# Patient Record
Sex: FEMALE | Race: WHITE
[De-identification: names, ages, dates, MRNs, and addresses within clinical notes are randomized per-mention and may not be internally consistent; named-entity substitution may affect disease eponyms.]

---

## 2018-04-27 ENCOUNTER — HOSPITAL ENCOUNTER (INPATIENT)
Dept: HOSPITAL 77 - KA.MS | Age: 83
LOS: 4 days | Discharge: NURSING, REHAB OR LONG-TERM CARE | DRG: 300 | End: 2018-05-01
Attending: FAMILY MEDICINE | Admitting: NURSE PRACTITIONER
Payer: MEDICARE

## 2018-04-27 DIAGNOSIS — N18.3: ICD-10-CM

## 2018-04-27 DIAGNOSIS — G25.81: ICD-10-CM

## 2018-04-27 DIAGNOSIS — Z88.8: ICD-10-CM

## 2018-04-27 DIAGNOSIS — K59.00: ICD-10-CM

## 2018-04-27 DIAGNOSIS — Z85.828: ICD-10-CM

## 2018-04-27 DIAGNOSIS — I50.32: ICD-10-CM

## 2018-04-27 DIAGNOSIS — K21.9: ICD-10-CM

## 2018-04-27 DIAGNOSIS — F32.9: ICD-10-CM

## 2018-04-27 DIAGNOSIS — R41.3: ICD-10-CM

## 2018-04-27 DIAGNOSIS — F41.8: ICD-10-CM

## 2018-04-27 DIAGNOSIS — D50.9: ICD-10-CM

## 2018-04-27 DIAGNOSIS — M54.5: ICD-10-CM

## 2018-04-27 DIAGNOSIS — I82.412: Primary | ICD-10-CM

## 2018-04-27 DIAGNOSIS — I11.0: ICD-10-CM

## 2018-04-27 DIAGNOSIS — Z79.899: ICD-10-CM

## 2018-04-27 DIAGNOSIS — Z87.891: ICD-10-CM

## 2018-04-27 DIAGNOSIS — I13.0: ICD-10-CM

## 2018-04-27 DIAGNOSIS — L03.116: ICD-10-CM

## 2018-04-28 LAB
CHLORIDE SERPL-SCNC: 109 MMOL/L (ref 98–115)
SODIUM SERPL-SCNC: 148 MMOL/L (ref 136–145)

## 2018-04-29 RX ADMIN — NYSTATIN SCH APPLIC: 100000 POWDER TOPICAL at 20:15

## 2018-04-29 NOTE — PN
04/29/2018 PATIENT NAME:  GRETCHEN MIR

 

CHIEF COMPLAINT:  Feels much better, did have significant bowel movement this

morning.  She was significantly constipated, less leg pain, less leg edema.

Vital signs stable.  She now has a therapeutic INR.

 

HISTORY:  84-year-old female was initially seen by Carmela Denise, Nurse

practitioner at [facility].  Her son had brought her in for

evaluation. She is evaluated by home health nurse.  Home health nurse had made a

vulnerable adult report. The patient was recently removed from long-term care;

however, she was admitted due to significant DVTs of her common femoral vein and

left superficial femoral vein as well as DVT of her proximal femoral and her

left greater saphenous vein, all incompletely compressible.  Also, she has a

nonocclusive thrombus in her right common femoral vein.  She was admitted for

anticoagulation and bridging therapy since she will be placed on a chronic

anticoagulation of Coumadin.

 

PHYSICAL EXAMINATION:  VITAL SIGNS:  The vital signs have been stable.  Blood

pressure 152/74, heart rate 68, temperature 98.8, and O2 sats 97% on 1 L oxygen.

LUNGS:  Her lungs are clear to auscultation. GENERAL:  The patient is alert and

oriented.

CV:  Regular rate and rhythm.  No murmur.  S1, S2 normal.  No rub.

GI:  No longer distended.  Increase in bowel tones with resolution of

constipation.

EXTREMITIES:  Lower extremities still edematous, however, some wrinkling and

less erythema, nonpitting 2+ to her left, now down to 2+ in her right.

Bilateral anterior shin shiny, however, some wrinkling.  Pedal pulses present

bilaterally dorsalis pedis.

 

LABORATORY DATA:  White count 5.8, INR now 2.0, PT 19.5, total protein 5.2,

albumin 2.37, BUN 20, and creatinine 1.26.

 

IMPRESSION AND PLAN:

1. Deep vein thrombosis, left common femoral vein, proximal femoral vein, and

    left greater saphenous vein.  Discontinue Lovenox. Continue with Coumadin 5

    mg p.o. daily.  Monitor INR, now therapeutic.  Attempts for KAREN hose

    stockings since she has less edema today; if not, continue with spiral Ace

    wraps.  Ankle pumps practice with the patient.  Monitor INR carefully.

2. Constipation significant on admission.  She was placed on Movantik due to

    chronic opioid therapy.  Docusate sodium along with prunes.  She had

    significant multiple bowel movements this morning with less GI distention.

    Continue Movantik one day.  Discontinue scheduled tramadol.  Reduce

    docusate sodium to one tablet.  Continue with stool softener and hold

    prunes.

3. Cellulitis, left lower extremity.  There is some mild improvement.  We will

    continue with Keflex orally.

4. Congestive heart failure, diastolic, chronic.  This is stable.  Continue

    with beta blocker 20 mg Lasix daily.  We will monitor for signs of

    dehydration.  Weight is stable.

5. Chronic musculoskeletal lumbar back pain; however, this is very manageable.

    The patient was on scheduled tramadol.  We will change to p.r.n.

6. Depression, stable on Zoloft.

7. Restless legs syndrome.  Continue with Requip.

8. Vulnerable adult.  Social service consult placed.  PT consult placed.

 

OVERALL PLAN:  Discontinue Lovenox.  Continue with Coumadin. Education today

regarding dietary modifications with Coumadin.  Pharmacy consultation will be

placed today.  Monitor INR.  Continue Ace wrap.  The patient will be determined

regarding placement in the morning.  She is medically ready to be discharged,

however.

 

DD:  04/29/2018 11:06:11

DT:  04/29/2018 12:54:43

Job #:  [id number]

## 2018-04-30 RX ADMIN — NYSTATIN SCH APPLIC: 100000 POWDER TOPICAL at 10:25

## 2018-04-30 RX ADMIN — NYSTATIN SCH APPLIC: 100000 POWDER TOPICAL at 20:55

## 2018-04-30 NOTE — PCM.PN
- General Info


Date of Service: 04/30/18


Functional Status: Reports: Pain Controlled, Tolerating Diet, Other (No longer 

constipation).  Denies: Ambulating, New Symptoms





- Review of Systems


General: Reports: Weakness


HEENT: Reports: No Symptoms


Pulmonary: Reports: No Symptoms


Cardiovascular: Reports: Edema


Gastrointestinal: Denies: Constipation, Diarrhea, Nausea, Vomiting


Musculoskeletal: Reports: Joint Swelling.  Denies: Back Pain


Skin: Reports: Rash.  Denies: Bruising, Pruritis


Neurological: Reports: Pre-Existing Deficit, Difficulty Walking, Weakness, Gait 

Disturbance


Psychiatric: Reports: No Symptoms





- Patient Data


Vitals - Most Recent: 


 Last Vital Signs











Temp  97.2 F   04/30/18 05:14


 


Pulse  68   04/30/18 05:14


 


Resp  22 H  04/30/18 05:14


 


BP  141/66 H  04/30/18 05:14


 


Pulse Ox  98   04/30/18 09:00











Weight - Most Recent: 176 lb 4.8 oz


I&O - Last 24 Hours: 


 Intake & Output











 04/29/18 04/30/18 04/30/18





 22:59 06:59 14:59


 


Intake Total 630 20 


 


Balance 630 20 











Med Orders - Current: 


 Current Medications





Acetaminophen (Tylenol)  650 mg PO BEDTIME [facility]


   Last Admin: 04/29/18 20:14 Dose:  650 mg


Acetaminophen (Tylenol)  650 mg PO Q6H PRN


   PRN Reason: Pain


Aspirin (Halfprin)  81 mg PO DAILY [facility]


   Last Admin: 04/29/18 08:03 Dose:  81 mg


Calcium Carbonate/Glycine (Tums)  750 mg PO TID PRN


   PRN Reason: DYSPEPSIA


Cephalexin (Keflex)  250 mg PO TID [facility]


   Stop: 05/04/18 14:01


   Last Admin: 04/29/18 20:14 Dose:  250 mg


Docusate Sodium (Colace)  100 mg PO DAILY [facility]


Furosemide (Lasix)  20 mg PO DAILY [facility]


   Last Admin: 04/29/18 08:04 Dose:  20 mg


Gabapentin (Neurontin)  300 mg PO BEDTIME [facility]


   Last Admin: 04/29/18 20:14 Dose:  300 mg


Gabapentin (Neurontin)  100 mg PO DAILY@0800,1200 [facility]


   Last Admin: 04/29/18 12:08 Dose:  100 mg


Metoprolol Succinate (Toprol Xl)  25 mg PO DAILY [facility]


   Last Admin: 04/29/18 08:04 Dose:  25 mg


Nitroglycerin (Nitrostat)  0.4 mg SL ASDIRECTED PRN


   PRN Reason: Chest Pain


Nystatin (Nystop)  0 gm TOP BID [facility]


   Last Admin: 04/29/18 20:15 Dose:  1 applic


Polyethylene Glycol (Miralax)  17 gm PO DAILY [facility]


   Last Admin: 04/29/18 08:00 Dose:  17 gm


Ropinirole HCl (Requip)  0.5 mg PO BEDTIME [facility]


   Last Admin: 04/29/18 20:14 Dose:  0.5 mg


Senna/Docusate Sodium (Senna Plus)  1 tab PO BID [facility]


   Last Admin: 04/29/18 20:16 Dose:  Not Given


Sertraline HCl (Zoloft)  75 mg PO DAILY [facility]


   Last Admin: 04/29/18 08:03 Dose:  75 mg


Tramadol HCl (Ultram)  50 mg PO Q8H PRN


   PRN Reason: Pain


Warfarin Sodium (Coumadin)  5 mg PO DAILY@1800 [facility]


   Last Admin: 04/29/18 18:02 Dose:  5 mg





Discontinued Medications





Docusate Sodium (Colace)  100 mg PO DAILY [facility]


   Last Admin: 04/28/18 08:21 Dose:  100 mg


Docusate Sodium (Colace)  100 mg PO BID [facility]


   Last Admin: 04/29/18 08:01 Dose:  100 mg


Enoxaparin Sodium (Lovenox)  80 mg SUBCUT BID [facility]


   Last Admin: 04/29/18 08:00 Dose:  80 mg


Tramadol HCl (Ultram)  100 mg PO BID [facility]


   Last Admin: 04/29/18 08:02 Dose:  100 mg


Warfarin Sodium (Coumadin)  10 mg PO ONETIME ONE


   Stop: 04/27/18 18:01


   Last Admin: 04/27/18 18:21 Dose:  10 mg











- Exam


Quality Assessment: Supplemental Oxygen


General: Alert, Oriented


Neck: Supple


Lungs: Clear to Auscultation, Normal Respiratory Effort


Cardiovascular: Regular Rate, Regular Rhythm


GI/Abdominal Exam: No Distention


Back Exam: No: CVA Tenderness (L), CVA Tenderness (R)


Extremities: Increased Warmth, Redness.  No: Anita's Sign


Skin: Warm, Rash (Anterior shins bilaterally)


Neurological: Normal Speech, Normal Tone


Psy/Mental Status: Alert, Normal Affect, Normal Mood





- Problem List Review


Problem List Initiated/Reviewed/Updated: Yes





- My Orders


Last 24 Hours: 


My Active Orders





04/29/18 13:18


Consult to  [CONS] Routine 





04/29/18 13:19


traMADol [Ultram]   50 mg PO Q8H PRN 





04/29/18 13:20


Consult to Physical Therapy [PT Evaluation and Treatment] [CONS] Routine 





04/29/18 21:00


Nystatin [Nystop]   0 gm TOP BID 





04/30/18 09:00


Docusate Sodium [Colace]   100 mg PO DAILY 














- Plan


Plan:: 


HISTORY OF PRESENT ILLNESS


84-year-old very pleasant elderly female was initially in admitted by Carmela mota in [place] due to DVT. Recently been removed from long-term care and had been 

evaluated by home health nurse in which a vulnerable report is generated by 

her. She was admitted due to significant DVTs of her common femoral vein, left 

superficial femoral vein, proximal femoral left greater saphenous vein--all 

incompletely compressible. She also had nonocclusive thrombus right common 

femoral vein. She was initially admitted for bridging therapy along with 

anticoagulation efforts and to monitor for any hemodynamic instability.


_________________________________________________





Primary problem


DVT, left, femoral vein, proximal femoral vein, left greater saphenous vein, 

now on Coumadin, LMWH discontinued. Therapeutic INR, KAREN hose stockings.





Constipation, COT contributory, significant on admission requiring digital 

disimpaction, soapsuds enema, Senna-S, Movantik, now resolved, tramadol changed 

to when necessary only





Cellulitis, BLE, improving with Keflex,





CHF, chronic, diastolic, beta blocker, Lasix





Musculoskeletal lumbar back pain, chronic, appears to be manageable without 

chronic opioids





Depression, stable on Zoloft restless leg syndrome, stable with Requip





Vulnerable adult; PT, social service today, 





Overall plan, patient medically stable, /physical therapy 

consult to Coumadin clinic, today likely needs to be placed back in long-term 

care

## 2018-05-01 VITALS — SYSTOLIC BLOOD PRESSURE: 123 MMHG | DIASTOLIC BLOOD PRESSURE: 70 MMHG

## 2018-05-01 RX ADMIN — NYSTATIN SCH APPLIC: 100000 POWDER TOPICAL at 10:00

## 2018-05-01 NOTE — DISCH
FINAL DIAGNOSES:

1. Deep vein thrombosis, femoral vein, proximal femoral vein and left greater

    saphenous vein.

2. Constipation, chronic opioid therapy induced.

3. Cellulitis, improving.

4. Chronic problems include diastolic congestive heart failure;

    musculoskeletal lumbar back pain, much improved; depression, stable on

    Zoloft, normal adult.  The patient will be placed back to long-term care.

 

HISTORY:  This 84-year-old, very pleasant, elderly female was initially admitted

by Carmela Denise, Nurse practitioner due to significant DVTs, recently had been

removed from long-term care.  Had been evaluated by home health nurse, which a

vulnerable report was generated.  She was admitted due to significant DVTs of

her common femoral vein, left superficial femoral vein, proximal femoral, left

greater saphenous vein all incompletely compressible.  She also had a

nonocclusive thrombus of her right common femoral vein.  She was admitted for

bridging therapy and anticoagulation and to monitor for any hemodynamic

instability.

 

HOSPITAL COURSE:  Hospital course was uneventful.  I had originally placed her

on Coumadin 10 mg p.o. daily along with Lovenox.  Her INR quickly became

therapeutic.  Once therapeutic at 2.0, low molecular weight heparin was

discontinued and we continued on Coumadin 5 mg each day.  She never had any

hemodynamic instability.  She did have monitoring of her left lower extremities

on her left.  She did have some cellulitis.  She came in on Keflex.  We

continued with that.  She had some improvement.  Her labs were monitored.  White

count 5.8, hemoglobin 10.6, and hematocrit 32.4.  INR on discharge 2.0.  Sodium

148, potassium 3.9, BUN 20, and creatinine 1.26.  AST and ALT were normal.

Albumin was 2.37.  The patient did have significant constipation which required

digital disimpaction with soapsuds enema and aggressive osmotic and stool

softeners which she had significant bowel movements.  We changed her diet to

increase with 3 whole prunes a day.  Her tramadol was discontinued.  She had no

back pain at all while in the hospital.  She will be discharged with scheduled

Tylenol and Senna-S.

 

MEDICATION CHANGES AND ADJUSTMENTS:  Tramadol, discontinued.  Coumadin 5 mg p.o.

daily, Senna-S one tablet p.o. daily.  Tylenol was changed from as needed to

scheduled.  She can continue on all other home medications.

 

DISPOSITION:  The patient will be placed back in long-term care.  Social

services consult has been placed.  She will be managed by the [facility]R Clinic,

consultation has been placed in for her.  They are to monitor for any increase

in redness or swelling of her lower extremities, any chest pains or shortness of

breath.  Her INR will be monitored.  She can follow up on rounds at next

scheduled appointment at the nursing home.

 

MEDICAL DECISION MAKING:  Greater than 45 minutes was spent on this discharge

planning, process, care, coordination, pharmacy consultation, and PT

consultation.

 

DD:  05/01/2018 10:16:39

DT:  05/01/2018 10:51:02

Job #:  [id number]

## 2019-01-31 ENCOUNTER — HOSPITAL ENCOUNTER (INPATIENT)
Dept: HOSPITAL 50 - VM.MS | Age: 84
LOS: 152 days | Discharge: HOME | DRG: 641 | End: 2019-07-02
Attending: FAMILY MEDICINE | Admitting: FAMILY MEDICINE
Payer: MEDICAID

## 2019-01-31 DIAGNOSIS — D64.9: ICD-10-CM

## 2019-01-31 DIAGNOSIS — E66.9: ICD-10-CM

## 2019-01-31 DIAGNOSIS — Z79.01: ICD-10-CM

## 2019-01-31 DIAGNOSIS — M81.0: ICD-10-CM

## 2019-01-31 DIAGNOSIS — Z90.49: ICD-10-CM

## 2019-01-31 DIAGNOSIS — M10.9: ICD-10-CM

## 2019-01-31 DIAGNOSIS — I13.0: ICD-10-CM

## 2019-01-31 DIAGNOSIS — Z85.828: ICD-10-CM

## 2019-01-31 DIAGNOSIS — M54.9: ICD-10-CM

## 2019-01-31 DIAGNOSIS — Z86.718: ICD-10-CM

## 2019-01-31 DIAGNOSIS — G25.81: ICD-10-CM

## 2019-01-31 DIAGNOSIS — J44.9: ICD-10-CM

## 2019-01-31 DIAGNOSIS — G89.29: ICD-10-CM

## 2019-01-31 DIAGNOSIS — F03.90: ICD-10-CM

## 2019-01-31 DIAGNOSIS — K21.9: ICD-10-CM

## 2019-01-31 DIAGNOSIS — F09: ICD-10-CM

## 2019-01-31 DIAGNOSIS — Z88.8: ICD-10-CM

## 2019-01-31 DIAGNOSIS — R62.7: Primary | ICD-10-CM

## 2019-01-31 DIAGNOSIS — N18.3: ICD-10-CM

## 2019-01-31 DIAGNOSIS — N39.0: ICD-10-CM

## 2019-01-31 DIAGNOSIS — Z79.899: ICD-10-CM

## 2019-01-31 DIAGNOSIS — I48.91: ICD-10-CM

## 2019-01-31 DIAGNOSIS — I50.32: ICD-10-CM

## 2019-01-31 DIAGNOSIS — F41.9: ICD-10-CM

## 2019-01-31 DIAGNOSIS — F32.9: ICD-10-CM

## 2019-01-31 RX ADMIN — ROPINIROLE SCH MG: 0.5 TABLET ORAL at 20:48

## 2019-01-31 RX ADMIN — MICONAZOLE NITRATE SCH APPLIC: 2 POWDER TOPICAL at 20:48

## 2019-01-31 NOTE — PCM.HP
H&P History of Present Illness





- General


Date of Service: 01/31/19


Source of Information: Patient, Other





- History of Present Illness


Initial Comments - Free Text/Narative: 








Patient presented by [facility] to ER for failure to thrive.   had 

been doing a vulnerable adult case with her.  She been living with her 

grandchildren who are unable to take care of her or transport.  She is 

basically sitting on the couch all day.  Numerous scratches to her arms for a 

puppy that been playing with her.  Doesn't sound like they're getting her up to 

toilet very much.  She is getting maybe one meal a day with a meals to [employer] 

type program.  She doesn't really have much for two complaints currently.  She 

doesn't have any pain anywhere.  She is on chronic Coumadin for history of DVT 

she has not had a recent INR the past couple months.  She do basic chemistry in 

the ER which was normal.





Past medical history skin cancer, shoulder pain, restless legs, vitreous 

detachment, peripheral neuropathy, obesity, osteoporosis, goiter, melanoma, 

ovarian cancer, impaired fasting glucose, DVT, gout, GERD, hypertension, 

peripheral edema, mild cognitive impairment, mild chronic kidney disease, 

diastolic heart failure, anxiety and depression, chronic anemia.





Medications: Coumadin, nystatin powder, Zoloft, Requip, when necessary 

nitroglycerin, Toprol, Neurontin, Lasix, Pepcid, Tylenol, Randi lax, nocturnal 

oxygen.





Allergies: She is intolerant to baclofen





social history:she does not drink alcohol, remote smoking history, she'd 

boyfriend at [facility] then he passed away -because of this she is 

not want return to nursing home there, she is a daughter-in-law and grandchild 

locally here that she has some contact with.  There is a son[place] she doesn't 

have much contact with.





Family history: Noncontributory





Review of systems: Denies any headache or neck pain.  Did have a recent fall 

but denies hitting her head or neck.  Any significant trauma.  Denies vision or 

hearing change that's new.  Denies any new memory or mood changes, mild chronic 

depression.  Denies chest pain or dyspnea.  Denies abdominal pain.  States 

urine and stools are normal.  Endorses mild stable chronic peripheral edema.  

Chronic scratches to arms per HPI.





Blood pressure 150/74, temperature 37, pulse 82, oxygen saturation 97% on room 

air.  Alert and oriented female, pleasant and cooperative in no acute distress.

  Head is eight rheumatic or mucosa pink and moist.  Heart and lungs clear to 

auscultation abdomen soft nontender.  No gross pressure ulcers to back or 

buttock.  1+ pitting edema bilaterally.





#1.  Failure to thrive and vulnerable adult.  She needs to be removed from this 

unsafe home environment.  County and  been contacted.  They're 

working on placement.  Given limited mobility this would be nursing home most 

likely.  We'll consult physical therapy regarding gait and ADLs.  She otherwise 

appears largely cognitively intact.





#2  She has a mildly super therapeutic INR which has not been monitored 

regularly secondary to above.  We will hold at least next couple doses and 

monitor this.





#3 Multiple scratches appear clean, wound cares per RN; no sign of pressure 

ulcer currently.








  ** Knees


Pain Score (Numeric/FACES): 4





- Related Data


Allergies/Adverse Reactions: 


 Allergies











Allergy/AdvReac Type Severity Reaction Status Date / Time


 


baclofen AdvReac Intermediate Confusion Verified 01/31/19 15:06











Home Medications: 


 Home Meds





Gabapentin 300 mg PO BEDTIME 05/19/15 [History]


Nitroglycerin [Nitrostat] 0.4 mg SL ASDIRECTED PRN 05/19/15 [History]


Sertraline [Zoloft] 100 mg PO DAILY 05/19/15 [History]


rOPINIRole HCl [Requip] 0.5 mg PO BEDTIME 05/20/15 [History]


Calcium Carbonate [Tums Extra Strength] 750 mg PO TID PRN 10/20/16 [History]


Gabapentin [Neurontin] 100 mg PO 08,12 10/20/16 [History]


Docusate Sodium [Colace] 100 mg PO DAILY 04/27/18 [History]


Furosemide 20 mg PO DAILY 04/27/18 [History]


Metoprolol Succinate 25 mg PO DAILY 04/27/18 [History]


Polyethylene Glycol 3350 [MiraLAX] 17 gm PO DAILY PRN 04/27/18 [History]


Acetaminophen [Tylenol] 650 mg PO Q6H #0 05/01/18 [Rx]


Sennosides/Docusate Sodium [Senna S Tablet] 1 each PO DAILY #30 tablet 05/01/18 

[Rx]


Famotidine 20 mg PO DAILY 01/31/19 [History]


Nystatin [Nyamyc] 1 gm TOP BID 01/31/19 [History]


Nystatin [Nystop] 1 applic TP BID PRN 01/31/19 [History]


Warfarin Sodium [Jantoven] 5 mg PO ASDIRECTED 01/31/19 [History]











Past Medical History


HEENT History: Reports: Cataract, Impaired Vision


Other HEENT History: bilat posterior vitreous detachment


Cardiovascular History: Reports: Blood Clots/VTE/DVT, Heart Failure, 

Hypertension


Other Cardiovascular History: 3+ pitting edema to lower legs and feet


Respiratory History: Reports: COPD, Other (See Below)


Other Respiratory History: Home O2 wears 1L/nc during the day and 3L/nc at night


Gastrointestinal History: Reports: GERD


Genitourinary History: Reports: Other (See Below)


Other Genitourinary History: CKD STAGE III


OB/GYN History: Reports: Pregnancy


Musculoskeletal History: Reports: Arthritis, Back Pain, Chronic, Gout, 

Osteoporosis


Neurological History: Reports: Neuropathy, Peripheral, Other (See Below)


Other Neuro History: Memory loss, cognitive defecit


Psychiatric History: Reports: Anxiety, Depression


Endocrine/Metabolic History: Reports: Obesity/BMI 30+, Other (See Below)


Other Endocrine/Metabolic History: Non toxic multinodular goiter


Hematologic History: Reports: Anemia


Other Hematologic History: DVT


Oncologic (Cancer) History: Reports: Malignant Melanoma, Ovarian, Squamous Cell 

Carcinoma


Dermatologic History: Reports: Cellulitis, Other (See Below)


Other Dermatologic History: Dermatochalasia, melanoma, squamous cell skin ca





- Infectious Disease History


Infectious Disease History: Reports: Chicken Pox, Measles, Mumps


Other Infectious Disease History: E coli in 2012





- Past Surgical History


HEENT Surgical History: Reports: Cataract Surgery


GI Surgical History: Reports: Appendectomy, Cholecystectomy


Musculoskeletal Surgical History: Reports: None


Dermatological Surgical History: Reports: Skin Biopsy





Social & Family History





- Family History


Family Medical History: Noncontributory


Oncologic: Reports: Lung





- Caffeine Use


Caffeine Use: Reports: None





H&P Review of Systems





- Review of Systems:


Review Of Systems: See Below





Exam





- Exam


Exam: See Below





- Vital Signs


Vital Signs: 


 Last Vital Signs











Temp  36.9 C   01/31/19 16:49


 


Pulse  82   01/31/19 16:49


 


Resp  20   01/31/19 16:49


 


BP  150/74 H  01/31/19 16:49


 


Pulse Ox  97   01/31/19 16:49











Weight: 71.668 kg





- Patient Data


Result Diagrams: 


 01/31/19 14:48





Problem List Initiated/Reviewed/Updated: Yes


Orders Last 24hrs: 


 Active Orders 24 hr











 Category Date Time Status


 


 ALBUMIN [CHEM] Routine Lab  01/31/19 16:56 Ordered


 


 CBC WITH AUTO DIFF [HEME] Routine Lab  01/31/19 16:56 Ordered


 


 INR,PT,PROTHROMBIN TIME [COAG] Routine Lab  01/31/19 16:56 Ordered


 


 SEDIMENTATION RATE AUTO [HEME] Routine Lab  01/31/19 16:56 Ordered


 


 TSH ULTRASENSITIVE [CHEM] Routine Lab  01/31/19 16:56 Ordered


 


 VITAMIN B12 [REF] Routine Lab  01/31/19 16:56 Ordered

## 2019-02-01 RX ADMIN — MICONAZOLE NITRATE SCH APPLIC: 2 POWDER TOPICAL at 21:37

## 2019-02-01 RX ADMIN — GABAPENTIN SCH MG: 100 CAPSULE ORAL at 21:36

## 2019-02-01 RX ADMIN — GABAPENTIN SCH MG: 100 CAPSULE ORAL at 12:04

## 2019-02-01 RX ADMIN — FUROSEMIDE SCH MG: 20 TABLET ORAL at 08:16

## 2019-02-01 RX ADMIN — ROPINIROLE SCH MG: 0.5 TABLET ORAL at 21:35

## 2019-02-01 RX ADMIN — MICONAZOLE NITRATE SCH APPLIC: 2 POWDER TOPICAL at 08:18

## 2019-02-01 RX ADMIN — SERTRALINE HYDROCHLORIDE SCH MG: 100 TABLET ORAL at 08:17

## 2019-02-02 RX ADMIN — MICONAZOLE NITRATE SCH APPLIC: 2 POWDER TOPICAL at 19:27

## 2019-02-02 RX ADMIN — SERTRALINE HYDROCHLORIDE SCH MG: 100 TABLET ORAL at 07:43

## 2019-02-02 RX ADMIN — ROPINIROLE SCH MG: 0.5 TABLET ORAL at 19:28

## 2019-02-02 RX ADMIN — GABAPENTIN SCH MG: 100 CAPSULE ORAL at 19:28

## 2019-02-02 RX ADMIN — MICONAZOLE NITRATE SCH APPLIC: 2 POWDER TOPICAL at 07:44

## 2019-02-02 RX ADMIN — FUROSEMIDE SCH MG: 20 TABLET ORAL at 07:42

## 2019-02-02 RX ADMIN — GABAPENTIN SCH MG: 100 CAPSULE ORAL at 07:43

## 2019-02-02 RX ADMIN — GABAPENTIN SCH MG: 100 CAPSULE ORAL at 12:22

## 2019-02-03 RX ADMIN — FUROSEMIDE SCH MG: 20 TABLET ORAL at 07:34

## 2019-02-03 RX ADMIN — GABAPENTIN SCH MG: 100 CAPSULE ORAL at 20:45

## 2019-02-03 RX ADMIN — MICONAZOLE NITRATE SCH APPLIC: 2 POWDER TOPICAL at 07:35

## 2019-02-03 RX ADMIN — GABAPENTIN SCH MG: 100 CAPSULE ORAL at 12:20

## 2019-02-03 RX ADMIN — GABAPENTIN SCH MG: 100 CAPSULE ORAL at 07:35

## 2019-02-03 RX ADMIN — SERTRALINE HYDROCHLORIDE SCH MG: 100 TABLET ORAL at 07:34

## 2019-02-03 RX ADMIN — MICONAZOLE NITRATE SCH APPLIC: 2 POWDER TOPICAL at 20:46

## 2019-02-03 RX ADMIN — ROPINIROLE SCH MG: 0.5 TABLET ORAL at 20:45

## 2019-02-04 RX ADMIN — MICONAZOLE NITRATE SCH APPLIC: 2 POWDER TOPICAL at 08:32

## 2019-02-04 RX ADMIN — GABAPENTIN SCH MG: 100 CAPSULE ORAL at 08:32

## 2019-02-04 RX ADMIN — SERTRALINE HYDROCHLORIDE SCH MG: 100 TABLET ORAL at 08:31

## 2019-02-04 RX ADMIN — GABAPENTIN SCH MG: 100 CAPSULE ORAL at 13:08

## 2019-02-04 RX ADMIN — FUROSEMIDE SCH MG: 20 TABLET ORAL at 08:32

## 2019-02-05 RX ADMIN — MICONAZOLE NITRATE SCH APPLIC: 2 POWDER TOPICAL at 20:38

## 2019-02-05 RX ADMIN — FUROSEMIDE SCH MG: 20 TABLET ORAL at 07:34

## 2019-02-05 RX ADMIN — ROPINIROLE SCH MG: 0.5 TABLET ORAL at 20:38

## 2019-02-05 RX ADMIN — Medication SCH MG: at 20:40

## 2019-02-05 RX ADMIN — SERTRALINE HYDROCHLORIDE SCH MG: 100 TABLET ORAL at 07:34

## 2019-02-05 RX ADMIN — GABAPENTIN SCH MG: 100 CAPSULE ORAL at 20:43

## 2019-02-05 RX ADMIN — GABAPENTIN SCH MG: 100 CAPSULE ORAL at 20:38

## 2019-02-05 RX ADMIN — GABAPENTIN SCH MG: 100 CAPSULE ORAL at 07:33

## 2019-02-05 RX ADMIN — ROPINIROLE SCH MG: 0.5 TABLET ORAL at 20:42

## 2019-02-05 RX ADMIN — Medication SCH MG: at 01:19

## 2019-02-05 RX ADMIN — GABAPENTIN SCH MG: 100 CAPSULE ORAL at 12:38

## 2019-02-05 RX ADMIN — Medication SCH MG: at 20:44

## 2019-02-05 RX ADMIN — MICONAZOLE NITRATE SCH APPLIC: 2 POWDER TOPICAL at 01:20

## 2019-02-05 RX ADMIN — ROPINIROLE SCH MG: 0.5 TABLET ORAL at 01:19

## 2019-02-05 RX ADMIN — GABAPENTIN SCH MG: 100 CAPSULE ORAL at 01:19

## 2019-02-05 RX ADMIN — MICONAZOLE NITRATE SCH APPLIC: 2 POWDER TOPICAL at 07:34

## 2019-02-06 RX ADMIN — MICONAZOLE NITRATE SCH APPLIC: 2 POWDER TOPICAL at 20:04

## 2019-02-06 RX ADMIN — Medication SCH MG: at 20:06

## 2019-02-06 RX ADMIN — MICONAZOLE NITRATE SCH APPLIC: 2 POWDER TOPICAL at 08:46

## 2019-02-06 RX ADMIN — GABAPENTIN SCH MG: 100 CAPSULE ORAL at 11:49

## 2019-02-06 RX ADMIN — ROPINIROLE SCH MG: 0.5 TABLET ORAL at 20:05

## 2019-02-06 RX ADMIN — GABAPENTIN SCH MG: 100 CAPSULE ORAL at 08:46

## 2019-02-06 RX ADMIN — GABAPENTIN SCH MG: 100 CAPSULE ORAL at 20:05

## 2019-02-06 RX ADMIN — FUROSEMIDE SCH MG: 20 TABLET ORAL at 08:46

## 2019-02-06 RX ADMIN — SERTRALINE HYDROCHLORIDE SCH MG: 100 TABLET ORAL at 08:45

## 2019-02-07 RX ADMIN — SERTRALINE HYDROCHLORIDE SCH MG: 100 TABLET ORAL at 09:01

## 2019-02-07 RX ADMIN — ROPINIROLE SCH MG: 0.5 TABLET ORAL at 19:54

## 2019-02-07 RX ADMIN — MICONAZOLE NITRATE SCH APPLIC: 2 POWDER TOPICAL at 09:03

## 2019-02-07 RX ADMIN — MICONAZOLE NITRATE SCH APPLIC: 2 POWDER TOPICAL at 19:53

## 2019-02-07 RX ADMIN — GABAPENTIN SCH MG: 100 CAPSULE ORAL at 11:41

## 2019-02-07 RX ADMIN — GABAPENTIN SCH MG: 100 CAPSULE ORAL at 19:54

## 2019-02-07 RX ADMIN — GABAPENTIN SCH MG: 100 CAPSULE ORAL at 07:47

## 2019-02-07 RX ADMIN — FUROSEMIDE SCH MG: 20 TABLET ORAL at 09:01

## 2019-02-07 RX ADMIN — Medication SCH MG: at 19:55

## 2019-02-08 RX ADMIN — GABAPENTIN SCH MG: 100 CAPSULE ORAL at 12:27

## 2019-02-08 RX ADMIN — ROPINIROLE SCH MG: 0.5 TABLET ORAL at 21:00

## 2019-02-08 RX ADMIN — GABAPENTIN SCH MG: 100 CAPSULE ORAL at 21:00

## 2019-02-08 RX ADMIN — Medication SCH MG: at 21:00

## 2019-02-08 RX ADMIN — SERTRALINE HYDROCHLORIDE SCH MG: 100 TABLET ORAL at 08:10

## 2019-02-08 RX ADMIN — MICONAZOLE NITRATE SCH APPLIC: 2 POWDER TOPICAL at 08:12

## 2019-02-08 RX ADMIN — GABAPENTIN SCH MG: 100 CAPSULE ORAL at 08:10

## 2019-02-08 RX ADMIN — MICONAZOLE NITRATE SCH APPLIC: 2 POWDER TOPICAL at 21:00

## 2019-02-08 RX ADMIN — FUROSEMIDE SCH MG: 20 TABLET ORAL at 08:10

## 2019-02-09 RX ADMIN — Medication SCH MG: at 20:17

## 2019-02-09 RX ADMIN — SERTRALINE HYDROCHLORIDE SCH MG: 100 TABLET ORAL at 08:07

## 2019-02-09 RX ADMIN — ROPINIROLE SCH MG: 0.5 TABLET ORAL at 20:15

## 2019-02-09 RX ADMIN — MICONAZOLE NITRATE SCH APPLIC: 2 POWDER TOPICAL at 08:08

## 2019-02-09 RX ADMIN — GABAPENTIN SCH MG: 100 CAPSULE ORAL at 12:32

## 2019-02-09 RX ADMIN — GABAPENTIN SCH MG: 100 CAPSULE ORAL at 08:07

## 2019-02-09 RX ADMIN — GABAPENTIN SCH MG: 100 CAPSULE ORAL at 20:14

## 2019-02-09 RX ADMIN — FUROSEMIDE SCH MG: 20 TABLET ORAL at 08:07

## 2019-02-09 RX ADMIN — MICONAZOLE NITRATE SCH APPLIC: 2 POWDER TOPICAL at 20:13

## 2019-02-10 RX ADMIN — GABAPENTIN SCH MG: 100 CAPSULE ORAL at 12:48

## 2019-02-10 RX ADMIN — Medication SCH MG: at 20:09

## 2019-02-10 RX ADMIN — GABAPENTIN SCH MG: 100 CAPSULE ORAL at 08:06

## 2019-02-10 RX ADMIN — MICONAZOLE NITRATE SCH APPLIC: 2 POWDER TOPICAL at 08:08

## 2019-02-10 RX ADMIN — ROPINIROLE SCH MG: 0.5 TABLET ORAL at 20:07

## 2019-02-10 RX ADMIN — SERTRALINE HYDROCHLORIDE SCH MG: 100 TABLET ORAL at 08:05

## 2019-02-10 RX ADMIN — GABAPENTIN SCH MG: 100 CAPSULE ORAL at 20:07

## 2019-02-10 RX ADMIN — MICONAZOLE NITRATE SCH APPLIC: 2 POWDER TOPICAL at 20:08

## 2019-02-10 RX ADMIN — FUROSEMIDE SCH MG: 20 TABLET ORAL at 08:06

## 2019-02-11 RX ADMIN — SERTRALINE HYDROCHLORIDE SCH MG: 100 TABLET ORAL at 08:00

## 2019-02-11 RX ADMIN — FUROSEMIDE SCH MG: 20 TABLET ORAL at 07:59

## 2019-02-11 RX ADMIN — Medication SCH MG: at 20:21

## 2019-02-11 RX ADMIN — GABAPENTIN SCH MG: 100 CAPSULE ORAL at 20:22

## 2019-02-11 RX ADMIN — ROPINIROLE SCH MG: 0.5 TABLET ORAL at 20:20

## 2019-02-11 RX ADMIN — MICONAZOLE NITRATE SCH APPLIC: 2 POWDER TOPICAL at 20:20

## 2019-02-11 RX ADMIN — GABAPENTIN SCH MG: 100 CAPSULE ORAL at 12:12

## 2019-02-11 RX ADMIN — MICONAZOLE NITRATE SCH APPLIC: 2 POWDER TOPICAL at 08:01

## 2019-02-11 RX ADMIN — GABAPENTIN SCH MG: 100 CAPSULE ORAL at 08:00

## 2019-02-12 RX ADMIN — GABAPENTIN SCH MG: 100 CAPSULE ORAL at 08:14

## 2019-02-12 RX ADMIN — SERTRALINE HYDROCHLORIDE SCH MG: 100 TABLET ORAL at 08:14

## 2019-02-12 RX ADMIN — ROPINIROLE SCH MG: 0.5 TABLET ORAL at 19:47

## 2019-02-12 RX ADMIN — FUROSEMIDE SCH MG: 20 TABLET ORAL at 08:15

## 2019-02-12 RX ADMIN — GABAPENTIN SCH MG: 100 CAPSULE ORAL at 13:03

## 2019-02-12 RX ADMIN — MICONAZOLE NITRATE SCH APPLIC: 2 POWDER TOPICAL at 19:46

## 2019-02-12 RX ADMIN — GABAPENTIN SCH MG: 100 CAPSULE ORAL at 19:46

## 2019-02-12 RX ADMIN — MICONAZOLE NITRATE SCH APPLIC: 2 POWDER TOPICAL at 08:16

## 2019-02-13 RX ADMIN — SERTRALINE HYDROCHLORIDE SCH MG: 100 TABLET ORAL at 07:59

## 2019-02-13 RX ADMIN — GABAPENTIN SCH MG: 100 CAPSULE ORAL at 07:58

## 2019-02-13 RX ADMIN — GABAPENTIN SCH MG: 100 CAPSULE ORAL at 20:01

## 2019-02-13 RX ADMIN — MICONAZOLE NITRATE SCH: 2 POWDER TOPICAL at 20:15

## 2019-02-13 RX ADMIN — FUROSEMIDE SCH MG: 20 TABLET ORAL at 08:00

## 2019-02-13 RX ADMIN — MICONAZOLE NITRATE SCH APPLIC: 2 POWDER TOPICAL at 07:58

## 2019-02-13 RX ADMIN — GABAPENTIN SCH MG: 100 CAPSULE ORAL at 12:33

## 2019-02-13 RX ADMIN — ROPINIROLE SCH MG: 0.5 TABLET ORAL at 19:59

## 2019-02-14 RX ADMIN — SERTRALINE HYDROCHLORIDE SCH MG: 100 TABLET ORAL at 07:50

## 2019-02-14 RX ADMIN — GABAPENTIN SCH MG: 100 CAPSULE ORAL at 07:52

## 2019-02-14 RX ADMIN — MICONAZOLE NITRATE SCH APPLIC: 2 POWDER TOPICAL at 07:52

## 2019-02-14 RX ADMIN — Medication SCH: at 19:04

## 2019-02-14 RX ADMIN — ROPINIROLE SCH MG: 0.5 TABLET ORAL at 19:02

## 2019-02-14 RX ADMIN — FUROSEMIDE SCH MG: 20 TABLET ORAL at 07:52

## 2019-02-14 RX ADMIN — MICONAZOLE NITRATE SCH APPLIC: 2 POWDER TOPICAL at 19:03

## 2019-02-14 RX ADMIN — GABAPENTIN SCH MG: 100 CAPSULE ORAL at 19:03

## 2019-02-14 RX ADMIN — GABAPENTIN SCH MG: 100 CAPSULE ORAL at 12:28

## 2019-02-15 RX ADMIN — MICONAZOLE NITRATE SCH APPLIC: 2 POWDER TOPICAL at 19:48

## 2019-02-15 RX ADMIN — GABAPENTIN SCH MG: 100 CAPSULE ORAL at 08:01

## 2019-02-15 RX ADMIN — ROPINIROLE SCH MG: 0.5 TABLET ORAL at 19:48

## 2019-02-15 RX ADMIN — GABAPENTIN SCH MG: 100 CAPSULE ORAL at 13:02

## 2019-02-15 RX ADMIN — FUROSEMIDE SCH MG: 20 TABLET ORAL at 08:01

## 2019-02-15 RX ADMIN — MICONAZOLE NITRATE SCH APPLIC: 2 POWDER TOPICAL at 08:02

## 2019-02-15 RX ADMIN — GABAPENTIN SCH MG: 100 CAPSULE ORAL at 19:48

## 2019-02-15 RX ADMIN — SERTRALINE HYDROCHLORIDE SCH MG: 100 TABLET ORAL at 08:01

## 2019-02-16 RX ADMIN — MICONAZOLE NITRATE SCH APPLIC: 2 POWDER TOPICAL at 19:36

## 2019-02-16 RX ADMIN — GABAPENTIN SCH MG: 100 CAPSULE ORAL at 19:32

## 2019-02-16 RX ADMIN — MICONAZOLE NITRATE SCH APPLIC: 2 POWDER TOPICAL at 07:56

## 2019-02-16 RX ADMIN — GABAPENTIN SCH MG: 100 CAPSULE ORAL at 12:38

## 2019-02-16 RX ADMIN — SERTRALINE HYDROCHLORIDE SCH MG: 100 TABLET ORAL at 07:54

## 2019-02-16 RX ADMIN — FUROSEMIDE SCH MG: 20 TABLET ORAL at 07:50

## 2019-02-16 RX ADMIN — Medication SCH MG: at 19:33

## 2019-02-16 RX ADMIN — GABAPENTIN SCH MG: 100 CAPSULE ORAL at 07:50

## 2019-02-16 RX ADMIN — ROPINIROLE SCH MG: 0.5 TABLET ORAL at 19:33

## 2019-02-17 RX ADMIN — ROPINIROLE SCH MG: 0.5 TABLET ORAL at 20:22

## 2019-02-17 RX ADMIN — SERTRALINE HYDROCHLORIDE SCH MG: 100 TABLET ORAL at 07:49

## 2019-02-17 RX ADMIN — GABAPENTIN SCH MG: 100 CAPSULE ORAL at 07:48

## 2019-02-17 RX ADMIN — MICONAZOLE NITRATE SCH APPLIC: 2 POWDER TOPICAL at 20:23

## 2019-02-17 RX ADMIN — FUROSEMIDE SCH MG: 20 TABLET ORAL at 07:48

## 2019-02-17 RX ADMIN — Medication SCH MG: at 20:22

## 2019-02-17 RX ADMIN — GABAPENTIN SCH MG: 100 CAPSULE ORAL at 12:29

## 2019-02-17 RX ADMIN — GABAPENTIN SCH MG: 100 CAPSULE ORAL at 20:21

## 2019-02-17 RX ADMIN — MICONAZOLE NITRATE SCH: 2 POWDER TOPICAL at 07:47

## 2019-02-18 RX ADMIN — GABAPENTIN SCH MG: 100 CAPSULE ORAL at 09:00

## 2019-02-18 RX ADMIN — FUROSEMIDE SCH MG: 20 TABLET ORAL at 08:59

## 2019-02-18 RX ADMIN — ROPINIROLE SCH MG: 0.5 TABLET ORAL at 20:29

## 2019-02-18 RX ADMIN — MICONAZOLE NITRATE SCH: 2 POWDER TOPICAL at 09:00

## 2019-02-18 RX ADMIN — SERTRALINE HYDROCHLORIDE SCH MG: 100 TABLET ORAL at 09:00

## 2019-02-18 RX ADMIN — GABAPENTIN SCH MG: 100 CAPSULE ORAL at 20:29

## 2019-02-18 RX ADMIN — GABAPENTIN SCH MG: 100 CAPSULE ORAL at 14:46

## 2019-02-19 RX ADMIN — GABAPENTIN SCH MG: 100 CAPSULE ORAL at 09:32

## 2019-02-19 RX ADMIN — Medication SCH MG: at 19:41

## 2019-02-19 RX ADMIN — SERTRALINE HYDROCHLORIDE SCH MG: 100 TABLET ORAL at 09:32

## 2019-02-19 RX ADMIN — GABAPENTIN SCH MG: 100 CAPSULE ORAL at 13:17

## 2019-02-19 RX ADMIN — FUROSEMIDE SCH MG: 20 TABLET ORAL at 09:31

## 2019-02-19 RX ADMIN — ROPINIROLE SCH MG: 0.5 TABLET ORAL at 19:33

## 2019-02-19 RX ADMIN — GABAPENTIN SCH MG: 100 CAPSULE ORAL at 19:34

## 2019-02-20 RX ADMIN — SERTRALINE HYDROCHLORIDE SCH MG: 100 TABLET ORAL at 07:32

## 2019-02-20 RX ADMIN — GABAPENTIN SCH MG: 100 CAPSULE ORAL at 07:33

## 2019-02-20 RX ADMIN — FUROSEMIDE SCH MG: 20 TABLET ORAL at 07:32

## 2019-02-20 RX ADMIN — GABAPENTIN SCH MG: 100 CAPSULE ORAL at 13:25

## 2019-02-20 RX ADMIN — GABAPENTIN SCH MG: 100 CAPSULE ORAL at 20:03

## 2019-02-20 RX ADMIN — ROPINIROLE SCH MG: 0.5 TABLET ORAL at 20:03

## 2019-02-21 RX ADMIN — GABAPENTIN SCH MG: 100 CAPSULE ORAL at 08:17

## 2019-02-21 RX ADMIN — GABAPENTIN SCH MG: 100 CAPSULE ORAL at 19:16

## 2019-02-21 RX ADMIN — GABAPENTIN SCH MG: 100 CAPSULE ORAL at 13:27

## 2019-02-21 RX ADMIN — FUROSEMIDE SCH MG: 20 TABLET ORAL at 08:18

## 2019-02-21 RX ADMIN — ROPINIROLE SCH MG: 0.5 TABLET ORAL at 19:16

## 2019-02-21 RX ADMIN — SERTRALINE HYDROCHLORIDE SCH MG: 100 TABLET ORAL at 08:17

## 2019-02-22 RX ADMIN — ROPINIROLE SCH MG: 0.5 TABLET ORAL at 19:54

## 2019-02-22 RX ADMIN — GABAPENTIN SCH MG: 100 CAPSULE ORAL at 07:27

## 2019-02-22 RX ADMIN — GABAPENTIN SCH MG: 100 CAPSULE ORAL at 12:29

## 2019-02-22 RX ADMIN — FUROSEMIDE SCH MG: 20 TABLET ORAL at 07:26

## 2019-02-22 RX ADMIN — SERTRALINE HYDROCHLORIDE SCH MG: 100 TABLET ORAL at 07:28

## 2019-02-22 RX ADMIN — GABAPENTIN SCH MG: 100 CAPSULE ORAL at 19:54

## 2019-02-23 RX ADMIN — GABAPENTIN SCH MG: 100 CAPSULE ORAL at 10:59

## 2019-02-23 RX ADMIN — GABAPENTIN SCH MG: 100 CAPSULE ORAL at 19:49

## 2019-02-23 RX ADMIN — GABAPENTIN SCH MG: 100 CAPSULE ORAL at 07:13

## 2019-02-23 RX ADMIN — ROPINIROLE SCH MG: 0.5 TABLET ORAL at 19:49

## 2019-02-23 RX ADMIN — FUROSEMIDE SCH MG: 20 TABLET ORAL at 07:13

## 2019-02-23 RX ADMIN — SERTRALINE HYDROCHLORIDE SCH MG: 100 TABLET ORAL at 07:13

## 2019-02-24 RX ADMIN — ROPINIROLE SCH MG: 0.5 TABLET ORAL at 19:21

## 2019-02-24 RX ADMIN — SERTRALINE HYDROCHLORIDE SCH MG: 100 TABLET ORAL at 07:52

## 2019-02-24 RX ADMIN — GABAPENTIN SCH MG: 100 CAPSULE ORAL at 11:27

## 2019-02-24 RX ADMIN — FUROSEMIDE SCH MG: 20 TABLET ORAL at 07:52

## 2019-02-24 RX ADMIN — GABAPENTIN SCH MG: 100 CAPSULE ORAL at 19:21

## 2019-02-24 RX ADMIN — GABAPENTIN SCH MG: 100 CAPSULE ORAL at 07:51

## 2019-02-25 RX ADMIN — SERTRALINE HYDROCHLORIDE SCH MG: 100 TABLET ORAL at 08:00

## 2019-02-25 RX ADMIN — ROPINIROLE SCH MG: 0.5 TABLET ORAL at 20:03

## 2019-02-25 RX ADMIN — GABAPENTIN SCH MG: 100 CAPSULE ORAL at 20:02

## 2019-02-25 RX ADMIN — FUROSEMIDE SCH MG: 20 TABLET ORAL at 08:00

## 2019-02-25 RX ADMIN — GABAPENTIN SCH MG: 100 CAPSULE ORAL at 08:52

## 2019-02-25 RX ADMIN — GABAPENTIN SCH MG: 100 CAPSULE ORAL at 13:05

## 2019-02-26 RX ADMIN — Medication SCH MG: at 20:05

## 2019-02-26 RX ADMIN — GABAPENTIN SCH MG: 100 CAPSULE ORAL at 13:02

## 2019-02-26 RX ADMIN — FUROSEMIDE SCH MG: 20 TABLET ORAL at 07:33

## 2019-02-26 RX ADMIN — GABAPENTIN SCH MG: 100 CAPSULE ORAL at 07:34

## 2019-02-26 RX ADMIN — ROPINIROLE SCH MG: 0.5 TABLET ORAL at 20:04

## 2019-02-26 RX ADMIN — SERTRALINE HYDROCHLORIDE SCH MG: 100 TABLET ORAL at 07:33

## 2019-02-26 RX ADMIN — GABAPENTIN SCH MG: 100 CAPSULE ORAL at 20:05

## 2019-02-27 RX ADMIN — Medication SCH MG: at 19:48

## 2019-02-27 RX ADMIN — GABAPENTIN SCH MG: 100 CAPSULE ORAL at 19:50

## 2019-02-27 RX ADMIN — SERTRALINE HYDROCHLORIDE SCH MG: 100 TABLET ORAL at 07:12

## 2019-02-27 RX ADMIN — GABAPENTIN SCH: 100 CAPSULE ORAL at 08:09

## 2019-02-27 RX ADMIN — FUROSEMIDE SCH MG: 20 TABLET ORAL at 07:12

## 2019-02-27 RX ADMIN — GABAPENTIN SCH MG: 100 CAPSULE ORAL at 12:01

## 2019-02-27 RX ADMIN — GABAPENTIN SCH MG: 100 CAPSULE ORAL at 07:12

## 2019-02-27 RX ADMIN — ROPINIROLE SCH MG: 0.5 TABLET ORAL at 19:50

## 2019-02-28 RX ADMIN — GABAPENTIN SCH MG: 100 CAPSULE ORAL at 20:27

## 2019-02-28 RX ADMIN — SERTRALINE HYDROCHLORIDE SCH MG: 100 TABLET ORAL at 09:30

## 2019-02-28 RX ADMIN — ROPINIROLE SCH MG: 0.5 TABLET ORAL at 19:15

## 2019-02-28 RX ADMIN — GABAPENTIN SCH MG: 100 CAPSULE ORAL at 13:03

## 2019-02-28 RX ADMIN — FUROSEMIDE SCH MG: 20 TABLET ORAL at 09:30

## 2019-02-28 RX ADMIN — Medication SCH MG: at 20:32

## 2019-02-28 RX ADMIN — GABAPENTIN SCH MG: 100 CAPSULE ORAL at 09:30

## 2019-03-01 RX ADMIN — GABAPENTIN SCH MG: 100 CAPSULE ORAL at 20:04

## 2019-03-01 RX ADMIN — GABAPENTIN SCH MG: 100 CAPSULE ORAL at 09:08

## 2019-03-01 RX ADMIN — ROPINIROLE SCH MG: 0.5 TABLET ORAL at 20:04

## 2019-03-01 RX ADMIN — GABAPENTIN SCH MG: 100 CAPSULE ORAL at 12:30

## 2019-03-01 RX ADMIN — SERTRALINE HYDROCHLORIDE SCH MG: 100 TABLET ORAL at 09:08

## 2019-03-01 RX ADMIN — FUROSEMIDE SCH MG: 20 TABLET ORAL at 09:08

## 2019-03-01 RX ADMIN — Medication SCH MG: at 20:03

## 2019-03-02 RX ADMIN — GABAPENTIN SCH MG: 100 CAPSULE ORAL at 13:16

## 2019-03-02 RX ADMIN — GABAPENTIN SCH MG: 100 CAPSULE ORAL at 20:21

## 2019-03-02 RX ADMIN — Medication SCH MG: at 20:20

## 2019-03-02 RX ADMIN — GABAPENTIN SCH MG: 100 CAPSULE ORAL at 08:28

## 2019-03-02 RX ADMIN — SERTRALINE HYDROCHLORIDE SCH MG: 100 TABLET ORAL at 08:28

## 2019-03-02 RX ADMIN — FUROSEMIDE SCH MG: 20 TABLET ORAL at 08:28

## 2019-03-02 RX ADMIN — ROPINIROLE SCH MG: 0.5 TABLET ORAL at 20:21

## 2019-03-03 RX ADMIN — ROPINIROLE SCH MG: 0.5 TABLET ORAL at 19:44

## 2019-03-03 RX ADMIN — GABAPENTIN SCH MG: 100 CAPSULE ORAL at 19:43

## 2019-03-03 RX ADMIN — GABAPENTIN SCH MG: 100 CAPSULE ORAL at 14:51

## 2019-03-03 RX ADMIN — GABAPENTIN SCH: 100 CAPSULE ORAL at 10:45

## 2019-03-03 RX ADMIN — FUROSEMIDE SCH: 20 TABLET ORAL at 10:44

## 2019-03-03 RX ADMIN — SERTRALINE HYDROCHLORIDE SCH: 100 TABLET ORAL at 10:45

## 2019-03-04 RX ADMIN — SERTRALINE HYDROCHLORIDE SCH MG: 100 TABLET ORAL at 08:12

## 2019-03-04 RX ADMIN — GABAPENTIN SCH MG: 100 CAPSULE ORAL at 08:12

## 2019-03-04 RX ADMIN — FUROSEMIDE SCH MG: 20 TABLET ORAL at 08:12

## 2019-03-04 RX ADMIN — GABAPENTIN SCH MG: 100 CAPSULE ORAL at 12:00

## 2019-03-04 RX ADMIN — ROPINIROLE SCH MG: 0.5 TABLET ORAL at 20:15

## 2019-03-04 RX ADMIN — GABAPENTIN SCH MG: 100 CAPSULE ORAL at 20:14

## 2019-03-04 NOTE — CR
______________________________________________________________________________   

  

[id number] RAD/RAD Video Swallow Study  

Exam:   

   

 RAD Video Swallow Study  

   

 Clinical Data:   

   

 DIFFICULTY SWALLOWING  

   

 COMPARISON:   

   

 NO PREVIOUS SIMILAR EXAM IS AVAILABLE  

   

 FINDINGS:   

   

 The exam was performed by a certified technologist and speech   

   

 pathologist in the patient's hometown.  

   

 Penetration was seen in the piriform sinuses.  

   

 Residuals were present.  

   

 No aspiration was seen.   

   

 IMPRESSION:  

   

 PATIENT CONSIDERED AT MODERATE RISK FOR ASPIRATION PNEUMONIA.  

   

 Electronically signed by Kendell Mckeon MD on 3/4/2019 2:02 PM  

   

  

Kendell Mckeon MD                 

 03/04/19 4210    

  

Thank you for allowing us to participate in the care of your patient.

## 2019-03-05 RX ADMIN — GABAPENTIN SCH MG: 100 CAPSULE ORAL at 09:19

## 2019-03-05 RX ADMIN — GABAPENTIN SCH MG: 100 CAPSULE ORAL at 19:46

## 2019-03-05 RX ADMIN — FUROSEMIDE SCH MG: 20 TABLET ORAL at 09:17

## 2019-03-05 RX ADMIN — ROPINIROLE SCH MG: 0.5 TABLET ORAL at 19:46

## 2019-03-05 RX ADMIN — SERTRALINE HYDROCHLORIDE SCH MG: 100 TABLET ORAL at 09:19

## 2019-03-05 RX ADMIN — GABAPENTIN SCH MG: 100 CAPSULE ORAL at 15:17

## 2019-03-06 RX ADMIN — GABAPENTIN SCH MG: 100 CAPSULE ORAL at 13:57

## 2019-03-06 RX ADMIN — SERTRALINE HYDROCHLORIDE SCH MG: 100 TABLET ORAL at 07:39

## 2019-03-06 RX ADMIN — GABAPENTIN SCH MG: 100 CAPSULE ORAL at 19:37

## 2019-03-06 RX ADMIN — GABAPENTIN SCH MG: 100 CAPSULE ORAL at 07:39

## 2019-03-06 RX ADMIN — FUROSEMIDE SCH MG: 20 TABLET ORAL at 07:39

## 2019-03-06 RX ADMIN — ROPINIROLE SCH MG: 0.5 TABLET ORAL at 19:37

## 2019-03-07 RX ADMIN — GABAPENTIN SCH MG: 100 CAPSULE ORAL at 12:59

## 2019-03-07 RX ADMIN — ROPINIROLE SCH MG: 0.5 TABLET ORAL at 20:39

## 2019-03-07 RX ADMIN — FUROSEMIDE SCH MG: 20 TABLET ORAL at 08:14

## 2019-03-07 RX ADMIN — SERTRALINE HYDROCHLORIDE SCH MG: 100 TABLET ORAL at 08:14

## 2019-03-07 RX ADMIN — GABAPENTIN SCH MG: 100 CAPSULE ORAL at 20:39

## 2019-03-07 RX ADMIN — GABAPENTIN SCH MG: 100 CAPSULE ORAL at 08:14

## 2019-03-08 RX ADMIN — SERTRALINE HYDROCHLORIDE SCH MG: 100 TABLET ORAL at 07:48

## 2019-03-08 RX ADMIN — ROPINIROLE SCH MG: 0.5 TABLET ORAL at 19:58

## 2019-03-08 RX ADMIN — GABAPENTIN SCH MG: 100 CAPSULE ORAL at 07:48

## 2019-03-08 RX ADMIN — FUROSEMIDE SCH MG: 20 TABLET ORAL at 07:48

## 2019-03-08 RX ADMIN — GABAPENTIN SCH MG: 100 CAPSULE ORAL at 14:28

## 2019-03-08 RX ADMIN — GABAPENTIN SCH MG: 100 CAPSULE ORAL at 20:02

## 2019-03-09 RX ADMIN — GABAPENTIN SCH MG: 100 CAPSULE ORAL at 20:59

## 2019-03-09 RX ADMIN — FUROSEMIDE SCH MG: 20 TABLET ORAL at 08:45

## 2019-03-09 RX ADMIN — ROPINIROLE SCH MG: 0.5 TABLET ORAL at 21:00

## 2019-03-09 RX ADMIN — SERTRALINE HYDROCHLORIDE SCH MG: 100 TABLET ORAL at 08:44

## 2019-03-09 RX ADMIN — GABAPENTIN SCH MG: 100 CAPSULE ORAL at 12:55

## 2019-03-09 RX ADMIN — GABAPENTIN SCH MG: 100 CAPSULE ORAL at 08:44

## 2019-03-10 RX ADMIN — FUROSEMIDE SCH MG: 20 TABLET ORAL at 09:57

## 2019-03-10 RX ADMIN — SERTRALINE HYDROCHLORIDE SCH MG: 100 TABLET ORAL at 09:57

## 2019-03-10 RX ADMIN — GABAPENTIN SCH MG: 100 CAPSULE ORAL at 20:22

## 2019-03-10 RX ADMIN — GABAPENTIN SCH MG: 100 CAPSULE ORAL at 09:57

## 2019-03-10 RX ADMIN — ROPINIROLE SCH MG: 0.5 TABLET ORAL at 20:22

## 2019-03-10 RX ADMIN — GABAPENTIN SCH MG: 100 CAPSULE ORAL at 13:12

## 2019-03-11 RX ADMIN — GABAPENTIN SCH MG: 100 CAPSULE ORAL at 19:51

## 2019-03-11 RX ADMIN — SERTRALINE HYDROCHLORIDE SCH MG: 100 TABLET ORAL at 07:33

## 2019-03-11 RX ADMIN — ROPINIROLE SCH MG: 0.5 TABLET ORAL at 19:46

## 2019-03-11 RX ADMIN — Medication SCH MG: at 19:52

## 2019-03-11 RX ADMIN — GABAPENTIN SCH MG: 100 CAPSULE ORAL at 13:59

## 2019-03-11 RX ADMIN — FUROSEMIDE SCH MG: 20 TABLET ORAL at 07:37

## 2019-03-11 RX ADMIN — GABAPENTIN SCH MG: 100 CAPSULE ORAL at 07:33

## 2019-03-12 RX ADMIN — FUROSEMIDE SCH MG: 20 TABLET ORAL at 08:02

## 2019-03-12 RX ADMIN — GABAPENTIN SCH MG: 100 CAPSULE ORAL at 08:02

## 2019-03-12 RX ADMIN — GABAPENTIN SCH MG: 100 CAPSULE ORAL at 19:58

## 2019-03-12 RX ADMIN — SERTRALINE HYDROCHLORIDE SCH MG: 100 TABLET ORAL at 08:04

## 2019-03-12 RX ADMIN — GABAPENTIN SCH MG: 100 CAPSULE ORAL at 13:10

## 2019-03-12 RX ADMIN — ROPINIROLE SCH MG: 0.5 TABLET ORAL at 19:58

## 2019-03-12 RX ADMIN — Medication SCH MG: at 19:58

## 2019-03-13 RX ADMIN — ROPINIROLE SCH MG: 0.5 TABLET ORAL at 19:57

## 2019-03-13 RX ADMIN — GABAPENTIN SCH MG: 100 CAPSULE ORAL at 19:57

## 2019-03-13 RX ADMIN — FUROSEMIDE SCH MG: 20 TABLET ORAL at 09:09

## 2019-03-13 RX ADMIN — SERTRALINE HYDROCHLORIDE SCH MG: 100 TABLET ORAL at 09:09

## 2019-03-13 RX ADMIN — Medication SCH MG: at 19:58

## 2019-03-13 RX ADMIN — GABAPENTIN SCH MG: 100 CAPSULE ORAL at 09:09

## 2019-03-13 RX ADMIN — GABAPENTIN SCH MG: 100 CAPSULE ORAL at 12:31

## 2019-03-14 RX ADMIN — FUROSEMIDE SCH MG: 20 TABLET ORAL at 07:57

## 2019-03-14 RX ADMIN — GABAPENTIN SCH MG: 100 CAPSULE ORAL at 13:12

## 2019-03-14 RX ADMIN — Medication SCH MG: at 20:02

## 2019-03-14 RX ADMIN — ROPINIROLE SCH MG: 0.5 TABLET ORAL at 20:02

## 2019-03-14 RX ADMIN — GABAPENTIN SCH MG: 100 CAPSULE ORAL at 07:56

## 2019-03-14 RX ADMIN — GABAPENTIN SCH MG: 100 CAPSULE ORAL at 20:02

## 2019-03-14 RX ADMIN — SERTRALINE HYDROCHLORIDE SCH MG: 100 TABLET ORAL at 07:56

## 2019-03-15 RX ADMIN — GABAPENTIN SCH MG: 100 CAPSULE ORAL at 20:42

## 2019-03-15 RX ADMIN — Medication SCH MG: at 20:43

## 2019-03-15 RX ADMIN — SERTRALINE HYDROCHLORIDE SCH MG: 100 TABLET ORAL at 07:09

## 2019-03-15 RX ADMIN — FUROSEMIDE SCH MG: 20 TABLET ORAL at 07:06

## 2019-03-15 RX ADMIN — GABAPENTIN SCH MG: 100 CAPSULE ORAL at 07:09

## 2019-03-15 RX ADMIN — GABAPENTIN SCH MG: 100 CAPSULE ORAL at 13:22

## 2019-03-15 RX ADMIN — ROPINIROLE SCH MG: 0.5 TABLET ORAL at 20:42

## 2019-03-16 RX ADMIN — GABAPENTIN SCH MG: 100 CAPSULE ORAL at 19:27

## 2019-03-16 RX ADMIN — Medication SCH MG: at 19:28

## 2019-03-16 RX ADMIN — GABAPENTIN SCH MG: 100 CAPSULE ORAL at 12:40

## 2019-03-16 RX ADMIN — FUROSEMIDE SCH MG: 20 TABLET ORAL at 08:10

## 2019-03-16 RX ADMIN — ROPINIROLE SCH MG: 0.5 TABLET ORAL at 19:27

## 2019-03-16 RX ADMIN — GABAPENTIN SCH MG: 100 CAPSULE ORAL at 08:09

## 2019-03-16 RX ADMIN — SERTRALINE HYDROCHLORIDE SCH MG: 100 TABLET ORAL at 08:10

## 2019-03-17 RX ADMIN — GABAPENTIN SCH MG: 100 CAPSULE ORAL at 19:59

## 2019-03-17 RX ADMIN — Medication SCH MG: at 19:58

## 2019-03-17 RX ADMIN — SERTRALINE HYDROCHLORIDE SCH MG: 100 TABLET ORAL at 07:55

## 2019-03-17 RX ADMIN — FUROSEMIDE SCH MG: 20 TABLET ORAL at 07:55

## 2019-03-17 RX ADMIN — GABAPENTIN SCH MG: 100 CAPSULE ORAL at 15:07

## 2019-03-17 RX ADMIN — GABAPENTIN SCH MG: 100 CAPSULE ORAL at 07:55

## 2019-03-17 RX ADMIN — ROPINIROLE SCH MG: 0.5 TABLET ORAL at 20:00

## 2019-03-18 RX ADMIN — ROPINIROLE SCH MG: 0.5 TABLET ORAL at 19:49

## 2019-03-18 RX ADMIN — GABAPENTIN SCH MG: 100 CAPSULE ORAL at 13:07

## 2019-03-18 RX ADMIN — GABAPENTIN SCH MG: 100 CAPSULE ORAL at 19:49

## 2019-03-18 RX ADMIN — Medication SCH MG: at 19:48

## 2019-03-18 RX ADMIN — SERTRALINE HYDROCHLORIDE SCH MG: 100 TABLET ORAL at 07:52

## 2019-03-18 RX ADMIN — FUROSEMIDE SCH MG: 20 TABLET ORAL at 07:52

## 2019-03-18 RX ADMIN — GABAPENTIN SCH MG: 100 CAPSULE ORAL at 07:53

## 2019-03-19 RX ADMIN — FUROSEMIDE SCH MG: 20 TABLET ORAL at 08:39

## 2019-03-19 RX ADMIN — Medication SCH MG: at 20:11

## 2019-03-19 RX ADMIN — GABAPENTIN SCH MG: 100 CAPSULE ORAL at 20:11

## 2019-03-19 RX ADMIN — GABAPENTIN SCH MG: 100 CAPSULE ORAL at 08:37

## 2019-03-19 RX ADMIN — SERTRALINE HYDROCHLORIDE SCH MG: 100 TABLET ORAL at 08:39

## 2019-03-19 RX ADMIN — GABAPENTIN SCH MG: 100 CAPSULE ORAL at 13:31

## 2019-03-19 RX ADMIN — ROPINIROLE SCH MG: 0.5 TABLET ORAL at 20:11

## 2019-03-20 RX ADMIN — Medication SCH MG: at 19:24

## 2019-03-20 RX ADMIN — FUROSEMIDE SCH MG: 20 TABLET ORAL at 08:08

## 2019-03-20 RX ADMIN — GABAPENTIN SCH MG: 100 CAPSULE ORAL at 08:07

## 2019-03-20 RX ADMIN — GABAPENTIN SCH MG: 100 CAPSULE ORAL at 13:07

## 2019-03-20 RX ADMIN — SERTRALINE HYDROCHLORIDE SCH MG: 100 TABLET ORAL at 08:07

## 2019-03-20 RX ADMIN — GABAPENTIN SCH MG: 100 CAPSULE ORAL at 19:23

## 2019-03-20 RX ADMIN — ROPINIROLE SCH MG: 0.5 TABLET ORAL at 19:23

## 2019-03-21 RX ADMIN — FUROSEMIDE SCH MG: 20 TABLET ORAL at 07:33

## 2019-03-21 RX ADMIN — SERTRALINE HYDROCHLORIDE SCH MG: 100 TABLET ORAL at 07:33

## 2019-03-21 RX ADMIN — GABAPENTIN SCH MG: 100 CAPSULE ORAL at 07:32

## 2019-03-21 RX ADMIN — ROPINIROLE SCH MG: 0.5 TABLET ORAL at 19:58

## 2019-03-21 RX ADMIN — Medication SCH MG: at 19:59

## 2019-03-21 RX ADMIN — GABAPENTIN SCH MG: 100 CAPSULE ORAL at 13:15

## 2019-03-21 RX ADMIN — GABAPENTIN SCH MG: 100 CAPSULE ORAL at 19:59

## 2019-03-22 RX ADMIN — Medication SCH MG: at 20:31

## 2019-03-22 RX ADMIN — GABAPENTIN SCH MG: 100 CAPSULE ORAL at 08:06

## 2019-03-22 RX ADMIN — GABAPENTIN SCH MG: 100 CAPSULE ORAL at 20:31

## 2019-03-22 RX ADMIN — SERTRALINE HYDROCHLORIDE SCH MG: 100 TABLET ORAL at 08:05

## 2019-03-22 RX ADMIN — GABAPENTIN SCH MG: 100 CAPSULE ORAL at 13:05

## 2019-03-22 RX ADMIN — ROPINIROLE SCH MG: 0.5 TABLET ORAL at 20:31

## 2019-03-22 RX ADMIN — FUROSEMIDE SCH MG: 20 TABLET ORAL at 08:05

## 2019-03-23 RX ADMIN — SERTRALINE HYDROCHLORIDE SCH MG: 100 TABLET ORAL at 08:26

## 2019-03-23 RX ADMIN — GABAPENTIN SCH MG: 100 CAPSULE ORAL at 20:02

## 2019-03-23 RX ADMIN — Medication SCH MG: at 20:02

## 2019-03-23 RX ADMIN — GABAPENTIN SCH MG: 100 CAPSULE ORAL at 14:17

## 2019-03-23 RX ADMIN — ROPINIROLE SCH MG: 0.5 TABLET ORAL at 20:02

## 2019-03-23 RX ADMIN — FUROSEMIDE SCH MG: 20 TABLET ORAL at 08:27

## 2019-03-23 RX ADMIN — GABAPENTIN SCH MG: 100 CAPSULE ORAL at 08:27

## 2019-03-24 RX ADMIN — ROPINIROLE SCH MG: 0.5 TABLET ORAL at 19:45

## 2019-03-24 RX ADMIN — GABAPENTIN SCH MG: 100 CAPSULE ORAL at 19:45

## 2019-03-24 RX ADMIN — SERTRALINE HYDROCHLORIDE SCH MG: 100 TABLET ORAL at 08:25

## 2019-03-24 RX ADMIN — GABAPENTIN SCH MG: 100 CAPSULE ORAL at 08:25

## 2019-03-24 RX ADMIN — Medication SCH MG: at 19:43

## 2019-03-24 RX ADMIN — FUROSEMIDE SCH MG: 20 TABLET ORAL at 08:25

## 2019-03-24 RX ADMIN — GABAPENTIN SCH MG: 100 CAPSULE ORAL at 15:14

## 2019-03-25 RX ADMIN — ROPINIROLE SCH MG: 0.5 TABLET ORAL at 20:21

## 2019-03-25 RX ADMIN — GABAPENTIN SCH MG: 100 CAPSULE ORAL at 14:12

## 2019-03-25 RX ADMIN — Medication SCH MG: at 20:21

## 2019-03-25 RX ADMIN — GABAPENTIN SCH MG: 100 CAPSULE ORAL at 08:35

## 2019-03-25 RX ADMIN — FUROSEMIDE SCH MG: 20 TABLET ORAL at 08:36

## 2019-03-25 RX ADMIN — GABAPENTIN SCH MG: 100 CAPSULE ORAL at 20:20

## 2019-03-25 RX ADMIN — SERTRALINE HYDROCHLORIDE SCH MG: 100 TABLET ORAL at 08:36

## 2019-03-26 RX ADMIN — Medication SCH MG: at 20:37

## 2019-03-26 RX ADMIN — ROPINIROLE SCH MG: 0.5 TABLET ORAL at 20:36

## 2019-03-26 RX ADMIN — FUROSEMIDE SCH MG: 20 TABLET ORAL at 07:46

## 2019-03-26 RX ADMIN — GABAPENTIN SCH MG: 100 CAPSULE ORAL at 07:46

## 2019-03-26 RX ADMIN — GABAPENTIN SCH MG: 100 CAPSULE ORAL at 12:38

## 2019-03-26 RX ADMIN — SERTRALINE HYDROCHLORIDE SCH MG: 100 TABLET ORAL at 07:47

## 2019-03-26 RX ADMIN — GABAPENTIN SCH MG: 100 CAPSULE ORAL at 20:36

## 2019-03-27 RX ADMIN — FUROSEMIDE SCH MG: 20 TABLET ORAL at 07:44

## 2019-03-27 RX ADMIN — SERTRALINE HYDROCHLORIDE SCH MG: 100 TABLET ORAL at 07:43

## 2019-03-27 RX ADMIN — ROPINIROLE SCH MG: 0.5 TABLET ORAL at 20:29

## 2019-03-27 RX ADMIN — Medication SCH MG: at 20:30

## 2019-03-27 RX ADMIN — GABAPENTIN SCH MG: 100 CAPSULE ORAL at 13:55

## 2019-03-27 RX ADMIN — GABAPENTIN SCH MG: 100 CAPSULE ORAL at 07:43

## 2019-03-27 RX ADMIN — GABAPENTIN SCH MG: 100 CAPSULE ORAL at 20:30

## 2019-03-28 RX ADMIN — SERTRALINE HYDROCHLORIDE SCH MG: 100 TABLET ORAL at 08:03

## 2019-03-28 RX ADMIN — FUROSEMIDE SCH MG: 20 TABLET ORAL at 08:04

## 2019-03-28 RX ADMIN — GABAPENTIN SCH MG: 100 CAPSULE ORAL at 12:49

## 2019-03-28 RX ADMIN — GABAPENTIN SCH MG: 100 CAPSULE ORAL at 08:04

## 2019-03-28 RX ADMIN — Medication SCH MG: at 20:33

## 2019-03-28 RX ADMIN — GABAPENTIN SCH MG: 100 CAPSULE ORAL at 20:34

## 2019-03-28 RX ADMIN — ROPINIROLE SCH MG: 0.5 TABLET ORAL at 20:33

## 2019-03-29 RX ADMIN — GABAPENTIN SCH MG: 100 CAPSULE ORAL at 08:55

## 2019-03-29 RX ADMIN — Medication SCH MG: at 19:42

## 2019-03-29 RX ADMIN — ROPINIROLE SCH MG: 0.5 TABLET ORAL at 19:43

## 2019-03-29 RX ADMIN — GABAPENTIN SCH MG: 100 CAPSULE ORAL at 14:20

## 2019-03-29 RX ADMIN — FUROSEMIDE SCH MG: 20 TABLET ORAL at 08:55

## 2019-03-29 RX ADMIN — SERTRALINE HYDROCHLORIDE SCH MG: 100 TABLET ORAL at 08:55

## 2019-03-30 RX ADMIN — SERTRALINE HYDROCHLORIDE SCH MG: 100 TABLET ORAL at 07:49

## 2019-03-30 RX ADMIN — ROPINIROLE SCH MG: 0.5 TABLET ORAL at 19:55

## 2019-03-30 RX ADMIN — GABAPENTIN SCH MG: 100 CAPSULE ORAL at 07:48

## 2019-03-30 RX ADMIN — GABAPENTIN SCH MG: 100 CAPSULE ORAL at 14:16

## 2019-03-30 RX ADMIN — Medication SCH MG: at 07:47

## 2019-03-30 RX ADMIN — Medication SCH MG: at 19:54

## 2019-03-30 RX ADMIN — FUROSEMIDE SCH MG: 20 TABLET ORAL at 07:49

## 2019-03-31 RX ADMIN — GABAPENTIN SCH MG: 100 CAPSULE ORAL at 08:22

## 2019-03-31 RX ADMIN — SERTRALINE HYDROCHLORIDE SCH MG: 100 TABLET ORAL at 08:22

## 2019-03-31 RX ADMIN — Medication SCH MG: at 19:47

## 2019-03-31 RX ADMIN — ROPINIROLE SCH MG: 0.5 TABLET ORAL at 19:46

## 2019-03-31 RX ADMIN — FUROSEMIDE SCH MG: 20 TABLET ORAL at 08:22

## 2019-03-31 RX ADMIN — Medication SCH MG: at 08:21

## 2019-03-31 RX ADMIN — GABAPENTIN SCH MG: 100 CAPSULE ORAL at 12:51

## 2019-04-01 RX ADMIN — Medication SCH MG: at 08:04

## 2019-04-01 RX ADMIN — SERTRALINE HYDROCHLORIDE SCH MG: 100 TABLET ORAL at 08:05

## 2019-04-01 RX ADMIN — FUROSEMIDE SCH MG: 20 TABLET ORAL at 08:04

## 2019-04-01 RX ADMIN — ROPINIROLE SCH MG: 0.5 TABLET ORAL at 19:45

## 2019-04-01 RX ADMIN — GABAPENTIN SCH MG: 100 CAPSULE ORAL at 08:04

## 2019-04-01 RX ADMIN — Medication SCH MG: at 19:45

## 2019-04-01 RX ADMIN — GABAPENTIN SCH MG: 100 CAPSULE ORAL at 13:09

## 2019-04-02 RX ADMIN — FUROSEMIDE SCH MG: 20 TABLET ORAL at 08:11

## 2019-04-02 RX ADMIN — Medication SCH MG: at 19:51

## 2019-04-02 RX ADMIN — SERTRALINE HYDROCHLORIDE SCH MG: 100 TABLET ORAL at 08:12

## 2019-04-02 RX ADMIN — GABAPENTIN SCH MG: 100 CAPSULE ORAL at 08:12

## 2019-04-02 RX ADMIN — ROPINIROLE SCH MG: 0.5 TABLET ORAL at 19:51

## 2019-04-02 RX ADMIN — Medication SCH MG: at 08:12

## 2019-04-02 RX ADMIN — GABAPENTIN SCH MG: 100 CAPSULE ORAL at 15:10

## 2019-04-03 RX ADMIN — GABAPENTIN SCH MG: 100 CAPSULE ORAL at 12:47

## 2019-04-03 RX ADMIN — SERTRALINE HYDROCHLORIDE SCH MG: 100 TABLET ORAL at 08:09

## 2019-04-03 RX ADMIN — GABAPENTIN SCH MG: 100 CAPSULE ORAL at 08:09

## 2019-04-03 RX ADMIN — Medication SCH MG: at 20:19

## 2019-04-03 RX ADMIN — FUROSEMIDE SCH MG: 20 TABLET ORAL at 08:10

## 2019-04-03 RX ADMIN — ROPINIROLE SCH MG: 0.5 TABLET ORAL at 20:19

## 2019-04-03 RX ADMIN — Medication SCH MG: at 08:09

## 2019-04-04 RX ADMIN — Medication SCH MG: at 07:50

## 2019-04-04 RX ADMIN — ROPINIROLE SCH MG: 0.5 TABLET ORAL at 20:31

## 2019-04-04 RX ADMIN — GABAPENTIN SCH MG: 100 CAPSULE ORAL at 15:31

## 2019-04-04 RX ADMIN — Medication SCH MG: at 20:30

## 2019-04-04 RX ADMIN — SERTRALINE HYDROCHLORIDE SCH MG: 100 TABLET ORAL at 07:50

## 2019-04-04 RX ADMIN — FUROSEMIDE SCH MG: 20 TABLET ORAL at 07:51

## 2019-04-04 RX ADMIN — GABAPENTIN SCH MG: 100 CAPSULE ORAL at 07:50

## 2019-04-05 RX ADMIN — Medication SCH MG: at 08:01

## 2019-04-05 RX ADMIN — GABAPENTIN SCH MG: 100 CAPSULE ORAL at 08:02

## 2019-04-05 RX ADMIN — Medication SCH MG: at 20:29

## 2019-04-05 RX ADMIN — GABAPENTIN SCH MG: 100 CAPSULE ORAL at 12:54

## 2019-04-05 RX ADMIN — SERTRALINE HYDROCHLORIDE SCH MG: 100 TABLET ORAL at 08:02

## 2019-04-05 RX ADMIN — ROPINIROLE SCH MG: 0.5 TABLET ORAL at 20:29

## 2019-04-05 RX ADMIN — FUROSEMIDE SCH MG: 20 TABLET ORAL at 08:02

## 2019-04-06 RX ADMIN — Medication SCH MG: at 21:06

## 2019-04-06 RX ADMIN — GABAPENTIN SCH MG: 100 CAPSULE ORAL at 13:21

## 2019-04-06 RX ADMIN — SERTRALINE HYDROCHLORIDE SCH MG: 100 TABLET ORAL at 09:15

## 2019-04-06 RX ADMIN — Medication SCH MG: at 09:14

## 2019-04-06 RX ADMIN — FUROSEMIDE SCH MG: 20 TABLET ORAL at 09:15

## 2019-04-06 RX ADMIN — GABAPENTIN SCH MG: 100 CAPSULE ORAL at 09:15

## 2019-04-06 RX ADMIN — ROPINIROLE SCH MG: 0.5 TABLET ORAL at 21:06

## 2019-04-07 RX ADMIN — GABAPENTIN SCH MG: 100 CAPSULE ORAL at 12:06

## 2019-04-07 RX ADMIN — Medication SCH MG: at 07:46

## 2019-04-07 RX ADMIN — SERTRALINE HYDROCHLORIDE SCH MG: 100 TABLET ORAL at 07:46

## 2019-04-07 RX ADMIN — GABAPENTIN SCH MG: 100 CAPSULE ORAL at 07:46

## 2019-04-07 RX ADMIN — FUROSEMIDE SCH MG: 20 TABLET ORAL at 07:46

## 2019-04-07 RX ADMIN — ROPINIROLE SCH MG: 0.5 TABLET ORAL at 20:22

## 2019-04-07 RX ADMIN — Medication SCH MG: at 20:22

## 2019-04-08 RX ADMIN — GABAPENTIN SCH MG: 100 CAPSULE ORAL at 12:43

## 2019-04-08 RX ADMIN — GABAPENTIN SCH MG: 100 CAPSULE ORAL at 08:33

## 2019-04-08 RX ADMIN — ROPINIROLE SCH MG: 0.5 TABLET ORAL at 20:30

## 2019-04-08 RX ADMIN — Medication SCH MG: at 20:31

## 2019-04-08 RX ADMIN — Medication SCH MG: at 08:33

## 2019-04-08 RX ADMIN — SERTRALINE HYDROCHLORIDE SCH MG: 100 TABLET ORAL at 08:34

## 2019-04-08 RX ADMIN — FUROSEMIDE SCH MG: 20 TABLET ORAL at 08:34

## 2019-04-09 RX ADMIN — GABAPENTIN SCH MG: 100 CAPSULE ORAL at 12:00

## 2019-04-09 RX ADMIN — GABAPENTIN SCH MG: 100 CAPSULE ORAL at 08:06

## 2019-04-09 RX ADMIN — SERTRALINE HYDROCHLORIDE SCH MG: 100 TABLET ORAL at 08:06

## 2019-04-09 RX ADMIN — Medication SCH MG: at 19:27

## 2019-04-09 RX ADMIN — FUROSEMIDE SCH MG: 20 TABLET ORAL at 08:06

## 2019-04-09 RX ADMIN — Medication SCH MG: at 08:06

## 2019-04-09 RX ADMIN — ROPINIROLE SCH MG: 0.5 TABLET ORAL at 19:27

## 2019-04-10 RX ADMIN — SERTRALINE HYDROCHLORIDE SCH MG: 100 TABLET ORAL at 08:30

## 2019-04-10 RX ADMIN — ROPINIROLE SCH MG: 0.5 TABLET ORAL at 20:29

## 2019-04-10 RX ADMIN — GABAPENTIN SCH MG: 100 CAPSULE ORAL at 08:31

## 2019-04-10 RX ADMIN — GABAPENTIN SCH MG: 100 CAPSULE ORAL at 14:06

## 2019-04-10 RX ADMIN — Medication SCH MG: at 20:28

## 2019-04-10 RX ADMIN — Medication SCH MG: at 08:31

## 2019-04-10 RX ADMIN — FUROSEMIDE SCH MG: 20 TABLET ORAL at 08:30

## 2019-04-11 RX ADMIN — Medication SCH MG: at 08:37

## 2019-04-11 RX ADMIN — Medication SCH MG: at 19:44

## 2019-04-11 RX ADMIN — SERTRALINE HYDROCHLORIDE SCH MG: 100 TABLET ORAL at 08:37

## 2019-04-11 RX ADMIN — GABAPENTIN SCH MG: 100 CAPSULE ORAL at 08:37

## 2019-04-11 RX ADMIN — GABAPENTIN SCH MG: 100 CAPSULE ORAL at 14:38

## 2019-04-11 RX ADMIN — ROPINIROLE SCH MG: 0.5 TABLET ORAL at 19:44

## 2019-04-11 RX ADMIN — FUROSEMIDE SCH MG: 20 TABLET ORAL at 08:37

## 2019-04-12 RX ADMIN — GABAPENTIN SCH MG: 100 CAPSULE ORAL at 12:48

## 2019-04-12 RX ADMIN — Medication SCH MG: at 20:57

## 2019-04-12 RX ADMIN — ROPINIROLE SCH MG: 0.5 TABLET ORAL at 20:57

## 2019-04-12 RX ADMIN — GABAPENTIN SCH MG: 100 CAPSULE ORAL at 07:49

## 2019-04-12 RX ADMIN — SERTRALINE HYDROCHLORIDE SCH MG: 100 TABLET ORAL at 07:48

## 2019-04-12 RX ADMIN — Medication SCH MG: at 07:48

## 2019-04-12 RX ADMIN — FUROSEMIDE SCH MG: 20 TABLET ORAL at 07:48

## 2019-04-13 RX ADMIN — SERTRALINE HYDROCHLORIDE SCH MG: 100 TABLET ORAL at 08:58

## 2019-04-13 RX ADMIN — NITROGLYCERIN PRN MG: 0.4 TABLET SUBLINGUAL at 18:00

## 2019-04-13 RX ADMIN — Medication SCH MG: at 08:58

## 2019-04-13 RX ADMIN — GABAPENTIN SCH MG: 100 CAPSULE ORAL at 08:58

## 2019-04-13 RX ADMIN — GABAPENTIN SCH MG: 100 CAPSULE ORAL at 13:50

## 2019-04-13 RX ADMIN — ROPINIROLE SCH MG: 0.5 TABLET ORAL at 20:57

## 2019-04-13 RX ADMIN — Medication SCH MG: at 20:57

## 2019-04-13 RX ADMIN — FUROSEMIDE SCH MG: 20 TABLET ORAL at 08:58

## 2019-04-13 RX ADMIN — NITROGLYCERIN PRN MG: 0.4 TABLET SUBLINGUAL at 17:55

## 2019-04-14 RX ADMIN — GABAPENTIN SCH MG: 100 CAPSULE ORAL at 09:39

## 2019-04-14 RX ADMIN — FUROSEMIDE SCH MG: 20 TABLET ORAL at 09:39

## 2019-04-14 RX ADMIN — GABAPENTIN SCH MG: 100 CAPSULE ORAL at 12:08

## 2019-04-14 RX ADMIN — ROPINIROLE SCH MG: 0.5 TABLET ORAL at 21:31

## 2019-04-14 RX ADMIN — Medication SCH MG: at 21:32

## 2019-04-14 RX ADMIN — SERTRALINE HYDROCHLORIDE SCH MG: 100 TABLET ORAL at 09:40

## 2019-04-14 RX ADMIN — Medication SCH MG: at 09:39

## 2019-04-14 NOTE — PN
Progress Note for BRENDA MIRAMONTES  Date:  04/14/2019  Room #:  VM.219

 

SUBJECTIVE:  An 85-year-old seen today for swing bed rounds because of an

episode of chest pain she had yesterday late afternoon.  EKGs were done.  One of

them did show sinus rhythm, one of them showed atrial fibrillation which she has

a known history of and is on Coumadin.  She stated the chest pain lasted a

couple hours.  It was relieved by some nitroglycerin and Tums.  It really scared

her.  Did have a troponin drawn 3 hours after the chest pain started, which is

negative.  The patient then has had no further events.  She told the nurse she

had a couple of heart attacks in the past, but when I reviewed her history and I

had been her previous physician, she had only had heart failure.  Otherwise, she

is breathing fine today.  She is a long-term swing bed patient.

 

PHYSICAL EXAMINATION:

Vital Signs:  Objectively, her temperature, she has been afebrile, pulse 79,

blood pressure 112/56, respiratory rate 16, O2 of 96% on 2 L.  She is

chronically on oxygen.

General:  She is in no acute distress.

Heart:  Regular rate and rhythm, S1, S2 without murmur appreciated.

Lungs: Sounds clear to auscultation bilaterally without crackles or wheezes.

Extremities:  Warm and dry, no edema.

Mental Status:  She is alert, she is aware.  She is in the hospital.  She tells

me she is going to stay here long-term.

 

ASSESSMENT:

1. Episode of chest pain, partially evaluated with EKG and troponin.  Did

    offer her repeat troponin now this morning, but given that her symptoms

    have resolved, she is comfortable with no further workup.

2. Failure to thrive.  She was a vulnerable adult in an unsafe home

    environment, now she is in swing bed getting assistance with ADLs.

3. Atrial fibrillation and history of recurrent deep venous thrombosis.  Her

    INR was therapeutic on the 12th.  It will be repeated in 2 weeks.

4. Cognitive impairment.  MoCA was 19 three years ago.  She most likely has at

    least a moderate dementia.

 

PLAN:  At this point, patient will continue on swing bed cares.  We will further

evaluate if needed for any episodes of chest pain, but now we will continue with

her current medical management.  She has no documented coronary artery disease

but does have nitroglycerin available if needed.  I would not add any extra

medications at this point, she is already on a beta-blocker.

 

 

MKA:  04/14/2019 10:53:16  MODL:  04/14/2019 11:18:39

Job #:  [id number]

## 2019-04-15 RX ADMIN — SERTRALINE HYDROCHLORIDE SCH MG: 100 TABLET ORAL at 07:43

## 2019-04-15 RX ADMIN — GABAPENTIN SCH MG: 100 CAPSULE ORAL at 14:24

## 2019-04-15 RX ADMIN — Medication SCH MG: at 19:40

## 2019-04-15 RX ADMIN — ROPINIROLE SCH MG: 0.5 TABLET ORAL at 19:41

## 2019-04-15 RX ADMIN — Medication SCH MG: at 07:43

## 2019-04-15 RX ADMIN — FUROSEMIDE SCH MG: 20 TABLET ORAL at 07:43

## 2019-04-15 RX ADMIN — GABAPENTIN SCH MG: 100 CAPSULE ORAL at 07:42

## 2019-04-16 RX ADMIN — ROPINIROLE SCH MG: 0.5 TABLET ORAL at 19:17

## 2019-04-16 RX ADMIN — FUROSEMIDE SCH MG: 20 TABLET ORAL at 07:55

## 2019-04-16 RX ADMIN — GABAPENTIN SCH MG: 100 CAPSULE ORAL at 07:54

## 2019-04-16 RX ADMIN — Medication SCH MG: at 19:17

## 2019-04-16 RX ADMIN — Medication SCH MG: at 07:54

## 2019-04-16 RX ADMIN — SERTRALINE HYDROCHLORIDE SCH MG: 100 TABLET ORAL at 07:54

## 2019-04-16 RX ADMIN — GABAPENTIN SCH MG: 100 CAPSULE ORAL at 13:04

## 2019-04-17 RX ADMIN — GABAPENTIN SCH MG: 100 CAPSULE ORAL at 07:31

## 2019-04-17 RX ADMIN — ROPINIROLE SCH MG: 0.5 TABLET ORAL at 19:23

## 2019-04-17 RX ADMIN — Medication SCH MG: at 07:31

## 2019-04-17 RX ADMIN — FUROSEMIDE SCH MG: 20 TABLET ORAL at 07:31

## 2019-04-17 RX ADMIN — Medication SCH MG: at 19:23

## 2019-04-17 RX ADMIN — GABAPENTIN SCH MG: 100 CAPSULE ORAL at 13:35

## 2019-04-17 RX ADMIN — SERTRALINE HYDROCHLORIDE SCH MG: 100 TABLET ORAL at 07:31

## 2019-04-18 RX ADMIN — SERTRALINE HYDROCHLORIDE SCH MG: 100 TABLET ORAL at 07:50

## 2019-04-18 RX ADMIN — GABAPENTIN SCH MG: 100 CAPSULE ORAL at 07:49

## 2019-04-18 RX ADMIN — GABAPENTIN SCH: 100 CAPSULE ORAL at 15:02

## 2019-04-18 RX ADMIN — ROPINIROLE SCH MG: 0.5 TABLET ORAL at 19:30

## 2019-04-18 RX ADMIN — Medication SCH MG: at 19:31

## 2019-04-18 RX ADMIN — FUROSEMIDE SCH MG: 20 TABLET ORAL at 07:50

## 2019-04-18 RX ADMIN — Medication SCH MG: at 09:15

## 2019-04-18 RX ADMIN — CEPHALEXIN SCH MG: 250 CAPSULE ORAL at 19:34

## 2019-04-19 RX ADMIN — ROPINIROLE SCH MG: 0.5 TABLET ORAL at 22:38

## 2019-04-19 RX ADMIN — FUROSEMIDE SCH MG: 20 TABLET ORAL at 09:33

## 2019-04-19 RX ADMIN — CEPHALEXIN SCH MG: 250 CAPSULE ORAL at 09:36

## 2019-04-19 RX ADMIN — SERTRALINE HYDROCHLORIDE SCH MG: 100 TABLET ORAL at 09:34

## 2019-04-19 RX ADMIN — CEPHALEXIN SCH MG: 250 CAPSULE ORAL at 22:39

## 2019-04-19 RX ADMIN — Medication SCH MG: at 09:34

## 2019-04-19 RX ADMIN — GABAPENTIN SCH MG: 100 CAPSULE ORAL at 12:43

## 2019-04-19 RX ADMIN — GABAPENTIN SCH MG: 100 CAPSULE ORAL at 09:35

## 2019-04-19 RX ADMIN — Medication SCH MG: at 22:37

## 2019-04-20 RX ADMIN — Medication SCH MG: at 08:54

## 2019-04-20 RX ADMIN — CEPHALEXIN SCH MG: 250 CAPSULE ORAL at 08:50

## 2019-04-20 RX ADMIN — GABAPENTIN SCH MG: 100 CAPSULE ORAL at 14:04

## 2019-04-20 RX ADMIN — CEPHALEXIN SCH MG: 250 CAPSULE ORAL at 19:55

## 2019-04-20 RX ADMIN — FUROSEMIDE SCH MG: 20 TABLET ORAL at 08:52

## 2019-04-20 RX ADMIN — ROPINIROLE SCH MG: 0.5 TABLET ORAL at 19:56

## 2019-04-20 RX ADMIN — SERTRALINE HYDROCHLORIDE SCH MG: 100 TABLET ORAL at 08:52

## 2019-04-20 RX ADMIN — Medication SCH MG: at 19:56

## 2019-04-20 RX ADMIN — GABAPENTIN SCH MG: 100 CAPSULE ORAL at 08:55

## 2019-04-21 RX ADMIN — Medication SCH MG: at 09:02

## 2019-04-21 RX ADMIN — CEPHALEXIN SCH MG: 250 CAPSULE ORAL at 09:03

## 2019-04-21 RX ADMIN — ROPINIROLE SCH MG: 0.5 TABLET ORAL at 20:20

## 2019-04-21 RX ADMIN — GABAPENTIN SCH MG: 100 CAPSULE ORAL at 12:35

## 2019-04-21 RX ADMIN — GABAPENTIN SCH MG: 100 CAPSULE ORAL at 09:05

## 2019-04-21 RX ADMIN — Medication SCH MG: at 20:20

## 2019-04-21 RX ADMIN — FUROSEMIDE SCH MG: 20 TABLET ORAL at 09:02

## 2019-04-21 RX ADMIN — CEPHALEXIN SCH MG: 250 CAPSULE ORAL at 20:23

## 2019-04-21 RX ADMIN — SERTRALINE HYDROCHLORIDE SCH MG: 100 TABLET ORAL at 09:03

## 2019-04-22 RX ADMIN — GABAPENTIN SCH MG: 100 CAPSULE ORAL at 13:56

## 2019-04-22 RX ADMIN — Medication SCH MG: at 09:19

## 2019-04-22 RX ADMIN — SERTRALINE HYDROCHLORIDE SCH MG: 100 TABLET ORAL at 09:22

## 2019-04-22 RX ADMIN — FUROSEMIDE SCH MG: 20 TABLET ORAL at 09:18

## 2019-04-22 RX ADMIN — CEPHALEXIN SCH MG: 250 CAPSULE ORAL at 09:32

## 2019-04-22 RX ADMIN — GABAPENTIN SCH MG: 100 CAPSULE ORAL at 09:19

## 2019-04-22 RX ADMIN — CEPHALEXIN SCH MG: 250 CAPSULE ORAL at 19:59

## 2019-04-22 RX ADMIN — Medication SCH MG: at 19:59

## 2019-04-22 RX ADMIN — ROPINIROLE SCH MG: 0.5 TABLET ORAL at 19:59

## 2019-04-23 RX ADMIN — Medication SCH MG: at 19:34

## 2019-04-23 RX ADMIN — Medication SCH MG: at 08:19

## 2019-04-23 RX ADMIN — FUROSEMIDE SCH MG: 20 TABLET ORAL at 08:19

## 2019-04-23 RX ADMIN — ROPINIROLE SCH MG: 0.5 TABLET ORAL at 19:34

## 2019-04-23 RX ADMIN — GABAPENTIN SCH MG: 100 CAPSULE ORAL at 08:18

## 2019-04-23 RX ADMIN — CEPHALEXIN SCH MG: 250 CAPSULE ORAL at 08:20

## 2019-04-23 RX ADMIN — SERTRALINE HYDROCHLORIDE SCH MG: 100 TABLET ORAL at 08:20

## 2019-04-23 RX ADMIN — GABAPENTIN SCH MG: 100 CAPSULE ORAL at 13:27

## 2019-04-24 RX ADMIN — Medication SCH MG: at 19:20

## 2019-04-24 RX ADMIN — FUROSEMIDE SCH MG: 20 TABLET ORAL at 08:17

## 2019-04-24 RX ADMIN — Medication SCH MG: at 08:17

## 2019-04-24 RX ADMIN — GABAPENTIN SCH MG: 100 CAPSULE ORAL at 08:17

## 2019-04-24 RX ADMIN — SERTRALINE HYDROCHLORIDE SCH MG: 100 TABLET ORAL at 08:17

## 2019-04-24 RX ADMIN — ROPINIROLE SCH MG: 0.5 TABLET ORAL at 19:20

## 2019-04-24 RX ADMIN — GABAPENTIN SCH MG: 100 CAPSULE ORAL at 12:19

## 2019-04-25 RX ADMIN — GABAPENTIN SCH MG: 100 CAPSULE ORAL at 13:35

## 2019-04-25 RX ADMIN — GABAPENTIN SCH MG: 100 CAPSULE ORAL at 08:16

## 2019-04-25 RX ADMIN — Medication SCH MG: at 08:17

## 2019-04-25 RX ADMIN — FUROSEMIDE SCH MG: 20 TABLET ORAL at 08:17

## 2019-04-25 RX ADMIN — ROPINIROLE SCH MG: 0.5 TABLET ORAL at 20:57

## 2019-04-25 RX ADMIN — SERTRALINE HYDROCHLORIDE SCH MG: 100 TABLET ORAL at 08:17

## 2019-04-25 RX ADMIN — Medication SCH MG: at 20:57

## 2019-04-26 RX ADMIN — ROPINIROLE SCH MG: 0.5 TABLET ORAL at 20:21

## 2019-04-26 RX ADMIN — Medication SCH MG: at 20:22

## 2019-04-26 RX ADMIN — SERTRALINE HYDROCHLORIDE SCH MG: 100 TABLET ORAL at 08:39

## 2019-04-26 RX ADMIN — Medication SCH MG: at 08:39

## 2019-04-26 RX ADMIN — GABAPENTIN SCH MG: 100 CAPSULE ORAL at 08:38

## 2019-04-26 RX ADMIN — GABAPENTIN SCH MG: 100 CAPSULE ORAL at 12:46

## 2019-04-26 RX ADMIN — FUROSEMIDE SCH MG: 20 TABLET ORAL at 08:39

## 2019-04-27 RX ADMIN — FUROSEMIDE SCH MG: 20 TABLET ORAL at 07:33

## 2019-04-27 RX ADMIN — Medication SCH MG: at 20:02

## 2019-04-27 RX ADMIN — GABAPENTIN SCH MG: 100 CAPSULE ORAL at 07:34

## 2019-04-27 RX ADMIN — Medication SCH MG: at 07:33

## 2019-04-27 RX ADMIN — SERTRALINE HYDROCHLORIDE SCH MG: 100 TABLET ORAL at 07:33

## 2019-04-27 RX ADMIN — GABAPENTIN SCH MG: 100 CAPSULE ORAL at 13:47

## 2019-04-27 RX ADMIN — ROPINIROLE SCH MG: 0.5 TABLET ORAL at 20:02

## 2019-04-28 RX ADMIN — FUROSEMIDE SCH MG: 20 TABLET ORAL at 07:40

## 2019-04-28 RX ADMIN — GABAPENTIN SCH MG: 100 CAPSULE ORAL at 07:40

## 2019-04-28 RX ADMIN — SERTRALINE HYDROCHLORIDE SCH MG: 100 TABLET ORAL at 07:40

## 2019-04-28 RX ADMIN — Medication SCH MG: at 20:05

## 2019-04-28 RX ADMIN — Medication SCH MG: at 07:40

## 2019-04-28 RX ADMIN — GABAPENTIN SCH MG: 100 CAPSULE ORAL at 14:15

## 2019-04-28 RX ADMIN — ROPINIROLE SCH MG: 0.5 TABLET ORAL at 20:06

## 2019-04-29 RX ADMIN — Medication SCH MG: at 07:57

## 2019-04-29 RX ADMIN — Medication SCH MG: at 20:27

## 2019-04-29 RX ADMIN — FUROSEMIDE SCH MG: 20 TABLET ORAL at 07:57

## 2019-04-29 RX ADMIN — GABAPENTIN SCH MG: 100 CAPSULE ORAL at 13:52

## 2019-04-29 RX ADMIN — GABAPENTIN SCH MG: 100 CAPSULE ORAL at 07:57

## 2019-04-29 RX ADMIN — SERTRALINE HYDROCHLORIDE SCH MG: 100 TABLET ORAL at 07:57

## 2019-04-29 RX ADMIN — ROPINIROLE SCH MG: 0.5 TABLET ORAL at 20:26

## 2019-04-30 RX ADMIN — Medication SCH MG: at 07:30

## 2019-04-30 RX ADMIN — ROPINIROLE SCH MG: 0.5 TABLET ORAL at 20:16

## 2019-04-30 RX ADMIN — GABAPENTIN SCH MG: 100 CAPSULE ORAL at 07:30

## 2019-04-30 RX ADMIN — SERTRALINE HYDROCHLORIDE SCH MG: 100 TABLET ORAL at 07:30

## 2019-04-30 RX ADMIN — Medication SCH MG: at 20:19

## 2019-04-30 RX ADMIN — GABAPENTIN SCH MG: 100 CAPSULE ORAL at 12:29

## 2019-04-30 RX ADMIN — FUROSEMIDE SCH MG: 20 TABLET ORAL at 07:30

## 2019-05-01 RX ADMIN — ROPINIROLE SCH MG: 0.5 TABLET ORAL at 20:30

## 2019-05-01 RX ADMIN — GABAPENTIN SCH MG: 100 CAPSULE ORAL at 12:24

## 2019-05-01 RX ADMIN — GABAPENTIN SCH MG: 100 CAPSULE ORAL at 08:25

## 2019-05-01 RX ADMIN — Medication SCH MG: at 08:25

## 2019-05-01 RX ADMIN — FUROSEMIDE SCH MG: 20 TABLET ORAL at 08:25

## 2019-05-01 RX ADMIN — SERTRALINE HYDROCHLORIDE SCH MG: 100 TABLET ORAL at 08:26

## 2019-05-01 RX ADMIN — Medication SCH MG: at 20:30

## 2019-05-02 RX ADMIN — Medication SCH MG: at 09:13

## 2019-05-02 RX ADMIN — FUROSEMIDE SCH MG: 20 TABLET ORAL at 09:14

## 2019-05-02 RX ADMIN — SERTRALINE HYDROCHLORIDE SCH MG: 100 TABLET ORAL at 09:14

## 2019-05-02 RX ADMIN — ROPINIROLE SCH MG: 0.5 TABLET ORAL at 20:22

## 2019-05-02 RX ADMIN — GABAPENTIN SCH MG: 100 CAPSULE ORAL at 13:12

## 2019-05-02 RX ADMIN — GABAPENTIN SCH MG: 100 CAPSULE ORAL at 09:13

## 2019-05-02 RX ADMIN — Medication SCH MG: at 20:23

## 2019-05-03 RX ADMIN — Medication SCH MG: at 19:57

## 2019-05-03 RX ADMIN — Medication SCH MG: at 07:54

## 2019-05-03 RX ADMIN — GABAPENTIN SCH MG: 100 CAPSULE ORAL at 07:55

## 2019-05-03 RX ADMIN — FUROSEMIDE SCH MG: 20 TABLET ORAL at 07:55

## 2019-05-03 RX ADMIN — SERTRALINE HYDROCHLORIDE SCH MG: 100 TABLET ORAL at 07:55

## 2019-05-03 RX ADMIN — ROPINIROLE SCH MG: 0.5 TABLET ORAL at 19:58

## 2019-05-03 RX ADMIN — GABAPENTIN SCH MG: 100 CAPSULE ORAL at 12:17

## 2019-05-04 RX ADMIN — GABAPENTIN SCH MG: 100 CAPSULE ORAL at 08:34

## 2019-05-04 RX ADMIN — GABAPENTIN SCH MG: 100 CAPSULE ORAL at 12:22

## 2019-05-04 RX ADMIN — Medication SCH MG: at 08:33

## 2019-05-04 RX ADMIN — Medication SCH MG: at 19:52

## 2019-05-04 RX ADMIN — SERTRALINE HYDROCHLORIDE SCH MG: 100 TABLET ORAL at 08:34

## 2019-05-04 RX ADMIN — ROPINIROLE SCH MG: 0.5 TABLET ORAL at 19:52

## 2019-05-04 RX ADMIN — FUROSEMIDE SCH MG: 20 TABLET ORAL at 08:33

## 2019-05-05 RX ADMIN — Medication SCH MG: at 08:43

## 2019-05-05 RX ADMIN — Medication SCH MG: at 21:07

## 2019-05-05 RX ADMIN — ROPINIROLE SCH MG: 0.5 TABLET ORAL at 21:05

## 2019-05-05 RX ADMIN — SERTRALINE HYDROCHLORIDE SCH MG: 100 TABLET ORAL at 08:44

## 2019-05-05 RX ADMIN — FUROSEMIDE SCH MG: 20 TABLET ORAL at 08:43

## 2019-05-05 RX ADMIN — GABAPENTIN SCH MG: 100 CAPSULE ORAL at 12:42

## 2019-05-05 RX ADMIN — GABAPENTIN SCH MG: 100 CAPSULE ORAL at 08:43

## 2019-05-06 RX ADMIN — Medication SCH MG: at 09:02

## 2019-05-06 RX ADMIN — ROPINIROLE SCH MG: 0.5 TABLET ORAL at 20:38

## 2019-05-06 RX ADMIN — GABAPENTIN SCH MG: 100 CAPSULE ORAL at 14:41

## 2019-05-06 RX ADMIN — SERTRALINE HYDROCHLORIDE SCH MG: 100 TABLET ORAL at 09:02

## 2019-05-06 RX ADMIN — FUROSEMIDE SCH MG: 20 TABLET ORAL at 09:02

## 2019-05-06 RX ADMIN — Medication SCH MG: at 20:40

## 2019-05-06 RX ADMIN — GABAPENTIN SCH MG: 100 CAPSULE ORAL at 09:02

## 2019-05-07 RX ADMIN — GABAPENTIN SCH MG: 100 CAPSULE ORAL at 08:16

## 2019-05-07 RX ADMIN — Medication SCH MG: at 19:41

## 2019-05-07 RX ADMIN — GABAPENTIN SCH MG: 100 CAPSULE ORAL at 12:48

## 2019-05-07 RX ADMIN — SERTRALINE HYDROCHLORIDE SCH MG: 100 TABLET ORAL at 08:15

## 2019-05-07 RX ADMIN — ROPINIROLE SCH MG: 0.5 TABLET ORAL at 19:39

## 2019-05-07 RX ADMIN — Medication SCH MG: at 08:15

## 2019-05-07 RX ADMIN — FUROSEMIDE SCH MG: 20 TABLET ORAL at 08:15

## 2019-05-08 RX ADMIN — FUROSEMIDE SCH MG: 20 TABLET ORAL at 07:43

## 2019-05-08 RX ADMIN — Medication SCH MG: at 07:44

## 2019-05-08 RX ADMIN — GABAPENTIN SCH MG: 100 CAPSULE ORAL at 07:43

## 2019-05-08 RX ADMIN — SERTRALINE HYDROCHLORIDE SCH MG: 100 TABLET ORAL at 07:43

## 2019-05-08 RX ADMIN — Medication SCH MG: at 19:51

## 2019-05-08 RX ADMIN — ROPINIROLE SCH MG: 0.5 TABLET ORAL at 19:50

## 2019-05-08 RX ADMIN — GABAPENTIN SCH MG: 100 CAPSULE ORAL at 12:57

## 2019-05-09 RX ADMIN — GABAPENTIN SCH MG: 100 CAPSULE ORAL at 07:29

## 2019-05-09 RX ADMIN — GABAPENTIN SCH MG: 100 CAPSULE ORAL at 12:46

## 2019-05-09 RX ADMIN — FUROSEMIDE SCH MG: 20 TABLET ORAL at 07:28

## 2019-05-09 RX ADMIN — Medication SCH MG: at 07:28

## 2019-05-09 RX ADMIN — SERTRALINE HYDROCHLORIDE SCH MG: 100 TABLET ORAL at 07:28

## 2019-05-09 RX ADMIN — Medication SCH MG: at 20:32

## 2019-05-09 RX ADMIN — ROPINIROLE SCH MG: 0.5 TABLET ORAL at 20:31

## 2019-05-10 RX ADMIN — FUROSEMIDE SCH MG: 20 TABLET ORAL at 08:08

## 2019-05-10 RX ADMIN — GABAPENTIN SCH MG: 100 CAPSULE ORAL at 12:41

## 2019-05-10 RX ADMIN — GABAPENTIN SCH MG: 100 CAPSULE ORAL at 08:08

## 2019-05-10 RX ADMIN — SERTRALINE HYDROCHLORIDE SCH MG: 100 TABLET ORAL at 08:08

## 2019-05-10 RX ADMIN — Medication SCH MG: at 08:08

## 2019-05-10 RX ADMIN — Medication SCH MG: at 19:49

## 2019-05-10 RX ADMIN — ROPINIROLE SCH MG: 0.5 TABLET ORAL at 19:49

## 2019-05-11 RX ADMIN — GABAPENTIN SCH MG: 100 CAPSULE ORAL at 13:42

## 2019-05-11 RX ADMIN — ROPINIROLE SCH MG: 0.5 TABLET ORAL at 19:50

## 2019-05-11 RX ADMIN — SERTRALINE HYDROCHLORIDE SCH MG: 100 TABLET ORAL at 09:35

## 2019-05-11 RX ADMIN — FUROSEMIDE SCH MG: 20 TABLET ORAL at 09:35

## 2019-05-11 RX ADMIN — Medication SCH MG: at 19:49

## 2019-05-11 RX ADMIN — Medication SCH MG: at 09:35

## 2019-05-11 RX ADMIN — GABAPENTIN SCH MG: 100 CAPSULE ORAL at 09:32

## 2019-05-12 RX ADMIN — SERTRALINE HYDROCHLORIDE SCH MG: 100 TABLET ORAL at 08:06

## 2019-05-12 RX ADMIN — FUROSEMIDE SCH MG: 20 TABLET ORAL at 08:05

## 2019-05-12 RX ADMIN — GABAPENTIN SCH MG: 100 CAPSULE ORAL at 13:09

## 2019-05-12 RX ADMIN — Medication SCH MG: at 19:35

## 2019-05-12 RX ADMIN — ROPINIROLE SCH MG: 0.5 TABLET ORAL at 19:36

## 2019-05-12 RX ADMIN — GABAPENTIN SCH MG: 100 CAPSULE ORAL at 08:05

## 2019-05-12 RX ADMIN — Medication SCH MG: at 08:06

## 2019-05-13 RX ADMIN — Medication SCH MG: at 07:48

## 2019-05-13 RX ADMIN — ROPINIROLE SCH MG: 0.5 TABLET ORAL at 19:52

## 2019-05-13 RX ADMIN — FUROSEMIDE SCH MG: 20 TABLET ORAL at 07:48

## 2019-05-13 RX ADMIN — SERTRALINE HYDROCHLORIDE SCH MG: 100 TABLET ORAL at 07:48

## 2019-05-13 RX ADMIN — Medication SCH MG: at 19:52

## 2019-05-13 RX ADMIN — GABAPENTIN SCH MG: 100 CAPSULE ORAL at 07:47

## 2019-05-13 RX ADMIN — GABAPENTIN SCH MG: 100 CAPSULE ORAL at 12:42

## 2019-05-14 RX ADMIN — GABAPENTIN SCH MG: 100 CAPSULE ORAL at 07:28

## 2019-05-14 RX ADMIN — FUROSEMIDE SCH MG: 20 TABLET ORAL at 07:29

## 2019-05-14 RX ADMIN — Medication SCH MG: at 07:28

## 2019-05-14 RX ADMIN — ROPINIROLE SCH MG: 0.5 TABLET ORAL at 19:59

## 2019-05-14 RX ADMIN — Medication SCH MG: at 19:59

## 2019-05-14 RX ADMIN — SERTRALINE HYDROCHLORIDE SCH MG: 100 TABLET ORAL at 07:28

## 2019-05-14 RX ADMIN — GABAPENTIN SCH MG: 100 CAPSULE ORAL at 13:49

## 2019-05-15 RX ADMIN — GABAPENTIN SCH MG: 100 CAPSULE ORAL at 08:01

## 2019-05-15 RX ADMIN — FUROSEMIDE SCH MG: 20 TABLET ORAL at 08:01

## 2019-05-15 RX ADMIN — GABAPENTIN SCH MG: 100 CAPSULE ORAL at 14:06

## 2019-05-15 RX ADMIN — SERTRALINE HYDROCHLORIDE SCH MG: 100 TABLET ORAL at 08:01

## 2019-05-15 RX ADMIN — Medication SCH MG: at 08:01

## 2019-05-15 RX ADMIN — Medication SCH MG: at 20:13

## 2019-05-15 RX ADMIN — ROPINIROLE SCH MG: 0.5 TABLET ORAL at 20:13

## 2019-05-16 RX ADMIN — ROPINIROLE SCH MG: 0.5 TABLET ORAL at 20:42

## 2019-05-16 RX ADMIN — Medication SCH MG: at 20:43

## 2019-05-16 RX ADMIN — SERTRALINE HYDROCHLORIDE SCH MG: 100 TABLET ORAL at 08:58

## 2019-05-16 RX ADMIN — Medication SCH MG: at 08:54

## 2019-05-16 RX ADMIN — GABAPENTIN SCH MG: 100 CAPSULE ORAL at 13:26

## 2019-05-16 RX ADMIN — FUROSEMIDE SCH MG: 20 TABLET ORAL at 08:56

## 2019-05-16 RX ADMIN — GABAPENTIN SCH MG: 100 CAPSULE ORAL at 08:56

## 2019-05-17 RX ADMIN — Medication SCH MG: at 07:34

## 2019-05-17 RX ADMIN — GABAPENTIN SCH MG: 100 CAPSULE ORAL at 07:35

## 2019-05-17 RX ADMIN — Medication SCH MG: at 21:00

## 2019-05-17 RX ADMIN — FUROSEMIDE SCH MG: 20 TABLET ORAL at 07:34

## 2019-05-17 RX ADMIN — SERTRALINE HYDROCHLORIDE SCH MG: 100 TABLET ORAL at 07:35

## 2019-05-17 RX ADMIN — GABAPENTIN SCH MG: 100 CAPSULE ORAL at 12:34

## 2019-05-17 RX ADMIN — ROPINIROLE SCH MG: 0.5 TABLET ORAL at 21:00

## 2019-05-18 RX ADMIN — SERTRALINE HYDROCHLORIDE SCH MG: 100 TABLET ORAL at 07:18

## 2019-05-18 RX ADMIN — Medication SCH MG: at 07:18

## 2019-05-18 RX ADMIN — Medication SCH MG: at 19:24

## 2019-05-18 RX ADMIN — GABAPENTIN SCH MG: 100 CAPSULE ORAL at 07:18

## 2019-05-18 RX ADMIN — ROPINIROLE SCH MG: 0.5 TABLET ORAL at 19:21

## 2019-05-18 RX ADMIN — FUROSEMIDE SCH MG: 20 TABLET ORAL at 07:18

## 2019-05-18 RX ADMIN — GABAPENTIN SCH MG: 100 CAPSULE ORAL at 12:06

## 2019-05-19 RX ADMIN — ROPINIROLE SCH MG: 0.5 TABLET ORAL at 19:51

## 2019-05-19 RX ADMIN — Medication SCH MG: at 19:50

## 2019-05-19 RX ADMIN — GABAPENTIN SCH MG: 100 CAPSULE ORAL at 12:31

## 2019-05-19 RX ADMIN — GABAPENTIN SCH MG: 100 CAPSULE ORAL at 07:26

## 2019-05-19 RX ADMIN — FUROSEMIDE SCH MG: 20 TABLET ORAL at 07:26

## 2019-05-19 RX ADMIN — Medication SCH MG: at 07:26

## 2019-05-19 RX ADMIN — SERTRALINE HYDROCHLORIDE SCH MG: 100 TABLET ORAL at 07:26

## 2019-05-20 RX ADMIN — ROPINIROLE SCH MG: 0.5 TABLET ORAL at 19:47

## 2019-05-20 RX ADMIN — SERTRALINE HYDROCHLORIDE SCH MG: 100 TABLET ORAL at 07:54

## 2019-05-20 RX ADMIN — GABAPENTIN SCH MG: 100 CAPSULE ORAL at 07:53

## 2019-05-20 RX ADMIN — Medication SCH MG: at 19:48

## 2019-05-20 RX ADMIN — Medication SCH MG: at 07:53

## 2019-05-20 RX ADMIN — FUROSEMIDE SCH MG: 20 TABLET ORAL at 07:53

## 2019-05-20 RX ADMIN — GABAPENTIN SCH MG: 100 CAPSULE ORAL at 12:26

## 2019-05-21 RX ADMIN — GABAPENTIN SCH MG: 100 CAPSULE ORAL at 07:48

## 2019-05-21 RX ADMIN — SERTRALINE HYDROCHLORIDE SCH MG: 100 TABLET ORAL at 07:50

## 2019-05-21 RX ADMIN — Medication SCH MG: at 07:50

## 2019-05-21 RX ADMIN — GABAPENTIN SCH MG: 100 CAPSULE ORAL at 12:54

## 2019-05-21 RX ADMIN — FUROSEMIDE SCH MG: 20 TABLET ORAL at 07:50

## 2019-05-21 RX ADMIN — Medication SCH MG: at 20:08

## 2019-05-21 RX ADMIN — ROPINIROLE SCH MG: 0.5 TABLET ORAL at 20:08

## 2019-05-22 RX ADMIN — SERTRALINE HYDROCHLORIDE SCH MG: 100 TABLET ORAL at 07:30

## 2019-05-22 RX ADMIN — ROPINIROLE SCH MG: 0.5 TABLET ORAL at 19:54

## 2019-05-22 RX ADMIN — FUROSEMIDE SCH MG: 20 TABLET ORAL at 07:30

## 2019-05-22 RX ADMIN — GABAPENTIN SCH MG: 100 CAPSULE ORAL at 07:31

## 2019-05-22 RX ADMIN — Medication SCH MG: at 07:30

## 2019-05-22 RX ADMIN — Medication SCH MG: at 19:56

## 2019-05-22 RX ADMIN — GABAPENTIN SCH MG: 100 CAPSULE ORAL at 13:24

## 2019-05-23 RX ADMIN — GABAPENTIN SCH MG: 100 CAPSULE ORAL at 13:04

## 2019-05-23 RX ADMIN — Medication SCH MG: at 07:39

## 2019-05-23 RX ADMIN — GABAPENTIN SCH MG: 100 CAPSULE ORAL at 07:39

## 2019-05-23 RX ADMIN — SERTRALINE HYDROCHLORIDE SCH MG: 100 TABLET ORAL at 07:39

## 2019-05-23 RX ADMIN — FUROSEMIDE SCH MG: 20 TABLET ORAL at 07:40

## 2019-05-23 RX ADMIN — ROPINIROLE SCH MG: 0.5 TABLET ORAL at 19:53

## 2019-05-23 RX ADMIN — Medication SCH MG: at 19:52

## 2019-05-24 RX ADMIN — Medication SCH MG: at 19:24

## 2019-05-24 RX ADMIN — GABAPENTIN SCH MG: 100 CAPSULE ORAL at 12:57

## 2019-05-24 RX ADMIN — Medication SCH MG: at 07:47

## 2019-05-24 RX ADMIN — GABAPENTIN SCH MG: 100 CAPSULE ORAL at 07:48

## 2019-05-24 RX ADMIN — FUROSEMIDE SCH MG: 20 TABLET ORAL at 07:47

## 2019-05-24 RX ADMIN — SERTRALINE HYDROCHLORIDE SCH MG: 100 TABLET ORAL at 07:47

## 2019-05-24 RX ADMIN — ROPINIROLE SCH MG: 0.5 TABLET ORAL at 19:24

## 2019-05-25 RX ADMIN — SERTRALINE HYDROCHLORIDE SCH MG: 100 TABLET ORAL at 08:03

## 2019-05-25 RX ADMIN — GABAPENTIN SCH MG: 100 CAPSULE ORAL at 08:03

## 2019-05-25 RX ADMIN — Medication SCH MG: at 08:00

## 2019-05-25 RX ADMIN — FUROSEMIDE SCH MG: 20 TABLET ORAL at 08:00

## 2019-05-25 RX ADMIN — Medication SCH MG: at 19:53

## 2019-05-25 RX ADMIN — ROPINIROLE SCH MG: 0.5 TABLET ORAL at 19:53

## 2019-05-25 RX ADMIN — GABAPENTIN SCH MG: 100 CAPSULE ORAL at 12:53

## 2019-05-26 RX ADMIN — SERTRALINE HYDROCHLORIDE SCH MG: 100 TABLET ORAL at 07:45

## 2019-05-26 RX ADMIN — ROPINIROLE SCH MG: 0.5 TABLET ORAL at 19:23

## 2019-05-26 RX ADMIN — Medication SCH MG: at 19:23

## 2019-05-26 RX ADMIN — FUROSEMIDE SCH MG: 20 TABLET ORAL at 07:45

## 2019-05-26 RX ADMIN — GABAPENTIN SCH MG: 100 CAPSULE ORAL at 07:46

## 2019-05-26 RX ADMIN — Medication SCH MG: at 07:45

## 2019-05-26 RX ADMIN — GABAPENTIN SCH MG: 100 CAPSULE ORAL at 16:00

## 2019-05-27 RX ADMIN — SERTRALINE HYDROCHLORIDE SCH MG: 100 TABLET ORAL at 08:09

## 2019-05-27 RX ADMIN — Medication SCH MG: at 08:09

## 2019-05-27 RX ADMIN — GABAPENTIN SCH MG: 100 CAPSULE ORAL at 12:34

## 2019-05-27 RX ADMIN — ROPINIROLE SCH MG: 0.5 TABLET ORAL at 19:17

## 2019-05-27 RX ADMIN — GABAPENTIN SCH MG: 100 CAPSULE ORAL at 08:09

## 2019-05-27 RX ADMIN — FUROSEMIDE SCH MG: 20 TABLET ORAL at 08:09

## 2019-05-27 RX ADMIN — Medication SCH MG: at 19:17

## 2019-05-28 RX ADMIN — Medication SCH MG: at 08:08

## 2019-05-28 RX ADMIN — GABAPENTIN SCH MG: 100 CAPSULE ORAL at 08:09

## 2019-05-28 RX ADMIN — ROPINIROLE SCH MG: 0.5 TABLET ORAL at 19:38

## 2019-05-28 RX ADMIN — FUROSEMIDE SCH MG: 20 TABLET ORAL at 08:08

## 2019-05-28 RX ADMIN — GABAPENTIN SCH MG: 100 CAPSULE ORAL at 13:18

## 2019-05-28 RX ADMIN — SERTRALINE HYDROCHLORIDE SCH MG: 100 TABLET ORAL at 08:09

## 2019-05-28 RX ADMIN — Medication SCH MG: at 19:37

## 2019-05-29 RX ADMIN — GABAPENTIN SCH MG: 100 CAPSULE ORAL at 07:44

## 2019-05-29 RX ADMIN — FUROSEMIDE SCH MG: 20 TABLET ORAL at 07:44

## 2019-05-29 RX ADMIN — Medication SCH MG: at 19:39

## 2019-05-29 RX ADMIN — SERTRALINE HYDROCHLORIDE SCH MG: 100 TABLET ORAL at 07:45

## 2019-05-29 RX ADMIN — GABAPENTIN SCH MG: 100 CAPSULE ORAL at 13:14

## 2019-05-29 RX ADMIN — ROPINIROLE SCH MG: 0.5 TABLET ORAL at 19:38

## 2019-05-29 RX ADMIN — Medication SCH MG: at 07:44

## 2019-05-30 RX ADMIN — FUROSEMIDE SCH MG: 20 TABLET ORAL at 08:08

## 2019-05-30 RX ADMIN — Medication SCH MG: at 08:08

## 2019-05-30 RX ADMIN — GABAPENTIN SCH MG: 100 CAPSULE ORAL at 13:39

## 2019-05-30 RX ADMIN — SERTRALINE HYDROCHLORIDE SCH MG: 100 TABLET ORAL at 08:08

## 2019-05-30 RX ADMIN — ROPINIROLE SCH MG: 0.5 TABLET ORAL at 20:06

## 2019-05-30 RX ADMIN — GABAPENTIN SCH MG: 100 CAPSULE ORAL at 08:09

## 2019-05-30 RX ADMIN — Medication SCH MG: at 20:06

## 2019-05-31 RX ADMIN — FUROSEMIDE SCH MG: 20 TABLET ORAL at 08:18

## 2019-05-31 RX ADMIN — SERTRALINE HYDROCHLORIDE SCH MG: 100 TABLET ORAL at 08:18

## 2019-05-31 RX ADMIN — Medication SCH MG: at 08:18

## 2019-05-31 RX ADMIN — Medication SCH MG: at 20:30

## 2019-05-31 RX ADMIN — GABAPENTIN SCH MG: 100 CAPSULE ORAL at 12:19

## 2019-05-31 RX ADMIN — ROPINIROLE SCH MG: 0.5 TABLET ORAL at 20:31

## 2019-05-31 RX ADMIN — GABAPENTIN SCH MG: 100 CAPSULE ORAL at 08:18

## 2019-06-01 RX ADMIN — Medication SCH MG: at 09:05

## 2019-06-01 RX ADMIN — GABAPENTIN SCH MG: 100 CAPSULE ORAL at 09:07

## 2019-06-01 RX ADMIN — ROPINIROLE SCH MG: 0.5 TABLET ORAL at 19:26

## 2019-06-01 RX ADMIN — GABAPENTIN SCH MG: 100 CAPSULE ORAL at 12:24

## 2019-06-01 RX ADMIN — SERTRALINE HYDROCHLORIDE SCH MG: 100 TABLET ORAL at 09:05

## 2019-06-01 RX ADMIN — FUROSEMIDE SCH MG: 20 TABLET ORAL at 09:06

## 2019-06-01 RX ADMIN — Medication SCH MG: at 19:26

## 2019-06-02 RX ADMIN — ROPINIROLE SCH MG: 0.5 TABLET ORAL at 19:35

## 2019-06-02 RX ADMIN — FUROSEMIDE SCH MG: 20 TABLET ORAL at 07:33

## 2019-06-02 RX ADMIN — Medication SCH MG: at 19:35

## 2019-06-02 RX ADMIN — GABAPENTIN SCH MG: 100 CAPSULE ORAL at 07:34

## 2019-06-02 RX ADMIN — Medication SCH MG: at 07:33

## 2019-06-02 RX ADMIN — SERTRALINE HYDROCHLORIDE SCH MG: 100 TABLET ORAL at 07:33

## 2019-06-02 RX ADMIN — GABAPENTIN SCH MG: 100 CAPSULE ORAL at 12:41

## 2019-06-03 RX ADMIN — GABAPENTIN SCH MG: 100 CAPSULE ORAL at 12:16

## 2019-06-03 RX ADMIN — FUROSEMIDE SCH MG: 20 TABLET ORAL at 07:55

## 2019-06-03 RX ADMIN — Medication SCH MG: at 07:55

## 2019-06-03 RX ADMIN — ROPINIROLE SCH MG: 0.5 TABLET ORAL at 19:48

## 2019-06-03 RX ADMIN — Medication SCH MG: at 19:48

## 2019-06-03 RX ADMIN — GABAPENTIN SCH MG: 100 CAPSULE ORAL at 07:56

## 2019-06-03 RX ADMIN — SERTRALINE HYDROCHLORIDE SCH MG: 100 TABLET ORAL at 07:55

## 2019-06-04 RX ADMIN — FUROSEMIDE SCH MG: 20 TABLET ORAL at 08:26

## 2019-06-04 RX ADMIN — GABAPENTIN SCH MG: 100 CAPSULE ORAL at 13:14

## 2019-06-04 RX ADMIN — SERTRALINE HYDROCHLORIDE SCH MG: 100 TABLET ORAL at 08:26

## 2019-06-04 RX ADMIN — Medication SCH MG: at 08:25

## 2019-06-04 RX ADMIN — Medication SCH MG: at 20:04

## 2019-06-04 RX ADMIN — ROPINIROLE SCH MG: 0.5 TABLET ORAL at 20:04

## 2019-06-04 RX ADMIN — GABAPENTIN SCH MG: 100 CAPSULE ORAL at 08:25

## 2019-06-05 RX ADMIN — GABAPENTIN SCH MG: 100 CAPSULE ORAL at 08:34

## 2019-06-05 RX ADMIN — MICONAZOLE NITRATE SCH APPLIC: 2 POWDER TOPICAL at 19:56

## 2019-06-05 RX ADMIN — FUROSEMIDE SCH MG: 20 TABLET ORAL at 08:35

## 2019-06-05 RX ADMIN — ROPINIROLE SCH MG: 0.5 TABLET ORAL at 19:56

## 2019-06-05 RX ADMIN — Medication SCH MG: at 08:36

## 2019-06-05 RX ADMIN — GABAPENTIN SCH MG: 100 CAPSULE ORAL at 12:31

## 2019-06-05 RX ADMIN — SERTRALINE HYDROCHLORIDE SCH MG: 100 TABLET ORAL at 08:36

## 2019-06-05 RX ADMIN — Medication SCH MG: at 19:56

## 2019-06-06 RX ADMIN — SERTRALINE HYDROCHLORIDE SCH MG: 100 TABLET ORAL at 08:15

## 2019-06-06 RX ADMIN — GABAPENTIN SCH MG: 100 CAPSULE ORAL at 14:28

## 2019-06-06 RX ADMIN — MICONAZOLE NITRATE SCH APPLIC: 2 POWDER TOPICAL at 19:58

## 2019-06-06 RX ADMIN — MICONAZOLE NITRATE SCH APPLIC: 2 POWDER TOPICAL at 08:16

## 2019-06-06 RX ADMIN — MICONAZOLE NITRATE SCH APPLIC: 2 POWDER TOPICAL at 14:28

## 2019-06-06 RX ADMIN — Medication SCH MG: at 20:03

## 2019-06-06 RX ADMIN — GABAPENTIN SCH MG: 100 CAPSULE ORAL at 08:16

## 2019-06-06 RX ADMIN — FUROSEMIDE SCH MG: 20 TABLET ORAL at 08:16

## 2019-06-06 RX ADMIN — Medication SCH MG: at 08:15

## 2019-06-06 RX ADMIN — ROPINIROLE SCH MG: 0.5 TABLET ORAL at 20:01

## 2019-06-07 RX ADMIN — ROPINIROLE SCH MG: 0.5 TABLET ORAL at 19:23

## 2019-06-07 RX ADMIN — MICONAZOLE NITRATE SCH: 2 POWDER TOPICAL at 09:02

## 2019-06-07 RX ADMIN — GABAPENTIN SCH MG: 100 CAPSULE ORAL at 08:26

## 2019-06-07 RX ADMIN — GABAPENTIN SCH MG: 100 CAPSULE ORAL at 13:52

## 2019-06-07 RX ADMIN — FUROSEMIDE SCH MG: 20 TABLET ORAL at 08:27

## 2019-06-07 RX ADMIN — Medication SCH MG: at 19:23

## 2019-06-07 RX ADMIN — MICONAZOLE NITRATE SCH APPLIC: 2 POWDER TOPICAL at 19:19

## 2019-06-07 RX ADMIN — MICONAZOLE NITRATE SCH APPLIC: 2 POWDER TOPICAL at 13:55

## 2019-06-07 RX ADMIN — Medication SCH MG: at 08:27

## 2019-06-07 RX ADMIN — SERTRALINE HYDROCHLORIDE SCH MG: 100 TABLET ORAL at 08:27

## 2019-06-08 RX ADMIN — GABAPENTIN SCH MG: 100 CAPSULE ORAL at 13:04

## 2019-06-08 RX ADMIN — ROPINIROLE SCH MG: 0.5 TABLET ORAL at 19:11

## 2019-06-08 RX ADMIN — SERTRALINE HYDROCHLORIDE SCH MG: 100 TABLET ORAL at 07:18

## 2019-06-08 RX ADMIN — FUROSEMIDE SCH MG: 20 TABLET ORAL at 07:18

## 2019-06-08 RX ADMIN — Medication SCH MG: at 07:18

## 2019-06-08 RX ADMIN — MICONAZOLE NITRATE SCH: 2 POWDER TOPICAL at 11:24

## 2019-06-08 RX ADMIN — Medication SCH MG: at 19:12

## 2019-06-08 RX ADMIN — MICONAZOLE NITRATE SCH: 2 POWDER TOPICAL at 07:19

## 2019-06-08 RX ADMIN — GABAPENTIN SCH MG: 100 CAPSULE ORAL at 07:20

## 2019-06-09 RX ADMIN — Medication SCH MG: at 19:17

## 2019-06-09 RX ADMIN — FUROSEMIDE SCH MG: 20 TABLET ORAL at 07:28

## 2019-06-09 RX ADMIN — ROPINIROLE SCH MG: 0.5 TABLET ORAL at 19:17

## 2019-06-09 RX ADMIN — SERTRALINE HYDROCHLORIDE SCH MG: 100 TABLET ORAL at 07:28

## 2019-06-09 RX ADMIN — GABAPENTIN SCH MG: 100 CAPSULE ORAL at 12:17

## 2019-06-09 RX ADMIN — Medication SCH MG: at 07:28

## 2019-06-09 RX ADMIN — GABAPENTIN SCH MG: 100 CAPSULE ORAL at 07:28

## 2019-06-10 RX ADMIN — SERTRALINE HYDROCHLORIDE SCH MG: 100 TABLET ORAL at 08:00

## 2019-06-10 RX ADMIN — FUROSEMIDE SCH MG: 20 TABLET ORAL at 08:00

## 2019-06-10 RX ADMIN — GABAPENTIN SCH MG: 100 CAPSULE ORAL at 13:14

## 2019-06-10 RX ADMIN — Medication SCH MG: at 08:00

## 2019-06-10 RX ADMIN — Medication SCH MG: at 20:20

## 2019-06-10 RX ADMIN — ROPINIROLE SCH MG: 0.5 TABLET ORAL at 20:21

## 2019-06-10 RX ADMIN — GABAPENTIN SCH MG: 100 CAPSULE ORAL at 08:01

## 2019-06-11 RX ADMIN — ROPINIROLE SCH MG: 0.5 TABLET ORAL at 19:44

## 2019-06-11 RX ADMIN — SERTRALINE HYDROCHLORIDE SCH MG: 100 TABLET ORAL at 07:57

## 2019-06-11 RX ADMIN — FUROSEMIDE SCH MG: 20 TABLET ORAL at 07:57

## 2019-06-11 RX ADMIN — GABAPENTIN SCH MG: 100 CAPSULE ORAL at 07:58

## 2019-06-11 RX ADMIN — Medication SCH MG: at 07:56

## 2019-06-11 RX ADMIN — Medication SCH MG: at 19:44

## 2019-06-11 RX ADMIN — GABAPENTIN SCH MG: 100 CAPSULE ORAL at 14:04

## 2019-06-12 RX ADMIN — Medication SCH MG: at 08:06

## 2019-06-12 RX ADMIN — Medication SCH MG: at 19:10

## 2019-06-12 RX ADMIN — GABAPENTIN SCH MG: 100 CAPSULE ORAL at 08:07

## 2019-06-12 RX ADMIN — SERTRALINE HYDROCHLORIDE SCH MG: 100 TABLET ORAL at 08:06

## 2019-06-12 RX ADMIN — FUROSEMIDE SCH MG: 20 TABLET ORAL at 08:06

## 2019-06-12 RX ADMIN — ROPINIROLE SCH MG: 0.5 TABLET ORAL at 19:10

## 2019-06-12 RX ADMIN — GABAPENTIN SCH MG: 100 CAPSULE ORAL at 12:32

## 2019-06-13 RX ADMIN — GABAPENTIN SCH MG: 100 CAPSULE ORAL at 07:59

## 2019-06-13 RX ADMIN — FUROSEMIDE SCH MG: 20 TABLET ORAL at 07:58

## 2019-06-13 RX ADMIN — ROPINIROLE SCH MG: 0.5 TABLET ORAL at 19:15

## 2019-06-13 RX ADMIN — Medication SCH MG: at 19:15

## 2019-06-13 RX ADMIN — GABAPENTIN SCH MG: 100 CAPSULE ORAL at 13:32

## 2019-06-13 RX ADMIN — Medication SCH MG: at 07:58

## 2019-06-13 RX ADMIN — SERTRALINE HYDROCHLORIDE SCH MG: 100 TABLET ORAL at 07:58

## 2019-06-14 RX ADMIN — Medication SCH MG: at 08:34

## 2019-06-14 RX ADMIN — ROPINIROLE SCH MG: 0.5 TABLET ORAL at 19:21

## 2019-06-14 RX ADMIN — GABAPENTIN SCH MG: 100 CAPSULE ORAL at 12:47

## 2019-06-14 RX ADMIN — Medication SCH MG: at 19:22

## 2019-06-14 RX ADMIN — GABAPENTIN SCH MG: 100 CAPSULE ORAL at 08:41

## 2019-06-14 RX ADMIN — FUROSEMIDE SCH MG: 20 TABLET ORAL at 08:34

## 2019-06-14 RX ADMIN — SERTRALINE HYDROCHLORIDE SCH MG: 100 TABLET ORAL at 08:34

## 2019-06-15 RX ADMIN — FUROSEMIDE SCH MG: 20 TABLET ORAL at 08:38

## 2019-06-15 RX ADMIN — GABAPENTIN SCH MG: 100 CAPSULE ORAL at 12:59

## 2019-06-15 RX ADMIN — Medication SCH MG: at 19:20

## 2019-06-15 RX ADMIN — GABAPENTIN SCH MG: 100 CAPSULE ORAL at 08:38

## 2019-06-15 RX ADMIN — ROPINIROLE SCH MG: 0.5 TABLET ORAL at 19:19

## 2019-06-15 RX ADMIN — SERTRALINE HYDROCHLORIDE SCH MG: 100 TABLET ORAL at 08:37

## 2019-06-15 RX ADMIN — Medication SCH MG: at 08:41

## 2019-06-16 RX ADMIN — SERTRALINE HYDROCHLORIDE SCH MG: 100 TABLET ORAL at 08:20

## 2019-06-16 RX ADMIN — Medication SCH MG: at 19:11

## 2019-06-16 RX ADMIN — ROPINIROLE SCH MG: 0.5 TABLET ORAL at 19:11

## 2019-06-16 RX ADMIN — Medication SCH MG: at 08:20

## 2019-06-16 RX ADMIN — FUROSEMIDE SCH MG: 20 TABLET ORAL at 08:20

## 2019-06-16 RX ADMIN — GABAPENTIN SCH MG: 100 CAPSULE ORAL at 08:20

## 2019-06-16 RX ADMIN — GABAPENTIN SCH MG: 100 CAPSULE ORAL at 12:13

## 2019-06-17 RX ADMIN — ROPINIROLE SCH MG: 0.5 TABLET ORAL at 19:14

## 2019-06-17 RX ADMIN — FUROSEMIDE SCH MG: 20 TABLET ORAL at 09:02

## 2019-06-17 RX ADMIN — SERTRALINE HYDROCHLORIDE SCH MG: 100 TABLET ORAL at 09:01

## 2019-06-17 RX ADMIN — Medication SCH MG: at 19:14

## 2019-06-17 RX ADMIN — GABAPENTIN SCH MG: 100 CAPSULE ORAL at 12:35

## 2019-06-17 RX ADMIN — Medication SCH MG: at 09:01

## 2019-06-17 RX ADMIN — GABAPENTIN SCH MG: 100 CAPSULE ORAL at 09:02

## 2019-06-18 RX ADMIN — SERTRALINE HYDROCHLORIDE SCH MG: 100 TABLET ORAL at 08:13

## 2019-06-18 RX ADMIN — GABAPENTIN SCH MG: 100 CAPSULE ORAL at 08:14

## 2019-06-18 RX ADMIN — GABAPENTIN SCH MG: 100 CAPSULE ORAL at 13:06

## 2019-06-18 RX ADMIN — Medication SCH MG: at 19:13

## 2019-06-18 RX ADMIN — FUROSEMIDE SCH MG: 20 TABLET ORAL at 08:13

## 2019-06-18 RX ADMIN — ROPINIROLE SCH MG: 0.5 TABLET ORAL at 19:12

## 2019-06-18 RX ADMIN — Medication SCH MG: at 08:13

## 2019-06-19 RX ADMIN — Medication SCH MG: at 08:32

## 2019-06-19 RX ADMIN — ROPINIROLE SCH MG: 0.5 TABLET ORAL at 19:47

## 2019-06-19 RX ADMIN — GABAPENTIN SCH MG: 100 CAPSULE ORAL at 13:18

## 2019-06-19 RX ADMIN — FUROSEMIDE SCH MG: 20 TABLET ORAL at 08:32

## 2019-06-19 RX ADMIN — SERTRALINE HYDROCHLORIDE SCH MG: 100 TABLET ORAL at 08:32

## 2019-06-19 RX ADMIN — GABAPENTIN SCH MG: 100 CAPSULE ORAL at 08:32

## 2019-06-19 RX ADMIN — Medication SCH MG: at 19:47

## 2019-06-20 RX ADMIN — SERTRALINE HYDROCHLORIDE SCH MG: 100 TABLET ORAL at 08:06

## 2019-06-20 RX ADMIN — FUROSEMIDE SCH MG: 20 TABLET ORAL at 08:05

## 2019-06-20 RX ADMIN — GABAPENTIN SCH MG: 100 CAPSULE ORAL at 13:41

## 2019-06-20 RX ADMIN — Medication SCH MG: at 19:54

## 2019-06-20 RX ADMIN — Medication SCH MG: at 08:05

## 2019-06-20 RX ADMIN — ROPINIROLE SCH MG: 0.5 TABLET ORAL at 19:54

## 2019-06-20 RX ADMIN — GABAPENTIN SCH MG: 100 CAPSULE ORAL at 08:05

## 2019-06-21 RX ADMIN — FUROSEMIDE SCH MG: 20 TABLET ORAL at 08:52

## 2019-06-21 RX ADMIN — Medication SCH MG: at 19:47

## 2019-06-21 RX ADMIN — SERTRALINE HYDROCHLORIDE SCH MG: 100 TABLET ORAL at 08:52

## 2019-06-21 RX ADMIN — Medication SCH MG: at 08:51

## 2019-06-21 RX ADMIN — GABAPENTIN SCH MG: 100 CAPSULE ORAL at 08:53

## 2019-06-21 RX ADMIN — ROPINIROLE SCH MG: 0.5 TABLET ORAL at 19:47

## 2019-06-21 RX ADMIN — GABAPENTIN SCH MG: 100 CAPSULE ORAL at 13:50

## 2019-06-22 RX ADMIN — ROPINIROLE SCH MG: 0.5 TABLET ORAL at 20:09

## 2019-06-22 RX ADMIN — Medication SCH MG: at 20:09

## 2019-06-22 RX ADMIN — FUROSEMIDE SCH MG: 20 TABLET ORAL at 07:53

## 2019-06-22 RX ADMIN — GABAPENTIN SCH MG: 100 CAPSULE ORAL at 12:21

## 2019-06-22 RX ADMIN — SERTRALINE HYDROCHLORIDE SCH MG: 100 TABLET ORAL at 07:53

## 2019-06-22 RX ADMIN — GABAPENTIN SCH MG: 100 CAPSULE ORAL at 07:53

## 2019-06-22 RX ADMIN — Medication SCH MG: at 07:54

## 2019-06-23 RX ADMIN — Medication SCH MG: at 07:47

## 2019-06-23 RX ADMIN — FUROSEMIDE SCH MG: 20 TABLET ORAL at 07:47

## 2019-06-23 RX ADMIN — GABAPENTIN SCH MG: 100 CAPSULE ORAL at 14:12

## 2019-06-23 RX ADMIN — SERTRALINE HYDROCHLORIDE SCH MG: 100 TABLET ORAL at 07:48

## 2019-06-23 RX ADMIN — GABAPENTIN SCH MG: 100 CAPSULE ORAL at 07:48

## 2019-06-23 RX ADMIN — Medication SCH MG: at 19:27

## 2019-06-23 RX ADMIN — ROPINIROLE SCH MG: 0.5 TABLET ORAL at 19:28

## 2019-06-24 RX ADMIN — FUROSEMIDE SCH MG: 20 TABLET ORAL at 08:55

## 2019-06-24 RX ADMIN — SERTRALINE HYDROCHLORIDE SCH MG: 100 TABLET ORAL at 09:11

## 2019-06-24 RX ADMIN — Medication SCH MG: at 08:56

## 2019-06-24 RX ADMIN — GABAPENTIN SCH MG: 100 CAPSULE ORAL at 08:59

## 2019-06-24 RX ADMIN — ROPINIROLE SCH MG: 0.5 TABLET ORAL at 20:46

## 2019-06-24 RX ADMIN — GABAPENTIN SCH MG: 100 CAPSULE ORAL at 12:54

## 2019-06-24 RX ADMIN — Medication SCH MG: at 20:47

## 2019-06-25 RX ADMIN — Medication SCH MG: at 09:33

## 2019-06-25 RX ADMIN — GABAPENTIN SCH MG: 100 CAPSULE ORAL at 13:05

## 2019-06-25 RX ADMIN — Medication SCH MG: at 19:53

## 2019-06-25 RX ADMIN — FUROSEMIDE SCH MG: 20 TABLET ORAL at 09:32

## 2019-06-25 RX ADMIN — SERTRALINE HYDROCHLORIDE SCH MG: 100 TABLET ORAL at 09:33

## 2019-06-25 RX ADMIN — GABAPENTIN SCH MG: 100 CAPSULE ORAL at 09:33

## 2019-06-25 RX ADMIN — ROPINIROLE SCH MG: 0.5 TABLET ORAL at 19:54

## 2019-06-26 RX ADMIN — FUROSEMIDE SCH MG: 20 TABLET ORAL at 08:28

## 2019-06-26 RX ADMIN — SERTRALINE HYDROCHLORIDE SCH MG: 100 TABLET ORAL at 08:28

## 2019-06-26 RX ADMIN — GABAPENTIN SCH MG: 100 CAPSULE ORAL at 13:28

## 2019-06-26 RX ADMIN — Medication SCH MG: at 19:53

## 2019-06-26 RX ADMIN — Medication SCH MG: at 08:28

## 2019-06-26 RX ADMIN — ROPINIROLE SCH MG: 0.5 TABLET ORAL at 19:53

## 2019-06-26 RX ADMIN — GABAPENTIN SCH MG: 100 CAPSULE ORAL at 08:28

## 2019-06-27 RX ADMIN — GABAPENTIN SCH MG: 100 CAPSULE ORAL at 14:36

## 2019-06-27 RX ADMIN — ROPINIROLE SCH MG: 0.5 TABLET ORAL at 19:10

## 2019-06-27 RX ADMIN — FUROSEMIDE SCH MG: 20 TABLET ORAL at 07:48

## 2019-06-27 RX ADMIN — Medication SCH MG: at 19:11

## 2019-06-27 RX ADMIN — Medication SCH MG: at 07:48

## 2019-06-27 RX ADMIN — GABAPENTIN SCH MG: 100 CAPSULE ORAL at 07:47

## 2019-06-27 RX ADMIN — SERTRALINE HYDROCHLORIDE SCH MG: 100 TABLET ORAL at 07:48

## 2019-06-28 VITALS — DIASTOLIC BLOOD PRESSURE: 42 MMHG | SYSTOLIC BLOOD PRESSURE: 101 MMHG

## 2019-06-28 RX ADMIN — FUROSEMIDE SCH MG: 20 TABLET ORAL at 07:50

## 2019-06-28 RX ADMIN — SERTRALINE HYDROCHLORIDE SCH MG: 100 TABLET ORAL at 07:50

## 2019-06-28 RX ADMIN — GABAPENTIN SCH MG: 100 CAPSULE ORAL at 07:53

## 2019-06-28 RX ADMIN — GABAPENTIN SCH MG: 100 CAPSULE ORAL at 14:12

## 2019-06-28 RX ADMIN — ROPINIROLE SCH MG: 0.5 TABLET ORAL at 19:09

## 2019-06-28 RX ADMIN — Medication SCH MG: at 07:50

## 2019-06-28 RX ADMIN — Medication SCH MG: at 19:09

## 2019-06-29 RX ADMIN — ROPINIROLE SCH MG: 0.5 TABLET ORAL at 19:20

## 2019-06-29 RX ADMIN — Medication SCH MG: at 19:21

## 2019-06-29 RX ADMIN — SERTRALINE HYDROCHLORIDE SCH MG: 100 TABLET ORAL at 08:21

## 2019-06-29 RX ADMIN — GABAPENTIN SCH MG: 100 CAPSULE ORAL at 08:22

## 2019-06-29 RX ADMIN — GABAPENTIN SCH MG: 100 CAPSULE ORAL at 13:59

## 2019-06-29 RX ADMIN — Medication SCH MG: at 08:21

## 2019-06-29 RX ADMIN — FUROSEMIDE SCH MG: 20 TABLET ORAL at 08:21

## 2019-06-30 RX ADMIN — ROPINIROLE SCH MG: 0.5 TABLET ORAL at 19:15

## 2019-06-30 RX ADMIN — Medication SCH MG: at 19:16

## 2019-06-30 RX ADMIN — Medication SCH MG: at 07:27

## 2019-06-30 RX ADMIN — FUROSEMIDE SCH MG: 20 TABLET ORAL at 07:27

## 2019-06-30 RX ADMIN — GABAPENTIN SCH MG: 100 CAPSULE ORAL at 14:14

## 2019-06-30 RX ADMIN — SERTRALINE HYDROCHLORIDE SCH MG: 100 TABLET ORAL at 07:27

## 2019-06-30 RX ADMIN — GABAPENTIN SCH MG: 100 CAPSULE ORAL at 07:27

## 2019-07-01 RX ADMIN — GABAPENTIN SCH MG: 100 CAPSULE ORAL at 13:12

## 2019-07-01 RX ADMIN — Medication SCH MG: at 08:07

## 2019-07-01 RX ADMIN — FUROSEMIDE SCH MG: 20 TABLET ORAL at 08:08

## 2019-07-01 RX ADMIN — SERTRALINE HYDROCHLORIDE SCH MG: 100 TABLET ORAL at 08:07

## 2019-07-01 RX ADMIN — ROPINIROLE SCH MG: 0.5 TABLET ORAL at 19:12

## 2019-07-01 RX ADMIN — GABAPENTIN SCH MG: 100 CAPSULE ORAL at 08:08

## 2019-07-01 RX ADMIN — Medication SCH MG: at 19:13

## 2019-07-02 RX ADMIN — GABAPENTIN SCH MG: 100 CAPSULE ORAL at 13:44

## 2019-07-02 RX ADMIN — ROPINIROLE SCH MG: 0.5 TABLET ORAL at 20:34

## 2019-07-02 RX ADMIN — GABAPENTIN SCH MG: 100 CAPSULE ORAL at 08:04

## 2019-07-02 RX ADMIN — Medication SCH MG: at 20:33

## 2019-07-02 RX ADMIN — SERTRALINE HYDROCHLORIDE SCH MG: 100 TABLET ORAL at 08:04

## 2019-07-02 RX ADMIN — Medication SCH MG: at 20:34

## 2019-07-02 RX ADMIN — Medication SCH MG: at 08:04

## 2019-07-02 RX ADMIN — FUROSEMIDE SCH MG: 20 TABLET ORAL at 08:04

## 2019-07-02 NOTE — PCM.PN
- General Info


Date of Service: 05/14/19


Admission Dx/Problem (Free Text): 





Subjective: Patient is doing well.  No complaints currently.  Did have an 

episode of chest pain was seen by internal medicine last month.  EKG showed 

unchanged age for fibrillation, troponin was negative.  Patient's on chronic 

Coumadin therapy, INR therapeutic.  She was treated for UTI.





Objective: Vital signs remain normal.  Pulse remains mostly regular heart and 

lungs clear extremity is warm well perfused.





A/P:





Doing well in long-term SB.  Continue cares.





Rate stable as is INR














- Patient Data


Vitals - Most Recent: 


 Last Vital Signs











Temp  37.0 C   06/28/19 08:00


 


Pulse  88   06/28/19 08:00


 


Resp  20   06/14/19 08:00


 


BP  101/42 L  06/28/19 08:00


 


Pulse Ox  93 L  06/28/19 08:00











Weight - Most Recent: 81.873 kg


I&O - Last 24 Hours: 


 Intake & Output











 07/01/19 07/02/19 07/02/19





 22:59 06:59 14:59


 


Intake Total 240  


 


Balance 240  











Med Orders - Current: 


 Current Medications





Acetaminophen (Tylenol)  650 mg PO 0200,0800,1300,2000 [facility]


   Last Admin: 07/02/19 13:44 Dose:  650 mg


Calcium Carbonate/Glycine (Tums Extra Strength)  750 mg PO TID PRN


   PRN Reason: Dyspepsia


   Last Admin: 06/05/19 12:31 Dose:  750 mg


Docusate Sodium (Colace)  100 mg PO DAILY PRN


   PRN Reason: Constipation


Famotidine (Pepcid)  20 mg PO DAILY [facility]


   Last Admin: 07/02/19 08:05 Dose:  20 mg


Furosemide (Lasix)  20 mg PO DAILY [facility]


   Last Admin: 07/02/19 08:04 Dose:  20 mg


Gabapentin (Neurontin)  100 mg PO BID@0800,1300 [facility]


   Last Admin: 07/02/19 13:44 Dose:  100 mg


Melatonin (Melatonin)  6 mg PO BEDTIME [facility]


   Last Admin: 07/01/19 19:11 Dose:  6 mg


Nitroglycerin (Nitrostat)  0.4 mg SL Q5M PRN


   PRN Reason: Chest Pain


   Last Admin: 04/13/19 18:00 Dose:  0.4 mg


Warfarin. 2.5mg (1/2 Of 5mg Tab): Own Supply  0 mg PO MoTh@2000 [facility]


   Last Admin: 07/01/19 19:13 Dose:  2.5 mg


Warfarin. 5mg (Own (Supply))  0 mg PO SuTuWeFrSa@2000 [facility]


   Last Admin: 06/30/19 19:16 Dose:  5 mg


Gabapentin. 300mg ( (Own Supply))  0 mg PO BEDTIME [facility]


   Last Admin: 07/01/19 19:11 Dose:  300 mg


Metoprolol.Er 25mg ( (Own Supply))  0 mg PO DAILY [facility]


   Last Admin: 07/02/19 08:04 Dose:  25 mg


Pharmacy Consult (Consult To Pharmacy)  1 each .XX ASDIRECTED PRN


   PRN Reason: consult warfarin/INR


Polyethylene Glycol (Miralax)  17 gm PO DAILY [facility]


   Last Admin: 07/02/19 08:04 Dose:  Not Given


Ropinirole HCl (Requip)  0.5 mg PO BEDTIME [facility]


   Last Admin: 07/01/19 19:12 Dose:  0.5 mg


Senna/Docusate Sodium (Senna Plus)  1 tab PO DAILY PRN


   PRN Reason: Constipation


Sertraline HCl (Zoloft)  100 mg PO DAILY [facility]


   Last Admin: 07/02/19 08:04 Dose:  100 mg





Discontinued Medications





Acetaminophen (Tylenol)  650 mg PO Q6H [facility]


   Last Admin: 02/08/19 12:43 Dose:  Not Given


Acetaminophen (Tylenol)  650 mg PO 0200,0800,1400,2000 [facility]


   Last Admin: 02/27/19 07:12 Dose:  650 mg


Barium Sulfate (E-Z-Hd)  50.3999 gm .ROUTE .STK-MED ONE


   Stop: 03/04/19 10:01


Barium Sulfate (E-Z-Paste)  10.25567 gm PO .STK-MED ONE


   Stop: 03/04/19 10:01


Cephalexin (Keflex)  500 mg PO BID [facility]


   Stop: 03/12/19 20:01


   Last Admin: 03/12/19 19:57 Dose:  500 mg


Cephalexin (Keflex)  250 mg PO BID [facility]


   Stop: 04/23/19 08:01


   Last Admin: 04/23/19 08:20 Dose:  250 mg


Docusate Sodium (Colace)  100 mg PO DAILY [facility]


   Last Admin: 06/19/19 08:32 Dose:  100 mg


Gabapentin (Neurontin)  300 mg PO BEDTIME [facility]


   Last Admin: 01/31/19 20:48 Dose:  300 mg


Gabapentin (Neurontin)  100 mg PO 08,12 [facility]


   Last Admin: 02/01/19 08:17 Dose:  100 mg


Gabapentin (Neurontin)  100 mg PO BID@0800,1200 [facility]


   Last Admin: 02/27/19 07:12 Dose:  100 mg


Gabapentin (Neurontin)  300 mg PO BEDTIME [facility]


   Last Admin: 03/28/19 20:34 Dose:  300 mg


Metoprolol Succinate (Toprol Xl)  25 mg PO DAILY [facility]


   Last Admin: 03/29/19 08:58 Dose:  25 mg


Miconazole (Desenex 2%)  0 gm TOP BID [facility]


   Last Admin: 02/18/19 09:00 Dose:  Not Given


Miconazole (Desenex 2%)  0 gm TOP BID PRN


   PRN Reason: Itching


   Last Admin: 02/16/19 07:55 Dose:  1 dose


Miconazole (Desenex 2%)  0 gm TOP TID [facility]


   Last Admin: 06/05/19 15:40 Dose:  1 applic


Miconazole (Desenex 2%)  0 gm TOP TID [facility]


   Stop: 06/10/19 20:01


   Last Admin: 06/08/19 11:24 Dose:  Not Given


Miconazole (Desenex 2%)  0 gm TOP TID PRN


   PRN Reason: Rash


   Stop: 06/10/19 20:01


   Last Admin: 06/09/19 19:16 Dose:  1 applic


Nitrofurantoin Macrocrystals (Macrobid)  100 mg PO BID [facility]


   Stop: 04/23/19 17:01


   Last Admin: 04/18/19 18:16 Dose:  Not Given


Warfarin. 2.5mg (1/2 Of 5mg Tab): Own Supply  0 mg PO MOWEFR@2000 [facility]


   Last Admin: 02/11/19 20:21 Dose:  2.5 mg


Warfarin. 5mg (Own (Supply))  0 mg PO SUTUTHSA@2000 [facility]


   Last Admin: 02/19/19 19:41 Dose:  5 mg


Warfarin. 2.5mg (1/2 Of 5mg Tab): Own Supply  0 mg PO MOWEFR@2000 [facility]


   Last Admin: 02/15/19 20:20 Dose:  2.5 mg


Warfarin. 2.5mg (1/2 Of 5mg Tab): Own Supply  0 mg PO ONETIME ONE


   Stop: 02/16/19 20:01


   Last Admin: 02/16/19 19:34 Dose:  2.5 mg


Warfarin. 5mg (Own (Supply))  0 mg PO ONETIME ONE


   Stop: 02/17/19 20:01


   Last Admin: 02/17/19 20:23 Dose:  5 mg


Warfarin. 5mg (Own (Supply))  0 mg PO ONETIME ONE


   Stop: 02/18/19 20:01


   Last Admin: 02/18/19 20:30 Dose:  10 mg


Warfarin. 5mg (Own (Supply))  0 mg PO ONETIME ONE


   Stop: 02/19/19 20:01


   Last Admin: 02/19/19 20:30 Dose:  5 mg


Warfarin. 5mg (Own (Supply))  2.5 mg PO ONETIME ONE


   Stop: 02/22/19 20:01


   Last Admin: 02/22/19 19:55 Dose:  2.5 mg


Warfarin. 5mg (Own (Supply))  5 mg PO ONETIME ONE


   Stop: 02/23/19 20:01


   Last Admin: 02/23/19 19:49 Dose:  5 mg


Warfarin. 5mg (Own (Supply))  5 mg PO ONETIME ONE


   Stop: 02/24/19 20:01


   Last Admin: 02/24/19 19:21 Dose:  5 mg


Warfarin. 5mg (Own (Supply))  2.5 mg PO ONETIME ONE


   Stop: 02/25/19 20:01


   Last Admin: 02/25/19 20:04 Dose:  2.5 mg


Warfarin. 2.5mg (1/2 Of 5mg Tab): Own Supply  0 mg PO MOWEFR@2000 [facility]


   Last Admin: 03/01/19 20:03 Dose:  2.5 mg


Warfarin. 5mg (Own (Supply))  0 mg PO SUTUTHSA@2000 [facility]


   Last Admin: 03/02/19 20:20 Dose:  5 mg


Warfarin. 2.5mg (1/2 Of 5mg Tab): Own Supply  2.5 mg PO ONETIME ONE


   Stop: 03/02/19 20:01


   Last Admin: 03/02/19 20:20 Dose:  2.5 mg


Warfarin. 5mg (Own (Supply))  0 mg PO ONETIME ONE


   Stop: 03/03/19 20:01


   Last Admin: 03/03/19 19:43 Dose:  10 mg


Warfarin. 5mg (Own (Supply))  0 mg PO ONETIME ONE


   Stop: 03/04/19 20:01


   Last Admin: 03/04/19 20:16 Dose:  5 mg


Warfarin 5mg (Own (Supply))  0 mg PO ONETIME ONE


   Stop: 03/07/19 20:01


   Last Admin: 03/07/19 20:41 Dose:  5 mg


Warfarin 5mg (Own (Supply))  0 mg PO ONETIME ONE


   Stop: 03/08/19 20:01


   Last Admin: 03/08/19 20:02 Dose:  5 mg


Warfarin 5mg (Own (Supply))  0 mg PO ONETIME ONE


   Stop: 03/09/19 20:01


   Last Admin: 03/09/19 21:03 Dose:  5 mg


Warfarin 5mg (Own (Supply))  0 mg PO ONETIME ONE


   Stop: 03/10/19 20:01


   Last Admin: 03/10/19 20:23 Dose:  2.5 mg


Warfarin. 2.5mg (1/2 Of 5mg Tab): Own Supply  0 mg PO MOWEFR@2000 [facility]


   Last Admin: 03/20/19 19:24 Dose:  2.5 mg


Warfarin. 5mg (Own (Supply))  0 mg PO SUTUTHSA@2000 [facility]


   Last Admin: 03/21/19 19:59 Dose:  5 mg


Polyethylene Glycol (Miralax)  17 gm PO DAILY PRN


   PRN Reason: Constipation


Ropinirole HCl (Requip)  0.5 mg PO BEDTIME [facility]


   Last Admin: 01/31/19 20:36 Dose:  Not Given


Ropinirole HCl (Requip)  0.5 mg PO BEDTIME [facility]


   Last Admin: 01/31/19 20:49 Dose:  Not Given


Ropinirole HCl (Requip)  0.5 mg PO BEDTIME [facility]


Senna/Docusate Sodium (Senna Plus)  1 tab PO DAILY [facility]


   Last Admin: 06/19/19 08:32 Dose:  1 tab


Warfarin Sodium (Coumadin)  5 mg PO ASDIRECTED [facility]


Warfarin Sodium (Coumadin)  2.5 mg PO ONETIME ONE


   Stop: 02/03/19 20:01


   Last Admin: 02/03/19 20:45 Dose:  2.5 mg


Warfarin Sodium (Coumadin)  1 mg PO DAILY@2000 [facility]


   Stop: 03/05/19 20:01


   Last Admin: 03/05/19 19:47 Dose:  1 mg


Warfarin Sodium (Coumadin)  2.5 mg PO DAILY@2000 [facility]


   Stop: 03/06/19 20:01


   Last Admin: 03/06/19 19:39 Dose:  2.5 mg











- Problem List Review


Problem List Initiated/Reviewed/Updated: Yes





- My Orders


Last 24 Hours: 


My Active Orders





07/19/19 07:00


INR,PT,PROTHROMBIN TIME [COAG] Q28D 





08/16/19 07:00


INR,PT,PROTHROMBIN TIME [COAG] Q28D 





09/13/19 07:00


INR,PT,PROTHROMBIN TIME [COAG] Q28D 





10/11/19 07:00


INR,PT,PROTHROMBIN TIME [COAG] Q28D

## 2019-07-02 NOTE — PCM.PN
- General Info


Date of Service: 02/28/19


Subjective Update: 








Patient is a long-term admitted to swing bed.  Hasn't any self you economic 

support her family.  She opted for placement here set nursing home.  She has no 

focal complaints at this time.





Vital signs remained normal.  Heart and lungs clear to auscultation.  Abdomen 

soft nontender.  Extremities warm well perfused without edema.





Patient admitted to the long term nursing home stay.  She's doing relatively 

well.  INR supratherapeutic, pharmacy to follow.











- Patient Data


Vitals - Most Recent: 


 Last Vital Signs











Temp  37.0 C   06/28/19 08:00


 


Pulse  88   06/28/19 08:00


 


Resp  20   06/14/19 08:00


 


BP  101/42 L  06/28/19 08:00


 


Pulse Ox  93 L  06/28/19 08:00











Weight - Most Recent: 81.873 kg


I&O - Last 24 Hours: 


 Intake & Output











 07/01/19 07/02/19 07/02/19





 22:59 06:59 14:59


 


Intake Total 240  


 


Balance 240  











Med Orders - Current: 


 Current Medications





Acetaminophen (Tylenol)  650 mg PO 0200,0800,1300,2000 [facility]


   Last Admin: 07/02/19 08:05 Dose:  650 mg


Calcium Carbonate/Glycine (Tums Extra Strength)  750 mg PO TID PRN


   PRN Reason: Dyspepsia


   Last Admin: 06/05/19 12:31 Dose:  750 mg


Docusate Sodium (Colace)  100 mg PO DAILY PRN


   PRN Reason: Constipation


Famotidine (Pepcid)  20 mg PO DAILY [facility]


   Last Admin: 07/02/19 08:05 Dose:  20 mg


Furosemide (Lasix)  20 mg PO DAILY [facility]


   Last Admin: 07/02/19 08:04 Dose:  20 mg


Gabapentin (Neurontin)  100 mg PO BID@0800,1300 [facility]


   Last Admin: 07/02/19 08:04 Dose:  100 mg


Melatonin (Melatonin)  6 mg PO BEDTIME [facility]


   Last Admin: 07/01/19 19:11 Dose:  6 mg


Nitroglycerin (Nitrostat)  0.4 mg SL Q5M PRN


   PRN Reason: Chest Pain


   Last Admin: 04/13/19 18:00 Dose:  0.4 mg


Warfarin. 2.5mg (1/2 Of 5mg Tab): Own Supply  0 mg PO MoTh@2000 [facility]


   Last Admin: 07/01/19 19:13 Dose:  2.5 mg


Warfarin. 5mg (Own (Supply))  0 mg PO SuTuWElpidioa@2000 [facility]


   Last Admin: 06/30/19 19:16 Dose:  5 mg


Gabapentin. 300mg ( (Own Supply))  0 mg PO BEDTIME [facility]


   Last Admin: 07/01/19 19:11 Dose:  300 mg


Metoprolol.Er 25mg ( (Own Supply))  0 mg PO DAILY [facility]


   Last Admin: 07/02/19 08:04 Dose:  25 mg


Pharmacy Consult (Consult To Pharmacy)  1 each .XX ASDIRECTED PRN


   PRN Reason: consult warfarin/INR


Polyethylene Glycol (Miralax)  17 gm PO DAILY [facility]


   Last Admin: 07/02/19 08:04 Dose:  Not Given


Ropinirole HCl (Requip)  0.5 mg PO BEDTIME [facility]


   Last Admin: 07/01/19 19:12 Dose:  0.5 mg


Senna/Docusate Sodium (Senna Plus)  1 tab PO DAILY PRN


   PRN Reason: Constipation


Sertraline HCl (Zoloft)  100 mg PO DAILY [facility]


   Last Admin: 07/02/19 08:04 Dose:  100 mg





Discontinued Medications





Acetaminophen (Tylenol)  650 mg PO Q6H [facility]


   Last Admin: 02/08/19 12:43 Dose:  Not Given


Acetaminophen (Tylenol)  650 mg PO 0200,0800,1400,2000 [facility]


   Last Admin: 02/27/19 07:12 Dose:  650 mg


Barium Sulfate (E-Z-Hd)  50.3999 gm .ROUTE .STK-MED ONE


   Stop: 03/04/19 10:01


Barium Sulfate (E-Z-Paste)  10.52097 gm PO .STK-MED ONE


   Stop: 03/04/19 10:01


Cephalexin (Keflex)  500 mg PO BID [facility]


   Stop: 03/12/19 20:01


   Last Admin: 03/12/19 19:57 Dose:  500 mg


Cephalexin (Keflex)  250 mg PO BID [facility]


   Stop: 04/23/19 08:01


   Last Admin: 04/23/19 08:20 Dose:  250 mg


Docusate Sodium (Colace)  100 mg PO DAILY [facility]


   Last Admin: 06/19/19 08:32 Dose:  100 mg


Gabapentin (Neurontin)  300 mg PO BEDTIME [facility]


   Last Admin: 01/31/19 20:48 Dose:  300 mg


Gabapentin (Neurontin)  100 mg PO 08,12 [facility]


   Last Admin: 02/01/19 08:17 Dose:  100 mg


Gabapentin (Neurontin)  100 mg PO BID@0800,1200 [facility]


   Last Admin: 02/27/19 07:12 Dose:  100 mg


Gabapentin (Neurontin)  300 mg PO BEDTIME [facility]


   Last Admin: 03/28/19 20:34 Dose:  300 mg


Metoprolol Succinate (Toprol Xl)  25 mg PO DAILY [facility]


   Last Admin: 03/29/19 08:58 Dose:  25 mg


Miconazole (Desenex 2%)  0 gm TOP BID [facility]


   Last Admin: 02/18/19 09:00 Dose:  Not Given


Miconazole (Desenex 2%)  0 gm TOP BID PRN


   PRN Reason: Itching


   Last Admin: 02/16/19 07:55 Dose:  1 dose


Miconazole (Desenex 2%)  0 gm TOP TID [facility]


   Last Admin: 06/05/19 15:40 Dose:  1 applic


Miconazole (Desenex 2%)  0 gm TOP TID [facility]


   Stop: 06/10/19 20:01


   Last Admin: 06/08/19 11:24 Dose:  Not Given


Miconazole (Desenex 2%)  0 gm TOP TID PRN


   PRN Reason: Rash


   Stop: 06/10/19 20:01


   Last Admin: 06/09/19 19:16 Dose:  1 applic


Nitrofurantoin Macrocrystals (Macrobid)  100 mg PO BID [facility]


   Stop: 04/23/19 17:01


   Last Admin: 04/18/19 18:16 Dose:  Not Given


Warfarin. 2.5mg (1/2 Of 5mg Tab): Own Supply  0 mg PO MOWEFR@2000 [facility]


   Last Admin: 02/11/19 20:21 Dose:  2.5 mg


Warfarin. 5mg (Own (Supply))  0 mg PO SUTUTHSA@2000 [facility]


   Last Admin: 02/19/19 19:41 Dose:  5 mg


Warfarin. 2.5mg (1/2 Of 5mg Tab): Own Supply  0 mg PO MOWEFR@2000 [facility]


   Last Admin: 02/15/19 20:20 Dose:  2.5 mg


Warfarin. 2.5mg (1/2 Of 5mg Tab): Own Supply  0 mg PO ONETIME ONE


   Stop: 02/16/19 20:01


   Last Admin: 02/16/19 19:34 Dose:  2.5 mg


Warfarin. 5mg (Own (Supply))  0 mg PO ONETIME ONE


   Stop: 02/17/19 20:01


   Last Admin: 02/17/19 20:23 Dose:  5 mg


Warfarin. 5mg (Own (Supply))  0 mg PO ONETIME ONE


   Stop: 02/18/19 20:01


   Last Admin: 02/18/19 20:30 Dose:  10 mg


Warfarin. 5mg (Own (Supply))  0 mg PO ONETIME ONE


   Stop: 02/19/19 20:01


   Last Admin: 02/19/19 20:30 Dose:  5 mg


Warfarin. 5mg (Own (Supply))  2.5 mg PO ONETIME ONE


   Stop: 02/22/19 20:01


   Last Admin: 02/22/19 19:55 Dose:  2.5 mg


Warfarin. 5mg (Own (Supply))  5 mg PO ONETIME ONE


   Stop: 02/23/19 20:01


   Last Admin: 02/23/19 19:49 Dose:  5 mg


Warfarin. 5mg (Own (Supply))  5 mg PO ONETIME ONE


   Stop: 02/24/19 20:01


   Last Admin: 02/24/19 19:21 Dose:  5 mg


Warfarin. 5mg (Own (Supply))  2.5 mg PO ONETIME ONE


   Stop: 02/25/19 20:01


   Last Admin: 02/25/19 20:04 Dose:  2.5 mg


Warfarin. 2.5mg (1/2 Of 5mg Tab): Own Supply  0 mg PO MOWEFR@2000 [facility]


   Last Admin: 03/01/19 20:03 Dose:  2.5 mg


Warfarin. 5mg (Own (Supply))  0 mg PO SUTUTHSA@[address] [place] [address]


   Last Admin: 03/02/19 20:20 Dose:  5 mg


Warfarin. 2.5mg (1/2 Of 5mg Tab): Own Supply  2.5 mg PO ONETIME ONE


   Stop: 03/02/19 20:01


   Last Admin: 03/02/19 20:20 Dose:  2.5 mg


Warfarin. 5mg (Own (Supply))  0 mg PO ONETIME ONE


   Stop: 03/03/19 20:01


   Last Admin: 03/03/19 19:43 Dose:  10 mg


Warfarin. 5mg (Own (Supply))  0 mg PO ONETIME ONE


   Stop: 03/04/19 20:01


   Last Admin: 03/04/19 20:16 Dose:  5 mg


Warfarin 5mg (Own (Supply))  0 mg PO ONETIME ONE


   Stop: 03/07/19 20:01


   Last Admin: 03/07/19 20:41 Dose:  5 mg


Warfarin 5mg (Own (Supply))  0 mg PO ONETIME ONE


   Stop: 03/08/19 20:01


   Last Admin: 03/08/19 20:02 Dose:  5 mg


Warfarin 5mg (Own (Supply))  0 mg PO ONETIME ONE


   Stop: 03/09/19 20:01


   Last Admin: 03/09/19 21:03 Dose:  5 mg


Warfarin 5mg (Own (Supply))  0 mg PO ONETIME ONE


   Stop: 03/10/19 20:01


   Last Admin: 03/10/19 20:23 Dose:  2.5 mg


Warfarin. 2.5mg (1/2 Of 5mg Tab): Own Supply  0 mg PO MOWEFR@2000 [facility]


   Last Admin: 03/20/19 19:24 Dose:  2.5 mg


Warfarin. 5mg (Own (Supply))  0 mg PO SUTUTHSA@2000 [facility]


   Last Admin: 03/21/19 19:59 Dose:  5 mg


Polyethylene Glycol (Miralax)  17 gm PO DAILY PRN


   PRN Reason: Constipation


Ropinirole HCl (Requip)  0.5 mg PO BEDTIME [facility]


   Last Admin: 01/31/19 20:36 Dose:  Not Given


Ropinirole HCl (Requip)  0.5 mg PO BEDTIME [facility]


   Last Admin: 01/31/19 20:49 Dose:  Not Given


Ropinirole HCl (Requip)  0.5 mg PO BEDTIME [facility]


Senna/Docusate Sodium (Senna Plus)  1 tab PO DAILY [facility]


   Last Admin: 06/19/19 08:32 Dose:  1 tab


Warfarin Sodium (Coumadin)  5 mg PO ASDIRECTED [facility]


Warfarin Sodium (Coumadin)  2.5 mg PO ONETIME ONE


   Stop: 02/03/19 20:01


   Last Admin: 02/03/19 20:45 Dose:  2.5 mg


Warfarin Sodium (Coumadin)  1 mg PO DAILY@2000 [facility]


   Stop: 03/05/19 20:01


   Last Admin: 03/05/19 19:47 Dose:  1 mg


Warfarin Sodium (Coumadin)  2.5 mg PO DAILY@2000 [facility]


   Stop: 03/06/19 20:01


   Last Admin: 03/06/19 19:39 Dose:  2.5 mg











- Problem List Review


Problem List Initiated/Reviewed/Updated: Yes





- My Orders


Last 24 Hours: 


My Active Orders





07/19/19 07:00


INR,PT,PROTHROMBIN TIME [COAG] Q28D 





08/16/19 07:00


INR,PT,PROTHROMBIN TIME [COAG] Q28D 





09/13/19 07:00


INR,PT,PROTHROMBIN TIME [COAG] Q28D 





10/11/19 07:00


INR,PT,PROTHROMBIN TIME [COAG] Q28D

## 2019-07-02 NOTE — PCM.PN
- General Info


Date of Service: 03/28/19


Admission Dx/Problem (Free Text): 








Admitted to swing bed long-term.  Failure to thrive.  Little to no social 

economic support.  Nursing was concerned about swallowing.  She did a video eval

, which showed penetration without cristela aspiration.  They have recommended 

moderate dietary adjustments.





Vital signs remain stable.  Alert oriented no acute distress.  Abdomen soft 

nontender.





Assessment and plan:


Slight dietary modification, watch for any aspiration episodes.


Coumadin level currently therapeutic.


Otherwise seems adjusted to long-term living in swing bed environment.














- Patient Data


Vitals - Most Recent: 


 Last Vital Signs











Temp  37.0 C   06/28/19 08:00


 


Pulse  88   06/28/19 08:00


 


Resp  20   06/14/19 08:00


 


BP  101/42 L  06/28/19 08:00


 


Pulse Ox  93 L  06/28/19 08:00











Weight - Most Recent: 81.873 kg


I&O - Last 24 Hours: 


 Intake & Output











 07/01/19 07/02/19 07/02/19





 22:59 06:59 14:59


 


Intake Total 240  


 


Balance 240  











Med Orders - Current: 


 Current Medications





Acetaminophen (Tylenol)  650 mg PO 0200,0800,1300,2000 [facility]


   Last Admin: 07/02/19 08:05 Dose:  650 mg


Calcium Carbonate/Glycine (Tums Extra Strength)  750 mg PO TID PRN


   PRN Reason: Dyspepsia


   Last Admin: 06/05/19 12:31 Dose:  750 mg


Docusate Sodium (Colace)  100 mg PO DAILY PRN


   PRN Reason: Constipation


Famotidine (Pepcid)  20 mg PO DAILY [facility]


   Last Admin: 07/02/19 08:05 Dose:  20 mg


Furosemide (Lasix)  20 mg PO DAILY [facility]


   Last Admin: 07/02/19 08:04 Dose:  20 mg


Gabapentin (Neurontin)  100 mg PO BID@0800,1300 [facility]


   Last Admin: 07/02/19 08:04 Dose:  100 mg


Melatonin (Melatonin)  6 mg PO BEDTIME [facility]


   Last Admin: 07/01/19 19:11 Dose:  6 mg


Nitroglycerin (Nitrostat)  0.4 mg SL Q5M PRN


   PRN Reason: Chest Pain


   Last Admin: 04/13/19 18:00 Dose:  0.4 mg


Warfarin. 2.5mg (1/2 Of 5mg Tab): Own Supply  0 mg PO MoTh@2000 [facility]


   Last Admin: 07/01/19 19:13 Dose:  2.5 mg


Warfarin. 5mg (Own (Supply))  0 mg PO SuTuWeFrSa@2000 [facility]


   Last Admin: 06/30/19 19:16 Dose:  5 mg


Gabapentin. 300mg ( (Own Supply))  0 mg PO BEDTIME [facility]


   Last Admin: 07/01/19 19:11 Dose:  300 mg


Metoprolol.Er 25mg ( (Own Supply))  0 mg PO DAILY [facility]


   Last Admin: 07/02/19 08:04 Dose:  25 mg


Pharmacy Consult (Consult To Pharmacy)  1 each .XX ASDIRECTED PRN


   PRN Reason: consult warfarin/INR


Polyethylene Glycol (Miralax)  17 gm PO DAILY [facility]


   Last Admin: 07/02/19 08:04 Dose:  Not Given


Ropinirole HCl (Requip)  0.5 mg PO BEDTIME [facility]


   Last Admin: 07/01/19 19:12 Dose:  0.5 mg


Senna/Docusate Sodium (Senna Plus)  1 tab PO DAILY PRN


   PRN Reason: Constipation


Sertraline HCl (Zoloft)  100 mg PO DAILY [facility]


   Last Admin: 07/02/19 08:04 Dose:  100 mg





Discontinued Medications





Acetaminophen (Tylenol)  650 mg PO Q6H [facility]


   Last Admin: 02/08/19 12:43 Dose:  Not Given


Acetaminophen (Tylenol)  650 mg PO 0200,0800,1400,2000 [facility]


   Last Admin: 02/27/19 07:12 Dose:  650 mg


Barium Sulfate (E-Z-Hd)  50.3999 gm .ROUTE .STK-MED ONE


   Stop: 03/04/19 10:01


Barium Sulfate (E-Z-Paste)  10.15122 gm PO .STK-MED ONE


   Stop: 03/04/19 10:01


Cephalexin (Keflex)  500 mg PO BID [facility]


   Stop: 03/12/19 20:01


   Last Admin: 03/12/19 19:57 Dose:  500 mg


Cephalexin (Keflex)  250 mg PO BID [facility]


   Stop: 04/23/19 08:01


   Last Admin: 04/23/19 08:20 Dose:  250 mg


Docusate Sodium (Colace)  100 mg PO DAILY [facility]


   Last Admin: 06/19/19 08:32 Dose:  100 mg


Gabapentin (Neurontin)  300 mg PO BEDTIME [facility]


   Last Admin: 01/31/19 20:48 Dose:  300 mg


Gabapentin (Neurontin)  100 mg PO 08,12 [facility]


   Last Admin: 02/01/19 08:17 Dose:  100 mg


Gabapentin (Neurontin)  100 mg PO BID@0800,1200 [facility]


   Last Admin: 02/27/19 07:12 Dose:  100 mg


Gabapentin (Neurontin)  300 mg PO BEDTIME [facility]


   Last Admin: 03/28/19 20:34 Dose:  300 mg


Metoprolol Succinate (Toprol Xl)  25 mg PO DAILY [facility]


   Last Admin: 03/29/19 08:58 Dose:  25 mg


Miconazole (Desenex 2%)  0 gm TOP BID [facility]


   Last Admin: 02/18/19 09:00 Dose:  Not Given


Miconazole (Desenex 2%)  0 gm TOP BID PRN


   PRN Reason: Itching


   Last Admin: 02/16/19 07:55 Dose:  1 dose


Miconazole (Desenex 2%)  0 gm TOP TID [facility]


   Last Admin: 06/05/19 15:40 Dose:  1 applic


Miconazole (Desenex 2%)  0 gm TOP TID [facility]


   Stop: 06/10/19 20:01


   Last Admin: 06/08/19 11:24 Dose:  Not Given


Miconazole (Desenex 2%)  0 gm TOP TID PRN


   PRN Reason: Rash


   Stop: 06/10/19 20:01


   Last Admin: 06/09/19 19:16 Dose:  1 applic


Nitrofurantoin Macrocrystals (Macrobid)  100 mg PO BID [facility]


   Stop: 04/23/19 17:01


   Last Admin: 04/18/19 18:16 Dose:  Not Given


Warfarin. 2.5mg (1/2 Of 5mg Tab): Own Supply  0 mg PO MOWEFR@2000 [facility]


   Last Admin: 02/11/19 20:21 Dose:  2.5 mg


Warfarin. 5mg (Own (Supply))  0 mg PO SUTUTHSA@2000 [facility]


   Last Admin: 02/19/19 19:41 Dose:  5 mg


Warfarin. 2.5mg (1/2 Of 5mg Tab): Own Supply  0 mg PO MOWEFR@2000 [facility]


   Last Admin: 02/15/19 20:20 Dose:  2.5 mg


Warfarin. 2.5mg (1/2 Of 5mg Tab): Own Supply  0 mg PO ONETIME ONE


   Stop: 02/16/19 20:01


   Last Admin: 02/16/19 19:34 Dose:  2.5 mg


Warfarin. 5mg (Own (Supply))  0 mg PO ONETIME ONE


   Stop: 02/17/19 20:01


   Last Admin: 02/17/19 20:23 Dose:  5 mg


Warfarin. 5mg (Own (Supply))  0 mg PO ONETIME ONE


   Stop: 02/18/19 20:01


   Last Admin: 02/18/19 20:30 Dose:  10 mg


Warfarin. 5mg (Own (Supply))  0 mg PO ONETIME ONE


   Stop: 02/19/19 20:01


   Last Admin: 02/19/19 20:30 Dose:  5 mg


Warfarin. 5mg (Own (Supply))  2.5 mg PO ONETIME ONE


   Stop: 02/22/19 20:01


   Last Admin: 02/22/19 19:55 Dose:  2.5 mg


Warfarin. 5mg (Own (Supply))  5 mg PO ONETIME ONE


   Stop: 02/23/19 20:01


   Last Admin: 02/23/19 19:49 Dose:  5 mg


Warfarin. 5mg (Own (Supply))  5 mg PO ONETIME ONE


   Stop: 02/24/19 20:01


   Last Admin: 02/24/19 19:21 Dose:  5 mg


Warfarin. 5mg (Own (Supply))  2.5 mg PO ONETIME ONE


   Stop: 02/25/19 20:01


   Last Admin: 02/25/19 20:04 Dose:  2.5 mg


Warfarin. 2.5mg (1/2 Of 5mg Tab): Own Supply  0 mg PO MOWEFR@2000 [facility]


   Last Admin: 03/01/19 20:03 Dose:  2.5 mg


Warfarin. 5mg (Own (Supply))  0 mg PO SUTUTHSA@2000 [facility]


   Last Admin: 03/02/19 20:20 Dose:  5 mg


Warfarin. 2.5mg (1/2 Of 5mg Tab): Own Supply  2.5 mg PO ONETIME ONE


   Stop: 03/02/19 20:01


   Last Admin: 03/02/19 20:20 Dose:  2.5 mg


Warfarin. 5mg (Own (Supply))  0 mg PO ONETIME ONE


   Stop: 03/03/19 20:01


   Last Admin: 03/03/19 19:43 Dose:  10 mg


Warfarin. 5mg (Own (Supply))  0 mg PO ONETIME ONE


   Stop: 03/04/19 20:01


   Last Admin: 03/04/19 20:16 Dose:  5 mg


Warfarin 5mg (Own (Supply))  0 mg PO ONETIME ONE


   Stop: 03/07/19 20:01


   Last Admin: 03/07/19 20:41 Dose:  5 mg


Warfarin 5mg (Own (Supply))  0 mg PO ONETIME ONE


   Stop: 03/08/19 20:01


   Last Admin: 03/08/19 20:02 Dose:  5 mg


Warfarin 5mg (Own (Supply))  0 mg PO ONETIME ONE


   Stop: 03/09/19 20:01


   Last Admin: 03/09/19 21:03 Dose:  5 mg


Warfarin 5mg (Own (Supply))  0 mg PO ONETIME ONE


   Stop: 03/10/19 20:01


   Last Admin: 03/10/19 20:23 Dose:  2.5 mg


Warfarin. 2.5mg (1/2 Of 5mg Tab): Own Supply  0 mg PO MOWEFR@2000 [facility]


   Last Admin: 03/20/19 19:24 Dose:  2.5 mg


Warfarin. 5mg (Own (Supply))  0 mg PO SUTUTHSA@2000 [facility]


   Last Admin: 03/21/19 19:59 Dose:  5 mg


Polyethylene Glycol (Miralax)  17 gm PO DAILY PRN


   PRN Reason: Constipation


Ropinirole HCl (Requip)  0.5 mg PO BEDTIME [facility]


   Last Admin: 01/31/19 20:36 Dose:  Not Given


Ropinirole HCl (Requip)  0.5 mg PO BEDTIME [facility]


   Last Admin: 01/31/19 20:49 Dose:  Not Given


Ropinirole HCl (Requip)  0.5 mg PO BEDTIME [facility]


Senna/Docusate Sodium (Senna Plus)  1 tab PO DAILY [facility]


   Last Admin: 06/19/19 08:32 Dose:  1 tab


Warfarin Sodium (Coumadin)  5 mg PO ASDIRECTED [facility]


Warfarin Sodium (Coumadin)  2.5 mg PO ONETIME ONE


   Stop: 02/03/19 20:01


   Last Admin: 02/03/19 20:45 Dose:  2.5 mg


Warfarin Sodium (Coumadin)  1 mg PO DAILY@2000 [facility]


   Stop: 03/05/19 20:01


   Last Admin: 03/05/19 19:47 Dose:  1 mg


Warfarin Sodium (Coumadin)  2.5 mg PO DAILY@2000 [facility]


   Stop: 03/06/19 20:01


   Last Admin: 03/06/19 19:39 Dose:  2.5 mg











- Problem List Review


Problem List Initiated/Reviewed/Updated: Yes





- My Orders


Last 24 Hours: 


My Active Orders





07/19/19 07:00


INR,PT,PROTHROMBIN TIME [COAG] Q28D 





08/16/19 07:00


INR,PT,PROTHROMBIN TIME [COAG] Q28D 





09/13/19 07:00


INR,PT,PROTHROMBIN TIME [COAG] Q28D 





10/11/19 07:00


INR,PT,PROTHROMBIN TIME [COAG] Q28D

## 2019-07-03 RX ADMIN — Medication SCH MG: at 19:40

## 2019-07-03 RX ADMIN — Medication SCH MG: at 19:41

## 2019-07-03 RX ADMIN — SERTRALINE HYDROCHLORIDE SCH MG: 100 TABLET ORAL at 08:48

## 2019-07-03 RX ADMIN — GABAPENTIN SCH MG: 100 CAPSULE ORAL at 12:52

## 2019-07-03 RX ADMIN — FUROSEMIDE SCH MG: 20 TABLET ORAL at 08:48

## 2019-07-03 RX ADMIN — Medication SCH MG: at 08:48

## 2019-07-03 RX ADMIN — GABAPENTIN SCH MG: 100 CAPSULE ORAL at 08:48

## 2019-07-03 RX ADMIN — ROPINIROLE SCH MG: 0.5 TABLET ORAL at 19:41

## 2019-07-04 RX ADMIN — GABAPENTIN SCH MG: 100 CAPSULE ORAL at 13:10

## 2019-07-04 RX ADMIN — SERTRALINE HYDROCHLORIDE SCH MG: 100 TABLET ORAL at 07:50

## 2019-07-04 RX ADMIN — FUROSEMIDE SCH MG: 20 TABLET ORAL at 07:47

## 2019-07-04 RX ADMIN — Medication SCH MG: at 19:54

## 2019-07-04 RX ADMIN — GABAPENTIN SCH MG: 100 CAPSULE ORAL at 07:49

## 2019-07-04 RX ADMIN — Medication SCH MG: at 07:48

## 2019-07-04 RX ADMIN — ROPINIROLE SCH MG: 0.5 TABLET ORAL at 19:55

## 2019-07-05 RX ADMIN — GABAPENTIN SCH MG: 100 CAPSULE ORAL at 12:19

## 2019-07-05 RX ADMIN — Medication SCH MG: at 19:42

## 2019-07-05 RX ADMIN — Medication SCH MG: at 08:04

## 2019-07-05 RX ADMIN — ROPINIROLE SCH MG: 0.5 TABLET ORAL at 19:42

## 2019-07-05 RX ADMIN — GABAPENTIN SCH MG: 100 CAPSULE ORAL at 08:04

## 2019-07-05 RX ADMIN — SERTRALINE HYDROCHLORIDE SCH MG: 100 TABLET ORAL at 08:04

## 2019-07-05 RX ADMIN — FUROSEMIDE SCH MG: 20 TABLET ORAL at 08:04

## 2019-07-06 RX ADMIN — SERTRALINE HYDROCHLORIDE SCH MG: 100 TABLET ORAL at 07:30

## 2019-07-06 RX ADMIN — Medication SCH MG: at 07:30

## 2019-07-06 RX ADMIN — ROPINIROLE SCH MG: 0.5 TABLET ORAL at 20:20

## 2019-07-06 RX ADMIN — GABAPENTIN SCH MG: 100 CAPSULE ORAL at 07:30

## 2019-07-06 RX ADMIN — Medication SCH MG: at 20:20

## 2019-07-06 RX ADMIN — GABAPENTIN SCH MG: 100 CAPSULE ORAL at 13:12

## 2019-07-06 RX ADMIN — FUROSEMIDE SCH MG: 20 TABLET ORAL at 07:30

## 2019-07-07 RX ADMIN — GABAPENTIN SCH MG: 100 CAPSULE ORAL at 12:47

## 2019-07-07 RX ADMIN — GABAPENTIN SCH MG: 100 CAPSULE ORAL at 08:44

## 2019-07-07 RX ADMIN — SERTRALINE HYDROCHLORIDE SCH MG: 100 TABLET ORAL at 08:42

## 2019-07-07 RX ADMIN — FUROSEMIDE SCH MG: 20 TABLET ORAL at 08:43

## 2019-07-07 RX ADMIN — Medication SCH MG: at 08:43

## 2019-07-07 RX ADMIN — Medication SCH MG: at 20:17

## 2019-07-07 RX ADMIN — ROPINIROLE SCH MG: 0.5 TABLET ORAL at 20:18

## 2019-07-08 RX ADMIN — ROPINIROLE SCH MG: 0.5 TABLET ORAL at 19:58

## 2019-07-08 RX ADMIN — GABAPENTIN SCH MG: 100 CAPSULE ORAL at 08:23

## 2019-07-08 RX ADMIN — GABAPENTIN SCH MG: 100 CAPSULE ORAL at 12:40

## 2019-07-08 RX ADMIN — Medication SCH MG: at 08:23

## 2019-07-08 RX ADMIN — SERTRALINE HYDROCHLORIDE SCH MG: 100 TABLET ORAL at 08:24

## 2019-07-08 RX ADMIN — Medication SCH MG: at 19:57

## 2019-07-08 RX ADMIN — FUROSEMIDE SCH MG: 20 TABLET ORAL at 08:24

## 2019-07-08 RX ADMIN — Medication SCH MG: at 19:59

## 2019-07-09 RX ADMIN — FUROSEMIDE SCH MG: 20 TABLET ORAL at 08:18

## 2019-07-09 RX ADMIN — SERTRALINE HYDROCHLORIDE SCH MG: 100 TABLET ORAL at 08:17

## 2019-07-09 RX ADMIN — GABAPENTIN SCH MG: 100 CAPSULE ORAL at 13:07

## 2019-07-09 RX ADMIN — Medication SCH MG: at 20:48

## 2019-07-09 RX ADMIN — Medication SCH MG: at 20:49

## 2019-07-09 RX ADMIN — GABAPENTIN SCH MG: 100 CAPSULE ORAL at 08:17

## 2019-07-09 RX ADMIN — Medication SCH MG: at 08:17

## 2019-07-09 RX ADMIN — ROPINIROLE SCH MG: 0.5 TABLET ORAL at 20:47

## 2019-07-10 RX ADMIN — GABAPENTIN SCH MG: 100 CAPSULE ORAL at 08:28

## 2019-07-10 RX ADMIN — ROPINIROLE SCH MG: 0.5 TABLET ORAL at 19:55

## 2019-07-10 RX ADMIN — Medication SCH MG: at 19:56

## 2019-07-10 RX ADMIN — Medication SCH MG: at 08:27

## 2019-07-10 RX ADMIN — SERTRALINE HYDROCHLORIDE SCH MG: 100 TABLET ORAL at 08:27

## 2019-07-10 RX ADMIN — GABAPENTIN SCH MG: 100 CAPSULE ORAL at 12:09

## 2019-07-10 RX ADMIN — Medication SCH MG: at 19:51

## 2019-07-10 RX ADMIN — FUROSEMIDE SCH MG: 20 TABLET ORAL at 08:27

## 2019-07-11 RX ADMIN — FUROSEMIDE SCH MG: 20 TABLET ORAL at 07:43

## 2019-07-11 RX ADMIN — Medication SCH MG: at 19:47

## 2019-07-11 RX ADMIN — SERTRALINE HYDROCHLORIDE SCH MG: 100 TABLET ORAL at 07:43

## 2019-07-11 RX ADMIN — Medication SCH MG: at 07:42

## 2019-07-11 RX ADMIN — Medication SCH MG: at 19:50

## 2019-07-11 RX ADMIN — GABAPENTIN SCH MG: 100 CAPSULE ORAL at 13:55

## 2019-07-11 RX ADMIN — ROPINIROLE SCH MG: 0.5 TABLET ORAL at 19:49

## 2019-07-11 RX ADMIN — GABAPENTIN SCH MG: 100 CAPSULE ORAL at 07:42

## 2019-07-12 RX ADMIN — Medication SCH MG: at 07:40

## 2019-07-12 RX ADMIN — GABAPENTIN SCH MG: 100 CAPSULE ORAL at 07:41

## 2019-07-12 RX ADMIN — Medication SCH MG: at 19:27

## 2019-07-12 RX ADMIN — FUROSEMIDE SCH MG: 20 TABLET ORAL at 07:40

## 2019-07-12 RX ADMIN — SERTRALINE HYDROCHLORIDE SCH MG: 100 TABLET ORAL at 07:40

## 2019-07-12 RX ADMIN — ROPINIROLE SCH MG: 0.5 TABLET ORAL at 19:26

## 2019-07-12 RX ADMIN — GABAPENTIN SCH MG: 100 CAPSULE ORAL at 13:19

## 2019-07-12 RX ADMIN — Medication SCH MG: at 19:26

## 2019-07-13 RX ADMIN — Medication SCH MG: at 19:48

## 2019-07-13 RX ADMIN — FUROSEMIDE SCH MG: 20 TABLET ORAL at 07:39

## 2019-07-13 RX ADMIN — GABAPENTIN SCH MG: 100 CAPSULE ORAL at 15:45

## 2019-07-13 RX ADMIN — GABAPENTIN SCH MG: 100 CAPSULE ORAL at 07:44

## 2019-07-13 RX ADMIN — Medication SCH MG: at 19:44

## 2019-07-13 RX ADMIN — ROPINIROLE SCH MG: 0.5 TABLET ORAL at 19:47

## 2019-07-13 RX ADMIN — SERTRALINE HYDROCHLORIDE SCH MG: 100 TABLET ORAL at 07:39

## 2019-07-13 RX ADMIN — Medication SCH MG: at 07:39

## 2019-07-14 RX ADMIN — Medication SCH MG: at 07:37

## 2019-07-14 RX ADMIN — Medication SCH MG: at 19:08

## 2019-07-14 RX ADMIN — GABAPENTIN SCH MG: 100 CAPSULE ORAL at 13:38

## 2019-07-14 RX ADMIN — ROPINIROLE SCH MG: 0.5 TABLET ORAL at 19:07

## 2019-07-14 RX ADMIN — FUROSEMIDE SCH MG: 20 TABLET ORAL at 07:37

## 2019-07-14 RX ADMIN — Medication SCH MG: at 19:07

## 2019-07-14 RX ADMIN — SERTRALINE HYDROCHLORIDE SCH MG: 100 TABLET ORAL at 07:36

## 2019-07-14 RX ADMIN — GABAPENTIN SCH MG: 100 CAPSULE ORAL at 07:37

## 2019-07-15 RX ADMIN — FUROSEMIDE SCH MG: 20 TABLET ORAL at 07:34

## 2019-07-15 RX ADMIN — GABAPENTIN SCH MG: 100 CAPSULE ORAL at 13:31

## 2019-07-15 RX ADMIN — SERTRALINE HYDROCHLORIDE SCH MG: 100 TABLET ORAL at 07:34

## 2019-07-15 RX ADMIN — Medication SCH MG: at 07:34

## 2019-07-15 RX ADMIN — ROPINIROLE SCH MG: 0.5 TABLET ORAL at 19:16

## 2019-07-15 RX ADMIN — Medication SCH MG: at 19:19

## 2019-07-15 RX ADMIN — Medication SCH MG: at 19:17

## 2019-07-15 RX ADMIN — GABAPENTIN SCH MG: 100 CAPSULE ORAL at 07:34

## 2019-07-16 RX ADMIN — Medication SCH MG: at 19:10

## 2019-07-16 RX ADMIN — ROPINIROLE SCH MG: 0.5 TABLET ORAL at 19:09

## 2019-07-16 RX ADMIN — Medication SCH MG: at 19:07

## 2019-07-16 RX ADMIN — GABAPENTIN SCH MG: 100 CAPSULE ORAL at 12:38

## 2019-07-16 RX ADMIN — FUROSEMIDE SCH MG: 20 TABLET ORAL at 08:04

## 2019-07-16 RX ADMIN — SERTRALINE HYDROCHLORIDE SCH MG: 100 TABLET ORAL at 08:04

## 2019-07-16 RX ADMIN — Medication SCH MG: at 08:04

## 2019-07-16 RX ADMIN — GABAPENTIN SCH MG: 100 CAPSULE ORAL at 08:04

## 2019-07-17 RX ADMIN — Medication SCH MG: at 19:38

## 2019-07-17 RX ADMIN — FUROSEMIDE SCH MG: 20 TABLET ORAL at 07:55

## 2019-07-17 RX ADMIN — GABAPENTIN SCH MG: 100 CAPSULE ORAL at 13:15

## 2019-07-17 RX ADMIN — Medication SCH MG: at 19:37

## 2019-07-17 RX ADMIN — SERTRALINE HYDROCHLORIDE SCH MG: 100 TABLET ORAL at 07:55

## 2019-07-17 RX ADMIN — GABAPENTIN SCH MG: 100 CAPSULE ORAL at 07:55

## 2019-07-17 RX ADMIN — ROPINIROLE SCH MG: 0.5 TABLET ORAL at 19:37

## 2019-07-17 RX ADMIN — Medication SCH MG: at 07:55

## 2019-07-18 RX ADMIN — SERTRALINE HYDROCHLORIDE SCH MG: 100 TABLET ORAL at 07:32

## 2019-07-18 RX ADMIN — FUROSEMIDE SCH MG: 20 TABLET ORAL at 07:32

## 2019-07-18 RX ADMIN — ZINC SCH TAB: TAB ORAL at 12:41

## 2019-07-18 RX ADMIN — GABAPENTIN SCH MG: 100 CAPSULE ORAL at 07:33

## 2019-07-18 RX ADMIN — Medication SCH MG: at 19:24

## 2019-07-18 RX ADMIN — Medication SCH MG: at 19:25

## 2019-07-18 RX ADMIN — Medication SCH MG: at 07:32

## 2019-07-18 RX ADMIN — GABAPENTIN SCH MG: 100 CAPSULE ORAL at 12:42

## 2019-07-18 RX ADMIN — ROPINIROLE SCH MG: 0.5 TABLET ORAL at 19:26

## 2019-07-19 RX ADMIN — GABAPENTIN SCH MG: 100 CAPSULE ORAL at 12:00

## 2019-07-19 RX ADMIN — ZINC SCH TAB: TAB ORAL at 07:36

## 2019-07-19 RX ADMIN — FUROSEMIDE SCH MG: 20 TABLET ORAL at 07:37

## 2019-07-19 RX ADMIN — ROPINIROLE SCH MG: 0.5 TABLET ORAL at 20:32

## 2019-07-19 RX ADMIN — Medication SCH MG: at 20:32

## 2019-07-19 RX ADMIN — GABAPENTIN SCH MG: 100 CAPSULE ORAL at 07:34

## 2019-07-19 RX ADMIN — Medication SCH MG: at 07:38

## 2019-07-19 RX ADMIN — SERTRALINE HYDROCHLORIDE SCH MG: 100 TABLET ORAL at 07:37

## 2019-07-19 RX ADMIN — Medication SCH MG: at 20:28

## 2019-07-20 RX ADMIN — GABAPENTIN SCH MG: 100 CAPSULE ORAL at 13:32

## 2019-07-20 RX ADMIN — Medication SCH MG: at 08:06

## 2019-07-20 RX ADMIN — Medication SCH MG: at 21:00

## 2019-07-20 RX ADMIN — SERTRALINE HYDROCHLORIDE SCH MG: 100 TABLET ORAL at 08:07

## 2019-07-20 RX ADMIN — ROPINIROLE SCH MG: 0.5 TABLET ORAL at 21:00

## 2019-07-20 RX ADMIN — FUROSEMIDE SCH MG: 20 TABLET ORAL at 08:07

## 2019-07-20 RX ADMIN — GABAPENTIN SCH MG: 100 CAPSULE ORAL at 08:08

## 2019-07-20 RX ADMIN — ZINC SCH TAB: TAB ORAL at 08:14

## 2019-07-21 RX ADMIN — Medication SCH MG: at 20:09

## 2019-07-21 RX ADMIN — ZINC SCH TAB: TAB ORAL at 09:08

## 2019-07-21 RX ADMIN — GABAPENTIN SCH MG: 100 CAPSULE ORAL at 13:33

## 2019-07-21 RX ADMIN — ROPINIROLE SCH MG: 0.5 TABLET ORAL at 20:07

## 2019-07-21 RX ADMIN — GABAPENTIN SCH MG: 100 CAPSULE ORAL at 09:02

## 2019-07-21 RX ADMIN — FUROSEMIDE SCH MG: 20 TABLET ORAL at 09:02

## 2019-07-21 RX ADMIN — Medication SCH MG: at 20:07

## 2019-07-21 RX ADMIN — SERTRALINE HYDROCHLORIDE SCH MG: 100 TABLET ORAL at 09:02

## 2019-07-21 RX ADMIN — Medication SCH MG: at 09:03

## 2019-07-22 RX ADMIN — GABAPENTIN SCH MG: 100 CAPSULE ORAL at 12:45

## 2019-07-22 RX ADMIN — ROPINIROLE SCH MG: 0.5 TABLET ORAL at 20:55

## 2019-07-22 RX ADMIN — GABAPENTIN SCH MG: 100 CAPSULE ORAL at 08:41

## 2019-07-22 RX ADMIN — SERTRALINE HYDROCHLORIDE SCH MG: 100 TABLET ORAL at 08:42

## 2019-07-22 RX ADMIN — Medication SCH MG: at 20:55

## 2019-07-22 RX ADMIN — FUROSEMIDE SCH MG: 20 TABLET ORAL at 08:42

## 2019-07-22 RX ADMIN — Medication SCH MG: at 08:42

## 2019-07-22 RX ADMIN — ZINC SCH TAB: TAB ORAL at 08:39

## 2019-07-23 RX ADMIN — ZINC SCH TAB: TAB ORAL at 08:20

## 2019-07-23 RX ADMIN — SERTRALINE HYDROCHLORIDE SCH MG: 100 TABLET ORAL at 08:22

## 2019-07-23 RX ADMIN — Medication SCH MG: at 08:21

## 2019-07-23 RX ADMIN — FUROSEMIDE SCH MG: 20 TABLET ORAL at 08:22

## 2019-07-23 RX ADMIN — Medication SCH MG: at 19:50

## 2019-07-23 RX ADMIN — ROPINIROLE SCH MG: 0.5 TABLET ORAL at 19:50

## 2019-07-23 RX ADMIN — Medication SCH MG: at 19:48

## 2019-07-23 RX ADMIN — GABAPENTIN SCH MG: 100 CAPSULE ORAL at 08:21

## 2019-07-23 RX ADMIN — GABAPENTIN SCH MG: 100 CAPSULE ORAL at 12:26

## 2019-07-24 RX ADMIN — Medication SCH MG: at 19:55

## 2019-07-24 RX ADMIN — ZINC SCH TAB: TAB ORAL at 08:52

## 2019-07-24 RX ADMIN — ROPINIROLE SCH MG: 0.5 TABLET ORAL at 19:56

## 2019-07-24 RX ADMIN — GABAPENTIN SCH MG: 100 CAPSULE ORAL at 14:26

## 2019-07-24 RX ADMIN — FUROSEMIDE SCH MG: 20 TABLET ORAL at 08:53

## 2019-07-24 RX ADMIN — Medication SCH MG: at 19:56

## 2019-07-24 RX ADMIN — SERTRALINE HYDROCHLORIDE SCH MG: 100 TABLET ORAL at 08:53

## 2019-07-24 RX ADMIN — Medication SCH MG: at 08:54

## 2019-07-24 RX ADMIN — GABAPENTIN SCH MG: 100 CAPSULE ORAL at 08:52

## 2019-07-25 RX ADMIN — FUROSEMIDE SCH MG: 20 TABLET ORAL at 09:04

## 2019-07-25 RX ADMIN — ZINC SCH TAB: TAB ORAL at 09:03

## 2019-07-25 RX ADMIN — GABAPENTIN SCH MG: 100 CAPSULE ORAL at 13:05

## 2019-07-25 RX ADMIN — Medication SCH MG: at 21:00

## 2019-07-25 RX ADMIN — Medication SCH MG: at 20:59

## 2019-07-25 RX ADMIN — ROPINIROLE SCH MG: 0.5 TABLET ORAL at 21:00

## 2019-07-25 RX ADMIN — Medication SCH MG: at 09:05

## 2019-07-25 RX ADMIN — GABAPENTIN SCH MG: 100 CAPSULE ORAL at 09:03

## 2019-07-25 RX ADMIN — SERTRALINE HYDROCHLORIDE SCH MG: 100 TABLET ORAL at 09:05

## 2019-07-26 RX ADMIN — GABAPENTIN SCH MG: 100 CAPSULE ORAL at 13:35

## 2019-07-26 RX ADMIN — FUROSEMIDE SCH MG: 20 TABLET ORAL at 08:35

## 2019-07-26 RX ADMIN — GABAPENTIN SCH MG: 100 CAPSULE ORAL at 08:35

## 2019-07-26 RX ADMIN — Medication SCH MG: at 19:13

## 2019-07-26 RX ADMIN — Medication SCH MG: at 08:35

## 2019-07-26 RX ADMIN — SERTRALINE HYDROCHLORIDE SCH MG: 100 TABLET ORAL at 08:35

## 2019-07-26 RX ADMIN — ROPINIROLE SCH MG: 0.5 TABLET ORAL at 19:16

## 2019-07-26 RX ADMIN — ZINC SCH TAB: TAB ORAL at 08:34

## 2019-07-26 RX ADMIN — Medication SCH MG: at 19:17

## 2019-07-27 RX ADMIN — Medication SCH MG: at 19:09

## 2019-07-27 RX ADMIN — Medication SCH MG: at 19:08

## 2019-07-27 RX ADMIN — Medication SCH MG: at 08:27

## 2019-07-27 RX ADMIN — FUROSEMIDE SCH MG: 20 TABLET ORAL at 08:28

## 2019-07-27 RX ADMIN — ZINC SCH TAB: TAB ORAL at 08:33

## 2019-07-27 RX ADMIN — GABAPENTIN SCH MG: 100 CAPSULE ORAL at 13:50

## 2019-07-27 RX ADMIN — ROPINIROLE SCH MG: 0.5 TABLET ORAL at 19:09

## 2019-07-27 RX ADMIN — GABAPENTIN SCH MG: 100 CAPSULE ORAL at 08:27

## 2019-07-27 RX ADMIN — SERTRALINE HYDROCHLORIDE SCH MG: 100 TABLET ORAL at 08:28

## 2019-07-28 RX ADMIN — SERTRALINE HYDROCHLORIDE SCH MG: 100 TABLET ORAL at 09:37

## 2019-07-28 RX ADMIN — ROPINIROLE SCH MG: 0.5 TABLET ORAL at 19:29

## 2019-07-28 RX ADMIN — Medication SCH MG: at 09:37

## 2019-07-28 RX ADMIN — GABAPENTIN SCH MG: 100 CAPSULE ORAL at 09:38

## 2019-07-28 RX ADMIN — FUROSEMIDE SCH MG: 20 TABLET ORAL at 09:37

## 2019-07-28 RX ADMIN — Medication SCH MG: at 19:29

## 2019-07-28 RX ADMIN — Medication SCH MG: at 19:30

## 2019-07-28 RX ADMIN — GABAPENTIN SCH MG: 100 CAPSULE ORAL at 13:59

## 2019-07-28 RX ADMIN — ZINC SCH TAB: TAB ORAL at 09:36

## 2019-07-29 RX ADMIN — SERTRALINE HYDROCHLORIDE SCH MG: 100 TABLET ORAL at 08:21

## 2019-07-29 RX ADMIN — Medication SCH MG: at 19:09

## 2019-07-29 RX ADMIN — ZINC SCH TAB: TAB ORAL at 08:22

## 2019-07-29 RX ADMIN — GABAPENTIN SCH MG: 100 CAPSULE ORAL at 12:36

## 2019-07-29 RX ADMIN — Medication SCH MG: at 19:11

## 2019-07-29 RX ADMIN — Medication SCH MG: at 08:20

## 2019-07-29 RX ADMIN — GABAPENTIN SCH MG: 100 CAPSULE ORAL at 08:21

## 2019-07-29 RX ADMIN — FUROSEMIDE SCH MG: 20 TABLET ORAL at 08:21

## 2019-07-29 RX ADMIN — ROPINIROLE SCH MG: 0.5 TABLET ORAL at 19:09

## 2019-07-30 RX ADMIN — GABAPENTIN SCH MG: 100 CAPSULE ORAL at 12:02

## 2019-07-30 RX ADMIN — SERTRALINE HYDROCHLORIDE SCH MG: 100 TABLET ORAL at 07:43

## 2019-07-30 RX ADMIN — FUROSEMIDE SCH MG: 20 TABLET ORAL at 07:42

## 2019-07-30 RX ADMIN — Medication SCH MG: at 19:24

## 2019-07-30 RX ADMIN — ZINC SCH TAB: TAB ORAL at 07:41

## 2019-07-30 RX ADMIN — ROPINIROLE SCH MG: 0.5 TABLET ORAL at 19:23

## 2019-07-30 RX ADMIN — GABAPENTIN SCH MG: 100 CAPSULE ORAL at 07:41

## 2019-07-30 RX ADMIN — Medication SCH MG: at 19:22

## 2019-07-30 RX ADMIN — Medication SCH MG: at 07:43

## 2019-07-31 RX ADMIN — Medication SCH MG: at 19:35

## 2019-07-31 RX ADMIN — GABAPENTIN SCH MG: 100 CAPSULE ORAL at 12:05

## 2019-07-31 RX ADMIN — ZINC SCH TAB: TAB ORAL at 07:49

## 2019-07-31 RX ADMIN — GABAPENTIN SCH MG: 100 CAPSULE ORAL at 07:48

## 2019-07-31 RX ADMIN — FUROSEMIDE SCH MG: 20 TABLET ORAL at 07:49

## 2019-07-31 RX ADMIN — Medication SCH MG: at 07:50

## 2019-07-31 RX ADMIN — ROPINIROLE SCH MG: 0.5 TABLET ORAL at 19:35

## 2019-07-31 RX ADMIN — SERTRALINE HYDROCHLORIDE SCH MG: 100 TABLET ORAL at 07:49

## 2019-07-31 RX ADMIN — Medication SCH MG: at 19:34

## 2019-08-01 RX ADMIN — FUROSEMIDE SCH MG: 20 TABLET ORAL at 07:53

## 2019-08-01 RX ADMIN — Medication SCH MG: at 20:22

## 2019-08-01 RX ADMIN — GABAPENTIN SCH MG: 100 CAPSULE ORAL at 12:49

## 2019-08-01 RX ADMIN — ZINC SCH TAB: TAB ORAL at 07:52

## 2019-08-01 RX ADMIN — Medication SCH MG: at 07:53

## 2019-08-01 RX ADMIN — GABAPENTIN SCH MG: 100 CAPSULE ORAL at 07:53

## 2019-08-01 RX ADMIN — Medication SCH MG: at 20:23

## 2019-08-01 RX ADMIN — ROPINIROLE SCH MG: 0.5 TABLET ORAL at 20:22

## 2019-08-01 RX ADMIN — SERTRALINE HYDROCHLORIDE SCH MG: 100 TABLET ORAL at 07:53

## 2019-08-02 RX ADMIN — ROPINIROLE SCH MG: 0.5 TABLET ORAL at 19:40

## 2019-08-02 RX ADMIN — SERTRALINE HYDROCHLORIDE SCH MG: 100 TABLET ORAL at 07:39

## 2019-08-02 RX ADMIN — Medication SCH MG: at 07:39

## 2019-08-02 RX ADMIN — ZINC SCH TAB: TAB ORAL at 07:40

## 2019-08-02 RX ADMIN — FUROSEMIDE SCH MG: 20 TABLET ORAL at 07:39

## 2019-08-02 RX ADMIN — Medication SCH MG: at 19:40

## 2019-08-02 RX ADMIN — GABAPENTIN SCH MG: 100 CAPSULE ORAL at 12:24

## 2019-08-02 RX ADMIN — Medication SCH MG: at 19:41

## 2019-08-02 RX ADMIN — GABAPENTIN SCH MG: 100 CAPSULE ORAL at 07:40

## 2019-08-03 RX ADMIN — ROPINIROLE SCH MG: 0.5 TABLET ORAL at 19:50

## 2019-08-03 RX ADMIN — ZINC SCH TAB: TAB ORAL at 08:15

## 2019-08-03 RX ADMIN — GABAPENTIN SCH MG: 100 CAPSULE ORAL at 12:33

## 2019-08-03 RX ADMIN — SERTRALINE HYDROCHLORIDE SCH MG: 100 TABLET ORAL at 08:13

## 2019-08-03 RX ADMIN — Medication SCH MG: at 19:49

## 2019-08-03 RX ADMIN — FUROSEMIDE SCH MG: 20 TABLET ORAL at 08:13

## 2019-08-03 RX ADMIN — Medication SCH MG: at 08:14

## 2019-08-03 RX ADMIN — Medication SCH MG: at 19:50

## 2019-08-03 RX ADMIN — GABAPENTIN SCH MG: 100 CAPSULE ORAL at 08:15

## 2019-08-04 RX ADMIN — GABAPENTIN SCH MG: 100 CAPSULE ORAL at 12:32

## 2019-08-04 RX ADMIN — Medication SCH MG: at 20:03

## 2019-08-04 RX ADMIN — GABAPENTIN SCH MG: 100 CAPSULE ORAL at 08:29

## 2019-08-04 RX ADMIN — ZINC SCH TAB: TAB ORAL at 08:27

## 2019-08-04 RX ADMIN — FUROSEMIDE SCH MG: 20 TABLET ORAL at 08:28

## 2019-08-04 RX ADMIN — ROPINIROLE SCH MG: 0.5 TABLET ORAL at 20:02

## 2019-08-04 RX ADMIN — SERTRALINE HYDROCHLORIDE SCH MG: 100 TABLET ORAL at 08:28

## 2019-08-04 RX ADMIN — Medication SCH MG: at 08:28

## 2019-08-04 RX ADMIN — Medication SCH MG: at 20:01

## 2019-08-05 RX ADMIN — SERTRALINE HYDROCHLORIDE SCH MG: 100 TABLET ORAL at 08:04

## 2019-08-05 RX ADMIN — Medication SCH MG: at 08:05

## 2019-08-05 RX ADMIN — GABAPENTIN SCH MG: 100 CAPSULE ORAL at 08:05

## 2019-08-05 RX ADMIN — GABAPENTIN SCH MG: 100 CAPSULE ORAL at 12:39

## 2019-08-05 RX ADMIN — Medication SCH MG: at 20:07

## 2019-08-05 RX ADMIN — FUROSEMIDE SCH MG: 20 TABLET ORAL at 08:04

## 2019-08-05 RX ADMIN — ZINC SCH TAB: TAB ORAL at 08:04

## 2019-08-05 RX ADMIN — Medication SCH MG: at 20:05

## 2019-08-05 RX ADMIN — ROPINIROLE SCH MG: 0.5 TABLET ORAL at 20:07

## 2019-08-06 RX ADMIN — GABAPENTIN SCH MG: 100 CAPSULE ORAL at 07:44

## 2019-08-06 RX ADMIN — Medication SCH MG: at 07:44

## 2019-08-06 RX ADMIN — Medication SCH MG: at 19:39

## 2019-08-06 RX ADMIN — ROPINIROLE SCH MG: 0.5 TABLET ORAL at 19:38

## 2019-08-06 RX ADMIN — FUROSEMIDE SCH MG: 20 TABLET ORAL at 07:44

## 2019-08-06 RX ADMIN — GABAPENTIN SCH MG: 100 CAPSULE ORAL at 13:52

## 2019-08-06 RX ADMIN — SERTRALINE HYDROCHLORIDE SCH MG: 100 TABLET ORAL at 07:44

## 2019-08-06 RX ADMIN — Medication SCH MG: at 19:38

## 2019-08-06 RX ADMIN — ZINC SCH TAB: TAB ORAL at 07:44

## 2019-08-07 RX ADMIN — Medication SCH MG: at 20:38

## 2019-08-07 RX ADMIN — GABAPENTIN SCH MG: 100 CAPSULE ORAL at 13:58

## 2019-08-07 RX ADMIN — ZINC SCH TAB: TAB ORAL at 08:17

## 2019-08-07 RX ADMIN — Medication SCH MG: at 20:39

## 2019-08-07 RX ADMIN — SERTRALINE HYDROCHLORIDE SCH MG: 100 TABLET ORAL at 08:16

## 2019-08-07 RX ADMIN — GABAPENTIN SCH MG: 100 CAPSULE ORAL at 08:17

## 2019-08-07 RX ADMIN — ROPINIROLE SCH MG: 0.5 TABLET ORAL at 20:40

## 2019-08-07 RX ADMIN — Medication SCH MG: at 08:16

## 2019-08-07 RX ADMIN — FUROSEMIDE SCH MG: 20 TABLET ORAL at 08:16

## 2019-08-08 RX ADMIN — ZINC SCH TAB: TAB ORAL at 07:50

## 2019-08-08 RX ADMIN — SERTRALINE HYDROCHLORIDE SCH MG: 100 TABLET ORAL at 07:50

## 2019-08-08 RX ADMIN — Medication SCH MG: at 07:50

## 2019-08-08 RX ADMIN — FUROSEMIDE SCH MG: 20 TABLET ORAL at 07:50

## 2019-08-08 RX ADMIN — Medication SCH MG: at 20:09

## 2019-08-08 RX ADMIN — GABAPENTIN SCH MG: 100 CAPSULE ORAL at 13:24

## 2019-08-08 RX ADMIN — GABAPENTIN SCH MG: 100 CAPSULE ORAL at 07:49

## 2019-08-08 RX ADMIN — Medication SCH MG: at 20:11

## 2019-08-08 RX ADMIN — ROPINIROLE SCH MG: 0.5 TABLET ORAL at 20:10

## 2019-08-09 RX ADMIN — ROPINIROLE SCH MG: 0.5 TABLET ORAL at 19:23

## 2019-08-09 RX ADMIN — SERTRALINE HYDROCHLORIDE SCH MG: 100 TABLET ORAL at 08:16

## 2019-08-09 RX ADMIN — Medication SCH MG: at 19:23

## 2019-08-09 RX ADMIN — Medication SCH MG: at 08:16

## 2019-08-09 RX ADMIN — Medication SCH MG: at 19:22

## 2019-08-09 RX ADMIN — GABAPENTIN SCH MG: 100 CAPSULE ORAL at 12:04

## 2019-08-09 RX ADMIN — FUROSEMIDE SCH MG: 20 TABLET ORAL at 08:16

## 2019-08-09 RX ADMIN — ZINC SCH TAB: TAB ORAL at 08:16

## 2019-08-09 RX ADMIN — GABAPENTIN SCH MG: 100 CAPSULE ORAL at 08:16

## 2019-08-10 RX ADMIN — SERTRALINE HYDROCHLORIDE SCH MG: 100 TABLET ORAL at 08:30

## 2019-08-10 RX ADMIN — ZINC SCH TAB: TAB ORAL at 08:34

## 2019-08-10 RX ADMIN — Medication SCH MG: at 19:40

## 2019-08-10 RX ADMIN — Medication SCH MG: at 19:35

## 2019-08-10 RX ADMIN — Medication SCH MG: at 08:31

## 2019-08-10 RX ADMIN — ROPINIROLE SCH MG: 0.5 TABLET ORAL at 19:34

## 2019-08-10 RX ADMIN — GABAPENTIN SCH MG: 100 CAPSULE ORAL at 12:28

## 2019-08-10 RX ADMIN — GABAPENTIN SCH MG: 100 CAPSULE ORAL at 08:31

## 2019-08-10 RX ADMIN — FUROSEMIDE SCH MG: 20 TABLET ORAL at 08:31

## 2019-08-11 RX ADMIN — FUROSEMIDE SCH MG: 20 TABLET ORAL at 07:35

## 2019-08-11 RX ADMIN — GABAPENTIN SCH MG: 100 CAPSULE ORAL at 14:11

## 2019-08-11 RX ADMIN — GABAPENTIN SCH MG: 100 CAPSULE ORAL at 07:34

## 2019-08-11 RX ADMIN — ROPINIROLE SCH MG: 0.5 TABLET ORAL at 19:25

## 2019-08-11 RX ADMIN — ZINC SCH TAB: TAB ORAL at 07:36

## 2019-08-11 RX ADMIN — Medication SCH MG: at 07:35

## 2019-08-11 RX ADMIN — SERTRALINE HYDROCHLORIDE SCH MG: 100 TABLET ORAL at 07:35

## 2019-08-11 RX ADMIN — Medication SCH MG: at 19:26

## 2019-08-12 RX ADMIN — SERTRALINE HYDROCHLORIDE SCH MG: 100 TABLET ORAL at 07:38

## 2019-08-12 RX ADMIN — FUROSEMIDE SCH MG: 20 TABLET ORAL at 07:39

## 2019-08-12 RX ADMIN — ZINC SCH TAB: TAB ORAL at 07:38

## 2019-08-12 RX ADMIN — Medication SCH MG: at 19:32

## 2019-08-12 RX ADMIN — Medication SCH MG: at 19:31

## 2019-08-12 RX ADMIN — GABAPENTIN SCH MG: 100 CAPSULE ORAL at 12:14

## 2019-08-12 RX ADMIN — ROPINIROLE SCH MG: 0.5 TABLET ORAL at 19:31

## 2019-08-12 RX ADMIN — GABAPENTIN SCH MG: 100 CAPSULE ORAL at 07:37

## 2019-08-12 RX ADMIN — Medication SCH MG: at 07:39

## 2019-08-13 RX ADMIN — FUROSEMIDE SCH MG: 20 TABLET ORAL at 08:26

## 2019-08-13 RX ADMIN — Medication SCH MG: at 08:25

## 2019-08-13 RX ADMIN — ZINC SCH TAB: TAB ORAL at 08:25

## 2019-08-13 RX ADMIN — Medication SCH MG: at 19:24

## 2019-08-13 RX ADMIN — GABAPENTIN SCH MG: 100 CAPSULE ORAL at 08:25

## 2019-08-13 RX ADMIN — SERTRALINE HYDROCHLORIDE SCH MG: 100 TABLET ORAL at 08:26

## 2019-08-13 RX ADMIN — GABAPENTIN SCH MG: 100 CAPSULE ORAL at 14:08

## 2019-08-13 RX ADMIN — ROPINIROLE SCH MG: 0.5 TABLET ORAL at 19:23

## 2019-08-14 RX ADMIN — Medication SCH MG: at 19:20

## 2019-08-14 RX ADMIN — GABAPENTIN SCH MG: 100 CAPSULE ORAL at 13:04

## 2019-08-14 RX ADMIN — ROPINIROLE SCH MG: 0.5 TABLET ORAL at 19:26

## 2019-08-14 RX ADMIN — Medication SCH MG: at 07:37

## 2019-08-14 RX ADMIN — GABAPENTIN SCH MG: 100 CAPSULE ORAL at 07:35

## 2019-08-14 RX ADMIN — Medication SCH MG: at 19:26

## 2019-08-14 RX ADMIN — FUROSEMIDE SCH MG: 20 TABLET ORAL at 07:37

## 2019-08-14 RX ADMIN — SERTRALINE HYDROCHLORIDE SCH MG: 100 TABLET ORAL at 07:37

## 2019-08-14 RX ADMIN — ZINC SCH TAB: TAB ORAL at 07:38

## 2019-08-15 RX ADMIN — FUROSEMIDE SCH MG: 20 TABLET ORAL at 07:49

## 2019-08-15 RX ADMIN — GABAPENTIN SCH MG: 100 CAPSULE ORAL at 14:28

## 2019-08-15 RX ADMIN — GABAPENTIN SCH MG: 100 CAPSULE ORAL at 07:49

## 2019-08-15 RX ADMIN — ROPINIROLE SCH MG: 0.5 TABLET ORAL at 19:56

## 2019-08-15 RX ADMIN — Medication SCH MG: at 19:55

## 2019-08-15 RX ADMIN — Medication SCH MG: at 07:48

## 2019-08-15 RX ADMIN — ZINC SCH TAB: TAB ORAL at 07:48

## 2019-08-15 RX ADMIN — SERTRALINE HYDROCHLORIDE SCH MG: 100 TABLET ORAL at 07:49

## 2019-08-15 RX ADMIN — Medication SCH MG: at 19:56

## 2019-08-16 RX ADMIN — ZINC SCH TAB: TAB ORAL at 08:44

## 2019-08-16 RX ADMIN — GABAPENTIN SCH MG: 100 CAPSULE ORAL at 12:13

## 2019-08-16 RX ADMIN — SERTRALINE HYDROCHLORIDE SCH MG: 100 TABLET ORAL at 08:44

## 2019-08-16 RX ADMIN — Medication SCH MG: at 20:11

## 2019-08-16 RX ADMIN — ROPINIROLE SCH MG: 0.5 TABLET ORAL at 20:11

## 2019-08-16 RX ADMIN — Medication SCH MG: at 08:44

## 2019-08-16 RX ADMIN — GABAPENTIN SCH MG: 100 CAPSULE ORAL at 08:44

## 2019-08-16 RX ADMIN — FUROSEMIDE SCH MG: 20 TABLET ORAL at 08:44

## 2019-08-17 RX ADMIN — Medication SCH MG: at 09:22

## 2019-08-17 RX ADMIN — Medication SCH MG: at 21:46

## 2019-08-17 RX ADMIN — NITROGLYCERIN PRN MG: 0.4 TABLET SUBLINGUAL at 18:18

## 2019-08-17 RX ADMIN — ZINC SCH TAB: TAB ORAL at 09:21

## 2019-08-17 RX ADMIN — FUROSEMIDE SCH MG: 20 TABLET ORAL at 09:22

## 2019-08-17 RX ADMIN — Medication SCH MG: at 21:45

## 2019-08-17 RX ADMIN — GABAPENTIN SCH MG: 100 CAPSULE ORAL at 09:21

## 2019-08-17 RX ADMIN — SERTRALINE HYDROCHLORIDE SCH MG: 100 TABLET ORAL at 09:22

## 2019-08-17 RX ADMIN — NITROGLYCERIN PRN MG: 0.4 TABLET SUBLINGUAL at 18:13

## 2019-08-17 RX ADMIN — GABAPENTIN SCH MG: 100 CAPSULE ORAL at 12:22

## 2019-08-17 RX ADMIN — ROPINIROLE SCH MG: 0.5 TABLET ORAL at 21:45

## 2019-08-18 RX ADMIN — Medication SCH MG: at 08:26

## 2019-08-18 RX ADMIN — GABAPENTIN SCH MG: 100 CAPSULE ORAL at 13:40

## 2019-08-18 RX ADMIN — FUROSEMIDE SCH MG: 20 TABLET ORAL at 08:27

## 2019-08-18 RX ADMIN — ROPINIROLE SCH MG: 0.5 TABLET ORAL at 19:57

## 2019-08-18 RX ADMIN — Medication SCH MG: at 19:57

## 2019-08-18 RX ADMIN — ZINC SCH TAB: TAB ORAL at 08:26

## 2019-08-18 RX ADMIN — SERTRALINE HYDROCHLORIDE SCH MG: 100 TABLET ORAL at 08:27

## 2019-08-18 RX ADMIN — GABAPENTIN SCH MG: 100 CAPSULE ORAL at 08:26

## 2019-08-19 RX ADMIN — SERTRALINE HYDROCHLORIDE SCH MG: 100 TABLET ORAL at 08:48

## 2019-08-19 RX ADMIN — Medication SCH MG: at 20:49

## 2019-08-19 RX ADMIN — ROPINIROLE SCH MG: 0.5 TABLET ORAL at 20:48

## 2019-08-19 RX ADMIN — GABAPENTIN SCH MG: 100 CAPSULE ORAL at 12:55

## 2019-08-19 RX ADMIN — ZINC SCH TAB: TAB ORAL at 08:48

## 2019-08-19 RX ADMIN — GABAPENTIN SCH MG: 100 CAPSULE ORAL at 08:47

## 2019-08-19 RX ADMIN — Medication SCH MG: at 20:50

## 2019-08-19 RX ADMIN — Medication SCH MG: at 08:49

## 2019-08-19 RX ADMIN — FUROSEMIDE SCH MG: 20 TABLET ORAL at 08:48

## 2019-08-20 RX ADMIN — ROPINIROLE SCH MG: 0.5 TABLET ORAL at 19:32

## 2019-08-20 RX ADMIN — Medication SCH MG: at 19:31

## 2019-08-20 RX ADMIN — SERTRALINE HYDROCHLORIDE SCH MG: 100 TABLET ORAL at 08:36

## 2019-08-20 RX ADMIN — GABAPENTIN SCH MG: 100 CAPSULE ORAL at 08:36

## 2019-08-20 RX ADMIN — FUROSEMIDE SCH MG: 20 TABLET ORAL at 08:36

## 2019-08-20 RX ADMIN — ZINC SCH TAB: TAB ORAL at 08:36

## 2019-08-20 RX ADMIN — Medication SCH MG: at 08:36

## 2019-08-20 RX ADMIN — GABAPENTIN SCH MG: 100 CAPSULE ORAL at 13:48

## 2019-08-20 RX ADMIN — Medication SCH MG: at 19:32

## 2019-08-21 RX ADMIN — ZINC SCH TAB: TAB ORAL at 09:55

## 2019-08-21 RX ADMIN — Medication SCH MG: at 20:02

## 2019-08-21 RX ADMIN — GABAPENTIN SCH MG: 100 CAPSULE ORAL at 12:49

## 2019-08-21 RX ADMIN — SERTRALINE HYDROCHLORIDE SCH MG: 100 TABLET ORAL at 09:54

## 2019-08-21 RX ADMIN — FUROSEMIDE SCH MG: 20 TABLET ORAL at 09:55

## 2019-08-21 RX ADMIN — ROPINIROLE SCH MG: 0.5 TABLET ORAL at 20:03

## 2019-08-21 RX ADMIN — Medication SCH MG: at 09:55

## 2019-08-21 RX ADMIN — GABAPENTIN SCH MG: 100 CAPSULE ORAL at 09:54

## 2019-08-21 RX ADMIN — Medication SCH MG: at 20:03

## 2019-08-22 RX ADMIN — Medication SCH MG: at 19:11

## 2019-08-22 RX ADMIN — ROPINIROLE SCH MG: 0.5 TABLET ORAL at 19:10

## 2019-08-22 RX ADMIN — GABAPENTIN SCH MG: 100 CAPSULE ORAL at 13:57

## 2019-08-22 RX ADMIN — FUROSEMIDE SCH MG: 20 TABLET ORAL at 08:15

## 2019-08-22 RX ADMIN — Medication SCH MG: at 08:15

## 2019-08-22 RX ADMIN — ZINC SCH TAB: TAB ORAL at 08:15

## 2019-08-22 RX ADMIN — SERTRALINE HYDROCHLORIDE SCH MG: 100 TABLET ORAL at 08:15

## 2019-08-22 RX ADMIN — GABAPENTIN SCH MG: 100 CAPSULE ORAL at 08:15

## 2019-08-22 RX ADMIN — Medication SCH MG: at 19:09

## 2019-08-23 RX ADMIN — SERTRALINE HYDROCHLORIDE SCH MG: 100 TABLET ORAL at 08:57

## 2019-08-23 RX ADMIN — FUROSEMIDE SCH MG: 20 TABLET ORAL at 08:57

## 2019-08-23 RX ADMIN — Medication SCH MG: at 08:57

## 2019-08-23 RX ADMIN — ZINC SCH TAB: TAB ORAL at 08:57

## 2019-08-23 RX ADMIN — Medication SCH MG: at 20:11

## 2019-08-23 RX ADMIN — ROPINIROLE SCH MG: 0.5 TABLET ORAL at 20:10

## 2019-08-23 RX ADMIN — Medication SCH MG: at 20:10

## 2019-08-23 RX ADMIN — GABAPENTIN SCH MG: 100 CAPSULE ORAL at 08:57

## 2019-08-23 RX ADMIN — GABAPENTIN SCH MG: 100 CAPSULE ORAL at 13:30

## 2019-08-24 RX ADMIN — ROPINIROLE SCH MG: 0.5 TABLET ORAL at 20:20

## 2019-08-24 RX ADMIN — Medication SCH MG: at 20:19

## 2019-08-24 RX ADMIN — GABAPENTIN SCH MG: 100 CAPSULE ORAL at 10:12

## 2019-08-24 RX ADMIN — Medication SCH MG: at 20:20

## 2019-08-24 RX ADMIN — ZINC SCH TAB: TAB ORAL at 10:11

## 2019-08-24 RX ADMIN — FUROSEMIDE SCH MG: 20 TABLET ORAL at 10:14

## 2019-08-24 RX ADMIN — SERTRALINE HYDROCHLORIDE SCH MG: 100 TABLET ORAL at 10:14

## 2019-08-24 RX ADMIN — GABAPENTIN SCH MG: 100 CAPSULE ORAL at 12:14

## 2019-08-24 RX ADMIN — Medication SCH MG: at 10:13

## 2019-08-25 RX ADMIN — ROPINIROLE SCH MG: 0.5 TABLET ORAL at 19:44

## 2019-08-25 RX ADMIN — SERTRALINE HYDROCHLORIDE SCH MG: 100 TABLET ORAL at 09:30

## 2019-08-25 RX ADMIN — ZINC SCH TAB: TAB ORAL at 09:29

## 2019-08-25 RX ADMIN — Medication SCH MG: at 19:43

## 2019-08-25 RX ADMIN — Medication SCH MG: at 19:44

## 2019-08-25 RX ADMIN — Medication SCH MG: at 09:29

## 2019-08-25 RX ADMIN — GABAPENTIN SCH MG: 100 CAPSULE ORAL at 12:30

## 2019-08-25 RX ADMIN — FUROSEMIDE SCH MG: 20 TABLET ORAL at 09:30

## 2019-08-25 RX ADMIN — GABAPENTIN SCH MG: 100 CAPSULE ORAL at 09:30

## 2019-08-26 RX ADMIN — SERTRALINE HYDROCHLORIDE SCH MG: 100 TABLET ORAL at 08:25

## 2019-08-26 RX ADMIN — GABAPENTIN SCH MG: 100 CAPSULE ORAL at 08:28

## 2019-08-26 RX ADMIN — Medication SCH MG: at 08:35

## 2019-08-26 RX ADMIN — Medication SCH MG: at 19:42

## 2019-08-26 RX ADMIN — ZINC SCH TAB: TAB ORAL at 08:28

## 2019-08-26 RX ADMIN — ROPINIROLE SCH MG: 0.5 TABLET ORAL at 19:41

## 2019-08-26 RX ADMIN — GABAPENTIN SCH MG: 100 CAPSULE ORAL at 13:04

## 2019-08-26 RX ADMIN — FUROSEMIDE SCH MG: 20 TABLET ORAL at 08:24

## 2019-08-27 RX ADMIN — GABAPENTIN SCH MG: 100 CAPSULE ORAL at 12:30

## 2019-08-27 RX ADMIN — SERTRALINE HYDROCHLORIDE SCH MG: 100 TABLET ORAL at 07:56

## 2019-08-27 RX ADMIN — Medication SCH MG: at 07:56

## 2019-08-27 RX ADMIN — ROPINIROLE SCH MG: 0.5 TABLET ORAL at 20:05

## 2019-08-27 RX ADMIN — GABAPENTIN SCH MG: 100 CAPSULE ORAL at 07:55

## 2019-08-27 RX ADMIN — Medication SCH MG: at 20:05

## 2019-08-27 RX ADMIN — Medication SCH MG: at 20:03

## 2019-08-27 RX ADMIN — ZINC SCH TAB: TAB ORAL at 08:01

## 2019-08-27 RX ADMIN — FUROSEMIDE SCH MG: 20 TABLET ORAL at 07:56

## 2019-08-28 RX ADMIN — GABAPENTIN SCH MG: 100 CAPSULE ORAL at 08:40

## 2019-08-28 RX ADMIN — ROPINIROLE SCH MG: 0.5 TABLET ORAL at 19:22

## 2019-08-28 RX ADMIN — GABAPENTIN SCH MG: 100 CAPSULE ORAL at 12:24

## 2019-08-28 RX ADMIN — Medication SCH MG: at 19:22

## 2019-08-28 RX ADMIN — FUROSEMIDE SCH MG: 20 TABLET ORAL at 08:41

## 2019-08-28 RX ADMIN — SERTRALINE HYDROCHLORIDE SCH MG: 100 TABLET ORAL at 08:41

## 2019-08-28 RX ADMIN — Medication SCH MG: at 19:21

## 2019-08-28 RX ADMIN — ZINC SCH TAB: TAB ORAL at 08:40

## 2019-08-28 RX ADMIN — Medication SCH MG: at 08:41

## 2019-08-29 RX ADMIN — Medication SCH MG: at 08:09

## 2019-08-29 RX ADMIN — SERTRALINE HYDROCHLORIDE SCH MG: 100 TABLET ORAL at 08:09

## 2019-08-29 RX ADMIN — Medication SCH MG: at 19:31

## 2019-08-29 RX ADMIN — FUROSEMIDE SCH MG: 20 TABLET ORAL at 08:09

## 2019-08-29 RX ADMIN — Medication SCH MG: at 19:29

## 2019-08-29 RX ADMIN — GABAPENTIN SCH MG: 100 CAPSULE ORAL at 08:09

## 2019-08-29 RX ADMIN — GABAPENTIN SCH MG: 100 CAPSULE ORAL at 12:51

## 2019-08-29 RX ADMIN — ZINC SCH TAB: TAB ORAL at 08:10

## 2019-08-29 RX ADMIN — ROPINIROLE SCH MG: 0.5 TABLET ORAL at 19:30

## 2019-08-30 RX ADMIN — SERTRALINE HYDROCHLORIDE SCH MG: 100 TABLET ORAL at 08:07

## 2019-08-30 RX ADMIN — Medication SCH MG: at 19:30

## 2019-08-30 RX ADMIN — GABAPENTIN SCH MG: 100 CAPSULE ORAL at 12:50

## 2019-08-30 RX ADMIN — Medication SCH MG: at 08:07

## 2019-08-30 RX ADMIN — ZINC SCH TAB: TAB ORAL at 08:07

## 2019-08-30 RX ADMIN — ROPINIROLE SCH MG: 0.5 TABLET ORAL at 19:31

## 2019-08-30 RX ADMIN — Medication SCH MG: at 19:31

## 2019-08-30 RX ADMIN — FUROSEMIDE SCH MG: 20 TABLET ORAL at 08:07

## 2019-08-30 RX ADMIN — GABAPENTIN SCH MG: 100 CAPSULE ORAL at 08:07

## 2019-08-31 RX ADMIN — SERTRALINE HYDROCHLORIDE SCH MG: 100 TABLET ORAL at 07:56

## 2019-08-31 RX ADMIN — FUROSEMIDE SCH MG: 20 TABLET ORAL at 07:57

## 2019-08-31 RX ADMIN — ROPINIROLE SCH MG: 0.5 TABLET ORAL at 19:42

## 2019-08-31 RX ADMIN — GABAPENTIN SCH MG: 100 CAPSULE ORAL at 15:14

## 2019-08-31 RX ADMIN — GABAPENTIN SCH MG: 100 CAPSULE ORAL at 07:58

## 2019-08-31 RX ADMIN — Medication SCH MG: at 19:42

## 2019-08-31 RX ADMIN — Medication SCH MG: at 07:56

## 2019-08-31 RX ADMIN — ZINC SCH TAB: TAB ORAL at 07:58

## 2019-09-01 RX ADMIN — Medication SCH MG: at 19:12

## 2019-09-01 RX ADMIN — SERTRALINE HYDROCHLORIDE SCH MG: 100 TABLET ORAL at 07:51

## 2019-09-01 RX ADMIN — Medication SCH MG: at 07:52

## 2019-09-01 RX ADMIN — GABAPENTIN SCH MG: 100 CAPSULE ORAL at 12:25

## 2019-09-01 RX ADMIN — FUROSEMIDE SCH MG: 20 TABLET ORAL at 07:51

## 2019-09-01 RX ADMIN — GABAPENTIN SCH MG: 100 CAPSULE ORAL at 07:52

## 2019-09-01 RX ADMIN — ZINC SCH TAB: TAB ORAL at 07:52

## 2019-09-01 RX ADMIN — ROPINIROLE SCH MG: 0.5 TABLET ORAL at 19:12

## 2019-09-01 RX ADMIN — Medication SCH MG: at 19:11

## 2019-09-02 RX ADMIN — GABAPENTIN SCH MG: 100 CAPSULE ORAL at 07:42

## 2019-09-02 RX ADMIN — Medication SCH MG: at 19:49

## 2019-09-02 RX ADMIN — Medication SCH MG: at 19:51

## 2019-09-02 RX ADMIN — ROPINIROLE SCH MG: 0.5 TABLET ORAL at 19:51

## 2019-09-02 RX ADMIN — FUROSEMIDE SCH MG: 20 TABLET ORAL at 07:41

## 2019-09-02 RX ADMIN — SERTRALINE HYDROCHLORIDE SCH MG: 100 TABLET ORAL at 07:41

## 2019-09-02 RX ADMIN — Medication SCH MG: at 07:41

## 2019-09-02 RX ADMIN — ZINC SCH TAB: TAB ORAL at 07:42

## 2019-09-02 RX ADMIN — GABAPENTIN SCH MG: 100 CAPSULE ORAL at 12:02

## 2019-09-03 RX ADMIN — GABAPENTIN SCH MG: 100 CAPSULE ORAL at 08:12

## 2019-09-03 RX ADMIN — ZINC SCH TAB: TAB ORAL at 08:12

## 2019-09-03 RX ADMIN — SERTRALINE HYDROCHLORIDE SCH MG: 100 TABLET ORAL at 08:12

## 2019-09-03 RX ADMIN — FUROSEMIDE SCH MG: 20 TABLET ORAL at 08:12

## 2019-09-03 RX ADMIN — Medication SCH MG: at 19:38

## 2019-09-03 RX ADMIN — ROPINIROLE SCH MG: 0.5 TABLET ORAL at 19:39

## 2019-09-03 RX ADMIN — Medication SCH MG: at 19:39

## 2019-09-03 RX ADMIN — Medication SCH MG: at 08:12

## 2019-09-03 RX ADMIN — GABAPENTIN SCH MG: 100 CAPSULE ORAL at 13:25

## 2019-09-04 RX ADMIN — Medication SCH MG: at 07:58

## 2019-09-04 RX ADMIN — GABAPENTIN SCH MG: 100 CAPSULE ORAL at 07:58

## 2019-09-04 RX ADMIN — Medication SCH MG: at 19:18

## 2019-09-04 RX ADMIN — GABAPENTIN SCH MG: 100 CAPSULE ORAL at 14:11

## 2019-09-04 RX ADMIN — ROPINIROLE SCH MG: 0.5 TABLET ORAL at 19:19

## 2019-09-04 RX ADMIN — SERTRALINE HYDROCHLORIDE SCH MG: 100 TABLET ORAL at 07:58

## 2019-09-04 RX ADMIN — FUROSEMIDE SCH MG: 20 TABLET ORAL at 07:59

## 2019-09-04 RX ADMIN — ZINC SCH TAB: TAB ORAL at 07:58

## 2019-09-05 RX ADMIN — FUROSEMIDE SCH MG: 20 TABLET ORAL at 08:27

## 2019-09-05 RX ADMIN — SERTRALINE HYDROCHLORIDE SCH MG: 100 TABLET ORAL at 08:27

## 2019-09-05 RX ADMIN — GABAPENTIN SCH MG: 100 CAPSULE ORAL at 12:17

## 2019-09-05 RX ADMIN — Medication SCH MG: at 20:12

## 2019-09-05 RX ADMIN — Medication SCH MG: at 20:11

## 2019-09-05 RX ADMIN — Medication SCH MG: at 08:27

## 2019-09-05 RX ADMIN — ROPINIROLE SCH MG: 0.5 TABLET ORAL at 20:13

## 2019-09-05 RX ADMIN — GABAPENTIN SCH MG: 100 CAPSULE ORAL at 08:28

## 2019-09-05 RX ADMIN — ZINC SCH TAB: TAB ORAL at 08:28

## 2019-09-05 NOTE — PCM.PN
- General Info


Date of Service: 09/05/19


Admission Dx/Problem (Free Text): 





Subjective: 60 day note for long-term care swing bed patient.  She has no 

complaints.  She's amply in the halls going to bathroom doing meals and 

toileting without difficulty.  She enjoys living here.  She's getting outside 

once in a while.  Son visits occasionally.  Not interested in another nursing 

home placement at this time.





Objective: Vital signs stable, sitting in chair, smiling happy.  Watching TV, 

reading her book.  Heart and lungs clear to auscultation.





Assessment and plan: No issues.  Continue long-term care plan.  Discussed other 

facilities could be an option in the future if wanted.














- Patient Data


Vitals - Most Recent: 


 Last Vital Signs











Temp  36.2 C   08/30/19 16:39


 


Pulse  73   08/30/19 16:39


 


Resp  20   08/30/19 16:39


 


BP  102/44 L  08/30/19 16:39


 


Pulse Ox  93 L  09/03/19 08:09











Weight - Most Recent: 83.461 kg


I&O - Last 24 Hours: 


 Intake & Output











 09/05/19 09/05/19 09/05/19





 06:59 14:59 22:59


 


Intake Total  300 


 


Balance  300 











Med Orders - Current: 


 Current Medications





Acetaminophen (Tylenol)  650 mg PO 0200,0800,1300,2000 [facility]


   Last Admin: 09/05/19 12:16 Dose:  650 mg


Calcium Carbonate/Glycine (Tums Extra Strength)  750 mg PO TID PRN


   PRN Reason: Dyspepsia


   Last Admin: 08/26/19 18:18 Dose:  750 mg


Docusate Sodium (Colace)  100 mg PO DAILY PRN


   PRN Reason: Constipation


Famotidine (Pepcid)  20 mg PO DAILY [facility]


   Last Admin: 09/05/19 08:28 Dose:  20 mg


Furosemide (Lasix)  20 mg PO DAILY [facility]


   Last Admin: 09/05/19 08:27 Dose:  20 mg


Gabapentin (Neurontin)  100 mg PO BID@0800,1300 [facility]


   Last Admin: 09/05/19 12:17 Dose:  100 mg


Melatonin (Melatonin)  6 mg PO BEDTIME [facility]


   Last Admin: 09/04/19 19:17 Dose:  6 mg


Nitroglycerin (Nitrostat)  0.4 mg SL Q5M PRN


   PRN Reason: Chest Pain


   Last Admin: 08/17/19 18:18 Dose:  0.4 mg


Own Supply: Warfarin 2.5mg (1/2 Of 5mg Tab)  0 mg PO MoTh@2000 [facility]


   Last Admin: 09/02/19 19:51 Dose:  2.5 mg


Own Supply: Warfarin (. 5mg)  0 mg PO SuTuWeFrSa@2000 [facility]


   Last Admin: 09/04/19 19:18 Dose:  5 mg


Own Supply: (Gabapentin. 300mg)  0 mg PO BEDTIME [facility]


   Last Admin: 09/04/19 19:18 Dose:  300 mg


Own Supply: (Metoprolol.Er 25mg)  0 mg PO DAILY [facility]


   Last Admin: 09/05/19 08:27 Dose:  25 mg


Pharmacy Consult (Consult To Pharmacy)  1 each .XX ASDIRECTED [facility]


Polyethylene Glycol (Miralax)  17 gm PO DAILY PRN


   PRN Reason: Constipation


Ropinirole HCl (Requip)  0.5 mg PO BEDTIME [facility]


   Last Admin: 09/04/19 19:19 Dose:  0.5 mg


Senna/Docusate Sodium (Senna Plus)  1 tab PO DAILY PRN


   PRN Reason: Constipation


Sertraline HCl (Zoloft)  100 mg PO DAILY [facility]


   Last Admin: 09/05/19 08:27 Dose:  100 mg


Vitamin B Complex/Vit C/Vit E/Zinc (Stress Formula With Zinc)  1 tab PO DAILY 

[facility]


   Last Admin: 09/05/19 08:28 Dose:  1 tab





Discontinued Medications





Polyethylene Glycol (Miralax)  17 gm PO DAILY [facility]


   Last Admin: 08/07/19 08:17 Dose:  Not Given











- Problem List Review


Problem List Initiated/Reviewed/Updated: Yes





- My Orders


Last 24 Hours: 


My Active Orders





09/13/19 07:00


INR,PT,PROTHROMBIN TIME [COAG] Q28D 





10/11/19 07:00


INR,PT,PROTHROMBIN TIME [COAG] Q28D

## 2019-09-06 RX ADMIN — GABAPENTIN SCH MG: 100 CAPSULE ORAL at 13:00

## 2019-09-06 RX ADMIN — SERTRALINE HYDROCHLORIDE SCH MG: 100 TABLET ORAL at 08:57

## 2019-09-06 RX ADMIN — Medication SCH MG: at 20:11

## 2019-09-06 RX ADMIN — GABAPENTIN SCH MG: 100 CAPSULE ORAL at 08:55

## 2019-09-06 RX ADMIN — ROPINIROLE SCH MG: 0.5 TABLET ORAL at 20:11

## 2019-09-06 RX ADMIN — ZINC SCH TAB: TAB ORAL at 08:55

## 2019-09-06 RX ADMIN — FUROSEMIDE SCH MG: 20 TABLET ORAL at 08:56

## 2019-09-06 RX ADMIN — Medication SCH MG: at 20:12

## 2019-09-06 RX ADMIN — Medication SCH MG: at 08:57

## 2019-09-06 NOTE — PCM.PN
- General Info


Date of Service: 07/02/19


Admission Dx/Problem (Free Text): 





Subjective: Interim long-term care note.  No changes in health status.  On 

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  Mood is stable on 

antidepressant.  Eating and stooling normally.  No gross cognitive defects.





Objective: Vital signs remain normal.  Her weight is unchanged.  Alert oriented 

smiling no distress.  Heart and lungs are clear abdomen soft nontender 

extremities warm well perfused without edema or tenderness.





Assessment and plan:


Doing well.  Anticoagulated for history of DVT, mood stable on antidepressant, 

blood pressures controlled, gabapentin for history of neuropathy.











- Patient Data


Vitals - Most Recent: 


 Last Vital Signs











Temp  36.5 C   09/06/19 08:00


 


Pulse  87   09/06/19 08:00


 


Resp  16   09/06/19 08:00


 


BP  141/69 H  09/06/19 08:00


 


Pulse Ox  96   09/06/19 08:00











Weight - Most Recent: 86.092 kg


I&O - Last 24 Hours: 


 Intake & Output











 09/06/19 09/06/19 09/06/19





 06:59 14:59 22:59


 


Intake Total  300 


 


Balance  300 











Med Orders - Current: 


 Current Medications





Acetaminophen (Tylenol)  650 mg PO 0200,0800,1300,2000 [facility]


   Last Admin: 09/06/19 13:00 Dose:  650 mg


Calcium Carbonate/Glycine (Tums Extra Strength)  750 mg PO TID PRN


   PRN Reason: Dyspepsia


   Last Admin: 08/26/19 18:18 Dose:  750 mg


Docusate Sodium (Colace)  100 mg PO DAILY PRN


   PRN Reason: Constipation


Famotidine (Pepcid)  20 mg PO DAILY [facility]


   Last Admin: 09/06/19 08:55 Dose:  20 mg


Furosemide (Lasix)  20 mg PO DAILY [facility]


   Last Admin: 09/06/19 08:56 Dose:  20 mg


Gabapentin (Neurontin)  100 mg PO BID@0800,1300 [facility]


   Last Admin: 09/06/19 13:00 Dose:  100 mg


Melatonin (Melatonin)  6 mg PO BEDTIME [facility]


   Last Admin: 09/05/19 20:13 Dose:  6 mg


Nitroglycerin (Nitrostat)  0.4 mg SL Q5M PRN


   PRN Reason: Chest Pain


   Last Admin: 08/17/19 18:18 Dose:  0.4 mg


Own Supply: Warfarin 2.5mg (1/2 Of 5mg Tab)  0 mg PO MoTh@2000 [facility]


   Last Admin: 09/05/19 20:12 Dose:  2.5 mg


Own Supply: Warfarin (. 5mg)  0 mg PO SuTuWeFrSa@2000 [facility]


   Last Admin: 09/04/19 19:18 Dose:  5 mg


Own Supply: (Gabapentin. 300mg)  0 mg PO BEDTIME [facility]


   Last Admin: 09/05/19 20:11 Dose:  300 mg


Own Supply: (Metoprolol.Er 25mg)  0 mg PO DAILY [facility]


   Last Admin: 09/06/19 08:57 Dose:  25 mg


Pharmacy Consult (Consult To Pharmacy)  1 each .XX ASDIRECTED [facility]


Polyethylene Glycol (Miralax)  17 gm PO DAILY PRN


   PRN Reason: Constipation


Ropinirole HCl (Requip)  0.5 mg PO BEDTIME [facility]


   Last Admin: 09/05/19 20:13 Dose:  0.5 mg


Senna/Docusate Sodium (Senna Plus)  1 tab PO DAILY PRN


   PRN Reason: Constipation


Sertraline HCl (Zoloft)  100 mg PO DAILY [facility]


   Last Admin: 09/06/19 08:57 Dose:  100 mg


Vitamin B Complex/Vit C/Vit E/Zinc (Stress Formula With Zinc)  1 tab PO DAILY 

[facility]


   Last Admin: 09/06/19 08:55 Dose:  1 tab





Discontinued Medications





Polyethylene Glycol (Miralax)  17 gm PO DAILY [facility]


   Last Admin: 08/07/19 08:17 Dose:  Not Given











- Problem List Review


Problem List Initiated/Reviewed/Updated: Yes





- My Orders


Last 24 Hours: 


My Active Orders





09/13/19 07:00


INR,PT,PROTHROMBIN TIME [COAG] Q28D 





10/11/19 07:00


INR,PT,PROTHROMBIN TIME [COAG] Q28D

## 2019-09-07 RX ADMIN — GABAPENTIN SCH MG: 100 CAPSULE ORAL at 12:28

## 2019-09-07 RX ADMIN — Medication SCH MG: at 08:08

## 2019-09-07 RX ADMIN — FUROSEMIDE SCH MG: 20 TABLET ORAL at 08:08

## 2019-09-07 RX ADMIN — GABAPENTIN SCH MG: 100 CAPSULE ORAL at 08:07

## 2019-09-07 RX ADMIN — SERTRALINE HYDROCHLORIDE SCH MG: 100 TABLET ORAL at 08:08

## 2019-09-07 RX ADMIN — ZINC SCH TAB: TAB ORAL at 08:06

## 2019-09-07 RX ADMIN — ROPINIROLE SCH MG: 0.5 TABLET ORAL at 20:39

## 2019-09-07 RX ADMIN — Medication SCH MG: at 20:39

## 2019-09-07 RX ADMIN — Medication SCH MG: at 20:38

## 2019-09-08 RX ADMIN — Medication SCH MG: at 20:15

## 2019-09-08 RX ADMIN — SERTRALINE HYDROCHLORIDE SCH MG: 100 TABLET ORAL at 09:06

## 2019-09-08 RX ADMIN — Medication SCH MG: at 20:14

## 2019-09-08 RX ADMIN — GABAPENTIN SCH MG: 100 CAPSULE ORAL at 09:05

## 2019-09-08 RX ADMIN — GABAPENTIN SCH MG: 100 CAPSULE ORAL at 14:12

## 2019-09-08 RX ADMIN — ROPINIROLE SCH MG: 0.5 TABLET ORAL at 20:15

## 2019-09-08 RX ADMIN — FUROSEMIDE SCH MG: 20 TABLET ORAL at 09:06

## 2019-09-08 RX ADMIN — Medication SCH MG: at 09:06

## 2019-09-08 RX ADMIN — ZINC SCH TAB: TAB ORAL at 09:04

## 2019-09-09 RX ADMIN — ZINC SCH TAB: TAB ORAL at 08:25

## 2019-09-09 RX ADMIN — GABAPENTIN SCH MG: 100 CAPSULE ORAL at 12:17

## 2019-09-09 RX ADMIN — GABAPENTIN SCH MG: 100 CAPSULE ORAL at 08:24

## 2019-09-09 RX ADMIN — SERTRALINE HYDROCHLORIDE SCH MG: 100 TABLET ORAL at 08:24

## 2019-09-09 RX ADMIN — Medication SCH MG: at 19:40

## 2019-09-09 RX ADMIN — Medication SCH MG: at 19:39

## 2019-09-09 RX ADMIN — FUROSEMIDE SCH MG: 20 TABLET ORAL at 08:24

## 2019-09-09 RX ADMIN — ROPINIROLE SCH MG: 0.5 TABLET ORAL at 19:39

## 2019-09-09 RX ADMIN — Medication SCH MG: at 08:24

## 2019-09-10 RX ADMIN — GABAPENTIN SCH MG: 100 CAPSULE ORAL at 12:48

## 2019-09-10 RX ADMIN — Medication SCH MG: at 19:20

## 2019-09-10 RX ADMIN — FUROSEMIDE SCH MG: 20 TABLET ORAL at 07:55

## 2019-09-10 RX ADMIN — ZINC SCH TAB: TAB ORAL at 07:55

## 2019-09-10 RX ADMIN — Medication SCH MG: at 07:55

## 2019-09-10 RX ADMIN — SERTRALINE HYDROCHLORIDE SCH MG: 100 TABLET ORAL at 07:55

## 2019-09-10 RX ADMIN — GABAPENTIN SCH MG: 100 CAPSULE ORAL at 07:56

## 2019-09-10 RX ADMIN — ROPINIROLE SCH MG: 0.5 TABLET ORAL at 19:20

## 2019-09-10 RX ADMIN — Medication SCH MG: at 19:19

## 2019-09-11 RX ADMIN — ZINC SCH TAB: TAB ORAL at 07:40

## 2019-09-11 RX ADMIN — ROPINIROLE SCH MG: 0.5 TABLET ORAL at 19:44

## 2019-09-11 RX ADMIN — GABAPENTIN SCH MG: 100 CAPSULE ORAL at 07:43

## 2019-09-11 RX ADMIN — FUROSEMIDE SCH MG: 20 TABLET ORAL at 07:43

## 2019-09-11 RX ADMIN — SERTRALINE HYDROCHLORIDE SCH MG: 100 TABLET ORAL at 07:42

## 2019-09-11 RX ADMIN — GABAPENTIN SCH MG: 100 CAPSULE ORAL at 12:09

## 2019-09-11 RX ADMIN — Medication SCH MG: at 07:43

## 2019-09-11 RX ADMIN — Medication SCH MG: at 19:44

## 2019-09-11 RX ADMIN — Medication SCH MG: at 19:45

## 2019-09-12 RX ADMIN — GABAPENTIN SCH MG: 100 CAPSULE ORAL at 12:23

## 2019-09-12 RX ADMIN — ZINC SCH TAB: TAB ORAL at 08:21

## 2019-09-12 RX ADMIN — Medication SCH MG: at 20:02

## 2019-09-12 RX ADMIN — GABAPENTIN SCH MG: 100 CAPSULE ORAL at 08:20

## 2019-09-12 RX ADMIN — Medication SCH MG: at 20:04

## 2019-09-12 RX ADMIN — Medication SCH MG: at 08:22

## 2019-09-12 RX ADMIN — SERTRALINE HYDROCHLORIDE SCH MG: 100 TABLET ORAL at 08:23

## 2019-09-12 RX ADMIN — ROPINIROLE SCH MG: 0.5 TABLET ORAL at 20:05

## 2019-09-12 RX ADMIN — FUROSEMIDE SCH MG: 20 TABLET ORAL at 08:23

## 2019-09-13 RX ADMIN — Medication SCH MG: at 20:02

## 2019-09-13 RX ADMIN — Medication SCH MG: at 09:27

## 2019-09-13 RX ADMIN — SERTRALINE HYDROCHLORIDE SCH MG: 100 TABLET ORAL at 09:27

## 2019-09-13 RX ADMIN — GABAPENTIN SCH MG: 100 CAPSULE ORAL at 13:21

## 2019-09-13 RX ADMIN — Medication SCH MG: at 19:58

## 2019-09-13 RX ADMIN — GABAPENTIN SCH MG: 100 CAPSULE ORAL at 09:27

## 2019-09-13 RX ADMIN — FUROSEMIDE SCH MG: 20 TABLET ORAL at 09:27

## 2019-09-13 RX ADMIN — ROPINIROLE SCH MG: 0.5 TABLET ORAL at 20:02

## 2019-09-13 RX ADMIN — ZINC SCH TAB: TAB ORAL at 09:26

## 2019-09-14 RX ADMIN — GABAPENTIN SCH MG: 100 CAPSULE ORAL at 07:45

## 2019-09-14 RX ADMIN — Medication SCH MG: at 19:32

## 2019-09-14 RX ADMIN — ROPINIROLE SCH MG: 0.5 TABLET ORAL at 19:33

## 2019-09-14 RX ADMIN — Medication SCH MG: at 07:46

## 2019-09-14 RX ADMIN — SERTRALINE HYDROCHLORIDE SCH MG: 100 TABLET ORAL at 07:46

## 2019-09-14 RX ADMIN — GABAPENTIN SCH MG: 100 CAPSULE ORAL at 12:31

## 2019-09-14 RX ADMIN — Medication SCH MG: at 19:34

## 2019-09-14 RX ADMIN — FUROSEMIDE SCH MG: 20 TABLET ORAL at 07:46

## 2019-09-14 RX ADMIN — ZINC SCH TAB: TAB ORAL at 07:46

## 2019-09-15 RX ADMIN — FUROSEMIDE SCH MG: 20 TABLET ORAL at 09:20

## 2019-09-15 RX ADMIN — GABAPENTIN SCH MG: 100 CAPSULE ORAL at 13:15

## 2019-09-15 RX ADMIN — Medication SCH MG: at 09:20

## 2019-09-15 RX ADMIN — Medication SCH MG: at 20:34

## 2019-09-15 RX ADMIN — Medication SCH MG: at 20:31

## 2019-09-15 RX ADMIN — ZINC SCH TAB: TAB ORAL at 09:20

## 2019-09-15 RX ADMIN — ROPINIROLE SCH MG: 0.5 TABLET ORAL at 20:33

## 2019-09-15 RX ADMIN — SERTRALINE HYDROCHLORIDE SCH MG: 100 TABLET ORAL at 09:20

## 2019-09-15 RX ADMIN — GABAPENTIN SCH MG: 100 CAPSULE ORAL at 09:20

## 2019-09-16 RX ADMIN — GABAPENTIN SCH MG: 100 CAPSULE ORAL at 13:42

## 2019-09-16 RX ADMIN — SERTRALINE HYDROCHLORIDE SCH MG: 100 TABLET ORAL at 07:45

## 2019-09-16 RX ADMIN — ZINC SCH TAB: TAB ORAL at 07:46

## 2019-09-16 RX ADMIN — Medication SCH MG: at 07:45

## 2019-09-16 RX ADMIN — Medication SCH MG: at 19:24

## 2019-09-16 RX ADMIN — Medication SCH MG: at 19:22

## 2019-09-16 RX ADMIN — ROPINIROLE SCH MG: 0.5 TABLET ORAL at 19:23

## 2019-09-16 RX ADMIN — FUROSEMIDE SCH MG: 20 TABLET ORAL at 07:45

## 2019-09-16 RX ADMIN — GABAPENTIN SCH MG: 100 CAPSULE ORAL at 07:45

## 2019-09-17 RX ADMIN — GABAPENTIN SCH MG: 100 CAPSULE ORAL at 13:22

## 2019-09-17 RX ADMIN — Medication SCH MG: at 20:14

## 2019-09-17 RX ADMIN — ROPINIROLE SCH MG: 0.5 TABLET ORAL at 20:14

## 2019-09-17 RX ADMIN — Medication SCH MG: at 07:41

## 2019-09-17 RX ADMIN — Medication SCH MG: at 20:15

## 2019-09-17 RX ADMIN — ZINC SCH TAB: TAB ORAL at 07:40

## 2019-09-17 RX ADMIN — FUROSEMIDE SCH MG: 20 TABLET ORAL at 07:41

## 2019-09-17 RX ADMIN — SERTRALINE HYDROCHLORIDE SCH MG: 100 TABLET ORAL at 07:42

## 2019-09-17 RX ADMIN — GABAPENTIN SCH MG: 100 CAPSULE ORAL at 07:42

## 2019-09-18 RX ADMIN — SERTRALINE HYDROCHLORIDE SCH MG: 100 TABLET ORAL at 07:33

## 2019-09-18 RX ADMIN — Medication SCH MG: at 19:16

## 2019-09-18 RX ADMIN — ZINC SCH TAB: TAB ORAL at 07:31

## 2019-09-18 RX ADMIN — ROPINIROLE SCH MG: 0.5 TABLET ORAL at 19:16

## 2019-09-18 RX ADMIN — GABAPENTIN SCH MG: 100 CAPSULE ORAL at 14:08

## 2019-09-18 RX ADMIN — Medication SCH MG: at 07:33

## 2019-09-18 RX ADMIN — GABAPENTIN SCH MG: 100 CAPSULE ORAL at 07:33

## 2019-09-18 RX ADMIN — FUROSEMIDE SCH MG: 20 TABLET ORAL at 07:33

## 2019-09-19 RX ADMIN — GABAPENTIN SCH MG: 100 CAPSULE ORAL at 12:33

## 2019-09-19 RX ADMIN — ROPINIROLE SCH MG: 0.5 TABLET ORAL at 20:55

## 2019-09-19 RX ADMIN — GABAPENTIN SCH MG: 100 CAPSULE ORAL at 07:11

## 2019-09-19 RX ADMIN — ZINC SCH TAB: TAB ORAL at 07:10

## 2019-09-19 RX ADMIN — Medication SCH MG: at 20:55

## 2019-09-19 RX ADMIN — Medication SCH MG: at 07:13

## 2019-09-19 RX ADMIN — FUROSEMIDE SCH MG: 20 TABLET ORAL at 07:13

## 2019-09-19 RX ADMIN — SERTRALINE HYDROCHLORIDE SCH MG: 100 TABLET ORAL at 07:13

## 2019-09-20 RX ADMIN — FUROSEMIDE SCH MG: 20 TABLET ORAL at 07:59

## 2019-09-20 RX ADMIN — ROPINIROLE SCH MG: 0.5 TABLET ORAL at 19:32

## 2019-09-20 RX ADMIN — Medication SCH MG: at 19:33

## 2019-09-20 RX ADMIN — Medication SCH MG: at 19:32

## 2019-09-20 RX ADMIN — ZINC SCH TAB: TAB ORAL at 07:57

## 2019-09-20 RX ADMIN — GABAPENTIN SCH MG: 100 CAPSULE ORAL at 13:59

## 2019-09-20 RX ADMIN — SERTRALINE HYDROCHLORIDE SCH MG: 100 TABLET ORAL at 07:58

## 2019-09-20 RX ADMIN — GABAPENTIN SCH MG: 100 CAPSULE ORAL at 07:57

## 2019-09-20 RX ADMIN — Medication SCH MG: at 07:59

## 2019-09-21 RX ADMIN — GABAPENTIN SCH MG: 100 CAPSULE ORAL at 12:40

## 2019-09-21 RX ADMIN — Medication SCH MG: at 19:38

## 2019-09-21 RX ADMIN — ROPINIROLE SCH MG: 0.5 TABLET ORAL at 19:39

## 2019-09-21 RX ADMIN — Medication SCH MG: at 19:39

## 2019-09-21 RX ADMIN — SERTRALINE HYDROCHLORIDE SCH MG: 100 TABLET ORAL at 08:53

## 2019-09-21 RX ADMIN — FUROSEMIDE SCH MG: 20 TABLET ORAL at 08:52

## 2019-09-21 RX ADMIN — Medication SCH MG: at 08:53

## 2019-09-21 RX ADMIN — GABAPENTIN SCH MG: 100 CAPSULE ORAL at 08:51

## 2019-09-21 RX ADMIN — ZINC SCH TAB: TAB ORAL at 09:00

## 2019-09-22 RX ADMIN — GABAPENTIN SCH MG: 100 CAPSULE ORAL at 08:28

## 2019-09-22 RX ADMIN — ROPINIROLE SCH MG: 0.5 TABLET ORAL at 19:42

## 2019-09-22 RX ADMIN — Medication SCH MG: at 19:43

## 2019-09-22 RX ADMIN — FUROSEMIDE SCH MG: 20 TABLET ORAL at 08:28

## 2019-09-22 RX ADMIN — GABAPENTIN SCH MG: 100 CAPSULE ORAL at 12:29

## 2019-09-22 RX ADMIN — ZINC SCH TAB: TAB ORAL at 08:29

## 2019-09-22 RX ADMIN — Medication SCH MG: at 19:42

## 2019-09-22 RX ADMIN — Medication SCH MG: at 08:28

## 2019-09-22 RX ADMIN — SERTRALINE HYDROCHLORIDE SCH MG: 100 TABLET ORAL at 08:28

## 2019-09-23 RX ADMIN — Medication SCH MG: at 09:15

## 2019-09-23 RX ADMIN — ROPINIROLE SCH MG: 0.5 TABLET ORAL at 19:37

## 2019-09-23 RX ADMIN — Medication SCH MG: at 19:37

## 2019-09-23 RX ADMIN — GABAPENTIN SCH MG: 100 CAPSULE ORAL at 13:09

## 2019-09-23 RX ADMIN — GABAPENTIN SCH MG: 100 CAPSULE ORAL at 09:15

## 2019-09-23 RX ADMIN — FUROSEMIDE SCH MG: 20 TABLET ORAL at 09:15

## 2019-09-23 RX ADMIN — SERTRALINE HYDROCHLORIDE SCH MG: 100 TABLET ORAL at 09:15

## 2019-09-23 RX ADMIN — ZINC SCH TAB: TAB ORAL at 09:16

## 2019-09-23 RX ADMIN — Medication SCH MG: at 19:36

## 2019-09-24 RX ADMIN — GABAPENTIN SCH MG: 100 CAPSULE ORAL at 13:48

## 2019-09-24 RX ADMIN — FUROSEMIDE SCH MG: 20 TABLET ORAL at 08:18

## 2019-09-24 RX ADMIN — ROPINIROLE SCH MG: 0.5 TABLET ORAL at 19:20

## 2019-09-24 RX ADMIN — ZINC SCH TAB: TAB ORAL at 08:17

## 2019-09-24 RX ADMIN — WARFARIN SODIUM SCH MG: 5 TABLET ORAL at 19:20

## 2019-09-24 RX ADMIN — GABAPENTIN SCH MG: 300 CAPSULE ORAL at 19:21

## 2019-09-24 RX ADMIN — Medication SCH MG: at 08:17

## 2019-09-24 RX ADMIN — SERTRALINE HYDROCHLORIDE SCH MG: 100 TABLET ORAL at 08:18

## 2019-09-24 RX ADMIN — GABAPENTIN SCH MG: 100 CAPSULE ORAL at 08:18

## 2019-09-25 RX ADMIN — METOPROLOL SUCCINATE SCH MG: 25 TABLET, EXTENDED RELEASE ORAL at 08:25

## 2019-09-25 RX ADMIN — FUROSEMIDE SCH MG: 20 TABLET ORAL at 08:25

## 2019-09-25 RX ADMIN — GABAPENTIN SCH MG: 100 CAPSULE ORAL at 08:23

## 2019-09-25 RX ADMIN — GABAPENTIN SCH MG: 100 CAPSULE ORAL at 12:46

## 2019-09-25 RX ADMIN — GABAPENTIN SCH MG: 300 CAPSULE ORAL at 19:28

## 2019-09-25 RX ADMIN — ROPINIROLE SCH MG: 0.5 TABLET ORAL at 19:28

## 2019-09-25 RX ADMIN — WARFARIN SODIUM SCH MG: 5 TABLET ORAL at 19:29

## 2019-09-25 RX ADMIN — SERTRALINE HYDROCHLORIDE SCH MG: 100 TABLET ORAL at 08:24

## 2019-09-25 RX ADMIN — ZINC SCH TAB: TAB ORAL at 08:24

## 2019-09-26 RX ADMIN — SERTRALINE HYDROCHLORIDE SCH MG: 100 TABLET ORAL at 08:39

## 2019-09-26 RX ADMIN — GABAPENTIN SCH MG: 300 CAPSULE ORAL at 19:53

## 2019-09-26 RX ADMIN — ZINC SCH TAB: TAB ORAL at 08:38

## 2019-09-26 RX ADMIN — WARFARIN SODIUM SCH MG: 5 TABLET ORAL at 19:56

## 2019-09-26 RX ADMIN — ROPINIROLE SCH MG: 0.5 TABLET ORAL at 19:57

## 2019-09-26 RX ADMIN — GABAPENTIN SCH MG: 100 CAPSULE ORAL at 08:38

## 2019-09-26 RX ADMIN — FUROSEMIDE SCH MG: 20 TABLET ORAL at 08:40

## 2019-09-26 RX ADMIN — METOPROLOL SUCCINATE SCH MG: 25 TABLET, EXTENDED RELEASE ORAL at 08:40

## 2019-09-26 RX ADMIN — GABAPENTIN SCH MG: 100 CAPSULE ORAL at 12:34

## 2019-09-27 RX ADMIN — ZINC SCH TAB: TAB ORAL at 07:56

## 2019-09-27 RX ADMIN — GABAPENTIN SCH MG: 300 CAPSULE ORAL at 20:13

## 2019-09-27 RX ADMIN — METOPROLOL SUCCINATE SCH MG: 25 TABLET, EXTENDED RELEASE ORAL at 07:56

## 2019-09-27 RX ADMIN — ROPINIROLE SCH MG: 0.5 TABLET ORAL at 20:14

## 2019-09-27 RX ADMIN — FUROSEMIDE SCH MG: 20 TABLET ORAL at 07:57

## 2019-09-27 RX ADMIN — GABAPENTIN SCH MG: 100 CAPSULE ORAL at 12:16

## 2019-09-27 RX ADMIN — WARFARIN SODIUM SCH MG: 5 TABLET ORAL at 20:14

## 2019-09-27 RX ADMIN — SERTRALINE HYDROCHLORIDE SCH MG: 100 TABLET ORAL at 07:56

## 2019-09-27 RX ADMIN — GABAPENTIN SCH MG: 100 CAPSULE ORAL at 07:56

## 2019-09-28 RX ADMIN — METOPROLOL SUCCINATE SCH MG: 25 TABLET, EXTENDED RELEASE ORAL at 07:44

## 2019-09-28 RX ADMIN — GABAPENTIN SCH MG: 100 CAPSULE ORAL at 07:43

## 2019-09-28 RX ADMIN — ZINC SCH TAB: TAB ORAL at 07:40

## 2019-09-28 RX ADMIN — GABAPENTIN SCH MG: 300 CAPSULE ORAL at 19:18

## 2019-09-28 RX ADMIN — GABAPENTIN SCH MG: 100 CAPSULE ORAL at 12:21

## 2019-09-28 RX ADMIN — ROPINIROLE SCH MG: 0.5 TABLET ORAL at 19:18

## 2019-09-28 RX ADMIN — SERTRALINE HYDROCHLORIDE SCH MG: 100 TABLET ORAL at 07:41

## 2019-09-28 RX ADMIN — FUROSEMIDE SCH MG: 20 TABLET ORAL at 07:41

## 2019-09-28 RX ADMIN — WARFARIN SODIUM SCH MG: 5 TABLET ORAL at 19:18

## 2019-09-29 RX ADMIN — FUROSEMIDE SCH MG: 20 TABLET ORAL at 07:13

## 2019-09-29 RX ADMIN — ROPINIROLE SCH MG: 0.5 TABLET ORAL at 19:52

## 2019-09-29 RX ADMIN — GABAPENTIN SCH MG: 300 CAPSULE ORAL at 19:52

## 2019-09-29 RX ADMIN — ZINC SCH TAB: TAB ORAL at 07:13

## 2019-09-29 RX ADMIN — GABAPENTIN SCH MG: 100 CAPSULE ORAL at 07:14

## 2019-09-29 RX ADMIN — METOPROLOL SUCCINATE SCH MG: 25 TABLET, EXTENDED RELEASE ORAL at 07:16

## 2019-09-29 RX ADMIN — GABAPENTIN SCH MG: 100 CAPSULE ORAL at 12:22

## 2019-09-29 RX ADMIN — WARFARIN SODIUM SCH MG: 5 TABLET ORAL at 19:52

## 2019-09-29 RX ADMIN — SERTRALINE HYDROCHLORIDE SCH MG: 100 TABLET ORAL at 07:13

## 2019-09-30 RX ADMIN — GABAPENTIN SCH MG: 100 CAPSULE ORAL at 09:32

## 2019-09-30 RX ADMIN — GABAPENTIN SCH MG: 300 CAPSULE ORAL at 20:28

## 2019-09-30 RX ADMIN — FUROSEMIDE SCH MG: 20 TABLET ORAL at 09:31

## 2019-09-30 RX ADMIN — GABAPENTIN SCH MG: 100 CAPSULE ORAL at 12:35

## 2019-09-30 RX ADMIN — ROPINIROLE SCH MG: 0.5 TABLET ORAL at 20:29

## 2019-09-30 RX ADMIN — WARFARIN SODIUM SCH MG: 5 TABLET ORAL at 20:28

## 2019-09-30 RX ADMIN — SERTRALINE HYDROCHLORIDE SCH MG: 100 TABLET ORAL at 09:32

## 2019-09-30 RX ADMIN — METOPROLOL SUCCINATE SCH MG: 25 TABLET, EXTENDED RELEASE ORAL at 09:30

## 2019-09-30 RX ADMIN — ZINC SCH TAB: TAB ORAL at 09:33

## 2019-10-01 RX ADMIN — GABAPENTIN SCH MG: 100 CAPSULE ORAL at 12:32

## 2019-10-01 RX ADMIN — SERTRALINE HYDROCHLORIDE SCH MG: 100 TABLET ORAL at 08:39

## 2019-10-01 RX ADMIN — ZINC SCH TAB: TAB ORAL at 08:40

## 2019-10-01 RX ADMIN — ROPINIROLE SCH MG: 0.5 TABLET ORAL at 19:17

## 2019-10-01 RX ADMIN — FUROSEMIDE SCH MG: 20 TABLET ORAL at 08:39

## 2019-10-01 RX ADMIN — GABAPENTIN SCH MG: 100 CAPSULE ORAL at 08:41

## 2019-10-01 RX ADMIN — METOPROLOL SUCCINATE SCH MG: 25 TABLET, EXTENDED RELEASE ORAL at 08:38

## 2019-10-01 RX ADMIN — WARFARIN SODIUM SCH MG: 5 TABLET ORAL at 19:17

## 2019-10-01 RX ADMIN — GABAPENTIN SCH MG: 300 CAPSULE ORAL at 19:16

## 2019-10-02 RX ADMIN — FUROSEMIDE SCH MG: 20 TABLET ORAL at 07:54

## 2019-10-02 RX ADMIN — SERTRALINE HYDROCHLORIDE SCH MG: 100 TABLET ORAL at 07:54

## 2019-10-02 RX ADMIN — METOPROLOL SUCCINATE SCH MG: 25 TABLET, EXTENDED RELEASE ORAL at 07:54

## 2019-10-02 RX ADMIN — GABAPENTIN SCH MG: 100 CAPSULE ORAL at 13:56

## 2019-10-02 RX ADMIN — GABAPENTIN SCH MG: 100 CAPSULE ORAL at 07:53

## 2019-10-02 RX ADMIN — WARFARIN SODIUM SCH MG: 5 TABLET ORAL at 19:18

## 2019-10-02 RX ADMIN — ZINC SCH TAB: TAB ORAL at 07:55

## 2019-10-02 RX ADMIN — ROPINIROLE SCH MG: 0.5 TABLET ORAL at 19:17

## 2019-10-02 RX ADMIN — GABAPENTIN SCH MG: 300 CAPSULE ORAL at 19:16

## 2019-10-03 RX ADMIN — GABAPENTIN SCH MG: 100 CAPSULE ORAL at 08:51

## 2019-10-03 RX ADMIN — ROPINIROLE SCH MG: 0.5 TABLET ORAL at 19:56

## 2019-10-03 RX ADMIN — ZINC SCH TAB: TAB ORAL at 08:50

## 2019-10-03 RX ADMIN — WARFARIN SODIUM SCH MG: 5 TABLET ORAL at 19:56

## 2019-10-03 RX ADMIN — METOPROLOL SUCCINATE SCH MG: 25 TABLET, EXTENDED RELEASE ORAL at 08:50

## 2019-10-03 RX ADMIN — GABAPENTIN SCH MG: 300 CAPSULE ORAL at 19:55

## 2019-10-03 RX ADMIN — SERTRALINE HYDROCHLORIDE SCH MG: 100 TABLET ORAL at 08:51

## 2019-10-03 RX ADMIN — GABAPENTIN SCH MG: 100 CAPSULE ORAL at 12:08

## 2019-10-03 RX ADMIN — FUROSEMIDE SCH MG: 20 TABLET ORAL at 08:51

## 2019-10-04 RX ADMIN — GABAPENTIN SCH MG: 100 CAPSULE ORAL at 09:11

## 2019-10-04 RX ADMIN — ZINC SCH TAB: TAB ORAL at 09:12

## 2019-10-04 RX ADMIN — ROPINIROLE SCH MG: 0.5 TABLET ORAL at 19:43

## 2019-10-04 RX ADMIN — METOPROLOL SUCCINATE SCH MG: 25 TABLET, EXTENDED RELEASE ORAL at 09:12

## 2019-10-04 RX ADMIN — GABAPENTIN SCH MG: 300 CAPSULE ORAL at 19:42

## 2019-10-04 RX ADMIN — SERTRALINE HYDROCHLORIDE SCH MG: 100 TABLET ORAL at 09:11

## 2019-10-04 RX ADMIN — FUROSEMIDE SCH MG: 20 TABLET ORAL at 09:11

## 2019-10-04 RX ADMIN — WARFARIN SODIUM SCH MG: 5 TABLET ORAL at 19:43

## 2019-10-04 RX ADMIN — GABAPENTIN SCH MG: 100 CAPSULE ORAL at 12:49

## 2019-10-05 RX ADMIN — GABAPENTIN SCH MG: 300 CAPSULE ORAL at 19:39

## 2019-10-05 RX ADMIN — SERTRALINE HYDROCHLORIDE SCH MG: 100 TABLET ORAL at 08:41

## 2019-10-05 RX ADMIN — FUROSEMIDE SCH MG: 20 TABLET ORAL at 08:41

## 2019-10-05 RX ADMIN — METOPROLOL SUCCINATE SCH MG: 25 TABLET, EXTENDED RELEASE ORAL at 08:42

## 2019-10-05 RX ADMIN — ZINC SCH TAB: TAB ORAL at 08:40

## 2019-10-05 RX ADMIN — WARFARIN SODIUM SCH MG: 5 TABLET ORAL at 19:40

## 2019-10-05 RX ADMIN — GABAPENTIN SCH MG: 100 CAPSULE ORAL at 08:41

## 2019-10-05 RX ADMIN — GABAPENTIN SCH MG: 100 CAPSULE ORAL at 12:52

## 2019-10-05 RX ADMIN — ROPINIROLE SCH MG: 0.5 TABLET ORAL at 19:40

## 2019-10-06 RX ADMIN — ZINC SCH TAB: TAB ORAL at 08:36

## 2019-10-06 RX ADMIN — ROPINIROLE SCH MG: 0.5 TABLET ORAL at 20:45

## 2019-10-06 RX ADMIN — METOPROLOL SUCCINATE SCH MG: 25 TABLET, EXTENDED RELEASE ORAL at 08:41

## 2019-10-06 RX ADMIN — SERTRALINE HYDROCHLORIDE SCH MG: 100 TABLET ORAL at 08:40

## 2019-10-06 RX ADMIN — WARFARIN SODIUM SCH MG: 5 TABLET ORAL at 20:45

## 2019-10-06 RX ADMIN — GABAPENTIN SCH MG: 300 CAPSULE ORAL at 20:44

## 2019-10-06 RX ADMIN — FUROSEMIDE SCH MG: 20 TABLET ORAL at 08:40

## 2019-10-06 RX ADMIN — GABAPENTIN SCH MG: 100 CAPSULE ORAL at 08:37

## 2019-10-06 RX ADMIN — GABAPENTIN SCH MG: 100 CAPSULE ORAL at 13:04

## 2019-10-07 RX ADMIN — WARFARIN SODIUM SCH MG: 5 TABLET ORAL at 20:39

## 2019-10-07 RX ADMIN — ROPINIROLE SCH MG: 0.5 TABLET ORAL at 20:39

## 2019-10-07 RX ADMIN — GABAPENTIN SCH MG: 100 CAPSULE ORAL at 07:28

## 2019-10-07 RX ADMIN — FUROSEMIDE SCH MG: 20 TABLET ORAL at 07:27

## 2019-10-07 RX ADMIN — SERTRALINE HYDROCHLORIDE SCH MG: 100 TABLET ORAL at 07:27

## 2019-10-07 RX ADMIN — GABAPENTIN SCH MG: 100 CAPSULE ORAL at 13:19

## 2019-10-07 RX ADMIN — METOPROLOL SUCCINATE SCH MG: 25 TABLET, EXTENDED RELEASE ORAL at 07:29

## 2019-10-07 RX ADMIN — GABAPENTIN SCH MG: 300 CAPSULE ORAL at 20:37

## 2019-10-07 RX ADMIN — ZINC SCH TAB: TAB ORAL at 07:26

## 2019-10-08 RX ADMIN — FUROSEMIDE SCH MG: 20 TABLET ORAL at 08:53

## 2019-10-08 RX ADMIN — GABAPENTIN SCH MG: 100 CAPSULE ORAL at 08:52

## 2019-10-08 RX ADMIN — GABAPENTIN SCH MG: 100 CAPSULE ORAL at 12:32

## 2019-10-08 RX ADMIN — ZINC SCH TAB: TAB ORAL at 08:53

## 2019-10-08 RX ADMIN — WARFARIN SODIUM SCH MG: 5 TABLET ORAL at 19:52

## 2019-10-08 RX ADMIN — ROPINIROLE SCH MG: 0.5 TABLET ORAL at 19:52

## 2019-10-08 RX ADMIN — GABAPENTIN SCH MG: 300 CAPSULE ORAL at 19:54

## 2019-10-08 RX ADMIN — SERTRALINE HYDROCHLORIDE SCH MG: 100 TABLET ORAL at 08:53

## 2019-10-08 RX ADMIN — METOPROLOL SUCCINATE SCH MG: 25 TABLET, EXTENDED RELEASE ORAL at 08:54

## 2019-10-09 RX ADMIN — GABAPENTIN SCH MG: 100 CAPSULE ORAL at 08:38

## 2019-10-09 RX ADMIN — FUROSEMIDE SCH MG: 20 TABLET ORAL at 08:38

## 2019-10-09 RX ADMIN — ROPINIROLE SCH MG: 0.5 TABLET ORAL at 20:17

## 2019-10-09 RX ADMIN — METOPROLOL SUCCINATE SCH MG: 25 TABLET, EXTENDED RELEASE ORAL at 08:39

## 2019-10-09 RX ADMIN — WARFARIN SODIUM SCH MG: 5 TABLET ORAL at 20:18

## 2019-10-09 RX ADMIN — SERTRALINE HYDROCHLORIDE SCH MG: 100 TABLET ORAL at 08:38

## 2019-10-09 RX ADMIN — ZINC SCH TAB: TAB ORAL at 08:37

## 2019-10-09 RX ADMIN — GABAPENTIN SCH MG: 300 CAPSULE ORAL at 20:17

## 2019-10-09 RX ADMIN — GABAPENTIN SCH MG: 100 CAPSULE ORAL at 12:40

## 2019-10-10 RX ADMIN — GABAPENTIN SCH MG: 300 CAPSULE ORAL at 20:05

## 2019-10-10 RX ADMIN — GABAPENTIN SCH MG: 100 CAPSULE ORAL at 10:42

## 2019-10-10 RX ADMIN — GABAPENTIN SCH MG: 100 CAPSULE ORAL at 13:54

## 2019-10-10 RX ADMIN — ROPINIROLE SCH MG: 0.5 TABLET ORAL at 20:06

## 2019-10-10 RX ADMIN — WARFARIN SODIUM SCH MG: 5 TABLET ORAL at 20:06

## 2019-10-10 RX ADMIN — ZINC SCH TAB: TAB ORAL at 10:39

## 2019-10-10 RX ADMIN — SERTRALINE HYDROCHLORIDE SCH MG: 100 TABLET ORAL at 10:40

## 2019-10-10 RX ADMIN — FUROSEMIDE SCH MG: 20 TABLET ORAL at 10:41

## 2019-10-10 RX ADMIN — METOPROLOL SUCCINATE SCH MG: 25 TABLET, EXTENDED RELEASE ORAL at 10:40

## 2019-10-11 RX ADMIN — GABAPENTIN SCH MG: 100 CAPSULE ORAL at 12:32

## 2019-10-11 RX ADMIN — METOPROLOL SUCCINATE SCH MG: 25 TABLET, EXTENDED RELEASE ORAL at 07:47

## 2019-10-11 RX ADMIN — GABAPENTIN SCH MG: 100 CAPSULE ORAL at 07:47

## 2019-10-11 RX ADMIN — ZINC SCH TAB: TAB ORAL at 07:48

## 2019-10-11 RX ADMIN — FUROSEMIDE SCH MG: 20 TABLET ORAL at 07:48

## 2019-10-11 RX ADMIN — WARFARIN SODIUM SCH MG: 5 TABLET ORAL at 19:51

## 2019-10-11 RX ADMIN — ROPINIROLE SCH MG: 0.5 TABLET ORAL at 19:51

## 2019-10-11 RX ADMIN — SERTRALINE HYDROCHLORIDE SCH MG: 100 TABLET ORAL at 07:48

## 2019-10-11 RX ADMIN — GABAPENTIN SCH MG: 300 CAPSULE ORAL at 19:51

## 2019-10-12 RX ADMIN — ZINC SCH TAB: TAB ORAL at 08:54

## 2019-10-12 RX ADMIN — METOPROLOL SUCCINATE SCH MG: 25 TABLET, EXTENDED RELEASE ORAL at 08:54

## 2019-10-12 RX ADMIN — ROPINIROLE SCH MG: 0.5 TABLET ORAL at 19:48

## 2019-10-12 RX ADMIN — SERTRALINE HYDROCHLORIDE SCH MG: 100 TABLET ORAL at 08:54

## 2019-10-12 RX ADMIN — GABAPENTIN SCH MG: 300 CAPSULE ORAL at 19:48

## 2019-10-12 RX ADMIN — WARFARIN SODIUM SCH MG: 5 TABLET ORAL at 19:48

## 2019-10-12 RX ADMIN — FUROSEMIDE SCH MG: 20 TABLET ORAL at 08:54

## 2019-10-12 RX ADMIN — GABAPENTIN SCH MG: 100 CAPSULE ORAL at 13:04

## 2019-10-12 RX ADMIN — GABAPENTIN SCH MG: 100 CAPSULE ORAL at 08:55

## 2019-10-13 RX ADMIN — GABAPENTIN SCH MG: 100 CAPSULE ORAL at 08:30

## 2019-10-13 RX ADMIN — ROPINIROLE SCH MG: 0.5 TABLET ORAL at 20:20

## 2019-10-13 RX ADMIN — ZINC SCH TAB: TAB ORAL at 08:31

## 2019-10-13 RX ADMIN — FUROSEMIDE SCH MG: 20 TABLET ORAL at 08:31

## 2019-10-13 RX ADMIN — SERTRALINE HYDROCHLORIDE SCH MG: 100 TABLET ORAL at 08:30

## 2019-10-13 RX ADMIN — WARFARIN SODIUM SCH MG: 5 TABLET ORAL at 20:20

## 2019-10-13 RX ADMIN — METOPROLOL SUCCINATE SCH MG: 25 TABLET, EXTENDED RELEASE ORAL at 08:34

## 2019-10-13 RX ADMIN — GABAPENTIN SCH MG: 100 CAPSULE ORAL at 12:43

## 2019-10-13 RX ADMIN — GABAPENTIN SCH MG: 300 CAPSULE ORAL at 20:20

## 2019-10-14 RX ADMIN — GABAPENTIN SCH MG: 100 CAPSULE ORAL at 12:29

## 2019-10-14 RX ADMIN — ZINC SCH TAB: TAB ORAL at 07:44

## 2019-10-14 RX ADMIN — SERTRALINE HYDROCHLORIDE SCH MG: 100 TABLET ORAL at 07:45

## 2019-10-14 RX ADMIN — GABAPENTIN SCH MG: 300 CAPSULE ORAL at 20:46

## 2019-10-14 RX ADMIN — ROPINIROLE SCH MG: 0.5 TABLET ORAL at 20:47

## 2019-10-14 RX ADMIN — GABAPENTIN SCH MG: 100 CAPSULE ORAL at 07:45

## 2019-10-14 RX ADMIN — FUROSEMIDE SCH MG: 20 TABLET ORAL at 07:45

## 2019-10-14 RX ADMIN — METOPROLOL SUCCINATE SCH MG: 25 TABLET, EXTENDED RELEASE ORAL at 07:46

## 2019-10-14 RX ADMIN — WARFARIN SODIUM SCH MG: 5 TABLET ORAL at 20:47

## 2019-10-15 RX ADMIN — ZINC SCH TAB: TAB ORAL at 09:14

## 2019-10-15 RX ADMIN — METOPROLOL SUCCINATE SCH MG: 25 TABLET, EXTENDED RELEASE ORAL at 09:13

## 2019-10-15 RX ADMIN — GABAPENTIN SCH MG: 100 CAPSULE ORAL at 09:13

## 2019-10-15 RX ADMIN — ROPINIROLE SCH MG: 0.5 TABLET ORAL at 20:11

## 2019-10-15 RX ADMIN — GABAPENTIN SCH MG: 100 CAPSULE ORAL at 12:43

## 2019-10-15 RX ADMIN — WARFARIN SODIUM SCH MG: 5 TABLET ORAL at 20:10

## 2019-10-15 RX ADMIN — GABAPENTIN SCH MG: 300 CAPSULE ORAL at 20:09

## 2019-10-15 RX ADMIN — SERTRALINE HYDROCHLORIDE SCH MG: 100 TABLET ORAL at 09:14

## 2019-10-15 RX ADMIN — FUROSEMIDE SCH MG: 20 TABLET ORAL at 09:15

## 2019-10-16 RX ADMIN — ROPINIROLE SCH MG: 0.5 TABLET ORAL at 19:45

## 2019-10-16 RX ADMIN — FUROSEMIDE SCH MG: 20 TABLET ORAL at 07:57

## 2019-10-16 RX ADMIN — METOPROLOL SUCCINATE SCH MG: 25 TABLET, EXTENDED RELEASE ORAL at 07:58

## 2019-10-16 RX ADMIN — GABAPENTIN SCH MG: 100 CAPSULE ORAL at 13:00

## 2019-10-16 RX ADMIN — WARFARIN SODIUM SCH MG: 5 TABLET ORAL at 19:45

## 2019-10-16 RX ADMIN — ZINC SCH TAB: TAB ORAL at 07:57

## 2019-10-16 RX ADMIN — SERTRALINE HYDROCHLORIDE SCH MG: 100 TABLET ORAL at 07:57

## 2019-10-16 RX ADMIN — GABAPENTIN SCH MG: 300 CAPSULE ORAL at 19:45

## 2019-10-16 RX ADMIN — GABAPENTIN SCH MG: 100 CAPSULE ORAL at 07:55

## 2019-10-17 RX ADMIN — GABAPENTIN SCH MG: 300 CAPSULE ORAL at 19:48

## 2019-10-17 RX ADMIN — GABAPENTIN SCH MG: 100 CAPSULE ORAL at 07:47

## 2019-10-17 RX ADMIN — WARFARIN SODIUM SCH MG: 5 TABLET ORAL at 19:49

## 2019-10-17 RX ADMIN — FUROSEMIDE SCH MG: 20 TABLET ORAL at 07:49

## 2019-10-17 RX ADMIN — SERTRALINE HYDROCHLORIDE SCH MG: 100 TABLET ORAL at 07:49

## 2019-10-17 RX ADMIN — METOPROLOL SUCCINATE SCH MG: 25 TABLET, EXTENDED RELEASE ORAL at 07:49

## 2019-10-17 RX ADMIN — ZINC SCH TAB: TAB ORAL at 07:48

## 2019-10-17 RX ADMIN — GABAPENTIN SCH MG: 100 CAPSULE ORAL at 13:43

## 2019-10-17 RX ADMIN — ROPINIROLE SCH MG: 0.5 TABLET ORAL at 19:49

## 2019-10-18 RX ADMIN — ZINC SCH TAB: TAB ORAL at 07:34

## 2019-10-18 RX ADMIN — WARFARIN SODIUM SCH MG: 5 TABLET ORAL at 19:59

## 2019-10-18 RX ADMIN — SERTRALINE HYDROCHLORIDE SCH MG: 100 TABLET ORAL at 07:34

## 2019-10-18 RX ADMIN — GABAPENTIN SCH MG: 300 CAPSULE ORAL at 20:01

## 2019-10-18 RX ADMIN — METOPROLOL SUCCINATE SCH MG: 25 TABLET, EXTENDED RELEASE ORAL at 07:34

## 2019-10-18 RX ADMIN — FUROSEMIDE SCH MG: 20 TABLET ORAL at 07:34

## 2019-10-18 RX ADMIN — GABAPENTIN SCH MG: 100 CAPSULE ORAL at 07:35

## 2019-10-18 RX ADMIN — GABAPENTIN SCH MG: 100 CAPSULE ORAL at 14:00

## 2019-10-18 RX ADMIN — ROPINIROLE SCH MG: 0.5 TABLET ORAL at 20:00

## 2019-10-19 RX ADMIN — ROPINIROLE SCH MG: 0.5 TABLET ORAL at 19:32

## 2019-10-19 RX ADMIN — GABAPENTIN SCH MG: 300 CAPSULE ORAL at 19:32

## 2019-10-19 RX ADMIN — SERTRALINE HYDROCHLORIDE SCH MG: 100 TABLET ORAL at 07:44

## 2019-10-19 RX ADMIN — FUROSEMIDE SCH MG: 20 TABLET ORAL at 07:45

## 2019-10-19 RX ADMIN — GABAPENTIN SCH MG: 100 CAPSULE ORAL at 07:46

## 2019-10-19 RX ADMIN — WARFARIN SODIUM SCH MG: 5 TABLET ORAL at 19:32

## 2019-10-19 RX ADMIN — METOPROLOL SUCCINATE SCH MG: 25 TABLET, EXTENDED RELEASE ORAL at 07:44

## 2019-10-19 RX ADMIN — GABAPENTIN SCH MG: 100 CAPSULE ORAL at 12:50

## 2019-10-19 RX ADMIN — ZINC SCH TAB: TAB ORAL at 07:43

## 2019-10-20 RX ADMIN — GABAPENTIN SCH MG: 100 CAPSULE ORAL at 12:39

## 2019-10-20 RX ADMIN — ZINC SCH TAB: TAB ORAL at 08:01

## 2019-10-20 RX ADMIN — WARFARIN SODIUM SCH MG: 5 TABLET ORAL at 20:28

## 2019-10-20 RX ADMIN — METOPROLOL SUCCINATE SCH MG: 25 TABLET, EXTENDED RELEASE ORAL at 08:06

## 2019-10-20 RX ADMIN — ROPINIROLE SCH MG: 0.5 TABLET ORAL at 20:28

## 2019-10-20 RX ADMIN — NITROFURANTOIN MONOHYDRATE AND NITROFURANTOIN MACROCRYSTALLINE SCH MG: 75; 25 CAPSULE ORAL at 10:12

## 2019-10-20 RX ADMIN — GABAPENTIN SCH MG: 300 CAPSULE ORAL at 20:28

## 2019-10-20 RX ADMIN — FUROSEMIDE SCH MG: 20 TABLET ORAL at 08:06

## 2019-10-20 RX ADMIN — SERTRALINE HYDROCHLORIDE SCH MG: 100 TABLET ORAL at 08:05

## 2019-10-20 RX ADMIN — NITROFURANTOIN MONOHYDRATE AND NITROFURANTOIN MACROCRYSTALLINE SCH MG: 75; 25 CAPSULE ORAL at 20:28

## 2019-10-20 RX ADMIN — GABAPENTIN SCH MG: 100 CAPSULE ORAL at 08:02

## 2019-10-21 RX ADMIN — ZINC SCH TAB: TAB ORAL at 07:33

## 2019-10-21 RX ADMIN — NITROFURANTOIN MONOHYDRATE AND NITROFURANTOIN MACROCRYSTALLINE SCH MG: 75; 25 CAPSULE ORAL at 07:33

## 2019-10-21 RX ADMIN — WARFARIN SODIUM SCH MG: 5 TABLET ORAL at 20:12

## 2019-10-21 RX ADMIN — NITROFURANTOIN MONOHYDRATE AND NITROFURANTOIN MACROCRYSTALLINE SCH MG: 75; 25 CAPSULE ORAL at 20:11

## 2019-10-21 RX ADMIN — METOPROLOL SUCCINATE SCH MG: 25 TABLET, EXTENDED RELEASE ORAL at 07:37

## 2019-10-21 RX ADMIN — FUROSEMIDE SCH MG: 20 TABLET ORAL at 07:32

## 2019-10-21 RX ADMIN — SERTRALINE HYDROCHLORIDE SCH MG: 100 TABLET ORAL at 07:32

## 2019-10-21 RX ADMIN — GABAPENTIN SCH MG: 100 CAPSULE ORAL at 12:29

## 2019-10-21 RX ADMIN — GABAPENTIN SCH MG: 300 CAPSULE ORAL at 20:12

## 2019-10-21 RX ADMIN — ROPINIROLE SCH MG: 0.5 TABLET ORAL at 20:12

## 2019-10-21 RX ADMIN — GABAPENTIN SCH MG: 100 CAPSULE ORAL at 07:32

## 2019-10-22 RX ADMIN — NITROFURANTOIN MONOHYDRATE AND NITROFURANTOIN MACROCRYSTALLINE SCH MG: 75; 25 CAPSULE ORAL at 19:56

## 2019-10-22 RX ADMIN — ROPINIROLE SCH MG: 0.5 TABLET ORAL at 19:55

## 2019-10-22 RX ADMIN — GABAPENTIN SCH MG: 300 CAPSULE ORAL at 19:54

## 2019-10-22 RX ADMIN — WARFARIN SODIUM SCH MG: 5 TABLET ORAL at 19:55

## 2019-10-22 RX ADMIN — FUROSEMIDE SCH MG: 20 TABLET ORAL at 07:53

## 2019-10-22 RX ADMIN — GABAPENTIN SCH MG: 100 CAPSULE ORAL at 07:53

## 2019-10-22 RX ADMIN — GABAPENTIN SCH MG: 100 CAPSULE ORAL at 13:20

## 2019-10-22 RX ADMIN — NITROFURANTOIN MONOHYDRATE AND NITROFURANTOIN MACROCRYSTALLINE SCH MG: 75; 25 CAPSULE ORAL at 07:54

## 2019-10-22 RX ADMIN — METOPROLOL SUCCINATE SCH MG: 25 TABLET, EXTENDED RELEASE ORAL at 07:53

## 2019-10-22 RX ADMIN — SERTRALINE HYDROCHLORIDE SCH MG: 100 TABLET ORAL at 07:53

## 2019-10-22 RX ADMIN — ZINC SCH TAB: TAB ORAL at 07:55

## 2019-10-23 RX ADMIN — WARFARIN SODIUM SCH MG: 5 TABLET ORAL at 19:58

## 2019-10-23 RX ADMIN — FUROSEMIDE SCH MG: 20 TABLET ORAL at 07:59

## 2019-10-23 RX ADMIN — GABAPENTIN SCH MG: 300 CAPSULE ORAL at 19:59

## 2019-10-23 RX ADMIN — GABAPENTIN SCH MG: 100 CAPSULE ORAL at 12:33

## 2019-10-23 RX ADMIN — METOPROLOL SUCCINATE SCH MG: 25 TABLET, EXTENDED RELEASE ORAL at 07:58

## 2019-10-23 RX ADMIN — GABAPENTIN SCH MG: 100 CAPSULE ORAL at 07:57

## 2019-10-23 RX ADMIN — ZINC SCH TAB: TAB ORAL at 07:58

## 2019-10-23 RX ADMIN — NITROFURANTOIN MONOHYDRATE AND NITROFURANTOIN MACROCRYSTALLINE SCH MG: 75; 25 CAPSULE ORAL at 19:57

## 2019-10-23 RX ADMIN — SERTRALINE HYDROCHLORIDE SCH MG: 100 TABLET ORAL at 07:59

## 2019-10-23 RX ADMIN — ROPINIROLE SCH MG: 0.5 TABLET ORAL at 19:57

## 2019-10-23 RX ADMIN — NITROFURANTOIN MONOHYDRATE AND NITROFURANTOIN MACROCRYSTALLINE SCH MG: 75; 25 CAPSULE ORAL at 08:00

## 2019-10-24 RX ADMIN — METOPROLOL SUCCINATE SCH MG: 25 TABLET, EXTENDED RELEASE ORAL at 07:27

## 2019-10-24 RX ADMIN — WARFARIN SODIUM SCH MG: 5 TABLET ORAL at 19:14

## 2019-10-24 RX ADMIN — ZINC SCH TAB: TAB ORAL at 07:28

## 2019-10-24 RX ADMIN — FUROSEMIDE SCH MG: 20 TABLET ORAL at 07:27

## 2019-10-24 RX ADMIN — NITROFURANTOIN MONOHYDRATE AND NITROFURANTOIN MACROCRYSTALLINE SCH MG: 75; 25 CAPSULE ORAL at 19:14

## 2019-10-24 RX ADMIN — GABAPENTIN SCH MG: 300 CAPSULE ORAL at 19:13

## 2019-10-24 RX ADMIN — NITROFURANTOIN MONOHYDRATE AND NITROFURANTOIN MACROCRYSTALLINE SCH MG: 75; 25 CAPSULE ORAL at 07:28

## 2019-10-24 RX ADMIN — ROPINIROLE SCH MG: 0.5 TABLET ORAL at 19:13

## 2019-10-24 RX ADMIN — GABAPENTIN SCH MG: 100 CAPSULE ORAL at 07:26

## 2019-10-24 RX ADMIN — GABAPENTIN SCH MG: 100 CAPSULE ORAL at 15:56

## 2019-10-24 RX ADMIN — SERTRALINE HYDROCHLORIDE SCH MG: 100 TABLET ORAL at 07:27

## 2019-10-25 RX ADMIN — GABAPENTIN SCH MG: 100 CAPSULE ORAL at 12:12

## 2019-10-25 RX ADMIN — GABAPENTIN SCH MG: 100 CAPSULE ORAL at 07:47

## 2019-10-25 RX ADMIN — MICONAZOLE NITRATE SCH APPLIC: 2 POWDER TOPICAL at 20:26

## 2019-10-25 RX ADMIN — SERTRALINE HYDROCHLORIDE SCH MG: 100 TABLET ORAL at 07:46

## 2019-10-25 RX ADMIN — NITROFURANTOIN MONOHYDRATE AND NITROFURANTOIN MACROCRYSTALLINE SCH MG: 75; 25 CAPSULE ORAL at 20:24

## 2019-10-25 RX ADMIN — GABAPENTIN SCH MG: 300 CAPSULE ORAL at 20:24

## 2019-10-25 RX ADMIN — ROPINIROLE SCH MG: 0.5 TABLET ORAL at 20:24

## 2019-10-25 RX ADMIN — METOPROLOL SUCCINATE SCH MG: 25 TABLET, EXTENDED RELEASE ORAL at 07:45

## 2019-10-25 RX ADMIN — ZINC SCH TAB: TAB ORAL at 07:48

## 2019-10-25 RX ADMIN — FUROSEMIDE SCH MG: 20 TABLET ORAL at 07:46

## 2019-10-25 RX ADMIN — WARFARIN SODIUM SCH MG: 5 TABLET ORAL at 20:24

## 2019-10-25 RX ADMIN — NITROFURANTOIN MONOHYDRATE AND NITROFURANTOIN MACROCRYSTALLINE SCH MG: 75; 25 CAPSULE ORAL at 07:45

## 2019-10-26 RX ADMIN — MICONAZOLE NITRATE SCH APPLIC: 2 POWDER TOPICAL at 20:15

## 2019-10-26 RX ADMIN — GABAPENTIN SCH MG: 100 CAPSULE ORAL at 07:57

## 2019-10-26 RX ADMIN — NITROFURANTOIN MONOHYDRATE AND NITROFURANTOIN MACROCRYSTALLINE SCH MG: 75; 25 CAPSULE ORAL at 08:00

## 2019-10-26 RX ADMIN — SERTRALINE HYDROCHLORIDE SCH MG: 100 TABLET ORAL at 07:58

## 2019-10-26 RX ADMIN — WARFARIN SODIUM SCH MG: 5 TABLET ORAL at 20:14

## 2019-10-26 RX ADMIN — GABAPENTIN SCH MG: 300 CAPSULE ORAL at 20:10

## 2019-10-26 RX ADMIN — GABAPENTIN SCH MG: 100 CAPSULE ORAL at 12:14

## 2019-10-26 RX ADMIN — FUROSEMIDE SCH MG: 20 TABLET ORAL at 07:58

## 2019-10-26 RX ADMIN — NITROFURANTOIN MONOHYDRATE AND NITROFURANTOIN MACROCRYSTALLINE SCH MG: 75; 25 CAPSULE ORAL at 20:34

## 2019-10-26 RX ADMIN — MICONAZOLE NITRATE SCH APPLIC: 2 POWDER TOPICAL at 07:59

## 2019-10-26 RX ADMIN — METOPROLOL SUCCINATE SCH MG: 25 TABLET, EXTENDED RELEASE ORAL at 07:59

## 2019-10-26 RX ADMIN — ZINC SCH TAB: TAB ORAL at 07:58

## 2019-10-26 RX ADMIN — ROPINIROLE SCH MG: 0.5 TABLET ORAL at 20:14

## 2019-10-27 RX ADMIN — FUROSEMIDE SCH MG: 20 TABLET ORAL at 07:52

## 2019-10-27 RX ADMIN — MICONAZOLE NITRATE SCH APPLIC: 2 POWDER TOPICAL at 07:55

## 2019-10-27 RX ADMIN — WARFARIN SODIUM SCH MG: 5 TABLET ORAL at 20:32

## 2019-10-27 RX ADMIN — ROPINIROLE SCH MG: 0.5 TABLET ORAL at 20:33

## 2019-10-27 RX ADMIN — MICONAZOLE NITRATE SCH APPLIC: 2 POWDER TOPICAL at 20:38

## 2019-10-27 RX ADMIN — METOPROLOL SUCCINATE SCH MG: 25 TABLET, EXTENDED RELEASE ORAL at 07:54

## 2019-10-27 RX ADMIN — GABAPENTIN SCH MG: 100 CAPSULE ORAL at 07:51

## 2019-10-27 RX ADMIN — GABAPENTIN SCH MG: 300 CAPSULE ORAL at 20:33

## 2019-10-27 RX ADMIN — ZINC SCH TAB: TAB ORAL at 07:52

## 2019-10-27 RX ADMIN — SERTRALINE HYDROCHLORIDE SCH MG: 100 TABLET ORAL at 07:52

## 2019-10-27 RX ADMIN — GABAPENTIN SCH MG: 100 CAPSULE ORAL at 12:35

## 2019-10-28 RX ADMIN — WARFARIN SODIUM SCH MG: 5 TABLET ORAL at 20:26

## 2019-10-28 RX ADMIN — GABAPENTIN SCH MG: 100 CAPSULE ORAL at 14:28

## 2019-10-28 RX ADMIN — SERTRALINE HYDROCHLORIDE SCH MG: 100 TABLET ORAL at 08:14

## 2019-10-28 RX ADMIN — MICONAZOLE NITRATE SCH APPLIC: 2 POWDER TOPICAL at 20:27

## 2019-10-28 RX ADMIN — FUROSEMIDE SCH MG: 20 TABLET ORAL at 08:15

## 2019-10-28 RX ADMIN — METOPROLOL SUCCINATE SCH MG: 25 TABLET, EXTENDED RELEASE ORAL at 08:14

## 2019-10-28 RX ADMIN — ZINC SCH TAB: TAB ORAL at 08:14

## 2019-10-28 RX ADMIN — GABAPENTIN SCH MG: 100 CAPSULE ORAL at 08:14

## 2019-10-28 RX ADMIN — ROPINIROLE SCH MG: 0.5 TABLET ORAL at 20:26

## 2019-10-28 RX ADMIN — MICONAZOLE NITRATE SCH APPLIC: 2 POWDER TOPICAL at 08:15

## 2019-10-28 RX ADMIN — GABAPENTIN SCH MG: 300 CAPSULE ORAL at 20:25

## 2019-10-29 RX ADMIN — WARFARIN SODIUM SCH MG: 5 TABLET ORAL at 20:12

## 2019-10-29 RX ADMIN — ROPINIROLE SCH MG: 0.5 TABLET ORAL at 20:12

## 2019-10-29 RX ADMIN — ZINC SCH TAB: TAB ORAL at 08:25

## 2019-10-29 RX ADMIN — FUROSEMIDE SCH MG: 20 TABLET ORAL at 08:25

## 2019-10-29 RX ADMIN — MICONAZOLE NITRATE SCH APPLIC: 2 POWDER TOPICAL at 08:23

## 2019-10-29 RX ADMIN — GABAPENTIN SCH MG: 300 CAPSULE ORAL at 20:11

## 2019-10-29 RX ADMIN — GABAPENTIN SCH MG: 100 CAPSULE ORAL at 12:46

## 2019-10-29 RX ADMIN — SERTRALINE HYDROCHLORIDE SCH MG: 100 TABLET ORAL at 08:24

## 2019-10-29 RX ADMIN — MICONAZOLE NITRATE SCH APPLIC: 2 POWDER TOPICAL at 20:13

## 2019-10-29 RX ADMIN — METOPROLOL SUCCINATE SCH MG: 25 TABLET, EXTENDED RELEASE ORAL at 08:24

## 2019-10-29 RX ADMIN — GABAPENTIN SCH MG: 100 CAPSULE ORAL at 08:25

## 2019-10-29 NOTE — PCM.PN
- General Info


Date of Service: 10/29/19


Subjective Update: 








Subjective: 60 day recheck.  No complaints.  No memory or cardiopulmonary 

issues.  No bowel or bladder issues.  Vital signs are stable.  INR bit labile 

but okay.  She has had a couple dermatology issues, she had an SK removed from 

her left neck, benign biopsy.  She is using some antifungal powder for 

intertrigo and this is improving.





Objective: Vital signs are normal.  Alert smiling and oriented well groomed.  

Recently her hair done.  Heart and lungs clear to auscultation.  Abdomen soft 

nontender.  External is warm well perfused without edema.





Assment and plan:


No changes in chronic health issues





Recent derm issues improved per above





She has no other concerns or questions at this time











- Patient Data


Vitals - Most Recent: 


 Last Vital Signs











Temp  36.6 C   10/25/19 08:00


 


Pulse  83   10/29/19 08:24


 


Resp  20   10/25/19 08:00


 


BP  143/73 H  10/29/19 08:24


 


Pulse Ox  94 L  10/25/19 08:00











Weight - Most Recent: 84.005 kg


I&O - Last 24 Hours: 


 Intake & Output











 10/28/19 10/29/19 10/29/19





 22:59 06:59 14:59


 


Intake Total   360


 


Balance   360











Med Orders - Current: 


 Current Medications





Acetaminophen (Tylenol)  650 mg PO 0200,0800,1300,2000 [facility]


   Last Admin: 10/29/19 12:47 Dose:  650 mg


Calcium Carbonate/Glycine (Tums Extra Strength)  750 mg PO TID PRN


   PRN Reason: Dyspepsia


   Last Admin: 09/22/19 10:06 Dose:  750 mg


Docusate Sodium (Colace)  100 mg PO DAILY PRN


   PRN Reason: Constipation


Famotidine (Pepcid)  20 mg PO DAILY [facility]


   Last Admin: 10/29/19 08:25 Dose:  20 mg


Furosemide (Lasix)  20 mg PO DAILY [facility]


   Last Admin: 10/29/19 08:25 Dose:  20 mg


Gabapentin (Neurontin)  100 mg PO BID@0800,1300 [facility]


   Last Admin: 10/29/19 12:46 Dose:  100 mg


Gabapentin (Neurontin)  300 mg PO BEDTIME [facility]


   Last Admin: 10/28/19 20:25 Dose:  300 mg


Melatonin (Melatonin)  6 mg PO BEDTIME [facility]


   Last Admin: 10/28/19 20:26 Dose:  6 mg


Metoprolol Succinate (Toprol Xl)  25 mg PO DAILY [facility]


   Last Admin: 10/29/19 08:24 Dose:  25 mg


Miconazole (Desenex 2%)  1 gm TOP BID [facility]


   Last Admin: 10/29/19 08:23 Dose:  1 applic


Nitroglycerin (Nitrostat)  0.4 mg SL Q5M PRN


   PRN Reason: Chest Pain


   Last Admin: 08/17/19 18:18 Dose:  0.4 mg


Pharmacy Consult (Consult To Pharmacy)  1 each .XX ASDIRECTED [facility]


Polyethylene Glycol (Miralax)  17 gm PO DAILY PRN


   PRN Reason: Constipation


Ropinirole HCl (Requip)  0.5 mg PO BEDTIME [facility]


   Last Admin: 10/28/19 20:26 Dose:  0.5 mg


Senna/Docusate Sodium (Senna Plus)  1 tab PO DAILY PRN


   PRN Reason: Constipation


Sertraline HCl (Zoloft)  100 mg PO DAILY [facility]


   Last Admin: 10/29/19 08:24 Dose:  100 mg


Vitamin B Complex/Vit C/Vit E/Zinc (Stress Formula With Zinc)  1 tab PO DAILY 

[facility]


   Last Admin: 10/29/19 08:25 Dose:  1 tab


Warfarin Sodium (Coumadin)  2.5 mg PO MoTh@2000 [facility]


   Last Admin: 10/28/19 20:26 Dose:  2.5 mg


Warfarin Sodium (Coumadin)  5 mg PO SuTuWeFrSa@2000 [facility]


   Last Admin: 10/27/19 20:32 Dose:  5 mg





Discontinued Medications





Furosemide (Lasix)  20 mg PO DAILY [facility]


   Last Admin: 09/24/19 08:18 Dose:  20 mg


Gabapentin (Neurontin)  100 mg PO BID@0800,1300 [facility]


   Last Admin: 09/24/19 13:48 Dose:  100 mg


Influenza Virus Vaccine (Fluzone High-Dose 2019-20 Syringe)  180 mcg IM .ONCE 

ONE


   Stop: 10/01/19 14:01


   Last Admin: 10/01/19 13:57 Dose:  180 mcg


Nitrofurantoin Macrocrystals (Macrobid)  100 mg PO BID [facility]


   Stop: 10/26/19 20:00


   Last Admin: 10/22/19 07:54 Dose:  100 mg


Nitrofurantoin Macrocrystals (Macrobid)  100 mg PO BID [facility]


   Stop: 10/26/19 20:01


   Last Admin: 10/26/19 20:34 Dose:  100 mg


Own Supply: Warfarin 2.5mg (1/2 Of 5mg Tab)  0 mg PO MoTh@2000 [facility]


   Last Admin: 09/23/19 19:37 Dose:  2.5 mg


Own Supply: Warfarin (. 5mg)  0 mg PO SuTuWeFrSa@2000 [facility]


   Last Admin: 09/22/19 19:43 Dose:  5 mg


Own Supply: (Gabapentin. 300mg)  0 mg PO BEDTIME [facility]


   Last Admin: 09/23/19 19:36 Dose:  300 mg


Own Supply: (Metoprolol.Er 25mg)  0 mg PO DAILY [facility]


   Last Admin: 09/24/19 08:17 Dose:  25 mg


Polyethylene Glycol (Miralax)  17 gm PO DAILY [facility]


   Last Admin: 08/07/19 08:17 Dose:  Not Given


Ropinirole HCl (Requip)  0.5 mg PO BEDTIME [facility]


   Last Admin: 09/23/19 19:37 Dose:  0.5 mg











- Problem List Review


Problem List Initiated/Reviewed/Updated: Yes

## 2019-10-30 RX ADMIN — GABAPENTIN SCH MG: 300 CAPSULE ORAL at 19:54

## 2019-10-30 RX ADMIN — WARFARIN SODIUM SCH MG: 5 TABLET ORAL at 19:55

## 2019-10-30 RX ADMIN — ZINC SCH TAB: TAB ORAL at 07:48

## 2019-10-30 RX ADMIN — MICONAZOLE NITRATE SCH APPLIC: 2 POWDER TOPICAL at 19:59

## 2019-10-30 RX ADMIN — GABAPENTIN SCH MG: 100 CAPSULE ORAL at 13:32

## 2019-10-30 RX ADMIN — MICONAZOLE NITRATE SCH APPLIC: 2 POWDER TOPICAL at 07:50

## 2019-10-30 RX ADMIN — METOPROLOL SUCCINATE SCH MG: 25 TABLET, EXTENDED RELEASE ORAL at 07:49

## 2019-10-30 RX ADMIN — ROPINIROLE SCH MG: 0.5 TABLET ORAL at 19:55

## 2019-10-30 RX ADMIN — SERTRALINE HYDROCHLORIDE SCH MG: 100 TABLET ORAL at 07:49

## 2019-10-30 RX ADMIN — FUROSEMIDE SCH MG: 20 TABLET ORAL at 07:49

## 2019-10-30 RX ADMIN — GABAPENTIN SCH MG: 100 CAPSULE ORAL at 07:49

## 2019-10-31 RX ADMIN — GABAPENTIN SCH MG: 300 CAPSULE ORAL at 20:15

## 2019-10-31 RX ADMIN — GABAPENTIN SCH MG: 100 CAPSULE ORAL at 07:39

## 2019-10-31 RX ADMIN — ROPINIROLE SCH MG: 0.5 TABLET ORAL at 20:16

## 2019-10-31 RX ADMIN — WARFARIN SODIUM SCH MG: 5 TABLET ORAL at 20:15

## 2019-10-31 RX ADMIN — GABAPENTIN SCH MG: 100 CAPSULE ORAL at 13:12

## 2019-10-31 RX ADMIN — ZINC SCH TAB: TAB ORAL at 07:38

## 2019-10-31 RX ADMIN — MICONAZOLE NITRATE SCH APPLIC: 2 POWDER TOPICAL at 07:41

## 2019-10-31 RX ADMIN — FUROSEMIDE SCH MG: 20 TABLET ORAL at 07:39

## 2019-10-31 RX ADMIN — MICONAZOLE NITRATE SCH APPLIC: 2 POWDER TOPICAL at 20:17

## 2019-10-31 RX ADMIN — METOPROLOL SUCCINATE SCH MG: 25 TABLET, EXTENDED RELEASE ORAL at 07:39

## 2019-10-31 RX ADMIN — SERTRALINE HYDROCHLORIDE SCH MG: 100 TABLET ORAL at 07:40

## 2019-11-01 RX ADMIN — ROPINIROLE SCH MG: 0.5 TABLET ORAL at 20:03

## 2019-11-01 RX ADMIN — MICONAZOLE NITRATE SCH APPLIC: 2 POWDER TOPICAL at 07:56

## 2019-11-01 RX ADMIN — SERTRALINE HYDROCHLORIDE SCH MG: 100 TABLET ORAL at 07:56

## 2019-11-01 RX ADMIN — GABAPENTIN SCH MG: 300 CAPSULE ORAL at 20:02

## 2019-11-01 RX ADMIN — ZINC SCH TAB: TAB ORAL at 07:56

## 2019-11-01 RX ADMIN — WARFARIN SODIUM SCH MG: 5 TABLET ORAL at 20:03

## 2019-11-01 RX ADMIN — MICONAZOLE NITRATE SCH APPLIC: 2 POWDER TOPICAL at 20:04

## 2019-11-01 RX ADMIN — GABAPENTIN SCH MG: 100 CAPSULE ORAL at 07:55

## 2019-11-01 RX ADMIN — METOPROLOL SUCCINATE SCH MG: 25 TABLET, EXTENDED RELEASE ORAL at 07:56

## 2019-11-01 RX ADMIN — GABAPENTIN SCH MG: 100 CAPSULE ORAL at 13:52

## 2019-11-01 RX ADMIN — FUROSEMIDE SCH MG: 20 TABLET ORAL at 07:55

## 2019-11-02 RX ADMIN — FUROSEMIDE SCH MG: 20 TABLET ORAL at 07:38

## 2019-11-02 RX ADMIN — SERTRALINE HYDROCHLORIDE SCH MG: 100 TABLET ORAL at 07:38

## 2019-11-02 RX ADMIN — ZINC SCH TAB: TAB ORAL at 07:38

## 2019-11-02 RX ADMIN — MICONAZOLE NITRATE SCH APPLIC: 2 POWDER TOPICAL at 20:05

## 2019-11-02 RX ADMIN — ROPINIROLE SCH MG: 0.5 TABLET ORAL at 20:03

## 2019-11-02 RX ADMIN — GABAPENTIN SCH MG: 100 CAPSULE ORAL at 07:37

## 2019-11-02 RX ADMIN — GABAPENTIN SCH MG: 300 CAPSULE ORAL at 20:02

## 2019-11-02 RX ADMIN — WARFARIN SODIUM SCH MG: 5 TABLET ORAL at 20:03

## 2019-11-02 RX ADMIN — GABAPENTIN SCH MG: 100 CAPSULE ORAL at 14:02

## 2019-11-02 RX ADMIN — METOPROLOL SUCCINATE SCH MG: 25 TABLET, EXTENDED RELEASE ORAL at 07:38

## 2019-11-02 RX ADMIN — MICONAZOLE NITRATE SCH APPLIC: 2 POWDER TOPICAL at 07:38

## 2019-11-03 RX ADMIN — FUROSEMIDE SCH MG: 20 TABLET ORAL at 08:58

## 2019-11-03 RX ADMIN — GABAPENTIN SCH MG: 300 CAPSULE ORAL at 21:00

## 2019-11-03 RX ADMIN — METOPROLOL SUCCINATE SCH MG: 25 TABLET, EXTENDED RELEASE ORAL at 08:57

## 2019-11-03 RX ADMIN — GABAPENTIN SCH MG: 100 CAPSULE ORAL at 13:33

## 2019-11-03 RX ADMIN — WARFARIN SODIUM SCH MG: 5 TABLET ORAL at 21:00

## 2019-11-03 RX ADMIN — MICONAZOLE NITRATE SCH APPLIC: 2 POWDER TOPICAL at 08:58

## 2019-11-03 RX ADMIN — ROPINIROLE SCH MG: 0.5 TABLET ORAL at 21:00

## 2019-11-03 RX ADMIN — MICONAZOLE NITRATE SCH APPLIC: 2 POWDER TOPICAL at 21:00

## 2019-11-03 RX ADMIN — SERTRALINE HYDROCHLORIDE SCH MG: 100 TABLET ORAL at 08:58

## 2019-11-03 RX ADMIN — GABAPENTIN SCH MG: 100 CAPSULE ORAL at 08:57

## 2019-11-03 RX ADMIN — ZINC SCH TAB: TAB ORAL at 08:58

## 2019-11-04 RX ADMIN — MICONAZOLE NITRATE SCH APPLIC: 2 POWDER TOPICAL at 07:58

## 2019-11-04 RX ADMIN — METOPROLOL SUCCINATE SCH MG: 25 TABLET, EXTENDED RELEASE ORAL at 07:55

## 2019-11-04 RX ADMIN — GABAPENTIN SCH MG: 100 CAPSULE ORAL at 07:57

## 2019-11-04 RX ADMIN — FUROSEMIDE SCH MG: 20 TABLET ORAL at 07:55

## 2019-11-04 RX ADMIN — SERTRALINE HYDROCHLORIDE SCH MG: 100 TABLET ORAL at 07:56

## 2019-11-04 RX ADMIN — GABAPENTIN SCH MG: 100 CAPSULE ORAL at 12:07

## 2019-11-04 RX ADMIN — ZINC SCH TAB: TAB ORAL at 07:57

## 2019-11-04 RX ADMIN — MICONAZOLE NITRATE SCH APPLIC: 2 POWDER TOPICAL at 20:06

## 2019-11-04 RX ADMIN — ROPINIROLE SCH MG: 0.5 TABLET ORAL at 20:06

## 2019-11-04 RX ADMIN — WARFARIN SODIUM SCH MG: 5 TABLET ORAL at 20:05

## 2019-11-04 RX ADMIN — GABAPENTIN SCH MG: 300 CAPSULE ORAL at 20:05

## 2019-11-05 RX ADMIN — ZINC SCH TAB: TAB ORAL at 07:49

## 2019-11-05 RX ADMIN — WARFARIN SODIUM SCH MG: 5 TABLET ORAL at 19:39

## 2019-11-05 RX ADMIN — MICONAZOLE NITRATE SCH APPLIC: 2 POWDER TOPICAL at 19:40

## 2019-11-05 RX ADMIN — GABAPENTIN SCH MG: 300 CAPSULE ORAL at 19:37

## 2019-11-05 RX ADMIN — SERTRALINE HYDROCHLORIDE SCH MG: 100 TABLET ORAL at 07:50

## 2019-11-05 RX ADMIN — GABAPENTIN SCH MG: 100 CAPSULE ORAL at 13:26

## 2019-11-05 RX ADMIN — ROPINIROLE SCH MG: 0.5 TABLET ORAL at 19:38

## 2019-11-05 RX ADMIN — METOPROLOL SUCCINATE SCH MG: 25 TABLET, EXTENDED RELEASE ORAL at 07:50

## 2019-11-05 RX ADMIN — FUROSEMIDE SCH MG: 20 TABLET ORAL at 07:50

## 2019-11-05 RX ADMIN — MICONAZOLE NITRATE SCH APPLIC: 2 POWDER TOPICAL at 07:50

## 2019-11-05 RX ADMIN — GABAPENTIN SCH MG: 100 CAPSULE ORAL at 07:50

## 2019-11-06 RX ADMIN — FUROSEMIDE SCH MG: 20 TABLET ORAL at 07:46

## 2019-11-06 RX ADMIN — MICONAZOLE NITRATE SCH APPLIC: 2 POWDER TOPICAL at 20:18

## 2019-11-06 RX ADMIN — METOPROLOL SUCCINATE SCH MG: 25 TABLET, EXTENDED RELEASE ORAL at 07:47

## 2019-11-06 RX ADMIN — GABAPENTIN SCH MG: 300 CAPSULE ORAL at 20:18

## 2019-11-06 RX ADMIN — MICONAZOLE NITRATE SCH APPLIC: 2 POWDER TOPICAL at 07:47

## 2019-11-06 RX ADMIN — ZINC SCH TAB: TAB ORAL at 07:48

## 2019-11-06 RX ADMIN — ROPINIROLE SCH MG: 0.5 TABLET ORAL at 20:19

## 2019-11-06 RX ADMIN — GABAPENTIN SCH MG: 100 CAPSULE ORAL at 12:08

## 2019-11-06 RX ADMIN — SERTRALINE HYDROCHLORIDE SCH MG: 100 TABLET ORAL at 07:47

## 2019-11-06 RX ADMIN — GABAPENTIN SCH MG: 100 CAPSULE ORAL at 07:46

## 2019-11-06 RX ADMIN — WARFARIN SODIUM SCH MG: 5 TABLET ORAL at 20:19

## 2019-11-07 RX ADMIN — ZINC SCH TAB: TAB ORAL at 08:31

## 2019-11-07 RX ADMIN — GABAPENTIN SCH MG: 100 CAPSULE ORAL at 12:15

## 2019-11-07 RX ADMIN — GABAPENTIN SCH MG: 100 CAPSULE ORAL at 08:30

## 2019-11-07 RX ADMIN — FUROSEMIDE SCH MG: 20 TABLET ORAL at 08:31

## 2019-11-07 RX ADMIN — WARFARIN SODIUM SCH MG: 5 TABLET ORAL at 20:27

## 2019-11-07 RX ADMIN — MICONAZOLE NITRATE SCH: 2 POWDER TOPICAL at 20:28

## 2019-11-07 RX ADMIN — METOPROLOL SUCCINATE SCH MG: 25 TABLET, EXTENDED RELEASE ORAL at 08:32

## 2019-11-07 RX ADMIN — MICONAZOLE NITRATE SCH: 2 POWDER TOPICAL at 08:33

## 2019-11-07 RX ADMIN — SERTRALINE HYDROCHLORIDE SCH MG: 100 TABLET ORAL at 08:31

## 2019-11-07 RX ADMIN — ROPINIROLE SCH MG: 0.5 TABLET ORAL at 20:27

## 2019-11-07 RX ADMIN — GABAPENTIN SCH MG: 300 CAPSULE ORAL at 20:26

## 2019-11-08 RX ADMIN — MICONAZOLE NITRATE SCH: 2 POWDER TOPICAL at 20:05

## 2019-11-08 RX ADMIN — ZINC SCH TAB: TAB ORAL at 08:51

## 2019-11-08 RX ADMIN — WARFARIN SODIUM SCH MG: 5 TABLET ORAL at 20:05

## 2019-11-08 RX ADMIN — SERTRALINE HYDROCHLORIDE SCH MG: 100 TABLET ORAL at 08:52

## 2019-11-08 RX ADMIN — MICONAZOLE NITRATE SCH: 2 POWDER TOPICAL at 08:52

## 2019-11-08 RX ADMIN — GABAPENTIN SCH MG: 300 CAPSULE ORAL at 20:06

## 2019-11-08 RX ADMIN — GABAPENTIN SCH MG: 100 CAPSULE ORAL at 12:40

## 2019-11-08 RX ADMIN — GABAPENTIN SCH MG: 100 CAPSULE ORAL at 08:50

## 2019-11-08 RX ADMIN — METOPROLOL SUCCINATE SCH MG: 25 TABLET, EXTENDED RELEASE ORAL at 08:52

## 2019-11-08 RX ADMIN — FUROSEMIDE SCH MG: 20 TABLET ORAL at 08:51

## 2019-11-08 RX ADMIN — ROPINIROLE SCH MG: 0.5 TABLET ORAL at 20:06

## 2019-11-09 RX ADMIN — MICONAZOLE NITRATE SCH: 2 POWDER TOPICAL at 20:23

## 2019-11-09 RX ADMIN — MICONAZOLE NITRATE SCH: 2 POWDER TOPICAL at 08:04

## 2019-11-09 RX ADMIN — GABAPENTIN SCH MG: 100 CAPSULE ORAL at 08:07

## 2019-11-09 RX ADMIN — GABAPENTIN SCH MG: 300 CAPSULE ORAL at 20:23

## 2019-11-09 RX ADMIN — GABAPENTIN SCH MG: 100 CAPSULE ORAL at 12:30

## 2019-11-09 RX ADMIN — ROPINIROLE SCH MG: 0.5 TABLET ORAL at 20:23

## 2019-11-09 RX ADMIN — ZINC SCH TAB: TAB ORAL at 08:06

## 2019-11-09 RX ADMIN — WARFARIN SODIUM SCH MG: 5 TABLET ORAL at 20:23

## 2019-11-09 RX ADMIN — SERTRALINE HYDROCHLORIDE SCH MG: 100 TABLET ORAL at 08:05

## 2019-11-09 RX ADMIN — METOPROLOL SUCCINATE SCH MG: 25 TABLET, EXTENDED RELEASE ORAL at 08:05

## 2019-11-09 RX ADMIN — FUROSEMIDE SCH MG: 20 TABLET ORAL at 08:05

## 2019-11-10 RX ADMIN — FUROSEMIDE SCH MG: 20 TABLET ORAL at 07:32

## 2019-11-10 RX ADMIN — ZINC SCH TAB: TAB ORAL at 07:31

## 2019-11-10 RX ADMIN — SERTRALINE HYDROCHLORIDE SCH MG: 100 TABLET ORAL at 07:33

## 2019-11-10 RX ADMIN — GABAPENTIN SCH MG: 100 CAPSULE ORAL at 07:33

## 2019-11-10 RX ADMIN — METOPROLOL SUCCINATE SCH MG: 25 TABLET, EXTENDED RELEASE ORAL at 07:33

## 2019-11-10 RX ADMIN — GABAPENTIN SCH MG: 300 CAPSULE ORAL at 20:26

## 2019-11-10 RX ADMIN — GABAPENTIN SCH MG: 100 CAPSULE ORAL at 12:25

## 2019-11-10 RX ADMIN — MICONAZOLE NITRATE SCH APPLIC: 2 POWDER TOPICAL at 07:34

## 2019-11-10 RX ADMIN — ROPINIROLE SCH MG: 0.5 TABLET ORAL at 20:25

## 2019-11-10 RX ADMIN — WARFARIN SODIUM SCH MG: 5 TABLET ORAL at 20:25

## 2019-11-10 RX ADMIN — MICONAZOLE NITRATE SCH: 2 POWDER TOPICAL at 20:26

## 2019-11-11 RX ADMIN — MICONAZOLE NITRATE SCH: 2 POWDER TOPICAL at 20:49

## 2019-11-11 RX ADMIN — GABAPENTIN SCH MG: 100 CAPSULE ORAL at 12:23

## 2019-11-11 RX ADMIN — ROPINIROLE SCH MG: 0.5 TABLET ORAL at 20:45

## 2019-11-11 RX ADMIN — GABAPENTIN SCH MG: 100 CAPSULE ORAL at 07:30

## 2019-11-11 RX ADMIN — SERTRALINE HYDROCHLORIDE SCH MG: 100 TABLET ORAL at 07:31

## 2019-11-11 RX ADMIN — FUROSEMIDE SCH MG: 20 TABLET ORAL at 07:31

## 2019-11-11 RX ADMIN — ZINC SCH TAB: TAB ORAL at 07:30

## 2019-11-11 RX ADMIN — MICONAZOLE NITRATE SCH APPLIC: 2 POWDER TOPICAL at 07:31

## 2019-11-11 RX ADMIN — WARFARIN SODIUM SCH MG: 5 TABLET ORAL at 20:45

## 2019-11-11 RX ADMIN — GABAPENTIN SCH MG: 300 CAPSULE ORAL at 20:44

## 2019-11-11 RX ADMIN — METOPROLOL SUCCINATE SCH MG: 25 TABLET, EXTENDED RELEASE ORAL at 07:30

## 2019-11-11 RX ADMIN — NITROGLYCERIN PRN MG: 0.4 TABLET SUBLINGUAL at 18:01

## 2019-11-12 RX ADMIN — FUROSEMIDE SCH MG: 20 TABLET ORAL at 07:50

## 2019-11-12 RX ADMIN — GABAPENTIN SCH MG: 100 CAPSULE ORAL at 07:49

## 2019-11-12 RX ADMIN — ZINC SCH TAB: TAB ORAL at 07:50

## 2019-11-12 RX ADMIN — WARFARIN SODIUM SCH MG: 5 TABLET ORAL at 19:48

## 2019-11-12 RX ADMIN — GABAPENTIN SCH MG: 300 CAPSULE ORAL at 19:48

## 2019-11-12 RX ADMIN — METOPROLOL SUCCINATE SCH MG: 25 TABLET, EXTENDED RELEASE ORAL at 07:49

## 2019-11-12 RX ADMIN — ROPINIROLE SCH MG: 0.5 TABLET ORAL at 19:48

## 2019-11-12 RX ADMIN — SERTRALINE HYDROCHLORIDE SCH MG: 100 TABLET ORAL at 07:50

## 2019-11-12 RX ADMIN — MICONAZOLE NITRATE SCH: 2 POWDER TOPICAL at 19:50

## 2019-11-12 RX ADMIN — MICONAZOLE NITRATE SCH: 2 POWDER TOPICAL at 07:49

## 2019-11-12 RX ADMIN — GABAPENTIN SCH MG: 100 CAPSULE ORAL at 12:15

## 2019-11-13 RX ADMIN — METOPROLOL SUCCINATE SCH MG: 25 TABLET, EXTENDED RELEASE ORAL at 09:20

## 2019-11-13 RX ADMIN — GABAPENTIN SCH MG: 100 CAPSULE ORAL at 09:18

## 2019-11-13 RX ADMIN — ROPINIROLE SCH MG: 0.5 TABLET ORAL at 20:17

## 2019-11-13 RX ADMIN — GABAPENTIN SCH MG: 300 CAPSULE ORAL at 20:17

## 2019-11-13 RX ADMIN — GABAPENTIN SCH MG: 100 CAPSULE ORAL at 12:00

## 2019-11-13 RX ADMIN — ZINC SCH TAB: TAB ORAL at 09:19

## 2019-11-13 RX ADMIN — MICONAZOLE NITRATE SCH: 2 POWDER TOPICAL at 09:20

## 2019-11-13 RX ADMIN — WARFARIN SODIUM SCH MG: 5 TABLET ORAL at 20:17

## 2019-11-13 RX ADMIN — FUROSEMIDE SCH MG: 20 TABLET ORAL at 09:19

## 2019-11-13 RX ADMIN — SERTRALINE HYDROCHLORIDE SCH MG: 100 TABLET ORAL at 09:19

## 2019-11-14 RX ADMIN — SERTRALINE HYDROCHLORIDE SCH MG: 100 TABLET ORAL at 08:21

## 2019-11-14 RX ADMIN — FUROSEMIDE SCH MG: 20 TABLET ORAL at 08:20

## 2019-11-14 RX ADMIN — METOPROLOL SUCCINATE SCH MG: 25 TABLET, EXTENDED RELEASE ORAL at 08:21

## 2019-11-14 RX ADMIN — GABAPENTIN SCH MG: 300 CAPSULE ORAL at 20:50

## 2019-11-14 RX ADMIN — GABAPENTIN SCH MG: 100 CAPSULE ORAL at 08:19

## 2019-11-14 RX ADMIN — WARFARIN SODIUM SCH MG: 5 TABLET ORAL at 20:50

## 2019-11-14 RX ADMIN — ZINC SCH TAB: TAB ORAL at 08:20

## 2019-11-14 RX ADMIN — ROPINIROLE SCH MG: 0.5 TABLET ORAL at 20:50

## 2019-11-14 RX ADMIN — GABAPENTIN SCH MG: 100 CAPSULE ORAL at 13:52

## 2019-11-15 RX ADMIN — SERTRALINE HYDROCHLORIDE SCH MG: 100 TABLET ORAL at 08:00

## 2019-11-15 RX ADMIN — GABAPENTIN SCH MG: 100 CAPSULE ORAL at 08:00

## 2019-11-15 RX ADMIN — ROPINIROLE SCH MG: 0.5 TABLET ORAL at 20:41

## 2019-11-15 RX ADMIN — MICONAZOLE NITRATE PRN APPLIC: 2 POWDER TOPICAL at 20:42

## 2019-11-15 RX ADMIN — METOPROLOL SUCCINATE SCH MG: 25 TABLET, EXTENDED RELEASE ORAL at 07:59

## 2019-11-15 RX ADMIN — MICONAZOLE NITRATE PRN APPLIC: 2 POWDER TOPICAL at 08:00

## 2019-11-15 RX ADMIN — WARFARIN SODIUM SCH MG: 5 TABLET ORAL at 20:41

## 2019-11-15 RX ADMIN — ZINC SCH TAB: TAB ORAL at 07:59

## 2019-11-15 RX ADMIN — GABAPENTIN SCH MG: 100 CAPSULE ORAL at 12:25

## 2019-11-15 RX ADMIN — FUROSEMIDE SCH MG: 20 TABLET ORAL at 08:00

## 2019-11-15 RX ADMIN — GABAPENTIN SCH MG: 300 CAPSULE ORAL at 20:40

## 2019-11-16 RX ADMIN — GABAPENTIN SCH MG: 300 CAPSULE ORAL at 20:00

## 2019-11-16 RX ADMIN — SERTRALINE HYDROCHLORIDE SCH MG: 100 TABLET ORAL at 08:42

## 2019-11-16 RX ADMIN — MICONAZOLE NITRATE PRN APPLIC: 2 POWDER TOPICAL at 08:43

## 2019-11-16 RX ADMIN — GABAPENTIN SCH MG: 100 CAPSULE ORAL at 12:21

## 2019-11-16 RX ADMIN — WARFARIN SODIUM SCH MG: 5 TABLET ORAL at 20:02

## 2019-11-16 RX ADMIN — ROPINIROLE SCH MG: 0.5 TABLET ORAL at 20:01

## 2019-11-16 RX ADMIN — GABAPENTIN SCH MG: 100 CAPSULE ORAL at 08:41

## 2019-11-16 RX ADMIN — ZINC SCH TAB: TAB ORAL at 08:41

## 2019-11-16 RX ADMIN — FUROSEMIDE SCH MG: 20 TABLET ORAL at 08:43

## 2019-11-16 RX ADMIN — METOPROLOL SUCCINATE SCH MG: 25 TABLET, EXTENDED RELEASE ORAL at 08:42

## 2019-11-17 RX ADMIN — GABAPENTIN SCH MG: 300 CAPSULE ORAL at 19:49

## 2019-11-17 RX ADMIN — METOPROLOL SUCCINATE SCH MG: 25 TABLET, EXTENDED RELEASE ORAL at 07:52

## 2019-11-17 RX ADMIN — SERTRALINE HYDROCHLORIDE SCH MG: 100 TABLET ORAL at 07:51

## 2019-11-17 RX ADMIN — ZINC SCH TAB: TAB ORAL at 07:48

## 2019-11-17 RX ADMIN — ROPINIROLE SCH MG: 0.5 TABLET ORAL at 19:50

## 2019-11-17 RX ADMIN — GABAPENTIN SCH MG: 100 CAPSULE ORAL at 12:30

## 2019-11-17 RX ADMIN — WARFARIN SODIUM SCH MG: 5 TABLET ORAL at 19:49

## 2019-11-17 RX ADMIN — FUROSEMIDE SCH MG: 20 TABLET ORAL at 07:51

## 2019-11-17 RX ADMIN — GABAPENTIN SCH MG: 100 CAPSULE ORAL at 07:49

## 2019-11-18 RX ADMIN — SERTRALINE HYDROCHLORIDE SCH MG: 100 TABLET ORAL at 08:33

## 2019-11-18 RX ADMIN — WARFARIN SODIUM SCH MG: 5 TABLET ORAL at 19:55

## 2019-11-18 RX ADMIN — GABAPENTIN SCH MG: 100 CAPSULE ORAL at 12:45

## 2019-11-18 RX ADMIN — ZINC SCH TAB: TAB ORAL at 08:31

## 2019-11-18 RX ADMIN — METOPROLOL SUCCINATE SCH MG: 25 TABLET, EXTENDED RELEASE ORAL at 08:34

## 2019-11-18 RX ADMIN — GABAPENTIN SCH MG: 100 CAPSULE ORAL at 08:33

## 2019-11-18 RX ADMIN — ROPINIROLE SCH MG: 0.5 TABLET ORAL at 19:55

## 2019-11-18 RX ADMIN — FUROSEMIDE SCH MG: 20 TABLET ORAL at 08:33

## 2019-11-18 RX ADMIN — GABAPENTIN SCH MG: 300 CAPSULE ORAL at 19:54

## 2019-11-19 RX ADMIN — ZINC SCH TAB: TAB ORAL at 10:06

## 2019-11-19 RX ADMIN — ROPINIROLE SCH MG: 0.5 TABLET ORAL at 19:55

## 2019-11-19 RX ADMIN — GABAPENTIN SCH MG: 100 CAPSULE ORAL at 12:44

## 2019-11-19 RX ADMIN — WARFARIN SODIUM SCH MG: 5 TABLET ORAL at 19:55

## 2019-11-19 RX ADMIN — SERTRALINE HYDROCHLORIDE SCH MG: 100 TABLET ORAL at 10:08

## 2019-11-19 RX ADMIN — METOPROLOL SUCCINATE SCH MG: 25 TABLET, EXTENDED RELEASE ORAL at 10:07

## 2019-11-19 RX ADMIN — FUROSEMIDE SCH MG: 20 TABLET ORAL at 10:07

## 2019-11-19 RX ADMIN — GABAPENTIN SCH MG: 100 CAPSULE ORAL at 10:06

## 2019-11-19 RX ADMIN — GABAPENTIN SCH MG: 300 CAPSULE ORAL at 19:53

## 2019-11-20 RX ADMIN — SERTRALINE HYDROCHLORIDE SCH MG: 100 TABLET ORAL at 08:04

## 2019-11-20 RX ADMIN — GABAPENTIN SCH MG: 100 CAPSULE ORAL at 08:02

## 2019-11-20 RX ADMIN — WARFARIN SODIUM SCH MG: 5 TABLET ORAL at 20:45

## 2019-11-20 RX ADMIN — METOPROLOL SUCCINATE SCH MG: 25 TABLET, EXTENDED RELEASE ORAL at 08:04

## 2019-11-20 RX ADMIN — ROPINIROLE SCH MG: 0.5 TABLET ORAL at 20:46

## 2019-11-20 RX ADMIN — ZINC SCH TAB: TAB ORAL at 08:03

## 2019-11-20 RX ADMIN — GABAPENTIN SCH MG: 300 CAPSULE ORAL at 20:44

## 2019-11-20 RX ADMIN — GABAPENTIN SCH MG: 100 CAPSULE ORAL at 13:33

## 2019-11-20 RX ADMIN — FUROSEMIDE SCH MG: 20 TABLET ORAL at 08:04

## 2019-11-21 RX ADMIN — GABAPENTIN SCH MG: 100 CAPSULE ORAL at 12:42

## 2019-11-21 RX ADMIN — GABAPENTIN SCH MG: 100 CAPSULE ORAL at 08:57

## 2019-11-21 RX ADMIN — SERTRALINE HYDROCHLORIDE SCH MG: 100 TABLET ORAL at 08:57

## 2019-11-21 RX ADMIN — ZINC SCH TAB: TAB ORAL at 08:57

## 2019-11-21 RX ADMIN — WARFARIN SODIUM SCH MG: 5 TABLET ORAL at 21:09

## 2019-11-21 RX ADMIN — GABAPENTIN SCH MG: 300 CAPSULE ORAL at 21:09

## 2019-11-21 RX ADMIN — FUROSEMIDE SCH MG: 20 TABLET ORAL at 08:57

## 2019-11-21 RX ADMIN — ROPINIROLE SCH MG: 0.5 TABLET ORAL at 21:09

## 2019-11-21 RX ADMIN — METOPROLOL SUCCINATE SCH MG: 25 TABLET, EXTENDED RELEASE ORAL at 08:58

## 2019-11-22 RX ADMIN — WARFARIN SODIUM SCH MG: 5 TABLET ORAL at 21:11

## 2019-11-22 RX ADMIN — GABAPENTIN SCH MG: 100 CAPSULE ORAL at 08:14

## 2019-11-22 RX ADMIN — GABAPENTIN SCH MG: 100 CAPSULE ORAL at 12:36

## 2019-11-22 RX ADMIN — METOPROLOL SUCCINATE SCH MG: 25 TABLET, EXTENDED RELEASE ORAL at 08:15

## 2019-11-22 RX ADMIN — GABAPENTIN SCH MG: 300 CAPSULE ORAL at 21:11

## 2019-11-22 RX ADMIN — FUROSEMIDE SCH MG: 20 TABLET ORAL at 08:15

## 2019-11-22 RX ADMIN — ROPINIROLE SCH MG: 0.5 TABLET ORAL at 21:11

## 2019-11-22 RX ADMIN — SERTRALINE HYDROCHLORIDE SCH MG: 100 TABLET ORAL at 08:16

## 2019-11-22 RX ADMIN — ZINC SCH TAB: TAB ORAL at 08:15

## 2019-11-23 RX ADMIN — FUROSEMIDE SCH MG: 20 TABLET ORAL at 07:22

## 2019-11-23 RX ADMIN — GABAPENTIN SCH MG: 100 CAPSULE ORAL at 13:20

## 2019-11-23 RX ADMIN — METOPROLOL SUCCINATE SCH MG: 25 TABLET, EXTENDED RELEASE ORAL at 07:23

## 2019-11-23 RX ADMIN — GABAPENTIN SCH MG: 300 CAPSULE ORAL at 21:24

## 2019-11-23 RX ADMIN — ROPINIROLE SCH MG: 0.5 TABLET ORAL at 21:24

## 2019-11-23 RX ADMIN — SERTRALINE HYDROCHLORIDE SCH MG: 100 TABLET ORAL at 07:23

## 2019-11-23 RX ADMIN — ZINC SCH TAB: TAB ORAL at 07:24

## 2019-11-23 RX ADMIN — GABAPENTIN SCH MG: 100 CAPSULE ORAL at 07:22

## 2019-11-23 RX ADMIN — WARFARIN SODIUM SCH MG: 5 TABLET ORAL at 21:24

## 2019-11-24 RX ADMIN — ROPINIROLE SCH MG: 0.5 TABLET ORAL at 21:00

## 2019-11-24 RX ADMIN — WARFARIN SODIUM SCH MG: 5 TABLET ORAL at 21:00

## 2019-11-24 RX ADMIN — METOPROLOL SUCCINATE SCH MG: 25 TABLET, EXTENDED RELEASE ORAL at 09:44

## 2019-11-24 RX ADMIN — GABAPENTIN SCH MG: 100 CAPSULE ORAL at 12:06

## 2019-11-24 RX ADMIN — FUROSEMIDE SCH MG: 20 TABLET ORAL at 09:44

## 2019-11-24 RX ADMIN — GABAPENTIN SCH MG: 300 CAPSULE ORAL at 21:00

## 2019-11-24 RX ADMIN — GABAPENTIN SCH MG: 100 CAPSULE ORAL at 09:42

## 2019-11-24 RX ADMIN — SERTRALINE HYDROCHLORIDE SCH MG: 100 TABLET ORAL at 09:43

## 2019-11-24 RX ADMIN — ZINC SCH TAB: TAB ORAL at 09:43

## 2019-11-25 RX ADMIN — FUROSEMIDE SCH MG: 20 TABLET ORAL at 09:22

## 2019-11-25 RX ADMIN — GABAPENTIN SCH MG: 100 CAPSULE ORAL at 13:29

## 2019-11-25 RX ADMIN — GABAPENTIN SCH MG: 100 CAPSULE ORAL at 09:22

## 2019-11-25 RX ADMIN — ZINC SCH TAB: TAB ORAL at 09:21

## 2019-11-25 RX ADMIN — WARFARIN SODIUM SCH MG: 5 TABLET ORAL at 20:26

## 2019-11-25 RX ADMIN — SERTRALINE HYDROCHLORIDE SCH MG: 100 TABLET ORAL at 09:22

## 2019-11-25 RX ADMIN — GABAPENTIN SCH MG: 300 CAPSULE ORAL at 20:25

## 2019-11-25 RX ADMIN — ROPINIROLE SCH MG: 0.5 TABLET ORAL at 20:26

## 2019-11-25 RX ADMIN — METOPROLOL SUCCINATE SCH MG: 25 TABLET, EXTENDED RELEASE ORAL at 09:22

## 2019-11-26 RX ADMIN — METOPROLOL SUCCINATE SCH MG: 25 TABLET, EXTENDED RELEASE ORAL at 09:27

## 2019-11-26 RX ADMIN — GABAPENTIN SCH MG: 100 CAPSULE ORAL at 13:58

## 2019-11-26 RX ADMIN — ZINC SCH TAB: TAB ORAL at 09:26

## 2019-11-26 RX ADMIN — ROPINIROLE SCH MG: 0.5 TABLET ORAL at 20:51

## 2019-11-26 RX ADMIN — SERTRALINE HYDROCHLORIDE SCH MG: 100 TABLET ORAL at 09:26

## 2019-11-26 RX ADMIN — FUROSEMIDE SCH MG: 20 TABLET ORAL at 09:26

## 2019-11-26 RX ADMIN — GABAPENTIN SCH MG: 300 CAPSULE ORAL at 20:50

## 2019-11-26 RX ADMIN — WARFARIN SODIUM SCH MG: 5 TABLET ORAL at 20:51

## 2019-11-26 RX ADMIN — GABAPENTIN SCH MG: 100 CAPSULE ORAL at 09:25

## 2019-11-27 RX ADMIN — GABAPENTIN SCH: 100 CAPSULE ORAL at 14:10

## 2019-11-27 RX ADMIN — ZINC SCH TAB: TAB ORAL at 08:17

## 2019-11-27 RX ADMIN — FUROSEMIDE SCH MG: 20 TABLET ORAL at 08:18

## 2019-11-27 RX ADMIN — ROPINIROLE SCH MG: 0.5 TABLET ORAL at 20:56

## 2019-11-27 RX ADMIN — GABAPENTIN SCH MG: 100 CAPSULE ORAL at 08:17

## 2019-11-27 RX ADMIN — SERTRALINE HYDROCHLORIDE SCH MG: 100 TABLET ORAL at 08:18

## 2019-11-27 RX ADMIN — GABAPENTIN SCH MG: 300 CAPSULE ORAL at 20:57

## 2019-11-27 RX ADMIN — METOPROLOL SUCCINATE SCH MG: 25 TABLET, EXTENDED RELEASE ORAL at 08:19

## 2019-11-27 RX ADMIN — WARFARIN SODIUM SCH MG: 5 TABLET ORAL at 20:56

## 2019-11-28 RX ADMIN — GABAPENTIN SCH MG: 100 CAPSULE ORAL at 08:03

## 2019-11-28 RX ADMIN — ZINC SCH TAB: TAB ORAL at 08:02

## 2019-11-28 RX ADMIN — FUROSEMIDE SCH MG: 20 TABLET ORAL at 08:03

## 2019-11-28 RX ADMIN — ROPINIROLE SCH MG: 0.5 TABLET ORAL at 19:55

## 2019-11-28 RX ADMIN — GABAPENTIN SCH MG: 100 CAPSULE ORAL at 12:36

## 2019-11-28 RX ADMIN — WARFARIN SODIUM SCH MG: 5 TABLET ORAL at 19:55

## 2019-11-28 RX ADMIN — GABAPENTIN SCH MG: 300 CAPSULE ORAL at 19:54

## 2019-11-28 RX ADMIN — SERTRALINE HYDROCHLORIDE SCH MG: 100 TABLET ORAL at 08:04

## 2019-11-28 RX ADMIN — METOPROLOL SUCCINATE SCH MG: 25 TABLET, EXTENDED RELEASE ORAL at 08:03

## 2019-11-29 RX ADMIN — ZINC SCH TAB: TAB ORAL at 08:53

## 2019-11-29 RX ADMIN — GABAPENTIN SCH MG: 100 CAPSULE ORAL at 08:52

## 2019-11-29 RX ADMIN — METOPROLOL SUCCINATE SCH MG: 25 TABLET, EXTENDED RELEASE ORAL at 08:54

## 2019-11-29 RX ADMIN — SERTRALINE HYDROCHLORIDE SCH MG: 100 TABLET ORAL at 08:57

## 2019-11-29 RX ADMIN — GABAPENTIN SCH MG: 300 CAPSULE ORAL at 20:50

## 2019-11-29 RX ADMIN — ROPINIROLE SCH MG: 0.5 TABLET ORAL at 20:50

## 2019-11-29 RX ADMIN — WARFARIN SODIUM SCH MG: 5 TABLET ORAL at 20:50

## 2019-11-29 RX ADMIN — FUROSEMIDE SCH MG: 20 TABLET ORAL at 08:53

## 2019-11-29 RX ADMIN — GABAPENTIN SCH MG: 100 CAPSULE ORAL at 13:00

## 2019-11-30 RX ADMIN — GABAPENTIN SCH MG: 100 CAPSULE ORAL at 08:51

## 2019-11-30 RX ADMIN — WARFARIN SODIUM SCH MG: 5 TABLET ORAL at 20:54

## 2019-11-30 RX ADMIN — GABAPENTIN SCH MG: 100 CAPSULE ORAL at 12:20

## 2019-11-30 RX ADMIN — METOPROLOL SUCCINATE SCH MG: 25 TABLET, EXTENDED RELEASE ORAL at 08:52

## 2019-11-30 RX ADMIN — FUROSEMIDE SCH MG: 20 TABLET ORAL at 08:52

## 2019-11-30 RX ADMIN — ZINC SCH TAB: TAB ORAL at 08:51

## 2019-11-30 RX ADMIN — GABAPENTIN SCH MG: 300 CAPSULE ORAL at 20:54

## 2019-11-30 RX ADMIN — SERTRALINE HYDROCHLORIDE SCH MG: 100 TABLET ORAL at 08:52

## 2019-11-30 RX ADMIN — ROPINIROLE SCH MG: 0.5 TABLET ORAL at 20:54

## 2019-12-01 RX ADMIN — METOPROLOL SUCCINATE SCH MG: 25 TABLET, EXTENDED RELEASE ORAL at 08:59

## 2019-12-01 RX ADMIN — GABAPENTIN SCH MG: 100 CAPSULE ORAL at 12:28

## 2019-12-01 RX ADMIN — FUROSEMIDE SCH MG: 20 TABLET ORAL at 08:59

## 2019-12-01 RX ADMIN — ZINC SCH TAB: TAB ORAL at 08:58

## 2019-12-01 RX ADMIN — GABAPENTIN SCH MG: 300 CAPSULE ORAL at 21:27

## 2019-12-01 RX ADMIN — GABAPENTIN SCH MG: 100 CAPSULE ORAL at 08:58

## 2019-12-01 RX ADMIN — WARFARIN SODIUM SCH MG: 5 TABLET ORAL at 21:27

## 2019-12-01 RX ADMIN — ROPINIROLE SCH MG: 0.5 TABLET ORAL at 21:27

## 2019-12-01 RX ADMIN — SERTRALINE HYDROCHLORIDE SCH MG: 100 TABLET ORAL at 08:59

## 2019-12-02 RX ADMIN — WARFARIN SODIUM SCH MG: 5 TABLET ORAL at 20:55

## 2019-12-02 RX ADMIN — ZINC SCH TAB: TAB ORAL at 09:43

## 2019-12-02 RX ADMIN — GABAPENTIN SCH MG: 300 CAPSULE ORAL at 20:54

## 2019-12-02 RX ADMIN — GABAPENTIN SCH MG: 100 CAPSULE ORAL at 12:12

## 2019-12-02 RX ADMIN — ROPINIROLE SCH MG: 0.5 TABLET ORAL at 20:55

## 2019-12-02 RX ADMIN — FUROSEMIDE SCH MG: 20 TABLET ORAL at 09:45

## 2019-12-02 RX ADMIN — SERTRALINE HYDROCHLORIDE SCH MG: 100 TABLET ORAL at 09:45

## 2019-12-02 RX ADMIN — METOPROLOL SUCCINATE SCH MG: 25 TABLET, EXTENDED RELEASE ORAL at 09:44

## 2019-12-02 RX ADMIN — GABAPENTIN SCH MG: 100 CAPSULE ORAL at 09:43

## 2019-12-03 RX ADMIN — GABAPENTIN SCH MG: 300 CAPSULE ORAL at 20:06

## 2019-12-03 RX ADMIN — WARFARIN SODIUM SCH MG: 5 TABLET ORAL at 20:07

## 2019-12-03 RX ADMIN — SERTRALINE HYDROCHLORIDE SCH MG: 100 TABLET ORAL at 08:17

## 2019-12-03 RX ADMIN — ROPINIROLE SCH MG: 0.5 TABLET ORAL at 20:08

## 2019-12-03 RX ADMIN — GABAPENTIN SCH MG: 100 CAPSULE ORAL at 14:21

## 2019-12-03 RX ADMIN — ZINC SCH TAB: TAB ORAL at 08:18

## 2019-12-03 RX ADMIN — FUROSEMIDE SCH MG: 20 TABLET ORAL at 08:17

## 2019-12-03 RX ADMIN — METOPROLOL SUCCINATE SCH MG: 25 TABLET, EXTENDED RELEASE ORAL at 08:17

## 2019-12-03 RX ADMIN — GABAPENTIN SCH MG: 100 CAPSULE ORAL at 08:16

## 2019-12-04 RX ADMIN — METOPROLOL SUCCINATE SCH MG: 25 TABLET, EXTENDED RELEASE ORAL at 07:43

## 2019-12-04 RX ADMIN — GABAPENTIN SCH MG: 100 CAPSULE ORAL at 12:24

## 2019-12-04 RX ADMIN — ZINC SCH TAB: TAB ORAL at 07:45

## 2019-12-04 RX ADMIN — GABAPENTIN SCH MG: 300 CAPSULE ORAL at 21:00

## 2019-12-04 RX ADMIN — GABAPENTIN SCH MG: 100 CAPSULE ORAL at 07:44

## 2019-12-04 RX ADMIN — WARFARIN SODIUM SCH MG: 5 TABLET ORAL at 21:00

## 2019-12-04 RX ADMIN — FUROSEMIDE SCH MG: 20 TABLET ORAL at 07:44

## 2019-12-04 RX ADMIN — SERTRALINE HYDROCHLORIDE SCH MG: 100 TABLET ORAL at 07:43

## 2019-12-04 RX ADMIN — ROPINIROLE SCH MG: 0.5 TABLET ORAL at 21:00

## 2019-12-05 RX ADMIN — FUROSEMIDE SCH MG: 20 TABLET ORAL at 08:28

## 2019-12-05 RX ADMIN — GABAPENTIN SCH MG: 100 CAPSULE ORAL at 13:11

## 2019-12-05 RX ADMIN — SERTRALINE HYDROCHLORIDE SCH MG: 100 TABLET ORAL at 08:28

## 2019-12-05 RX ADMIN — WARFARIN SODIUM SCH MG: 5 TABLET ORAL at 19:39

## 2019-12-05 RX ADMIN — ROPINIROLE SCH MG: 0.5 TABLET ORAL at 19:39

## 2019-12-05 RX ADMIN — GABAPENTIN SCH MG: 300 CAPSULE ORAL at 19:38

## 2019-12-05 RX ADMIN — METOPROLOL SUCCINATE SCH MG: 25 TABLET, EXTENDED RELEASE ORAL at 08:29

## 2019-12-05 RX ADMIN — ZINC SCH TAB: TAB ORAL at 08:28

## 2019-12-05 RX ADMIN — GABAPENTIN SCH MG: 100 CAPSULE ORAL at 08:28

## 2019-12-06 RX ADMIN — SERTRALINE HYDROCHLORIDE SCH MG: 100 TABLET ORAL at 08:12

## 2019-12-06 RX ADMIN — METOPROLOL SUCCINATE SCH MG: 25 TABLET, EXTENDED RELEASE ORAL at 08:12

## 2019-12-06 RX ADMIN — GABAPENTIN SCH MG: 100 CAPSULE ORAL at 12:17

## 2019-12-06 RX ADMIN — ROPINIROLE SCH MG: 0.5 TABLET ORAL at 19:59

## 2019-12-06 RX ADMIN — WARFARIN SODIUM SCH MG: 5 TABLET ORAL at 20:00

## 2019-12-06 RX ADMIN — ZINC SCH TAB: TAB ORAL at 08:13

## 2019-12-06 RX ADMIN — GABAPENTIN SCH MG: 100 CAPSULE ORAL at 08:12

## 2019-12-06 RX ADMIN — GABAPENTIN SCH MG: 300 CAPSULE ORAL at 19:59

## 2019-12-06 RX ADMIN — FUROSEMIDE SCH MG: 20 TABLET ORAL at 08:13

## 2019-12-07 RX ADMIN — METOPROLOL SUCCINATE SCH MG: 25 TABLET, EXTENDED RELEASE ORAL at 08:04

## 2019-12-07 RX ADMIN — GABAPENTIN SCH MG: 100 CAPSULE ORAL at 08:04

## 2019-12-07 RX ADMIN — ROPINIROLE SCH MG: 0.5 TABLET ORAL at 19:17

## 2019-12-07 RX ADMIN — ZINC SCH TAB: TAB ORAL at 08:04

## 2019-12-07 RX ADMIN — SERTRALINE HYDROCHLORIDE SCH MG: 100 TABLET ORAL at 08:04

## 2019-12-07 RX ADMIN — GABAPENTIN SCH MG: 300 CAPSULE ORAL at 19:17

## 2019-12-07 RX ADMIN — GABAPENTIN SCH MG: 100 CAPSULE ORAL at 13:50

## 2019-12-07 RX ADMIN — FUROSEMIDE SCH MG: 20 TABLET ORAL at 08:04

## 2019-12-07 RX ADMIN — WARFARIN SODIUM SCH MG: 5 TABLET ORAL at 19:17

## 2019-12-08 RX ADMIN — METOPROLOL SUCCINATE SCH MG: 25 TABLET, EXTENDED RELEASE ORAL at 07:45

## 2019-12-08 RX ADMIN — ROPINIROLE SCH MG: 0.5 TABLET ORAL at 20:04

## 2019-12-08 RX ADMIN — WARFARIN SODIUM SCH MG: 5 TABLET ORAL at 20:04

## 2019-12-08 RX ADMIN — GABAPENTIN SCH MG: 300 CAPSULE ORAL at 20:03

## 2019-12-08 RX ADMIN — SERTRALINE HYDROCHLORIDE SCH MG: 100 TABLET ORAL at 07:46

## 2019-12-08 RX ADMIN — GABAPENTIN SCH MG: 100 CAPSULE ORAL at 07:46

## 2019-12-08 RX ADMIN — GABAPENTIN SCH MG: 100 CAPSULE ORAL at 13:08

## 2019-12-08 RX ADMIN — FUROSEMIDE SCH MG: 20 TABLET ORAL at 07:46

## 2019-12-08 RX ADMIN — ZINC SCH TAB: TAB ORAL at 07:46

## 2019-12-09 RX ADMIN — ROPINIROLE SCH MG: 0.5 TABLET ORAL at 20:06

## 2019-12-09 RX ADMIN — FUROSEMIDE SCH MG: 20 TABLET ORAL at 07:54

## 2019-12-09 RX ADMIN — GABAPENTIN SCH MG: 100 CAPSULE ORAL at 07:53

## 2019-12-09 RX ADMIN — WARFARIN SODIUM SCH MG: 5 TABLET ORAL at 20:05

## 2019-12-09 RX ADMIN — METOPROLOL SUCCINATE SCH MG: 25 TABLET, EXTENDED RELEASE ORAL at 07:54

## 2019-12-09 RX ADMIN — GABAPENTIN SCH MG: 300 CAPSULE ORAL at 20:05

## 2019-12-09 RX ADMIN — GABAPENTIN SCH MG: 100 CAPSULE ORAL at 12:04

## 2019-12-09 RX ADMIN — SERTRALINE HYDROCHLORIDE SCH MG: 100 TABLET ORAL at 07:54

## 2019-12-09 RX ADMIN — ZINC SCH TAB: TAB ORAL at 07:54

## 2019-12-10 RX ADMIN — METOPROLOL SUCCINATE SCH MG: 25 TABLET, EXTENDED RELEASE ORAL at 07:53

## 2019-12-10 RX ADMIN — GABAPENTIN SCH MG: 300 CAPSULE ORAL at 20:04

## 2019-12-10 RX ADMIN — ROPINIROLE SCH MG: 0.5 TABLET ORAL at 20:05

## 2019-12-10 RX ADMIN — WARFARIN SODIUM SCH MG: 5 TABLET ORAL at 20:05

## 2019-12-10 RX ADMIN — GABAPENTIN SCH MG: 100 CAPSULE ORAL at 07:53

## 2019-12-10 RX ADMIN — FUROSEMIDE SCH MG: 20 TABLET ORAL at 07:53

## 2019-12-10 RX ADMIN — ZINC SCH TAB: TAB ORAL at 07:54

## 2019-12-10 RX ADMIN — SERTRALINE HYDROCHLORIDE SCH MG: 100 TABLET ORAL at 07:53

## 2019-12-10 RX ADMIN — GABAPENTIN SCH MG: 100 CAPSULE ORAL at 13:48

## 2019-12-11 RX ADMIN — ROPINIROLE SCH MG: 0.5 TABLET ORAL at 20:53

## 2019-12-11 RX ADMIN — GABAPENTIN SCH MG: 100 CAPSULE ORAL at 07:41

## 2019-12-11 RX ADMIN — GABAPENTIN SCH MG: 100 CAPSULE ORAL at 15:21

## 2019-12-11 RX ADMIN — ZINC SCH TAB: TAB ORAL at 07:42

## 2019-12-11 RX ADMIN — SERTRALINE HYDROCHLORIDE SCH MG: 100 TABLET ORAL at 07:41

## 2019-12-11 RX ADMIN — FUROSEMIDE SCH MG: 20 TABLET ORAL at 07:41

## 2019-12-11 RX ADMIN — METOPROLOL SUCCINATE SCH MG: 25 TABLET, EXTENDED RELEASE ORAL at 07:42

## 2019-12-11 RX ADMIN — GABAPENTIN SCH MG: 300 CAPSULE ORAL at 20:54

## 2019-12-11 RX ADMIN — WARFARIN SODIUM SCH MG: 5 TABLET ORAL at 20:53

## 2019-12-12 RX ADMIN — GABAPENTIN SCH MG: 100 CAPSULE ORAL at 08:14

## 2019-12-12 RX ADMIN — FUROSEMIDE SCH MG: 20 TABLET ORAL at 08:14

## 2019-12-12 RX ADMIN — METOPROLOL SUCCINATE SCH MG: 25 TABLET, EXTENDED RELEASE ORAL at 08:14

## 2019-12-12 RX ADMIN — GABAPENTIN SCH MG: 100 CAPSULE ORAL at 13:37

## 2019-12-12 RX ADMIN — GABAPENTIN SCH MG: 300 CAPSULE ORAL at 19:58

## 2019-12-12 RX ADMIN — WARFARIN SODIUM SCH MG: 5 TABLET ORAL at 19:58

## 2019-12-12 RX ADMIN — ZINC SCH TAB: TAB ORAL at 08:14

## 2019-12-12 RX ADMIN — ROPINIROLE SCH MG: 0.5 TABLET ORAL at 19:58

## 2019-12-12 RX ADMIN — SERTRALINE HYDROCHLORIDE SCH MG: 100 TABLET ORAL at 08:14

## 2019-12-13 RX ADMIN — FUROSEMIDE SCH MG: 20 TABLET ORAL at 08:42

## 2019-12-13 RX ADMIN — METOPROLOL SUCCINATE SCH MG: 25 TABLET, EXTENDED RELEASE ORAL at 08:42

## 2019-12-13 RX ADMIN — GABAPENTIN SCH MG: 300 CAPSULE ORAL at 20:57

## 2019-12-13 RX ADMIN — GABAPENTIN SCH MG: 100 CAPSULE ORAL at 12:36

## 2019-12-13 RX ADMIN — ZINC SCH TAB: TAB ORAL at 08:42

## 2019-12-13 RX ADMIN — SERTRALINE HYDROCHLORIDE SCH MG: 100 TABLET ORAL at 08:43

## 2019-12-13 RX ADMIN — WARFARIN SODIUM SCH MG: 5 TABLET ORAL at 20:53

## 2019-12-13 RX ADMIN — ROPINIROLE SCH MG: 0.5 TABLET ORAL at 20:54

## 2019-12-13 RX ADMIN — GABAPENTIN SCH MG: 100 CAPSULE ORAL at 08:42

## 2019-12-14 RX ADMIN — WARFARIN SODIUM SCH MG: 5 TABLET ORAL at 20:11

## 2019-12-14 RX ADMIN — METOPROLOL SUCCINATE SCH MG: 25 TABLET, EXTENDED RELEASE ORAL at 08:07

## 2019-12-14 RX ADMIN — GABAPENTIN SCH MG: 300 CAPSULE ORAL at 20:12

## 2019-12-14 RX ADMIN — GABAPENTIN SCH MG: 100 CAPSULE ORAL at 08:05

## 2019-12-14 RX ADMIN — ZINC SCH TAB: TAB ORAL at 08:06

## 2019-12-14 RX ADMIN — SERTRALINE HYDROCHLORIDE SCH MG: 100 TABLET ORAL at 08:06

## 2019-12-14 RX ADMIN — ROPINIROLE SCH MG: 0.5 TABLET ORAL at 20:11

## 2019-12-14 RX ADMIN — FUROSEMIDE SCH MG: 20 TABLET ORAL at 08:07

## 2019-12-14 RX ADMIN — GABAPENTIN SCH MG: 100 CAPSULE ORAL at 12:29

## 2019-12-15 RX ADMIN — ROPINIROLE SCH MG: 0.5 TABLET ORAL at 20:16

## 2019-12-15 RX ADMIN — WARFARIN SODIUM SCH MG: 5 TABLET ORAL at 20:16

## 2019-12-15 RX ADMIN — GABAPENTIN SCH MG: 300 CAPSULE ORAL at 20:16

## 2019-12-15 RX ADMIN — GABAPENTIN SCH MG: 100 CAPSULE ORAL at 12:15

## 2019-12-15 RX ADMIN — METOPROLOL SUCCINATE SCH MG: 25 TABLET, EXTENDED RELEASE ORAL at 08:14

## 2019-12-15 RX ADMIN — FUROSEMIDE SCH MG: 20 TABLET ORAL at 08:14

## 2019-12-15 RX ADMIN — SERTRALINE HYDROCHLORIDE SCH MG: 100 TABLET ORAL at 08:14

## 2019-12-15 RX ADMIN — GABAPENTIN SCH MG: 100 CAPSULE ORAL at 08:13

## 2019-12-15 RX ADMIN — ZINC SCH TAB: TAB ORAL at 08:14

## 2019-12-16 RX ADMIN — FUROSEMIDE SCH MG: 20 TABLET ORAL at 08:26

## 2019-12-16 RX ADMIN — GABAPENTIN SCH: 100 CAPSULE ORAL at 14:36

## 2019-12-16 RX ADMIN — GABAPENTIN SCH MG: 100 CAPSULE ORAL at 08:25

## 2019-12-16 RX ADMIN — SERTRALINE HYDROCHLORIDE SCH MG: 100 TABLET ORAL at 08:26

## 2019-12-16 RX ADMIN — WARFARIN SODIUM SCH MG: 5 TABLET ORAL at 19:28

## 2019-12-16 RX ADMIN — ROPINIROLE SCH MG: 0.5 TABLET ORAL at 19:28

## 2019-12-16 RX ADMIN — METOPROLOL SUCCINATE SCH MG: 25 TABLET, EXTENDED RELEASE ORAL at 08:26

## 2019-12-16 RX ADMIN — ZINC SCH TAB: TAB ORAL at 08:25

## 2019-12-16 RX ADMIN — GABAPENTIN SCH MG: 300 CAPSULE ORAL at 19:26

## 2019-12-17 RX ADMIN — SERTRALINE HYDROCHLORIDE SCH MG: 100 TABLET ORAL at 08:54

## 2019-12-17 RX ADMIN — ZINC SCH TAB: TAB ORAL at 08:54

## 2019-12-17 RX ADMIN — GABAPENTIN SCH MG: 100 CAPSULE ORAL at 12:24

## 2019-12-17 RX ADMIN — GABAPENTIN SCH MG: 100 CAPSULE ORAL at 08:54

## 2019-12-17 RX ADMIN — WARFARIN SODIUM SCH MG: 5 TABLET ORAL at 19:43

## 2019-12-17 RX ADMIN — FUROSEMIDE SCH MG: 20 TABLET ORAL at 08:54

## 2019-12-17 RX ADMIN — ROPINIROLE SCH MG: 0.5 TABLET ORAL at 19:43

## 2019-12-17 RX ADMIN — GABAPENTIN SCH MG: 300 CAPSULE ORAL at 19:42

## 2019-12-17 RX ADMIN — METOPROLOL SUCCINATE SCH MG: 25 TABLET, EXTENDED RELEASE ORAL at 08:54

## 2019-12-18 RX ADMIN — GABAPENTIN SCH MG: 300 CAPSULE ORAL at 20:04

## 2019-12-18 RX ADMIN — GABAPENTIN SCH MG: 100 CAPSULE ORAL at 14:54

## 2019-12-18 RX ADMIN — ZINC SCH TAB: TAB ORAL at 08:27

## 2019-12-18 RX ADMIN — FUROSEMIDE SCH MG: 20 TABLET ORAL at 08:27

## 2019-12-18 RX ADMIN — ROPINIROLE SCH MG: 0.5 TABLET ORAL at 20:05

## 2019-12-18 RX ADMIN — GABAPENTIN SCH MG: 100 CAPSULE ORAL at 08:27

## 2019-12-18 RX ADMIN — SERTRALINE HYDROCHLORIDE SCH MG: 100 TABLET ORAL at 08:27

## 2019-12-18 RX ADMIN — METOPROLOL SUCCINATE SCH MG: 25 TABLET, EXTENDED RELEASE ORAL at 08:27

## 2019-12-18 RX ADMIN — WARFARIN SODIUM SCH MG: 5 TABLET ORAL at 20:05

## 2019-12-19 RX ADMIN — GABAPENTIN SCH MG: 300 CAPSULE ORAL at 21:00

## 2019-12-19 RX ADMIN — GABAPENTIN SCH MG: 100 CAPSULE ORAL at 14:44

## 2019-12-19 RX ADMIN — GABAPENTIN SCH MG: 100 CAPSULE ORAL at 09:53

## 2019-12-19 RX ADMIN — ROPINIROLE SCH MG: 0.5 TABLET ORAL at 21:00

## 2019-12-19 RX ADMIN — SERTRALINE HYDROCHLORIDE SCH MG: 100 TABLET ORAL at 09:55

## 2019-12-19 RX ADMIN — ZINC SCH TAB: TAB ORAL at 10:01

## 2019-12-19 RX ADMIN — FUROSEMIDE SCH MG: 20 TABLET ORAL at 09:57

## 2019-12-19 RX ADMIN — METOPROLOL SUCCINATE SCH MG: 25 TABLET, EXTENDED RELEASE ORAL at 09:53

## 2019-12-19 RX ADMIN — WARFARIN SODIUM SCH MG: 5 TABLET ORAL at 21:00

## 2019-12-20 LAB
ANION GAP SERPL CALC-SCNC: 13.3 MMOL/L (ref 10–20)
CHLORIDE SERPL-SCNC: 106 MMOL/L (ref 98–107)
SODIUM SERPL-SCNC: 147 MMOL/L (ref 136–145)

## 2019-12-20 RX ADMIN — ROPINIROLE SCH MG: 0.5 TABLET ORAL at 19:43

## 2019-12-20 RX ADMIN — FUROSEMIDE SCH MG: 20 TABLET ORAL at 07:46

## 2019-12-20 RX ADMIN — GABAPENTIN SCH MG: 300 CAPSULE ORAL at 19:43

## 2019-12-20 RX ADMIN — GABAPENTIN SCH MG: 100 CAPSULE ORAL at 07:44

## 2019-12-20 RX ADMIN — ZINC SCH TAB: TAB ORAL at 07:45

## 2019-12-20 RX ADMIN — GABAPENTIN SCH MG: 100 CAPSULE ORAL at 12:00

## 2019-12-20 RX ADMIN — WARFARIN SODIUM SCH MG: 5 TABLET ORAL at 19:44

## 2019-12-20 RX ADMIN — METOPROLOL SUCCINATE SCH MG: 25 TABLET, EXTENDED RELEASE ORAL at 07:45

## 2019-12-20 RX ADMIN — SERTRALINE HYDROCHLORIDE SCH MG: 100 TABLET ORAL at 07:46

## 2019-12-20 NOTE — PN
Progress Note for BRENDAKING MIRAMONTES  Date:  12/20/2019  Room #:  VM.219

 

SUBJECTIVE:  This is an 85-year-old on long-term swing bed.  She tells me over

the past few days to really weeks, she has had increased shortness of breath.

She has not had significantly increased cough, but has had some trouble

swallowing.  She has had chest pain at times, but usually gets better when she

sits and relaxes.  The breathing trouble is whenever she tries to do anything.

She has been using oxygen more during the day and normally uses it only at

night.  She does have a previous history of heart failure, diastolic.  She has

had previous heart disease.  She has had also reported COPD per her previous

medical record.  She has not had any weight gain or increased swelling.  She is

on Coumadin and her INR was therapeutic today.

 

OBJECTIVE:  Vital Signs:  Her weight is 85.8 kg, temperature 98.2, pulse 72,

blood pressure 128/68, respiratory rate 16, O2 of 96% on room air.

General:  She is in no acute distress.

Heart:  Regular rate and rhythm.  S1, S2 without murmur.

Lungs:  Lung sounds are clear to auscultation bilaterally without crackles or

wheezes in the upper bases, but decreased bilaterally, but no wheezing noted in

the lower bases.

Abdomen:  Mildly distended.  She has some mid epigastric tenderness.  She

reports this is due to stress.  She has been on Pepcid.

Extremities:  Warm and dry, just trace edema.

Mental Status:  She is alert.  She is orientated x3.

 

LABORATORY DATA:  Lab work requested today due to the shortness of breath did

show her to have a white count of 11.1, which is similar to last lab check

nearly a year ago, hemoglobin 10.7 down from 12, platelets 282.  INR is 2.2.

Sodium 147, potassium 4.3, chloride 106, bicarb 32, BUN 19, creatinine 1.1,

glucose 140.  ProBNP is 810.

 

ASSESSMENT:

1. Dyspnea.  The patient is really not interested in significant workup this

    weekend.  She has some family coming.  Did discuss with her I did a chest x-

    ray and she had a mediastinal mass.  It actually appears to affect her

    airway.  She is considering doing a CT scan, but would like to wait till

    next week.  I will discuss with her primary care, Hector Hinson.  She also

    has an interesting history of a large thyroid nodule like from 2006 that

    was supposed to be removed and it never was.

2. History of chronic chronic heart failure EF 60 % in 2013, mildly elevated 
proBNP.  She is on Lasix

    20 mg daily.  This seems stable.

3. Coronary artery disease with angina.  We will continue to monitor.  She

    does have some Nitro p.r.n.  Could consider adding some Imdur.  However,

    blood pressures are under very well control.

4. Reported history of chronic obstructive pulmonary disease.  Could consider

    trying some nebulizers if her CT scan does not show a cause for shortness

    of breath.

5. Obesity.

6. Anticoagulated with Coumadin for hx of DVT's  She appears to be in a sinus 
rhythm

    currently and she is therapeutic.

7.  Mild anemia with epigastric pain.  I will add a PPI and repeat labs on 
Monday.  Check stools

for hemoccult also.  

 

PLAN:  At this point, the patient will continue swing bed cares.  She has

deferred doing a CT.  She has some shortness of breath, which has been coming on

over the past days to weeks.  I do not appear she is in any immediate danger.

We will continue oxygen as needed.  Follow up with her primary care next week.

 

 

MKA:  12/20/2019 17:34:10  MODL:  12/20/2019 18:22:23

Job #:  [id number]

JOSELUIS

## 2019-12-20 NOTE — CR
______________________________________________________________________________   

  

[id number] RAD/RAD Chest PA And Lateral  

EXAM: FRONTAL AND LATERAL CHEST  

   

 INDICATION: SHORTNESS OF BREATH.  

   

 COMPARISON: April 27, 2018.  

   

 DISCUSSION: Rightward deviation of the trachea suspicious for a mass in the left  

 aspect of the upper mediastinum. This is somewhat more prevalent than on the  

 prior study, and if not previously performed, a chest CT is again suggested for  

 further characterization. Stable mild cardiomegaly without evidence of  

 congestive heart failure. Hyperinflation is consistent with chronic obstructive  

 pulmonary disease.  

   

 IMPRESSION:    

 An upper mediastinal mass is again suggested.  

   

 Chronic obstructive pulmonary disease.  

   

 Stable cardiomegaly without evidence of congestive heart failure.  

   

 Electronically signed by Jimmy Turk MD on 12/20/2019 2:12 PM  

   

  

Jimmy Turk MD                 

 12/20/19 [id number]    

  

Thank you for allowing us to participate in the care of your patient.

## 2019-12-21 RX ADMIN — GABAPENTIN SCH MG: 100 CAPSULE ORAL at 13:00

## 2019-12-21 RX ADMIN — METOPROLOL SUCCINATE SCH MG: 25 TABLET, EXTENDED RELEASE ORAL at 07:20

## 2019-12-21 RX ADMIN — GABAPENTIN SCH MG: 300 CAPSULE ORAL at 21:00

## 2019-12-21 RX ADMIN — FUROSEMIDE SCH MG: 20 TABLET ORAL at 07:21

## 2019-12-21 RX ADMIN — SERTRALINE HYDROCHLORIDE SCH MG: 100 TABLET ORAL at 07:20

## 2019-12-21 RX ADMIN — WARFARIN SODIUM SCH MG: 5 TABLET ORAL at 21:00

## 2019-12-21 RX ADMIN — ZINC SCH TAB: TAB ORAL at 07:19

## 2019-12-21 RX ADMIN — ROPINIROLE SCH MG: 0.5 TABLET ORAL at 21:00

## 2019-12-21 RX ADMIN — GABAPENTIN SCH MG: 100 CAPSULE ORAL at 07:21

## 2019-12-21 RX ADMIN — OMEPRAZOLE SCH MG: 20 CAPSULE, DELAYED RELEASE ORAL at 07:20

## 2019-12-22 RX ADMIN — GABAPENTIN SCH MG: 100 CAPSULE ORAL at 12:31

## 2019-12-22 RX ADMIN — OMEPRAZOLE SCH MG: 20 CAPSULE, DELAYED RELEASE ORAL at 08:32

## 2019-12-22 RX ADMIN — SERTRALINE HYDROCHLORIDE SCH MG: 100 TABLET ORAL at 08:34

## 2019-12-22 RX ADMIN — FUROSEMIDE SCH MG: 20 TABLET ORAL at 08:33

## 2019-12-22 RX ADMIN — GABAPENTIN SCH MG: 100 CAPSULE ORAL at 08:31

## 2019-12-22 RX ADMIN — GABAPENTIN SCH MG: 300 CAPSULE ORAL at 19:27

## 2019-12-22 RX ADMIN — METOPROLOL SUCCINATE SCH MG: 25 TABLET, EXTENDED RELEASE ORAL at 08:34

## 2019-12-22 RX ADMIN — ZINC SCH TAB: TAB ORAL at 08:32

## 2019-12-22 RX ADMIN — ROPINIROLE SCH MG: 0.5 TABLET ORAL at 19:28

## 2019-12-22 RX ADMIN — WARFARIN SODIUM SCH MG: 5 TABLET ORAL at 19:28

## 2019-12-23 RX ADMIN — GABAPENTIN SCH MG: 100 CAPSULE ORAL at 13:45

## 2019-12-23 RX ADMIN — ROPINIROLE SCH MG: 0.5 TABLET ORAL at 19:37

## 2019-12-23 RX ADMIN — FUROSEMIDE SCH MG: 20 TABLET ORAL at 07:44

## 2019-12-23 RX ADMIN — WARFARIN SODIUM SCH MG: 5 TABLET ORAL at 19:37

## 2019-12-23 RX ADMIN — GABAPENTIN SCH MG: 100 CAPSULE ORAL at 07:42

## 2019-12-23 RX ADMIN — GABAPENTIN SCH MG: 300 CAPSULE ORAL at 19:35

## 2019-12-23 RX ADMIN — OMEPRAZOLE SCH MG: 20 CAPSULE, DELAYED RELEASE ORAL at 07:42

## 2019-12-23 RX ADMIN — SERTRALINE HYDROCHLORIDE SCH MG: 100 TABLET ORAL at 07:43

## 2019-12-23 RX ADMIN — ZINC SCH TAB: TAB ORAL at 07:43

## 2019-12-23 RX ADMIN — METOPROLOL SUCCINATE SCH MG: 25 TABLET, EXTENDED RELEASE ORAL at 07:44

## 2019-12-24 RX ADMIN — GABAPENTIN SCH MG: 300 CAPSULE ORAL at 20:57

## 2019-12-24 RX ADMIN — FUROSEMIDE SCH MG: 20 TABLET ORAL at 10:03

## 2019-12-24 RX ADMIN — METOPROLOL SUCCINATE SCH MG: 25 TABLET, EXTENDED RELEASE ORAL at 10:03

## 2019-12-24 RX ADMIN — GABAPENTIN SCH MG: 100 CAPSULE ORAL at 12:36

## 2019-12-24 RX ADMIN — GABAPENTIN SCH MG: 100 CAPSULE ORAL at 10:02

## 2019-12-24 RX ADMIN — OMEPRAZOLE SCH MG: 20 CAPSULE, DELAYED RELEASE ORAL at 10:03

## 2019-12-24 RX ADMIN — ROPINIROLE SCH MG: 0.5 TABLET ORAL at 20:57

## 2019-12-24 RX ADMIN — ZINC SCH TAB: TAB ORAL at 10:03

## 2019-12-24 RX ADMIN — WARFARIN SODIUM SCH MG: 5 TABLET ORAL at 20:58

## 2019-12-24 RX ADMIN — SERTRALINE HYDROCHLORIDE SCH MG: 100 TABLET ORAL at 10:04

## 2019-12-25 RX ADMIN — FUROSEMIDE SCH MG: 20 TABLET ORAL at 07:52

## 2019-12-25 RX ADMIN — SERTRALINE HYDROCHLORIDE SCH MG: 100 TABLET ORAL at 07:53

## 2019-12-25 RX ADMIN — GABAPENTIN SCH MG: 300 CAPSULE ORAL at 19:27

## 2019-12-25 RX ADMIN — ROPINIROLE SCH MG: 0.5 TABLET ORAL at 19:27

## 2019-12-25 RX ADMIN — WARFARIN SODIUM SCH MG: 5 TABLET ORAL at 19:28

## 2019-12-25 RX ADMIN — METOPROLOL SUCCINATE SCH MG: 25 TABLET, EXTENDED RELEASE ORAL at 07:52

## 2019-12-25 RX ADMIN — GABAPENTIN SCH MG: 100 CAPSULE ORAL at 12:43

## 2019-12-25 RX ADMIN — OMEPRAZOLE SCH MG: 20 CAPSULE, DELAYED RELEASE ORAL at 07:53

## 2019-12-25 RX ADMIN — ZINC SCH TAB: TAB ORAL at 07:53

## 2019-12-25 RX ADMIN — GABAPENTIN SCH MG: 100 CAPSULE ORAL at 07:52

## 2019-12-26 RX ADMIN — ROPINIROLE SCH: 0.5 TABLET ORAL at 19:44

## 2019-12-26 RX ADMIN — WARFARIN SODIUM SCH MG: 5 TABLET ORAL at 19:36

## 2019-12-26 RX ADMIN — FUROSEMIDE SCH MG: 20 TABLET ORAL at 07:52

## 2019-12-26 RX ADMIN — GABAPENTIN SCH MG: 100 CAPSULE ORAL at 12:30

## 2019-12-26 RX ADMIN — SERTRALINE HYDROCHLORIDE SCH MG: 100 TABLET ORAL at 07:53

## 2019-12-26 RX ADMIN — GABAPENTIN SCH MG: 300 CAPSULE ORAL at 19:35

## 2019-12-26 RX ADMIN — METOPROLOL SUCCINATE SCH MG: 25 TABLET, EXTENDED RELEASE ORAL at 07:53

## 2019-12-26 RX ADMIN — GABAPENTIN SCH MG: 100 CAPSULE ORAL at 07:54

## 2019-12-26 RX ADMIN — ZINC SCH TAB: TAB ORAL at 07:52

## 2019-12-26 RX ADMIN — OMEPRAZOLE SCH MG: 20 CAPSULE, DELAYED RELEASE ORAL at 07:51

## 2019-12-26 NOTE — CT
______________________________________________________________________________   

  

[id number] CT/CT Chest W IV  

EXAM: CT Chest W IV  

   

 CLINICAL DATA: MEDIASTINAL MASS.  

   

 COMPARISON: Radiograph examinations from 2018 and December 20, 2019.  

   

 FINDINGS:   

   

 LUNGS: 18 x 19 x 13 mm area of groundglass nodularity in the right upper lobe  

 (series 2 image 18 and series 3 image 51). No suspicious parenchymal nodularity.  

   

   

 HEART AND GREAT VESSELS: Coronary artery and thoracic aorta atherosclerosis.  

 Mild cardiomegaly. No pericardial effusion. Thoracic aorta is normal in caliber.  

 No dissection.  

   

 MEDIASTINUM AND LYMPHATICS: Enlarged left thyroid lobe secondary to a complex  

 mass measuring 43 x 45 x 48 mm. Superior margin of the mass extends beyond the  

 field of view of this examination.  

   

 Mass is chronic and results in rightward deviation of the trachea, correlating  

 with appearance on radiograph examinations dating to 2018.  

   

 No lymphadenopathy.  

   

 UPPER ABDOMINAL ORGANS: Gallbladder has been resected. Simple parenchymal cyst  

 in the left kidney superior pole. A second left renal lesion extends beyond the  

 field of view of this examination and is incompletely evaluated.  

   

 Simple appearing 7 mm cystic mass in the tail the pancreas.  

   

 BONES: Scattered changes of spondylosis in the spine. No fracture or osseous  

 lesion.  

   

 Soft tissues: 15 x 11 x 12 mm nodular area of soft tissue density in the right  

 breast (series 2 image 45 and series 3 image 20).  

   

 IMPRESSION:  

   

 Mediastinal mass is consistent with a complex left thyroid mass that exerts mass  

 effect and rightward deviation of the trachea, accounting for appearance on  

 prior chest radiograph examinations.  

   

 18 mm nodular area of groundglass opacification in the right upper lobe.  

 Findings are nonspecific and likely infectious or inflammatory etiology. If  

 there are appropriate clinical risk factors, follow-up noncontrast low dose  

 chest CT in 12 months is recommended.  

   

 12 mm masslike area of soft tissue density in the right breast. Correlation with  

 mammography is recommended.  

   

 Electronically signed by Alejandro Rodriguez MD on 12/26/2019 4:55 PM  

   

  

Alejandro Rodriguez MD                 

 12/26/19 [id number]    

  

Thank you for allowing us to participate in the care of your patient.

## 2019-12-27 RX ADMIN — OMEPRAZOLE SCH MG: 20 CAPSULE, DELAYED RELEASE ORAL at 09:56

## 2019-12-27 RX ADMIN — ZINC SCH TAB: TAB ORAL at 09:57

## 2019-12-27 RX ADMIN — GABAPENTIN SCH MG: 100 CAPSULE ORAL at 12:59

## 2019-12-27 RX ADMIN — GABAPENTIN SCH MG: 100 CAPSULE ORAL at 09:55

## 2019-12-27 RX ADMIN — SERTRALINE HYDROCHLORIDE SCH MG: 100 TABLET ORAL at 09:58

## 2019-12-27 RX ADMIN — METOPROLOL SUCCINATE SCH MG: 25 TABLET, EXTENDED RELEASE ORAL at 09:58

## 2019-12-27 RX ADMIN — FUROSEMIDE SCH MG: 20 TABLET ORAL at 09:57

## 2019-12-27 RX ADMIN — ROPINIROLE SCH MG: 0.5 TABLET ORAL at 19:40

## 2019-12-27 RX ADMIN — GABAPENTIN SCH MG: 300 CAPSULE ORAL at 19:39

## 2019-12-27 RX ADMIN — WARFARIN SODIUM SCH MG: 5 TABLET ORAL at 19:40

## 2019-12-28 RX ADMIN — FUROSEMIDE SCH MG: 20 TABLET ORAL at 07:41

## 2019-12-28 RX ADMIN — GABAPENTIN SCH MG: 300 CAPSULE ORAL at 19:42

## 2019-12-28 RX ADMIN — OMEPRAZOLE SCH MG: 20 CAPSULE, DELAYED RELEASE ORAL at 07:42

## 2019-12-28 RX ADMIN — WARFARIN SODIUM SCH MG: 5 TABLET ORAL at 19:43

## 2019-12-28 RX ADMIN — ZINC SCH TAB: TAB ORAL at 07:41

## 2019-12-28 RX ADMIN — GABAPENTIN SCH MG: 100 CAPSULE ORAL at 07:41

## 2019-12-28 RX ADMIN — SERTRALINE HYDROCHLORIDE SCH MG: 100 TABLET ORAL at 07:41

## 2019-12-28 RX ADMIN — METOPROLOL SUCCINATE SCH MG: 25 TABLET, EXTENDED RELEASE ORAL at 07:40

## 2019-12-28 RX ADMIN — GABAPENTIN SCH MG: 100 CAPSULE ORAL at 13:02

## 2019-12-28 RX ADMIN — ROPINIROLE SCH MG: 0.5 TABLET ORAL at 19:43

## 2019-12-29 RX ADMIN — GABAPENTIN SCH MG: 100 CAPSULE ORAL at 08:17

## 2019-12-29 RX ADMIN — METOPROLOL SUCCINATE SCH MG: 25 TABLET, EXTENDED RELEASE ORAL at 08:16

## 2019-12-29 RX ADMIN — SERTRALINE HYDROCHLORIDE SCH MG: 100 TABLET ORAL at 08:17

## 2019-12-29 RX ADMIN — ROPINIROLE SCH MG: 0.5 TABLET ORAL at 19:34

## 2019-12-29 RX ADMIN — GABAPENTIN SCH MG: 300 CAPSULE ORAL at 19:33

## 2019-12-29 RX ADMIN — GABAPENTIN SCH MG: 100 CAPSULE ORAL at 13:18

## 2019-12-29 RX ADMIN — OMEPRAZOLE SCH MG: 20 CAPSULE, DELAYED RELEASE ORAL at 08:17

## 2019-12-29 RX ADMIN — ZINC SCH TAB: TAB ORAL at 08:17

## 2019-12-29 RX ADMIN — WARFARIN SODIUM SCH MG: 5 TABLET ORAL at 19:34

## 2019-12-29 RX ADMIN — FUROSEMIDE SCH MG: 20 TABLET ORAL at 08:17

## 2019-12-30 RX ADMIN — ZINC SCH TAB: TAB ORAL at 08:24

## 2019-12-30 RX ADMIN — FUROSEMIDE SCH MG: 20 TABLET ORAL at 08:23

## 2019-12-30 RX ADMIN — WARFARIN SODIUM SCH MG: 5 TABLET ORAL at 19:37

## 2019-12-30 RX ADMIN — OMEPRAZOLE SCH MG: 20 CAPSULE, DELAYED RELEASE ORAL at 08:24

## 2019-12-30 RX ADMIN — SERTRALINE HYDROCHLORIDE SCH MG: 100 TABLET ORAL at 08:23

## 2019-12-30 RX ADMIN — GABAPENTIN SCH MG: 100 CAPSULE ORAL at 08:23

## 2019-12-30 RX ADMIN — METOPROLOL SUCCINATE SCH MG: 25 TABLET, EXTENDED RELEASE ORAL at 08:23

## 2019-12-30 RX ADMIN — GABAPENTIN SCH MG: 300 CAPSULE ORAL at 19:37

## 2019-12-30 RX ADMIN — ROPINIROLE SCH MG: 0.5 TABLET ORAL at 19:37

## 2019-12-30 RX ADMIN — GABAPENTIN SCH MG: 100 CAPSULE ORAL at 12:43

## 2019-12-31 LAB
ANION GAP SERPL CALC-SCNC: 11.4 MMOL/L (ref 10–20)
CHLORIDE SERPL-SCNC: 108 MMOL/L (ref 98–107)
SODIUM SERPL-SCNC: 147 MMOL/L (ref 136–145)

## 2019-12-31 RX ADMIN — GABAPENTIN SCH MG: 300 CAPSULE ORAL at 20:53

## 2019-12-31 RX ADMIN — GABAPENTIN SCH MG: 100 CAPSULE ORAL at 11:59

## 2019-12-31 RX ADMIN — WARFARIN SODIUM SCH MG: 5 TABLET ORAL at 20:50

## 2019-12-31 RX ADMIN — METOPROLOL SUCCINATE SCH MG: 25 TABLET, EXTENDED RELEASE ORAL at 07:50

## 2019-12-31 RX ADMIN — SERTRALINE HYDROCHLORIDE SCH MG: 100 TABLET ORAL at 07:50

## 2019-12-31 RX ADMIN — FUROSEMIDE SCH MG: 20 TABLET ORAL at 07:50

## 2019-12-31 RX ADMIN — ROPINIROLE SCH MG: 0.5 TABLET ORAL at 20:53

## 2019-12-31 RX ADMIN — ZINC SCH TAB: TAB ORAL at 07:49

## 2019-12-31 RX ADMIN — GABAPENTIN SCH MG: 100 CAPSULE ORAL at 07:48

## 2019-12-31 RX ADMIN — OMEPRAZOLE SCH MG: 20 CAPSULE, DELAYED RELEASE ORAL at 07:49

## 2020-01-01 RX ADMIN — GABAPENTIN SCH MG: 100 CAPSULE ORAL at 07:55

## 2020-01-01 RX ADMIN — ZINC SCH TAB: TAB ORAL at 07:55

## 2020-01-01 RX ADMIN — WARFARIN SODIUM SCH MG: 5 TABLET ORAL at 20:50

## 2020-01-01 RX ADMIN — ROPINIROLE SCH MG: 0.5 TABLET ORAL at 20:47

## 2020-01-01 RX ADMIN — OMEPRAZOLE SCH MG: 20 CAPSULE, DELAYED RELEASE ORAL at 07:53

## 2020-01-01 RX ADMIN — GABAPENTIN SCH MG: 300 CAPSULE ORAL at 20:47

## 2020-01-01 RX ADMIN — FUROSEMIDE SCH MG: 20 TABLET ORAL at 07:55

## 2020-01-01 RX ADMIN — SERTRALINE HYDROCHLORIDE SCH MG: 100 TABLET ORAL at 07:54

## 2020-01-01 RX ADMIN — GABAPENTIN SCH MG: 100 CAPSULE ORAL at 12:30

## 2020-01-01 RX ADMIN — METOPROLOL SUCCINATE SCH MG: 25 TABLET, EXTENDED RELEASE ORAL at 07:52

## 2020-01-02 RX ADMIN — GABAPENTIN SCH MG: 300 CAPSULE ORAL at 20:03

## 2020-01-02 RX ADMIN — ROPINIROLE SCH MG: 0.5 TABLET ORAL at 20:05

## 2020-01-02 RX ADMIN — ZINC SCH TAB: TAB ORAL at 08:11

## 2020-01-02 RX ADMIN — SERTRALINE HYDROCHLORIDE SCH MG: 100 TABLET ORAL at 08:11

## 2020-01-02 RX ADMIN — OMEPRAZOLE SCH MG: 20 CAPSULE, DELAYED RELEASE ORAL at 08:11

## 2020-01-02 RX ADMIN — FUROSEMIDE SCH MG: 20 TABLET ORAL at 08:11

## 2020-01-02 RX ADMIN — GABAPENTIN SCH MG: 100 CAPSULE ORAL at 12:04

## 2020-01-02 RX ADMIN — WARFARIN SODIUM SCH MG: 5 TABLET ORAL at 20:04

## 2020-01-02 RX ADMIN — METOPROLOL SUCCINATE SCH MG: 25 TABLET, EXTENDED RELEASE ORAL at 08:11

## 2020-01-02 RX ADMIN — GABAPENTIN SCH MG: 100 CAPSULE ORAL at 08:12

## 2020-01-03 RX ADMIN — ROPINIROLE SCH MG: 0.5 TABLET ORAL at 21:00

## 2020-01-03 RX ADMIN — GABAPENTIN SCH MG: 100 CAPSULE ORAL at 12:32

## 2020-01-03 RX ADMIN — METOPROLOL SUCCINATE SCH MG: 25 TABLET, EXTENDED RELEASE ORAL at 08:21

## 2020-01-03 RX ADMIN — WARFARIN SODIUM SCH MG: 5 TABLET ORAL at 21:00

## 2020-01-03 RX ADMIN — GABAPENTIN SCH MG: 100 CAPSULE ORAL at 08:22

## 2020-01-03 RX ADMIN — ZINC SCH TAB: TAB ORAL at 08:21

## 2020-01-03 RX ADMIN — SERTRALINE HYDROCHLORIDE SCH MG: 100 TABLET ORAL at 08:22

## 2020-01-03 RX ADMIN — OMEPRAZOLE SCH MG: 20 CAPSULE, DELAYED RELEASE ORAL at 08:21

## 2020-01-03 RX ADMIN — GABAPENTIN SCH MG: 300 CAPSULE ORAL at 21:00

## 2020-01-03 RX ADMIN — FUROSEMIDE SCH MG: 20 TABLET ORAL at 08:22

## 2020-01-04 RX ADMIN — METOPROLOL SUCCINATE SCH MG: 25 TABLET, EXTENDED RELEASE ORAL at 10:07

## 2020-01-04 RX ADMIN — GABAPENTIN SCH MG: 100 CAPSULE ORAL at 10:08

## 2020-01-04 RX ADMIN — SERTRALINE HYDROCHLORIDE SCH MG: 100 TABLET ORAL at 10:07

## 2020-01-04 RX ADMIN — ROPINIROLE SCH MG: 0.5 TABLET ORAL at 20:24

## 2020-01-04 RX ADMIN — GABAPENTIN SCH MG: 100 CAPSULE ORAL at 12:24

## 2020-01-04 RX ADMIN — OMEPRAZOLE SCH MG: 20 CAPSULE, DELAYED RELEASE ORAL at 10:07

## 2020-01-04 RX ADMIN — WARFARIN SODIUM SCH MG: 5 TABLET ORAL at 20:24

## 2020-01-04 RX ADMIN — FUROSEMIDE SCH MG: 20 TABLET ORAL at 10:07

## 2020-01-04 RX ADMIN — ZINC SCH TAB: TAB ORAL at 10:07

## 2020-01-04 RX ADMIN — GABAPENTIN SCH MG: 300 CAPSULE ORAL at 20:23

## 2020-01-05 RX ADMIN — ZINC SCH TAB: TAB ORAL at 08:44

## 2020-01-05 RX ADMIN — OMEPRAZOLE SCH MG: 20 CAPSULE, DELAYED RELEASE ORAL at 08:44

## 2020-01-05 RX ADMIN — WARFARIN SODIUM SCH MG: 5 TABLET ORAL at 20:47

## 2020-01-05 RX ADMIN — GABAPENTIN SCH MG: 300 CAPSULE ORAL at 20:47

## 2020-01-05 RX ADMIN — METOPROLOL SUCCINATE SCH MG: 25 TABLET, EXTENDED RELEASE ORAL at 08:43

## 2020-01-05 RX ADMIN — GABAPENTIN SCH MG: 100 CAPSULE ORAL at 08:43

## 2020-01-05 RX ADMIN — GABAPENTIN SCH MG: 100 CAPSULE ORAL at 13:13

## 2020-01-05 RX ADMIN — ROPINIROLE SCH MG: 0.5 TABLET ORAL at 20:47

## 2020-01-05 RX ADMIN — SERTRALINE HYDROCHLORIDE SCH MG: 100 TABLET ORAL at 08:43

## 2020-01-05 RX ADMIN — FUROSEMIDE SCH MG: 20 TABLET ORAL at 08:43

## 2020-01-06 RX ADMIN — SERTRALINE HYDROCHLORIDE SCH MG: 100 TABLET ORAL at 08:16

## 2020-01-06 RX ADMIN — GABAPENTIN SCH MG: 100 CAPSULE ORAL at 08:17

## 2020-01-06 RX ADMIN — FUROSEMIDE SCH MG: 20 TABLET ORAL at 08:16

## 2020-01-06 RX ADMIN — ROPINIROLE SCH MG: 0.5 TABLET ORAL at 19:35

## 2020-01-06 RX ADMIN — GABAPENTIN SCH MG: 300 CAPSULE ORAL at 19:35

## 2020-01-06 RX ADMIN — WARFARIN SODIUM SCH MG: 5 TABLET ORAL at 19:35

## 2020-01-06 RX ADMIN — ZINC SCH TAB: TAB ORAL at 08:16

## 2020-01-06 RX ADMIN — METOPROLOL SUCCINATE SCH MG: 25 TABLET, EXTENDED RELEASE ORAL at 08:25

## 2020-01-06 RX ADMIN — GABAPENTIN SCH MG: 100 CAPSULE ORAL at 12:47

## 2020-01-06 RX ADMIN — OMEPRAZOLE SCH MG: 20 CAPSULE, DELAYED RELEASE ORAL at 08:16

## 2020-01-07 RX ADMIN — GABAPENTIN SCH MG: 300 CAPSULE ORAL at 19:39

## 2020-01-07 RX ADMIN — GABAPENTIN SCH MG: 100 CAPSULE ORAL at 09:23

## 2020-01-07 RX ADMIN — SERTRALINE HYDROCHLORIDE SCH MG: 100 TABLET ORAL at 09:24

## 2020-01-07 RX ADMIN — FUROSEMIDE SCH MG: 20 TABLET ORAL at 09:24

## 2020-01-07 RX ADMIN — ZINC SCH TAB: TAB ORAL at 09:24

## 2020-01-07 RX ADMIN — OMEPRAZOLE SCH MG: 20 CAPSULE, DELAYED RELEASE ORAL at 09:24

## 2020-01-07 RX ADMIN — METOPROLOL SUCCINATE SCH MG: 25 TABLET, EXTENDED RELEASE ORAL at 09:24

## 2020-01-07 RX ADMIN — GABAPENTIN SCH MG: 100 CAPSULE ORAL at 13:44

## 2020-01-07 RX ADMIN — WARFARIN SODIUM SCH MG: 5 TABLET ORAL at 19:40

## 2020-01-07 RX ADMIN — ROPINIROLE SCH MG: 0.5 TABLET ORAL at 19:41

## 2020-01-08 RX ADMIN — ZINC SCH TAB: TAB ORAL at 08:21

## 2020-01-08 RX ADMIN — WARFARIN SODIUM SCH MG: 5 TABLET ORAL at 20:00

## 2020-01-08 RX ADMIN — GABAPENTIN SCH MG: 100 CAPSULE ORAL at 13:09

## 2020-01-08 RX ADMIN — FUROSEMIDE SCH MG: 20 TABLET ORAL at 08:21

## 2020-01-08 RX ADMIN — METOPROLOL SUCCINATE SCH MG: 25 TABLET, EXTENDED RELEASE ORAL at 08:21

## 2020-01-08 RX ADMIN — ROPINIROLE SCH MG: 0.5 TABLET ORAL at 19:59

## 2020-01-08 RX ADMIN — SERTRALINE HYDROCHLORIDE SCH MG: 100 TABLET ORAL at 08:21

## 2020-01-08 RX ADMIN — GABAPENTIN SCH MG: 300 CAPSULE ORAL at 19:59

## 2020-01-08 RX ADMIN — OMEPRAZOLE SCH MG: 20 CAPSULE, DELAYED RELEASE ORAL at 08:21

## 2020-01-08 RX ADMIN — GABAPENTIN SCH MG: 100 CAPSULE ORAL at 08:20

## 2020-01-09 RX ADMIN — WARFARIN SODIUM SCH MG: 5 TABLET ORAL at 21:16

## 2020-01-09 RX ADMIN — GABAPENTIN SCH MG: 100 CAPSULE ORAL at 08:24

## 2020-01-09 RX ADMIN — OMEPRAZOLE SCH MG: 20 CAPSULE, DELAYED RELEASE ORAL at 08:22

## 2020-01-09 RX ADMIN — ROPINIROLE SCH MG: 0.5 TABLET ORAL at 21:16

## 2020-01-09 RX ADMIN — ZINC SCH TAB: TAB ORAL at 08:22

## 2020-01-09 RX ADMIN — SERTRALINE HYDROCHLORIDE SCH MG: 100 TABLET ORAL at 08:21

## 2020-01-09 RX ADMIN — GABAPENTIN SCH MG: 100 CAPSULE ORAL at 13:23

## 2020-01-09 RX ADMIN — METOPROLOL SUCCINATE SCH MG: 25 TABLET, EXTENDED RELEASE ORAL at 08:22

## 2020-01-09 RX ADMIN — GABAPENTIN SCH MG: 300 CAPSULE ORAL at 21:19

## 2020-01-09 RX ADMIN — FUROSEMIDE SCH MG: 20 TABLET ORAL at 08:22

## 2020-01-10 RX ADMIN — GABAPENTIN SCH MG: 100 CAPSULE ORAL at 08:55

## 2020-01-10 RX ADMIN — SERTRALINE HYDROCHLORIDE SCH MG: 100 TABLET ORAL at 08:56

## 2020-01-10 RX ADMIN — WARFARIN SODIUM SCH MG: 5 TABLET ORAL at 19:59

## 2020-01-10 RX ADMIN — ZINC SCH TAB: TAB ORAL at 08:54

## 2020-01-10 RX ADMIN — METOPROLOL SUCCINATE SCH MG: 25 TABLET, EXTENDED RELEASE ORAL at 08:55

## 2020-01-10 RX ADMIN — FUROSEMIDE SCH MG: 20 TABLET ORAL at 08:56

## 2020-01-10 RX ADMIN — GABAPENTIN SCH MG: 100 CAPSULE ORAL at 12:15

## 2020-01-10 RX ADMIN — GABAPENTIN SCH MG: 300 CAPSULE ORAL at 19:58

## 2020-01-10 RX ADMIN — OMEPRAZOLE SCH MG: 20 CAPSULE, DELAYED RELEASE ORAL at 08:55

## 2020-01-10 RX ADMIN — ROPINIROLE SCH MG: 0.5 TABLET ORAL at 19:59

## 2020-01-11 RX ADMIN — GABAPENTIN SCH MG: 300 CAPSULE ORAL at 20:25

## 2020-01-11 RX ADMIN — ZINC SCH TAB: TAB ORAL at 07:55

## 2020-01-11 RX ADMIN — WARFARIN SODIUM SCH MG: 5 TABLET ORAL at 20:24

## 2020-01-11 RX ADMIN — GABAPENTIN SCH MG: 100 CAPSULE ORAL at 12:02

## 2020-01-11 RX ADMIN — FUROSEMIDE SCH MG: 20 TABLET ORAL at 07:55

## 2020-01-11 RX ADMIN — ROPINIROLE SCH MG: 0.5 TABLET ORAL at 20:24

## 2020-01-11 RX ADMIN — SERTRALINE HYDROCHLORIDE SCH MG: 100 TABLET ORAL at 07:54

## 2020-01-11 RX ADMIN — OMEPRAZOLE SCH MG: 20 CAPSULE, DELAYED RELEASE ORAL at 07:55

## 2020-01-11 RX ADMIN — METOPROLOL SUCCINATE SCH MG: 25 TABLET, EXTENDED RELEASE ORAL at 07:54

## 2020-01-11 RX ADMIN — GABAPENTIN SCH MG: 100 CAPSULE ORAL at 07:57

## 2020-01-12 RX ADMIN — GABAPENTIN SCH MG: 100 CAPSULE ORAL at 12:01

## 2020-01-12 RX ADMIN — GABAPENTIN SCH MG: 300 CAPSULE ORAL at 20:29

## 2020-01-12 RX ADMIN — OMEPRAZOLE SCH MG: 20 CAPSULE, DELAYED RELEASE ORAL at 07:45

## 2020-01-12 RX ADMIN — ZINC SCH TAB: TAB ORAL at 07:45

## 2020-01-12 RX ADMIN — ROPINIROLE SCH MG: 0.5 TABLET ORAL at 20:30

## 2020-01-12 RX ADMIN — GABAPENTIN SCH MG: 100 CAPSULE ORAL at 07:43

## 2020-01-12 RX ADMIN — METOPROLOL SUCCINATE SCH MG: 25 TABLET, EXTENDED RELEASE ORAL at 07:43

## 2020-01-12 RX ADMIN — SERTRALINE HYDROCHLORIDE SCH MG: 100 TABLET ORAL at 07:43

## 2020-01-12 RX ADMIN — FUROSEMIDE SCH MG: 20 TABLET ORAL at 07:46

## 2020-01-12 RX ADMIN — WARFARIN SODIUM SCH MG: 5 TABLET ORAL at 20:29

## 2020-01-13 RX ADMIN — ROPINIROLE SCH MG: 0.5 TABLET ORAL at 19:51

## 2020-01-13 RX ADMIN — FUROSEMIDE SCH MG: 20 TABLET ORAL at 08:34

## 2020-01-13 RX ADMIN — METOPROLOL SUCCINATE SCH MG: 25 TABLET, EXTENDED RELEASE ORAL at 08:34

## 2020-01-13 RX ADMIN — SERTRALINE HYDROCHLORIDE SCH MG: 100 TABLET ORAL at 08:34

## 2020-01-13 RX ADMIN — ZINC SCH TAB: TAB ORAL at 08:35

## 2020-01-13 RX ADMIN — OMEPRAZOLE SCH MG: 20 CAPSULE, DELAYED RELEASE ORAL at 08:35

## 2020-01-13 RX ADMIN — WARFARIN SODIUM SCH MG: 5 TABLET ORAL at 19:51

## 2020-01-13 RX ADMIN — GABAPENTIN SCH MG: 300 CAPSULE ORAL at 19:50

## 2020-01-13 RX ADMIN — GABAPENTIN SCH MG: 100 CAPSULE ORAL at 08:36

## 2020-01-13 RX ADMIN — GABAPENTIN SCH MG: 100 CAPSULE ORAL at 14:16

## 2020-01-14 RX ADMIN — GABAPENTIN SCH MG: 300 CAPSULE ORAL at 19:38

## 2020-01-14 RX ADMIN — GABAPENTIN SCH MG: 100 CAPSULE ORAL at 07:49

## 2020-01-14 RX ADMIN — SERTRALINE HYDROCHLORIDE SCH MG: 100 TABLET ORAL at 07:49

## 2020-01-14 RX ADMIN — METOPROLOL SUCCINATE SCH MG: 25 TABLET, EXTENDED RELEASE ORAL at 07:50

## 2020-01-14 RX ADMIN — GABAPENTIN SCH MG: 100 CAPSULE ORAL at 12:25

## 2020-01-14 RX ADMIN — FUROSEMIDE SCH MG: 20 TABLET ORAL at 07:49

## 2020-01-14 RX ADMIN — OMEPRAZOLE SCH MG: 20 CAPSULE, DELAYED RELEASE ORAL at 07:49

## 2020-01-14 RX ADMIN — ROPINIROLE SCH MG: 0.5 TABLET ORAL at 19:38

## 2020-01-14 RX ADMIN — WARFARIN SODIUM SCH MG: 5 TABLET ORAL at 19:38

## 2020-01-14 RX ADMIN — ZINC SCH TAB: TAB ORAL at 07:49

## 2020-01-15 RX ADMIN — GABAPENTIN SCH MG: 100 CAPSULE ORAL at 12:45

## 2020-01-15 RX ADMIN — ZINC SCH TAB: TAB ORAL at 08:57

## 2020-01-15 RX ADMIN — ROPINIROLE SCH MG: 0.5 TABLET ORAL at 19:47

## 2020-01-15 RX ADMIN — SERTRALINE HYDROCHLORIDE SCH MG: 100 TABLET ORAL at 08:58

## 2020-01-15 RX ADMIN — GABAPENTIN SCH MG: 100 CAPSULE ORAL at 08:57

## 2020-01-15 RX ADMIN — GABAPENTIN SCH MG: 300 CAPSULE ORAL at 19:47

## 2020-01-15 RX ADMIN — OMEPRAZOLE SCH MG: 20 CAPSULE, DELAYED RELEASE ORAL at 08:57

## 2020-01-15 RX ADMIN — WARFARIN SODIUM SCH MG: 5 TABLET ORAL at 19:47

## 2020-01-15 RX ADMIN — METOPROLOL SUCCINATE SCH MG: 25 TABLET, EXTENDED RELEASE ORAL at 08:58

## 2020-01-15 RX ADMIN — FUROSEMIDE SCH MG: 20 TABLET ORAL at 08:58

## 2020-01-16 RX ADMIN — ZINC SCH TAB: TAB ORAL at 08:17

## 2020-01-16 RX ADMIN — WARFARIN SODIUM SCH MG: 5 TABLET ORAL at 20:11

## 2020-01-16 RX ADMIN — ROPINIROLE SCH MG: 0.5 TABLET ORAL at 20:11

## 2020-01-16 RX ADMIN — OMEPRAZOLE SCH MG: 20 CAPSULE, DELAYED RELEASE ORAL at 08:17

## 2020-01-16 RX ADMIN — SERTRALINE HYDROCHLORIDE SCH MG: 100 TABLET ORAL at 08:17

## 2020-01-16 RX ADMIN — GABAPENTIN SCH MG: 300 CAPSULE ORAL at 20:11

## 2020-01-16 RX ADMIN — GABAPENTIN SCH MG: 100 CAPSULE ORAL at 13:08

## 2020-01-16 RX ADMIN — METOPROLOL SUCCINATE SCH MG: 25 TABLET, EXTENDED RELEASE ORAL at 08:17

## 2020-01-16 RX ADMIN — GABAPENTIN SCH MG: 100 CAPSULE ORAL at 08:16

## 2020-01-16 RX ADMIN — FUROSEMIDE SCH MG: 20 TABLET ORAL at 08:17

## 2020-01-17 RX ADMIN — WARFARIN SODIUM SCH MG: 5 TABLET ORAL at 19:53

## 2020-01-17 RX ADMIN — GABAPENTIN SCH MG: 100 CAPSULE ORAL at 13:14

## 2020-01-17 RX ADMIN — OMEPRAZOLE SCH MG: 20 CAPSULE, DELAYED RELEASE ORAL at 07:43

## 2020-01-17 RX ADMIN — ZINC SCH TAB: TAB ORAL at 07:43

## 2020-01-17 RX ADMIN — GABAPENTIN SCH MG: 300 CAPSULE ORAL at 19:52

## 2020-01-17 RX ADMIN — METOPROLOL SUCCINATE SCH MG: 25 TABLET, EXTENDED RELEASE ORAL at 07:43

## 2020-01-17 RX ADMIN — ROPINIROLE SCH MG: 0.5 TABLET ORAL at 19:53

## 2020-01-17 RX ADMIN — GABAPENTIN SCH MG: 100 CAPSULE ORAL at 07:43

## 2020-01-17 RX ADMIN — SERTRALINE HYDROCHLORIDE SCH MG: 100 TABLET ORAL at 07:43

## 2020-01-17 RX ADMIN — FUROSEMIDE SCH MG: 20 TABLET ORAL at 07:43

## 2020-01-18 RX ADMIN — GABAPENTIN SCH MG: 100 CAPSULE ORAL at 08:45

## 2020-01-18 RX ADMIN — GABAPENTIN SCH MG: 300 CAPSULE ORAL at 20:48

## 2020-01-18 RX ADMIN — FUROSEMIDE SCH MG: 20 TABLET ORAL at 08:45

## 2020-01-18 RX ADMIN — GABAPENTIN SCH MG: 100 CAPSULE ORAL at 12:31

## 2020-01-18 RX ADMIN — ROPINIROLE SCH MG: 0.5 TABLET ORAL at 20:48

## 2020-01-18 RX ADMIN — SERTRALINE HYDROCHLORIDE SCH MG: 100 TABLET ORAL at 08:45

## 2020-01-18 RX ADMIN — WARFARIN SODIUM SCH MG: 5 TABLET ORAL at 20:48

## 2020-01-18 RX ADMIN — METOPROLOL SUCCINATE SCH MG: 25 TABLET, EXTENDED RELEASE ORAL at 08:45

## 2020-01-18 RX ADMIN — ZINC SCH TAB: TAB ORAL at 08:45

## 2020-01-18 RX ADMIN — OMEPRAZOLE SCH MG: 20 CAPSULE, DELAYED RELEASE ORAL at 08:45

## 2020-01-19 RX ADMIN — GABAPENTIN SCH MG: 300 CAPSULE ORAL at 20:02

## 2020-01-19 RX ADMIN — ZINC SCH TAB: TAB ORAL at 07:54

## 2020-01-19 RX ADMIN — METOPROLOL SUCCINATE SCH MG: 25 TABLET, EXTENDED RELEASE ORAL at 07:54

## 2020-01-19 RX ADMIN — FUROSEMIDE SCH MG: 20 TABLET ORAL at 07:54

## 2020-01-19 RX ADMIN — ROPINIROLE SCH MG: 0.5 TABLET ORAL at 20:01

## 2020-01-19 RX ADMIN — SERTRALINE HYDROCHLORIDE SCH MG: 100 TABLET ORAL at 07:53

## 2020-01-19 RX ADMIN — GABAPENTIN SCH MG: 100 CAPSULE ORAL at 07:53

## 2020-01-19 RX ADMIN — OMEPRAZOLE SCH MG: 20 CAPSULE, DELAYED RELEASE ORAL at 07:54

## 2020-01-19 RX ADMIN — GABAPENTIN SCH MG: 100 CAPSULE ORAL at 12:29

## 2020-01-19 RX ADMIN — WARFARIN SODIUM SCH MG: 5 TABLET ORAL at 20:00

## 2020-01-20 RX ADMIN — OMEPRAZOLE SCH MG: 20 CAPSULE, DELAYED RELEASE ORAL at 09:33

## 2020-01-20 RX ADMIN — WARFARIN SODIUM SCH MG: 5 TABLET ORAL at 19:57

## 2020-01-20 RX ADMIN — SERTRALINE HYDROCHLORIDE SCH MG: 100 TABLET ORAL at 09:34

## 2020-01-20 RX ADMIN — GABAPENTIN SCH MG: 100 CAPSULE ORAL at 12:19

## 2020-01-20 RX ADMIN — ZINC SCH TAB: TAB ORAL at 09:33

## 2020-01-20 RX ADMIN — FUROSEMIDE SCH MG: 20 TABLET ORAL at 09:34

## 2020-01-20 RX ADMIN — GABAPENTIN SCH MG: 100 CAPSULE ORAL at 09:33

## 2020-01-20 RX ADMIN — GABAPENTIN SCH MG: 300 CAPSULE ORAL at 19:56

## 2020-01-20 RX ADMIN — METOPROLOL SUCCINATE SCH MG: 25 TABLET, EXTENDED RELEASE ORAL at 09:34

## 2020-01-20 RX ADMIN — ROPINIROLE SCH MG: 0.5 TABLET ORAL at 19:57

## 2020-01-21 RX ADMIN — METOPROLOL SUCCINATE SCH MG: 25 TABLET, EXTENDED RELEASE ORAL at 07:30

## 2020-01-21 RX ADMIN — WARFARIN SODIUM SCH MG: 5 TABLET ORAL at 19:19

## 2020-01-21 RX ADMIN — GABAPENTIN SCH MG: 100 CAPSULE ORAL at 13:01

## 2020-01-21 RX ADMIN — GABAPENTIN SCH MG: 300 CAPSULE ORAL at 19:19

## 2020-01-21 RX ADMIN — SERTRALINE HYDROCHLORIDE SCH MG: 100 TABLET ORAL at 07:30

## 2020-01-21 RX ADMIN — ROPINIROLE SCH MG: 0.5 TABLET ORAL at 19:19

## 2020-01-21 RX ADMIN — GABAPENTIN SCH MG: 100 CAPSULE ORAL at 07:30

## 2020-01-21 RX ADMIN — ZINC SCH TAB: TAB ORAL at 07:30

## 2020-01-21 RX ADMIN — OMEPRAZOLE SCH MG: 20 CAPSULE, DELAYED RELEASE ORAL at 07:30

## 2020-01-21 RX ADMIN — FUROSEMIDE SCH MG: 20 TABLET ORAL at 07:30

## 2020-01-22 RX ADMIN — WARFARIN SODIUM SCH MG: 5 TABLET ORAL at 19:13

## 2020-01-22 RX ADMIN — GABAPENTIN SCH MG: 300 CAPSULE ORAL at 19:14

## 2020-01-22 RX ADMIN — ROPINIROLE SCH MG: 0.5 TABLET ORAL at 19:13

## 2020-01-22 RX ADMIN — SERTRALINE HYDROCHLORIDE SCH MG: 100 TABLET ORAL at 07:36

## 2020-01-22 RX ADMIN — GABAPENTIN SCH MG: 100 CAPSULE ORAL at 12:40

## 2020-01-22 RX ADMIN — OMEPRAZOLE SCH MG: 20 CAPSULE, DELAYED RELEASE ORAL at 07:36

## 2020-01-22 RX ADMIN — FUROSEMIDE SCH MG: 20 TABLET ORAL at 07:36

## 2020-01-22 RX ADMIN — GABAPENTIN SCH MG: 100 CAPSULE ORAL at 07:35

## 2020-01-22 RX ADMIN — METOPROLOL SUCCINATE SCH MG: 25 TABLET, EXTENDED RELEASE ORAL at 07:37

## 2020-01-22 RX ADMIN — ZINC SCH TAB: TAB ORAL at 07:36

## 2020-01-23 RX ADMIN — SERTRALINE HYDROCHLORIDE SCH MG: 100 TABLET ORAL at 07:40

## 2020-01-23 RX ADMIN — GABAPENTIN SCH MG: 100 CAPSULE ORAL at 12:20

## 2020-01-23 RX ADMIN — WARFARIN SODIUM SCH MG: 5 TABLET ORAL at 21:20

## 2020-01-23 RX ADMIN — GABAPENTIN SCH MG: 100 CAPSULE ORAL at 07:42

## 2020-01-23 RX ADMIN — ZINC SCH TAB: TAB ORAL at 07:43

## 2020-01-23 RX ADMIN — GABAPENTIN SCH MG: 300 CAPSULE ORAL at 21:20

## 2020-01-23 RX ADMIN — OMEPRAZOLE SCH MG: 20 CAPSULE, DELAYED RELEASE ORAL at 07:42

## 2020-01-23 RX ADMIN — FUROSEMIDE SCH MG: 20 TABLET ORAL at 07:40

## 2020-01-23 RX ADMIN — ROPINIROLE SCH MG: 0.5 TABLET ORAL at 21:24

## 2020-01-23 RX ADMIN — METOPROLOL SUCCINATE SCH MG: 25 TABLET, EXTENDED RELEASE ORAL at 07:40

## 2020-01-24 RX ADMIN — FUROSEMIDE SCH MG: 20 TABLET ORAL at 07:43

## 2020-01-24 RX ADMIN — ROPINIROLE SCH MG: 0.5 TABLET ORAL at 20:20

## 2020-01-24 RX ADMIN — METOPROLOL SUCCINATE SCH MG: 25 TABLET, EXTENDED RELEASE ORAL at 07:42

## 2020-01-24 RX ADMIN — OMEPRAZOLE SCH MG: 20 CAPSULE, DELAYED RELEASE ORAL at 07:43

## 2020-01-24 RX ADMIN — GABAPENTIN SCH MG: 100 CAPSULE ORAL at 12:41

## 2020-01-24 RX ADMIN — WARFARIN SODIUM SCH MG: 5 TABLET ORAL at 20:20

## 2020-01-24 RX ADMIN — ZINC SCH TAB: TAB ORAL at 07:43

## 2020-01-24 RX ADMIN — SERTRALINE HYDROCHLORIDE SCH MG: 100 TABLET ORAL at 07:43

## 2020-01-24 RX ADMIN — GABAPENTIN SCH MG: 100 CAPSULE ORAL at 07:43

## 2020-01-24 RX ADMIN — GABAPENTIN SCH MG: 300 CAPSULE ORAL at 20:19

## 2020-01-25 RX ADMIN — GABAPENTIN SCH MG: 100 CAPSULE ORAL at 13:39

## 2020-01-25 RX ADMIN — GABAPENTIN SCH MG: 100 CAPSULE ORAL at 08:24

## 2020-01-25 RX ADMIN — OMEPRAZOLE SCH MG: 20 CAPSULE, DELAYED RELEASE ORAL at 08:24

## 2020-01-25 RX ADMIN — SERTRALINE HYDROCHLORIDE SCH MG: 100 TABLET ORAL at 08:24

## 2020-01-25 RX ADMIN — FUROSEMIDE SCH MG: 20 TABLET ORAL at 08:24

## 2020-01-25 RX ADMIN — ROPINIROLE SCH MG: 0.5 TABLET ORAL at 20:25

## 2020-01-25 RX ADMIN — ZINC SCH TAB: TAB ORAL at 08:24

## 2020-01-25 RX ADMIN — GABAPENTIN SCH MG: 300 CAPSULE ORAL at 20:25

## 2020-01-25 RX ADMIN — METOPROLOL SUCCINATE SCH MG: 25 TABLET, EXTENDED RELEASE ORAL at 08:24

## 2020-01-25 RX ADMIN — WARFARIN SODIUM SCH MG: 5 TABLET ORAL at 20:25

## 2020-01-26 RX ADMIN — FUROSEMIDE SCH MG: 20 TABLET ORAL at 08:02

## 2020-01-26 RX ADMIN — GABAPENTIN SCH MG: 100 CAPSULE ORAL at 08:02

## 2020-01-26 RX ADMIN — OMEPRAZOLE SCH MG: 20 CAPSULE, DELAYED RELEASE ORAL at 08:02

## 2020-01-26 RX ADMIN — METOPROLOL SUCCINATE SCH MG: 25 TABLET, EXTENDED RELEASE ORAL at 08:02

## 2020-01-26 RX ADMIN — ZINC SCH TAB: TAB ORAL at 08:02

## 2020-01-26 RX ADMIN — ROPINIROLE SCH MG: 0.5 TABLET ORAL at 19:54

## 2020-01-26 RX ADMIN — GABAPENTIN SCH MG: 300 CAPSULE ORAL at 19:54

## 2020-01-26 RX ADMIN — WARFARIN SODIUM SCH MG: 5 TABLET ORAL at 19:54

## 2020-01-26 RX ADMIN — GABAPENTIN SCH MG: 100 CAPSULE ORAL at 12:38

## 2020-01-26 RX ADMIN — SERTRALINE HYDROCHLORIDE SCH MG: 100 TABLET ORAL at 08:02

## 2020-01-27 RX ADMIN — WARFARIN SODIUM SCH MG: 5 TABLET ORAL at 19:39

## 2020-01-27 RX ADMIN — SERTRALINE HYDROCHLORIDE SCH MG: 100 TABLET ORAL at 07:47

## 2020-01-27 RX ADMIN — GABAPENTIN SCH MG: 100 CAPSULE ORAL at 13:42

## 2020-01-27 RX ADMIN — METOPROLOL SUCCINATE SCH MG: 25 TABLET, EXTENDED RELEASE ORAL at 07:48

## 2020-01-27 RX ADMIN — OMEPRAZOLE SCH MG: 20 CAPSULE, DELAYED RELEASE ORAL at 07:47

## 2020-01-27 RX ADMIN — ROPINIROLE SCH MG: 0.5 TABLET ORAL at 19:39

## 2020-01-27 RX ADMIN — GABAPENTIN SCH MG: 300 CAPSULE ORAL at 19:39

## 2020-01-27 RX ADMIN — FUROSEMIDE SCH MG: 20 TABLET ORAL at 07:47

## 2020-01-27 RX ADMIN — GABAPENTIN SCH MG: 100 CAPSULE ORAL at 07:47

## 2020-01-27 RX ADMIN — ZINC SCH TAB: TAB ORAL at 07:47

## 2020-01-28 RX ADMIN — METOPROLOL SUCCINATE SCH MG: 25 TABLET, EXTENDED RELEASE ORAL at 07:50

## 2020-01-28 RX ADMIN — GABAPENTIN SCH MG: 100 CAPSULE ORAL at 07:48

## 2020-01-28 RX ADMIN — ROPINIROLE SCH MG: 0.5 TABLET ORAL at 19:44

## 2020-01-28 RX ADMIN — WARFARIN SODIUM SCH MG: 5 TABLET ORAL at 19:43

## 2020-01-28 RX ADMIN — GABAPENTIN SCH MG: 100 CAPSULE ORAL at 12:57

## 2020-01-28 RX ADMIN — ZINC SCH TAB: TAB ORAL at 07:49

## 2020-01-28 RX ADMIN — FUROSEMIDE SCH MG: 20 TABLET ORAL at 07:48

## 2020-01-28 RX ADMIN — SERTRALINE HYDROCHLORIDE SCH MG: 100 TABLET ORAL at 07:48

## 2020-01-28 RX ADMIN — GABAPENTIN SCH MG: 300 CAPSULE ORAL at 19:42

## 2020-01-28 RX ADMIN — OMEPRAZOLE SCH MG: 20 CAPSULE, DELAYED RELEASE ORAL at 07:49

## 2020-01-29 RX ADMIN — OMEPRAZOLE SCH MG: 20 CAPSULE, DELAYED RELEASE ORAL at 07:52

## 2020-01-29 RX ADMIN — GABAPENTIN SCH MG: 300 CAPSULE ORAL at 19:46

## 2020-01-29 RX ADMIN — GABAPENTIN SCH MG: 100 CAPSULE ORAL at 07:52

## 2020-01-29 RX ADMIN — GABAPENTIN SCH MG: 100 CAPSULE ORAL at 12:35

## 2020-01-29 RX ADMIN — METOPROLOL SUCCINATE SCH MG: 25 TABLET, EXTENDED RELEASE ORAL at 07:52

## 2020-01-29 RX ADMIN — ROPINIROLE SCH MG: 0.5 TABLET ORAL at 19:47

## 2020-01-29 RX ADMIN — WARFARIN SODIUM SCH MG: 5 TABLET ORAL at 19:47

## 2020-01-29 RX ADMIN — FUROSEMIDE SCH MG: 20 TABLET ORAL at 07:52

## 2020-01-29 RX ADMIN — ZINC SCH TAB: TAB ORAL at 07:52

## 2020-01-29 RX ADMIN — SERTRALINE HYDROCHLORIDE SCH MG: 100 TABLET ORAL at 07:52

## 2020-01-30 RX ADMIN — METOPROLOL SUCCINATE SCH MG: 25 TABLET, EXTENDED RELEASE ORAL at 09:14

## 2020-01-30 RX ADMIN — SERTRALINE HYDROCHLORIDE SCH MG: 100 TABLET ORAL at 09:16

## 2020-01-30 RX ADMIN — GABAPENTIN SCH MG: 100 CAPSULE ORAL at 09:14

## 2020-01-30 RX ADMIN — OMEPRAZOLE SCH MG: 20 CAPSULE, DELAYED RELEASE ORAL at 09:17

## 2020-01-30 RX ADMIN — GABAPENTIN SCH MG: 300 CAPSULE ORAL at 20:10

## 2020-01-30 RX ADMIN — GABAPENTIN SCH MG: 100 CAPSULE ORAL at 14:36

## 2020-01-30 RX ADMIN — ROPINIROLE SCH MG: 0.5 TABLET ORAL at 20:11

## 2020-01-30 RX ADMIN — ZINC SCH TAB: TAB ORAL at 09:17

## 2020-01-30 RX ADMIN — WARFARIN SODIUM SCH MG: 5 TABLET ORAL at 20:11

## 2020-01-30 RX ADMIN — FUROSEMIDE SCH MG: 20 TABLET ORAL at 09:14

## 2020-01-31 RX ADMIN — WARFARIN SODIUM SCH MG: 5 TABLET ORAL at 20:29

## 2020-01-31 RX ADMIN — ZINC SCH TAB: TAB ORAL at 07:12

## 2020-01-31 RX ADMIN — SERTRALINE HYDROCHLORIDE SCH MG: 100 TABLET ORAL at 07:13

## 2020-01-31 RX ADMIN — FUROSEMIDE SCH MG: 20 TABLET ORAL at 07:13

## 2020-01-31 RX ADMIN — OMEPRAZOLE SCH MG: 20 CAPSULE, DELAYED RELEASE ORAL at 07:12

## 2020-01-31 RX ADMIN — GABAPENTIN SCH MG: 100 CAPSULE ORAL at 07:12

## 2020-01-31 RX ADMIN — ROPINIROLE SCH MG: 0.5 TABLET ORAL at 20:29

## 2020-01-31 RX ADMIN — METOPROLOL SUCCINATE SCH MG: 25 TABLET, EXTENDED RELEASE ORAL at 07:13

## 2020-01-31 RX ADMIN — GABAPENTIN SCH MG: 100 CAPSULE ORAL at 13:00

## 2020-01-31 RX ADMIN — GABAPENTIN SCH MG: 300 CAPSULE ORAL at 20:29

## 2020-02-01 RX ADMIN — WARFARIN SODIUM SCH MG: 5 TABLET ORAL at 20:11

## 2020-02-01 RX ADMIN — GABAPENTIN SCH MG: 100 CAPSULE ORAL at 09:08

## 2020-02-01 RX ADMIN — GABAPENTIN SCH MG: 100 CAPSULE ORAL at 12:11

## 2020-02-01 RX ADMIN — GABAPENTIN SCH MG: 300 CAPSULE ORAL at 20:12

## 2020-02-01 RX ADMIN — FUROSEMIDE SCH MG: 20 TABLET ORAL at 09:09

## 2020-02-01 RX ADMIN — ROPINIROLE SCH MG: 0.5 TABLET ORAL at 20:12

## 2020-02-01 RX ADMIN — SERTRALINE HYDROCHLORIDE SCH MG: 100 TABLET ORAL at 09:08

## 2020-02-01 RX ADMIN — METOPROLOL SUCCINATE SCH MG: 25 TABLET, EXTENDED RELEASE ORAL at 09:09

## 2020-02-01 RX ADMIN — OMEPRAZOLE SCH MG: 20 CAPSULE, DELAYED RELEASE ORAL at 09:06

## 2020-02-01 RX ADMIN — ZINC SCH TAB: TAB ORAL at 09:07

## 2020-02-02 RX ADMIN — WARFARIN SODIUM SCH MG: 5 TABLET ORAL at 20:13

## 2020-02-02 RX ADMIN — ROPINIROLE SCH MG: 0.5 TABLET ORAL at 20:13

## 2020-02-02 RX ADMIN — ZINC SCH TAB: TAB ORAL at 07:50

## 2020-02-02 RX ADMIN — METOPROLOL SUCCINATE SCH MG: 25 TABLET, EXTENDED RELEASE ORAL at 07:50

## 2020-02-02 RX ADMIN — GABAPENTIN SCH MG: 300 CAPSULE ORAL at 20:13

## 2020-02-02 RX ADMIN — OMEPRAZOLE SCH MG: 20 CAPSULE, DELAYED RELEASE ORAL at 07:50

## 2020-02-02 RX ADMIN — SERTRALINE HYDROCHLORIDE SCH MG: 100 TABLET ORAL at 07:50

## 2020-02-02 RX ADMIN — GABAPENTIN SCH MG: 100 CAPSULE ORAL at 12:16

## 2020-02-02 RX ADMIN — FUROSEMIDE SCH MG: 20 TABLET ORAL at 07:51

## 2020-02-02 RX ADMIN — GABAPENTIN SCH MG: 100 CAPSULE ORAL at 07:49

## 2020-02-03 RX ADMIN — ZINC SCH TAB: TAB ORAL at 07:39

## 2020-02-03 RX ADMIN — ROPINIROLE SCH MG: 0.5 TABLET ORAL at 19:55

## 2020-02-03 RX ADMIN — WARFARIN SODIUM SCH MG: 5 TABLET ORAL at 19:56

## 2020-02-03 RX ADMIN — FUROSEMIDE SCH MG: 20 TABLET ORAL at 07:41

## 2020-02-03 RX ADMIN — GABAPENTIN SCH MG: 100 CAPSULE ORAL at 07:41

## 2020-02-03 RX ADMIN — OMEPRAZOLE SCH MG: 20 CAPSULE, DELAYED RELEASE ORAL at 07:39

## 2020-02-03 RX ADMIN — METOPROLOL SUCCINATE SCH MG: 25 TABLET, EXTENDED RELEASE ORAL at 07:42

## 2020-02-03 RX ADMIN — SERTRALINE HYDROCHLORIDE SCH MG: 100 TABLET ORAL at 07:41

## 2020-02-03 RX ADMIN — GABAPENTIN SCH MG: 100 CAPSULE ORAL at 12:08

## 2020-02-03 RX ADMIN — GABAPENTIN SCH MG: 300 CAPSULE ORAL at 19:56

## 2020-02-04 RX ADMIN — WARFARIN SODIUM SCH MG: 5 TABLET ORAL at 19:50

## 2020-02-04 RX ADMIN — ROPINIROLE SCH MG: 0.5 TABLET ORAL at 19:50

## 2020-02-04 RX ADMIN — SERTRALINE HYDROCHLORIDE SCH MG: 100 TABLET ORAL at 08:30

## 2020-02-04 RX ADMIN — GABAPENTIN SCH MG: 300 CAPSULE ORAL at 19:49

## 2020-02-04 RX ADMIN — ZINC SCH TAB: TAB ORAL at 08:31

## 2020-02-04 RX ADMIN — OMEPRAZOLE SCH MG: 20 CAPSULE, DELAYED RELEASE ORAL at 08:31

## 2020-02-04 RX ADMIN — GABAPENTIN SCH MG: 100 CAPSULE ORAL at 08:30

## 2020-02-04 RX ADMIN — FUROSEMIDE SCH MG: 20 TABLET ORAL at 08:30

## 2020-02-04 RX ADMIN — METOPROLOL SUCCINATE SCH MG: 25 TABLET, EXTENDED RELEASE ORAL at 08:34

## 2020-02-04 RX ADMIN — GABAPENTIN SCH MG: 100 CAPSULE ORAL at 12:20

## 2020-02-05 RX ADMIN — GABAPENTIN SCH MG: 100 CAPSULE ORAL at 14:45

## 2020-02-05 RX ADMIN — SERTRALINE HYDROCHLORIDE SCH MG: 100 TABLET ORAL at 07:57

## 2020-02-05 RX ADMIN — GABAPENTIN SCH MG: 300 CAPSULE ORAL at 19:29

## 2020-02-05 RX ADMIN — WARFARIN SODIUM SCH MG: 5 TABLET ORAL at 19:28

## 2020-02-05 RX ADMIN — OMEPRAZOLE SCH MG: 20 CAPSULE, DELAYED RELEASE ORAL at 07:56

## 2020-02-05 RX ADMIN — GABAPENTIN SCH MG: 100 CAPSULE ORAL at 07:55

## 2020-02-05 RX ADMIN — ROPINIROLE SCH MG: 0.5 TABLET ORAL at 19:28

## 2020-02-05 RX ADMIN — ZINC SCH TAB: TAB ORAL at 07:56

## 2020-02-05 RX ADMIN — METOPROLOL SUCCINATE SCH MG: 25 TABLET, EXTENDED RELEASE ORAL at 07:57

## 2020-02-05 RX ADMIN — FUROSEMIDE SCH MG: 20 TABLET ORAL at 07:57

## 2020-02-06 RX ADMIN — GABAPENTIN SCH MG: 300 CAPSULE ORAL at 20:03

## 2020-02-06 RX ADMIN — SERTRALINE HYDROCHLORIDE SCH MG: 100 TABLET ORAL at 07:21

## 2020-02-06 RX ADMIN — OMEPRAZOLE SCH MG: 20 CAPSULE, DELAYED RELEASE ORAL at 07:21

## 2020-02-06 RX ADMIN — METOPROLOL SUCCINATE SCH MG: 25 TABLET, EXTENDED RELEASE ORAL at 07:21

## 2020-02-06 RX ADMIN — FUROSEMIDE SCH MG: 20 TABLET ORAL at 07:21

## 2020-02-06 RX ADMIN — GABAPENTIN SCH MG: 100 CAPSULE ORAL at 07:21

## 2020-02-06 RX ADMIN — GABAPENTIN SCH MG: 100 CAPSULE ORAL at 12:53

## 2020-02-06 RX ADMIN — ZINC SCH TAB: TAB ORAL at 07:21

## 2020-02-06 RX ADMIN — WARFARIN SODIUM SCH MG: 5 TABLET ORAL at 20:01

## 2020-02-06 RX ADMIN — ROPINIROLE SCH MG: 0.5 TABLET ORAL at 19:59

## 2020-02-07 RX ADMIN — METOPROLOL SUCCINATE SCH MG: 25 TABLET, EXTENDED RELEASE ORAL at 08:43

## 2020-02-07 RX ADMIN — OMEPRAZOLE SCH MG: 20 CAPSULE, DELAYED RELEASE ORAL at 08:43

## 2020-02-07 RX ADMIN — SERTRALINE HYDROCHLORIDE SCH MG: 100 TABLET ORAL at 08:43

## 2020-02-07 RX ADMIN — GABAPENTIN SCH MG: 300 CAPSULE ORAL at 19:45

## 2020-02-07 RX ADMIN — WARFARIN SODIUM SCH MG: 5 TABLET ORAL at 19:47

## 2020-02-07 RX ADMIN — GABAPENTIN SCH MG: 100 CAPSULE ORAL at 08:43

## 2020-02-07 RX ADMIN — ZINC SCH TAB: TAB ORAL at 08:43

## 2020-02-07 RX ADMIN — ROPINIROLE SCH MG: 0.5 TABLET ORAL at 19:46

## 2020-02-07 RX ADMIN — GABAPENTIN SCH MG: 100 CAPSULE ORAL at 12:42

## 2020-02-07 RX ADMIN — FUROSEMIDE SCH MG: 20 TABLET ORAL at 08:43

## 2020-02-08 RX ADMIN — METOPROLOL SUCCINATE SCH MG: 25 TABLET, EXTENDED RELEASE ORAL at 07:38

## 2020-02-08 RX ADMIN — ZINC SCH TAB: TAB ORAL at 07:35

## 2020-02-08 RX ADMIN — SERTRALINE HYDROCHLORIDE SCH MG: 100 TABLET ORAL at 07:37

## 2020-02-08 RX ADMIN — ROPINIROLE SCH MG: 0.5 TABLET ORAL at 19:28

## 2020-02-08 RX ADMIN — WARFARIN SODIUM SCH MG: 5 TABLET ORAL at 19:27

## 2020-02-08 RX ADMIN — GABAPENTIN SCH MG: 300 CAPSULE ORAL at 19:26

## 2020-02-08 RX ADMIN — OMEPRAZOLE SCH MG: 20 CAPSULE, DELAYED RELEASE ORAL at 07:35

## 2020-02-08 RX ADMIN — FUROSEMIDE SCH MG: 20 TABLET ORAL at 07:37

## 2020-02-08 RX ADMIN — GABAPENTIN SCH MG: 100 CAPSULE ORAL at 07:37

## 2020-02-08 RX ADMIN — GABAPENTIN SCH MG: 100 CAPSULE ORAL at 12:11

## 2020-02-09 RX ADMIN — GABAPENTIN SCH MG: 100 CAPSULE ORAL at 07:43

## 2020-02-09 RX ADMIN — OMEPRAZOLE SCH MG: 20 CAPSULE, DELAYED RELEASE ORAL at 07:42

## 2020-02-09 RX ADMIN — GABAPENTIN SCH MG: 100 CAPSULE ORAL at 12:29

## 2020-02-09 RX ADMIN — METOPROLOL SUCCINATE SCH MG: 25 TABLET, EXTENDED RELEASE ORAL at 07:45

## 2020-02-09 RX ADMIN — SERTRALINE HYDROCHLORIDE SCH MG: 100 TABLET ORAL at 07:43

## 2020-02-09 RX ADMIN — ZINC SCH TAB: TAB ORAL at 07:42

## 2020-02-09 RX ADMIN — WARFARIN SODIUM SCH MG: 5 TABLET ORAL at 19:41

## 2020-02-09 RX ADMIN — FUROSEMIDE SCH MG: 20 TABLET ORAL at 07:43

## 2020-02-09 RX ADMIN — ROPINIROLE SCH MG: 0.5 TABLET ORAL at 19:42

## 2020-02-09 RX ADMIN — GABAPENTIN SCH MG: 300 CAPSULE ORAL at 19:39

## 2020-02-10 RX ADMIN — GABAPENTIN SCH MG: 300 CAPSULE ORAL at 21:00

## 2020-02-10 RX ADMIN — WARFARIN SODIUM SCH MG: 5 TABLET ORAL at 21:00

## 2020-02-10 RX ADMIN — FUROSEMIDE SCH MG: 20 TABLET ORAL at 07:21

## 2020-02-10 RX ADMIN — ROPINIROLE SCH MG: 0.5 TABLET ORAL at 21:00

## 2020-02-10 RX ADMIN — METOPROLOL SUCCINATE SCH MG: 25 TABLET, EXTENDED RELEASE ORAL at 07:21

## 2020-02-10 RX ADMIN — OMEPRAZOLE SCH MG: 20 CAPSULE, DELAYED RELEASE ORAL at 07:20

## 2020-02-10 RX ADMIN — GABAPENTIN SCH MG: 100 CAPSULE ORAL at 07:21

## 2020-02-10 RX ADMIN — SERTRALINE HYDROCHLORIDE SCH MG: 100 TABLET ORAL at 07:21

## 2020-02-10 RX ADMIN — ZINC SCH TAB: TAB ORAL at 07:20

## 2020-02-10 RX ADMIN — GABAPENTIN SCH MG: 100 CAPSULE ORAL at 11:59

## 2020-02-11 RX ADMIN — GABAPENTIN SCH MG: 100 CAPSULE ORAL at 08:06

## 2020-02-11 RX ADMIN — SERTRALINE HYDROCHLORIDE SCH MG: 100 TABLET ORAL at 08:06

## 2020-02-11 RX ADMIN — WARFARIN SODIUM SCH MG: 5 TABLET ORAL at 20:51

## 2020-02-11 RX ADMIN — OMEPRAZOLE SCH MG: 20 CAPSULE, DELAYED RELEASE ORAL at 08:06

## 2020-02-11 RX ADMIN — METOPROLOL SUCCINATE SCH MG: 25 TABLET, EXTENDED RELEASE ORAL at 08:06

## 2020-02-11 RX ADMIN — GABAPENTIN SCH MG: 300 CAPSULE ORAL at 20:50

## 2020-02-11 RX ADMIN — GABAPENTIN SCH MG: 100 CAPSULE ORAL at 12:28

## 2020-02-11 RX ADMIN — ROPINIROLE SCH MG: 0.5 TABLET ORAL at 20:51

## 2020-02-11 RX ADMIN — FUROSEMIDE SCH MG: 20 TABLET ORAL at 08:06

## 2020-02-11 RX ADMIN — ZINC SCH TAB: TAB ORAL at 08:06

## 2020-02-12 RX ADMIN — GABAPENTIN SCH MG: 300 CAPSULE ORAL at 19:21

## 2020-02-12 RX ADMIN — OMEPRAZOLE SCH MG: 20 CAPSULE, DELAYED RELEASE ORAL at 08:18

## 2020-02-12 RX ADMIN — GABAPENTIN SCH MG: 100 CAPSULE ORAL at 13:17

## 2020-02-12 RX ADMIN — SERTRALINE HYDROCHLORIDE SCH MG: 100 TABLET ORAL at 08:18

## 2020-02-12 RX ADMIN — FUROSEMIDE SCH MG: 20 TABLET ORAL at 08:18

## 2020-02-12 RX ADMIN — ZINC SCH TAB: TAB ORAL at 08:18

## 2020-02-12 RX ADMIN — METOPROLOL SUCCINATE SCH MG: 25 TABLET, EXTENDED RELEASE ORAL at 08:18

## 2020-02-12 RX ADMIN — WARFARIN SODIUM SCH MG: 5 TABLET ORAL at 19:22

## 2020-02-12 RX ADMIN — GABAPENTIN SCH MG: 100 CAPSULE ORAL at 08:18

## 2020-02-12 RX ADMIN — ROPINIROLE SCH MG: 0.5 TABLET ORAL at 19:22

## 2020-02-13 RX ADMIN — ROPINIROLE SCH MG: 0.5 TABLET ORAL at 19:07

## 2020-02-13 RX ADMIN — WARFARIN SODIUM SCH MG: 5 TABLET ORAL at 19:07

## 2020-02-13 RX ADMIN — GABAPENTIN SCH MG: 100 CAPSULE ORAL at 12:01

## 2020-02-13 RX ADMIN — GABAPENTIN SCH MG: 100 CAPSULE ORAL at 08:33

## 2020-02-13 RX ADMIN — ZINC SCH TAB: TAB ORAL at 08:31

## 2020-02-13 RX ADMIN — FUROSEMIDE SCH MG: 20 TABLET ORAL at 08:34

## 2020-02-13 RX ADMIN — METOPROLOL SUCCINATE SCH MG: 25 TABLET, EXTENDED RELEASE ORAL at 08:33

## 2020-02-13 RX ADMIN — GABAPENTIN SCH MG: 300 CAPSULE ORAL at 19:06

## 2020-02-13 RX ADMIN — OMEPRAZOLE SCH MG: 20 CAPSULE, DELAYED RELEASE ORAL at 08:31

## 2020-02-13 RX ADMIN — SERTRALINE HYDROCHLORIDE SCH MG: 100 TABLET ORAL at 08:34

## 2020-02-14 RX ADMIN — OMEPRAZOLE SCH MG: 20 CAPSULE, DELAYED RELEASE ORAL at 08:16

## 2020-02-14 RX ADMIN — ZINC SCH TAB: TAB ORAL at 08:15

## 2020-02-14 RX ADMIN — FUROSEMIDE SCH MG: 20 TABLET ORAL at 08:16

## 2020-02-14 RX ADMIN — ROPINIROLE SCH MG: 0.5 TABLET ORAL at 20:04

## 2020-02-14 RX ADMIN — SERTRALINE HYDROCHLORIDE SCH MG: 100 TABLET ORAL at 08:16

## 2020-02-14 RX ADMIN — WARFARIN SODIUM SCH MG: 5 TABLET ORAL at 20:04

## 2020-02-14 RX ADMIN — GABAPENTIN SCH MG: 300 CAPSULE ORAL at 20:02

## 2020-02-14 RX ADMIN — GABAPENTIN SCH MG: 100 CAPSULE ORAL at 08:16

## 2020-02-14 RX ADMIN — GABAPENTIN SCH MG: 100 CAPSULE ORAL at 12:52

## 2020-02-14 RX ADMIN — METOPROLOL SUCCINATE SCH MG: 25 TABLET, EXTENDED RELEASE ORAL at 08:17

## 2020-02-15 RX ADMIN — GABAPENTIN SCH MG: 100 CAPSULE ORAL at 08:05

## 2020-02-15 RX ADMIN — SERTRALINE HYDROCHLORIDE SCH MG: 100 TABLET ORAL at 08:03

## 2020-02-15 RX ADMIN — WARFARIN SODIUM SCH MG: 5 TABLET ORAL at 21:28

## 2020-02-15 RX ADMIN — FUROSEMIDE SCH MG: 20 TABLET ORAL at 08:03

## 2020-02-15 RX ADMIN — OMEPRAZOLE SCH MG: 20 CAPSULE, DELAYED RELEASE ORAL at 08:05

## 2020-02-15 RX ADMIN — GABAPENTIN SCH MG: 100 CAPSULE ORAL at 12:00

## 2020-02-15 RX ADMIN — METOPROLOL SUCCINATE SCH MG: 25 TABLET, EXTENDED RELEASE ORAL at 08:03

## 2020-02-15 RX ADMIN — ZINC SCH TAB: TAB ORAL at 08:05

## 2020-02-15 RX ADMIN — GABAPENTIN SCH MG: 300 CAPSULE ORAL at 21:29

## 2020-02-15 RX ADMIN — ROPINIROLE SCH MG: 0.5 TABLET ORAL at 21:29

## 2020-02-16 RX ADMIN — SERTRALINE HYDROCHLORIDE SCH MG: 100 TABLET ORAL at 09:35

## 2020-02-16 RX ADMIN — OMEPRAZOLE SCH MG: 20 CAPSULE, DELAYED RELEASE ORAL at 09:40

## 2020-02-16 RX ADMIN — GABAPENTIN SCH MG: 100 CAPSULE ORAL at 09:33

## 2020-02-16 RX ADMIN — GABAPENTIN SCH MG: 300 CAPSULE ORAL at 19:37

## 2020-02-16 RX ADMIN — ROPINIROLE SCH MG: 0.5 TABLET ORAL at 19:38

## 2020-02-16 RX ADMIN — FUROSEMIDE SCH MG: 20 TABLET ORAL at 09:35

## 2020-02-16 RX ADMIN — GABAPENTIN SCH MG: 100 CAPSULE ORAL at 12:45

## 2020-02-16 RX ADMIN — ZINC SCH TAB: TAB ORAL at 09:35

## 2020-02-16 RX ADMIN — METOPROLOL SUCCINATE SCH MG: 25 TABLET, EXTENDED RELEASE ORAL at 09:35

## 2020-02-16 RX ADMIN — WARFARIN SODIUM SCH MG: 5 TABLET ORAL at 19:38

## 2020-02-17 RX ADMIN — OMEPRAZOLE SCH MG: 20 CAPSULE, DELAYED RELEASE ORAL at 08:26

## 2020-02-17 RX ADMIN — ZINC SCH TAB: TAB ORAL at 08:26

## 2020-02-17 RX ADMIN — WARFARIN SODIUM SCH MG: 5 TABLET ORAL at 19:37

## 2020-02-17 RX ADMIN — ROPINIROLE SCH MG: 0.5 TABLET ORAL at 19:36

## 2020-02-17 RX ADMIN — SERTRALINE HYDROCHLORIDE SCH MG: 100 TABLET ORAL at 08:26

## 2020-02-17 RX ADMIN — GABAPENTIN SCH MG: 100 CAPSULE ORAL at 08:25

## 2020-02-17 RX ADMIN — GABAPENTIN SCH MG: 300 CAPSULE ORAL at 19:35

## 2020-02-17 RX ADMIN — METOPROLOL SUCCINATE SCH MG: 25 TABLET, EXTENDED RELEASE ORAL at 08:27

## 2020-02-17 RX ADMIN — FUROSEMIDE SCH MG: 20 TABLET ORAL at 08:26

## 2020-02-17 RX ADMIN — GABAPENTIN SCH MG: 100 CAPSULE ORAL at 12:37

## 2020-02-18 RX ADMIN — ROPINIROLE SCH MG: 0.5 TABLET ORAL at 19:14

## 2020-02-18 RX ADMIN — SERTRALINE HYDROCHLORIDE SCH MG: 100 TABLET ORAL at 09:03

## 2020-02-18 RX ADMIN — METOPROLOL SUCCINATE SCH MG: 25 TABLET, EXTENDED RELEASE ORAL at 09:03

## 2020-02-18 RX ADMIN — FUROSEMIDE SCH MG: 20 TABLET ORAL at 09:03

## 2020-02-18 RX ADMIN — GABAPENTIN SCH MG: 300 CAPSULE ORAL at 19:13

## 2020-02-18 RX ADMIN — GABAPENTIN SCH MG: 100 CAPSULE ORAL at 09:04

## 2020-02-18 RX ADMIN — GABAPENTIN SCH MG: 100 CAPSULE ORAL at 12:14

## 2020-02-18 RX ADMIN — ZINC SCH TAB: TAB ORAL at 09:05

## 2020-02-18 RX ADMIN — WARFARIN SODIUM SCH MG: 5 TABLET ORAL at 19:14

## 2020-02-18 RX ADMIN — OMEPRAZOLE SCH MG: 20 CAPSULE, DELAYED RELEASE ORAL at 09:04

## 2020-02-19 LAB
ANION GAP SERPL CALC-SCNC: 10.2 MMOL/L (ref 10–20)
CHLORIDE SERPL-SCNC: 109 MMOL/L (ref 98–107)
SODIUM SERPL-SCNC: 148 MMOL/L (ref 136–145)

## 2020-02-19 RX ADMIN — GABAPENTIN SCH MG: 300 CAPSULE ORAL at 19:18

## 2020-02-19 RX ADMIN — ZINC SCH TAB: TAB ORAL at 08:28

## 2020-02-19 RX ADMIN — GABAPENTIN SCH MG: 100 CAPSULE ORAL at 08:28

## 2020-02-19 RX ADMIN — OMEPRAZOLE SCH MG: 20 CAPSULE, DELAYED RELEASE ORAL at 08:29

## 2020-02-19 RX ADMIN — SERTRALINE HYDROCHLORIDE SCH MG: 100 TABLET ORAL at 09:09

## 2020-02-19 RX ADMIN — WARFARIN SODIUM SCH MG: 5 TABLET ORAL at 19:19

## 2020-02-19 RX ADMIN — ROPINIROLE SCH MG: 0.5 TABLET ORAL at 19:19

## 2020-02-19 RX ADMIN — GABAPENTIN SCH MG: 100 CAPSULE ORAL at 14:13

## 2020-02-19 RX ADMIN — METOPROLOL SUCCINATE SCH MG: 25 TABLET, EXTENDED RELEASE ORAL at 08:31

## 2020-02-19 RX ADMIN — FUROSEMIDE SCH MG: 20 TABLET ORAL at 08:29

## 2020-02-19 NOTE — PCM.PN
- General Info


Date of Service: 02/19/20


Admission Dx/Problem (Free Text): 





Subjective: Interim long-term care note.  No changes in health status.  On 

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  Mood is stable on 

antidepressant.  Eating and stooling normally.  No gross cognitive defects.





Objective: Vital signs remain normal.  Her weight is unchanged.  Alert oriented 

smiling no distress.  Heart and lungs are clear abdomen soft nontender 

extremities warm well perfused without edema or tenderness.





Assessment and plan:


Doing well.  Anticoagulated for history of DVT, mood stable on antidepressant, 

blood pressures controlled, gabapentin for history of neuropathy.








Subjective Update: 





Subjective:


Sixty-day long-term care visit.  She doing pretty well.  Nursing notes frequent 

heartburn, she's on dual H2 PPI therapy.  Still has to take occasional Tums.  

No dysphasia.  Her mild anemia is stable to improved.  Denies any melena or 

hematochezia.  She has some mild clinical heart failure, her edema and weight 

have improved on their own.  She does get some dyspnea with exertion.  CT chest 

showed what looks like a mediastinal thyroid mass.  Going back this was present 

decades ago.  Incidental 1.5 cm breast mass seen by CT.  She is otherwise 

pretty happy on the floor staying busy reading books etc.





Objective: Vital signs are stable.  She has nodular lesion with scab about 3 mm 

in diameter at right lower lid just below


lid/lash margin.  She feels this is new past couple weeks.  Heart and lungs 

clear to auscultation abdomen soft nontender extremities warm well perfused 1+ 

edema.





Assessment and plan:


Chronic dyspnea.  Heart failure appears stable, weight is improved.  Monitor.





She uses oxygen when necessary.  She has a mediastinal thyroid mass, this has 

been present for years to decades and thus appears benign.  Unless she became 

grossly symptomatic with this thoracotomy would not be recommended at her age 

and medical condition.





Still has some issues with heartburn, can use Tums up to four times daily PRN, 

okay to continue H2 PPI therapy.  Again at her age would not pursue further 

workup for this unless anemia becoming grossly worse to the point requiring 

transfusion etc.





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 

versus squamous cell carcinoma.  I would recommend excision of this before it 

became more invasive.  It approaches the lashes and lid margin but is just 

below it, I think general surgeon here can probably remove this in the clinic, 

we'll refer outpatient them.





Presumption would be that the incidental right breast lesion by CT is indolent  

If visited with surgeon could discuss with them whether she and they thought 

mammogram or excision biopsy would be indicated.














- Patient Data


Vitals - Most Recent: 


 Last Vital Signs











Temp  36.1 C   02/14/20 08:00


 


Pulse  85   02/19/20 08:31


 


Resp  16   02/14/20 08:00


 


BP  121/54 L  02/19/20 08:31


 


Pulse Ox  92 L  02/17/20 10:31











Weight - Most Recent: 85.321 kg


I&O - Last 24 Hours: 


 Intake & Output











 02/18/20 02/19/20 02/19/20





 22:59 06:59 14:59


 


Intake Total 200  240


 


Balance 200  240











Lab Results Last 24 Hours: 


 Laboratory Results - last 24 hr











  02/19/20 02/19/20 02/19/20 Range/Units





  09:50 09:50 09:50 


 


WBC  9.2    (4.0-10.0)  x10^3/uL


 


RBC  4.04    (4.00-5.50)  x10^6/uL


 


Hgb  11.1 L    (12.0-16.0)  g/dL


 


Hct  37.3    (33.0-47.0)  %


 


MCV  92.3    (78.0-93.0)  fL


 


MCH  27.5    (26.0-32.0)  pg


 


MCHC  29.8 L    (32.0-36.0)  g/dL


 


RDW Coeff of Javi  18.7 H    (10.0-15.0)  %


 


Plt Count  203    (130-400)  x10^3/uL


 


Neut % (Auto)  45.1 L    (50.0-80.0)  %


 


Lymph % (Auto)  46.4    (25.0-50.0)  %


 


Mono % (Auto)  5.7    (2.0-11.0)  %


 


Eos % (Auto)  2.1    (0.0-4.0)  %


 


Baso % (Auto)  0.7    (0.2-1.2)  %


 


Sodium   148 H   (136-145)  mmol/L


 


Potassium   4.2   (3.5-5.1)  mmol/L


 


Chloride   109 H   ()  mmol/L


 


Carbon Dioxide   33 H   (21-32)  mmol/L


 


Anion Gap   10.2   (10-20)  mmol/L


 


BUN   15   (7-18)  mg/dL


 


Creatinine   1.1 H   (0.55-1.02)  mg/dL


 


Est Cr Clr Drug Dosing   32.29   mL/min


 


Estimated GFR (MDRD)   47   


 


Glucose   122 H   ()  mg/dL


 


Calcium   8.4 L   (8.5-10.1)  mg/dL


 


Ferritin    23  (8-252)  ng/mL











Med Orders - Current: 


 Current Medications





Acetaminophen (Tylenol)  650 mg PO 0200,0800,1300,2000 [facility]


   Last Admin: 02/19/20 08:28 Dose:  650 mg


Calcium Carbonate/Glycine (Tums Extra Strength)  750 mg PO TID PRN


   PRN Reason: Dyspepsia


   Last Admin: 02/16/20 12:44 Dose:  750 mg


Docusate Sodium (Colace)  100 mg PO DAILY PRN


   PRN Reason: Constipation


Famotidine (Pepcid)  20 mg PO DAILY [facility]


   Last Admin: 02/19/20 08:29 Dose:  20 mg


Ferrous Sulfate (Ferrous Sulfate)  325 mg PO Q48H [facility]


   Last Admin: 02/17/20 12:37 Dose:  325 mg


Furosemide (Lasix)  20 mg PO DAILY [facility]


   Last Admin: 02/19/20 08:29 Dose:  20 mg


Gabapentin (Neurontin)  100 mg PO BID@0800,1300 [facility]


   Last Admin: 02/19/20 08:28 Dose:  100 mg


Gabapentin (Neurontin)  300 mg PO BEDTIME [facility]


   Last Admin: 02/18/20 19:13 Dose:  300 mg


Loperamide HCl (Imodium)  2 mg PO Q12H PRN


   PRN Reason: Diarrhea


   Last Admin: 02/16/20 12:50 Dose:  2 mg


Melatonin (Melatonin)  6 mg PO BEDTIME [facility]


   Last Admin: 02/18/20 19:15 Dose:  6 mg


Metoprolol Succinate (Toprol Xl)  25 mg PO DAILY [facility]


   Last Admin: 02/19/20 08:31 Dose:  25 mg


Miconazole (Desenex 2%)  0 gm TOP BID PRN


   PRN Reason: Rash


   Last Admin: 11/16/19 08:43 Dose:  1 applic


Nitroglycerin (Nitrostat)  0.4 mg SL Q5M PRN


   PRN Reason: Chest Pain


   Last Admin: 11/11/19 18:01 Dose:  0.4 mg


Omeprazole (Omeprazole)  20 mg PO DAILY [facility]


   Last Admin: 02/19/20 08:29 Dose:  20 mg


Polyethylene Glycol (Miralax)  17 gm PO DAILY PRN


   PRN Reason: Constipation


Ropinirole HCl (Requip)  0.5 mg PO BEDTIME [facility]


   Last Admin: 02/18/20 19:14 Dose:  0.5 mg


Senna/Docusate Sodium (Senna Plus)  1 tab PO DAILY PRN


   PRN Reason: Constipation


Sertraline HCl (Zoloft)  100 mg PO DAILY [facility]


   Last Admin: 02/19/20 09:09 Dose:  100 mg


Vitamin B Complex/Vit C/Vit E/Zinc (Stress Formula With Zinc)  1 tab PO DAILY 

[facility]


   Last Admin: 02/19/20 08:28 Dose:  1 tab


Warfarin Sodium (Coumadin)  2.5 mg PO MoTh@2000 [facility]


   Last Admin: 02/17/20 19:37 Dose:  2.5 mg


Warfarin Sodium (Coumadin)  5 mg PO SuTuWeFrSa@2000 [facility]


   Last Admin: 02/18/20 19:14 Dose:  5 mg





Discontinued Medications





Furosemide (Lasix)  20 mg PO DAILY [facility]


   Last Admin: 09/24/19 08:18 Dose:  20 mg


Gabapentin (Neurontin)  100 mg PO BID@0800,1300 [facility]


   Last Admin: 09/24/19 13:48 Dose:  100 mg


Influenza Virus Vaccine (Fluzone High-Dose 2019-20 Syringe)  180 mcg IM .ONCE 

ONE


   Stop: 10/01/19 14:01


   Last Admin: 10/01/19 13:57 Dose:  180 mcg


Iopamidol (Isovue-300 (61%))  100 ml IVPUSH ONETIME ONE


   Stop: 12/26/19 15:19


   Last Admin: 12/26/19 16:33 Dose:  100 ml


Miconazole (Desenex 2%)  1 gm TOP BID [facility]


   Last Admin: 11/13/19 09:20 Dose:  Not Given


Nitrofurantoin Macrocrystals (Macrobid)  100 mg PO BID [facility]


   Stop: 10/26/19 20:00


   Last Admin: 10/22/19 07:54 Dose:  100 mg


Nitrofurantoin Macrocrystals (Macrobid)  100 mg PO BID [facility]


   Stop: 10/26/19 20:01


   Last Admin: 10/26/19 20:34 Dose:  100 mg


Own Supply: Warfarin 2.5mg (1/2 Of 5mg Tab)  0 mg PO MoTh@2000 [facility]


   Last Admin: 09/23/19 19:37 Dose:  2.5 mg


Own Supply: Warfarin (. 5mg)  0 mg PO SuTuWeFrSa@2000 [facility]


   Last Admin: 09/22/19 19:43 Dose:  5 mg


Own Supply: (Gabapentin. 300mg)  0 mg PO BEDTIME [facility]


   Last Admin: 09/23/19 19:36 Dose:  300 mg


Own Supply: (Metoprolol.Er 25mg)  0 mg PO DAILY [facility]


   Last Admin: 09/24/19 08:17 Dose:  25 mg


Polyethylene Glycol (Miralax)  17 gm PO DAILY [facility]


   Last Admin: 08/07/19 08:17 Dose:  Not Given


Ropinirole HCl (Requip)  0.5 mg PO BEDTIME [facility]


   Last Admin: 09/23/19 19:37 Dose:  0.5 mg











Sepsis Event Note





- Evaluation


Sepsis Screening Result: No Definite Risk





- Focused Exam


Vital Signs: 


 Vital Signs











  Pulse BP


 


 02/19/20 08:31  85  121/54 L











Date Exam was Performed: 02/19/20


Time Exam was Performed: 13:48





- Problem List Review


Problem List Initiated/Reviewed/Updated: Yes





- My Orders


Last 24 Hours: 


My Active Orders





03/13/20 07:00


INR,PT,PROTHROMBIN TIME [COAG] Q28D 





04/10/20 07:00


INR,PT,PROTHROMBIN TIME [COAG] Q28D 





05/08/20 07:00


INR,PT,PROTHROMBIN TIME [COAG] Q28D 





06/05/20 07:00


INR,PT,PROTHROMBIN TIME [COAG] Q28D 





07/03/20 07:00


INR,PT,PROTHROMBIN TIME [COAG] Q28D 





07/31/20 07:00


INR,PT,PROTHROMBIN TIME [COAG] Q28D 





08/28/20 07:00


INR,PT,PROTHROMBIN TIME [COAG] Q28D 





09/25/20 07:00


INR,PT,PROTHROMBIN TIME [COAG] Q28D 





10/23/20 07:00


INR,PT,PROTHROMBIN TIME [COAG] Q28D

## 2020-02-20 RX ADMIN — OMEPRAZOLE SCH MG: 20 CAPSULE, DELAYED RELEASE ORAL at 07:37

## 2020-02-20 RX ADMIN — GABAPENTIN SCH MG: 300 CAPSULE ORAL at 19:31

## 2020-02-20 RX ADMIN — ROPINIROLE SCH MG: 0.5 TABLET ORAL at 19:31

## 2020-02-20 RX ADMIN — METOPROLOL SUCCINATE SCH MG: 25 TABLET, EXTENDED RELEASE ORAL at 07:36

## 2020-02-20 RX ADMIN — SERTRALINE HYDROCHLORIDE SCH MG: 100 TABLET ORAL at 07:36

## 2020-02-20 RX ADMIN — WARFARIN SODIUM SCH MG: 5 TABLET ORAL at 19:31

## 2020-02-20 RX ADMIN — ZINC SCH TAB: TAB ORAL at 07:37

## 2020-02-20 RX ADMIN — GABAPENTIN SCH MG: 100 CAPSULE ORAL at 07:36

## 2020-02-20 RX ADMIN — FUROSEMIDE SCH MG: 20 TABLET ORAL at 07:36

## 2020-02-20 RX ADMIN — GABAPENTIN SCH MG: 100 CAPSULE ORAL at 13:01

## 2020-02-21 RX ADMIN — SERTRALINE HYDROCHLORIDE SCH MG: 100 TABLET ORAL at 07:43

## 2020-02-21 RX ADMIN — ROPINIROLE SCH MG: 0.5 TABLET ORAL at 20:14

## 2020-02-21 RX ADMIN — METOPROLOL SUCCINATE SCH MG: 25 TABLET, EXTENDED RELEASE ORAL at 07:38

## 2020-02-21 RX ADMIN — OMEPRAZOLE SCH MG: 20 CAPSULE, DELAYED RELEASE ORAL at 07:38

## 2020-02-21 RX ADMIN — FUROSEMIDE SCH MG: 20 TABLET ORAL at 07:36

## 2020-02-21 RX ADMIN — GABAPENTIN SCH MG: 300 CAPSULE ORAL at 20:14

## 2020-02-21 RX ADMIN — WARFARIN SODIUM SCH MG: 5 TABLET ORAL at 20:14

## 2020-02-21 RX ADMIN — GABAPENTIN SCH MG: 100 CAPSULE ORAL at 13:01

## 2020-02-21 RX ADMIN — ZINC SCH TAB: TAB ORAL at 07:38

## 2020-02-21 RX ADMIN — GABAPENTIN SCH MG: 100 CAPSULE ORAL at 07:37

## 2020-02-22 RX ADMIN — GABAPENTIN SCH MG: 100 CAPSULE ORAL at 13:16

## 2020-02-22 RX ADMIN — WARFARIN SODIUM SCH MG: 5 TABLET ORAL at 19:55

## 2020-02-22 RX ADMIN — GABAPENTIN SCH MG: 300 CAPSULE ORAL at 19:55

## 2020-02-22 RX ADMIN — OMEPRAZOLE SCH MG: 20 CAPSULE, DELAYED RELEASE ORAL at 07:56

## 2020-02-22 RX ADMIN — ROPINIROLE SCH MG: 0.5 TABLET ORAL at 19:55

## 2020-02-22 RX ADMIN — FUROSEMIDE SCH MG: 20 TABLET ORAL at 07:55

## 2020-02-22 RX ADMIN — SERTRALINE HYDROCHLORIDE SCH MG: 100 TABLET ORAL at 07:55

## 2020-02-22 RX ADMIN — METOPROLOL SUCCINATE SCH MG: 25 TABLET, EXTENDED RELEASE ORAL at 07:55

## 2020-02-22 RX ADMIN — GABAPENTIN SCH MG: 100 CAPSULE ORAL at 07:56

## 2020-02-22 RX ADMIN — ZINC SCH TAB: TAB ORAL at 07:56

## 2020-02-23 RX ADMIN — SERTRALINE HYDROCHLORIDE SCH MG: 100 TABLET ORAL at 08:00

## 2020-02-23 RX ADMIN — ZINC SCH TAB: TAB ORAL at 07:57

## 2020-02-23 RX ADMIN — GABAPENTIN SCH: 100 CAPSULE ORAL at 12:54

## 2020-02-23 RX ADMIN — ROPINIROLE SCH MG: 0.5 TABLET ORAL at 21:28

## 2020-02-23 RX ADMIN — GABAPENTIN SCH MG: 100 CAPSULE ORAL at 07:58

## 2020-02-23 RX ADMIN — FUROSEMIDE SCH MG: 20 TABLET ORAL at 07:58

## 2020-02-23 RX ADMIN — WARFARIN SODIUM SCH MG: 5 TABLET ORAL at 21:27

## 2020-02-23 RX ADMIN — METOPROLOL SUCCINATE SCH MG: 25 TABLET, EXTENDED RELEASE ORAL at 07:58

## 2020-02-23 RX ADMIN — GABAPENTIN SCH MG: 100 CAPSULE ORAL at 11:30

## 2020-02-23 RX ADMIN — GABAPENTIN SCH MG: 300 CAPSULE ORAL at 21:29

## 2020-02-23 RX ADMIN — OMEPRAZOLE SCH MG: 20 CAPSULE, DELAYED RELEASE ORAL at 07:57

## 2020-02-24 RX ADMIN — SERTRALINE HYDROCHLORIDE SCH MG: 100 TABLET ORAL at 08:52

## 2020-02-24 RX ADMIN — GABAPENTIN SCH MG: 100 CAPSULE ORAL at 08:52

## 2020-02-24 RX ADMIN — WARFARIN SODIUM SCH MG: 5 TABLET ORAL at 19:55

## 2020-02-24 RX ADMIN — GABAPENTIN SCH MG: 100 CAPSULE ORAL at 13:57

## 2020-02-24 RX ADMIN — ZINC SCH TAB: TAB ORAL at 08:52

## 2020-02-24 RX ADMIN — FUROSEMIDE SCH MG: 20 TABLET ORAL at 08:52

## 2020-02-24 RX ADMIN — OMEPRAZOLE SCH MG: 20 CAPSULE, DELAYED RELEASE ORAL at 08:51

## 2020-02-24 RX ADMIN — ROPINIROLE SCH MG: 0.5 TABLET ORAL at 19:54

## 2020-02-24 RX ADMIN — METOPROLOL SUCCINATE SCH MG: 25 TABLET, EXTENDED RELEASE ORAL at 08:52

## 2020-02-24 RX ADMIN — GABAPENTIN SCH MG: 300 CAPSULE ORAL at 19:54

## 2020-02-25 RX ADMIN — FUROSEMIDE SCH MG: 20 TABLET ORAL at 08:34

## 2020-02-25 RX ADMIN — GABAPENTIN SCH MG: 100 CAPSULE ORAL at 08:33

## 2020-02-25 RX ADMIN — SERTRALINE HYDROCHLORIDE SCH MG: 100 TABLET ORAL at 08:34

## 2020-02-25 RX ADMIN — GABAPENTIN SCH MG: 100 CAPSULE ORAL at 12:19

## 2020-02-25 RX ADMIN — ZINC SCH TAB: TAB ORAL at 08:33

## 2020-02-25 RX ADMIN — METOPROLOL SUCCINATE SCH MG: 25 TABLET, EXTENDED RELEASE ORAL at 08:34

## 2020-02-25 RX ADMIN — GABAPENTIN SCH MG: 300 CAPSULE ORAL at 19:40

## 2020-02-25 RX ADMIN — ROPINIROLE SCH MG: 0.5 TABLET ORAL at 19:41

## 2020-02-25 RX ADMIN — WARFARIN SODIUM SCH MG: 5 TABLET ORAL at 19:41

## 2020-02-25 RX ADMIN — OMEPRAZOLE SCH MG: 20 CAPSULE, DELAYED RELEASE ORAL at 08:33

## 2020-02-26 RX ADMIN — METOPROLOL SUCCINATE SCH MG: 25 TABLET, EXTENDED RELEASE ORAL at 08:30

## 2020-02-26 RX ADMIN — GABAPENTIN SCH MG: 100 CAPSULE ORAL at 13:48

## 2020-02-26 RX ADMIN — SERTRALINE HYDROCHLORIDE SCH MG: 100 TABLET ORAL at 08:32

## 2020-02-26 RX ADMIN — WARFARIN SODIUM SCH MG: 5 TABLET ORAL at 20:01

## 2020-02-26 RX ADMIN — OMEPRAZOLE SCH MG: 20 CAPSULE, DELAYED RELEASE ORAL at 08:30

## 2020-02-26 RX ADMIN — ROPINIROLE SCH MG: 0.5 TABLET ORAL at 20:01

## 2020-02-26 RX ADMIN — ZINC SCH TAB: TAB ORAL at 08:30

## 2020-02-26 RX ADMIN — GABAPENTIN SCH MG: 300 CAPSULE ORAL at 20:00

## 2020-02-26 RX ADMIN — FUROSEMIDE SCH MG: 20 TABLET ORAL at 08:31

## 2020-02-26 RX ADMIN — GABAPENTIN SCH MG: 100 CAPSULE ORAL at 08:32

## 2020-02-27 RX ADMIN — FUROSEMIDE SCH MG: 20 TABLET ORAL at 07:53

## 2020-02-27 RX ADMIN — GABAPENTIN SCH MG: 100 CAPSULE ORAL at 12:13

## 2020-02-27 RX ADMIN — SERTRALINE HYDROCHLORIDE SCH MG: 100 TABLET ORAL at 07:54

## 2020-02-27 RX ADMIN — OMEPRAZOLE SCH MG: 20 CAPSULE, DELAYED RELEASE ORAL at 07:53

## 2020-02-27 RX ADMIN — METOPROLOL SUCCINATE SCH MG: 25 TABLET, EXTENDED RELEASE ORAL at 07:56

## 2020-02-27 RX ADMIN — GABAPENTIN SCH MG: 300 CAPSULE ORAL at 20:26

## 2020-02-27 RX ADMIN — ZINC SCH TAB: TAB ORAL at 07:53

## 2020-02-27 RX ADMIN — WARFARIN SODIUM SCH MG: 5 TABLET ORAL at 20:27

## 2020-02-27 RX ADMIN — GABAPENTIN SCH MG: 100 CAPSULE ORAL at 07:53

## 2020-02-27 RX ADMIN — ROPINIROLE SCH MG: 0.5 TABLET ORAL at 20:27

## 2020-02-28 RX ADMIN — SERTRALINE HYDROCHLORIDE SCH MG: 100 TABLET ORAL at 08:12

## 2020-02-28 RX ADMIN — FUROSEMIDE SCH MG: 20 TABLET ORAL at 08:12

## 2020-02-28 RX ADMIN — METOPROLOL SUCCINATE SCH MG: 25 TABLET, EXTENDED RELEASE ORAL at 08:15

## 2020-02-28 RX ADMIN — ZINC SCH TAB: TAB ORAL at 08:12

## 2020-02-28 RX ADMIN — GABAPENTIN SCH MG: 300 CAPSULE ORAL at 19:53

## 2020-02-28 RX ADMIN — GABAPENTIN SCH MG: 100 CAPSULE ORAL at 12:34

## 2020-02-28 RX ADMIN — OMEPRAZOLE SCH MG: 20 CAPSULE, DELAYED RELEASE ORAL at 08:11

## 2020-02-28 RX ADMIN — GABAPENTIN SCH MG: 100 CAPSULE ORAL at 08:11

## 2020-02-28 RX ADMIN — WARFARIN SODIUM SCH MG: 5 TABLET ORAL at 19:52

## 2020-02-28 RX ADMIN — ROPINIROLE SCH MG: 0.5 TABLET ORAL at 19:51

## 2020-02-29 RX ADMIN — GABAPENTIN SCH MG: 100 CAPSULE ORAL at 12:27

## 2020-02-29 RX ADMIN — GABAPENTIN SCH MG: 100 CAPSULE ORAL at 08:50

## 2020-02-29 RX ADMIN — OMEPRAZOLE SCH MG: 20 CAPSULE, DELAYED RELEASE ORAL at 08:50

## 2020-02-29 RX ADMIN — SERTRALINE HYDROCHLORIDE SCH MG: 100 TABLET ORAL at 08:51

## 2020-02-29 RX ADMIN — ZINC SCH TAB: TAB ORAL at 08:50

## 2020-02-29 RX ADMIN — ROPINIROLE SCH MG: 0.5 TABLET ORAL at 20:41

## 2020-02-29 RX ADMIN — WARFARIN SODIUM SCH MG: 5 TABLET ORAL at 20:42

## 2020-02-29 RX ADMIN — FUROSEMIDE SCH MG: 20 TABLET ORAL at 08:51

## 2020-02-29 RX ADMIN — METOPROLOL SUCCINATE SCH MG: 25 TABLET, EXTENDED RELEASE ORAL at 08:52

## 2020-02-29 RX ADMIN — GABAPENTIN SCH MG: 300 CAPSULE ORAL at 20:39

## 2020-03-01 RX ADMIN — WARFARIN SODIUM SCH MG: 5 TABLET ORAL at 20:28

## 2020-03-01 RX ADMIN — SERTRALINE HYDROCHLORIDE SCH MG: 100 TABLET ORAL at 09:05

## 2020-03-01 RX ADMIN — ROPINIROLE SCH MG: 0.5 TABLET ORAL at 20:28

## 2020-03-01 RX ADMIN — GABAPENTIN SCH MG: 300 CAPSULE ORAL at 20:27

## 2020-03-01 RX ADMIN — GABAPENTIN SCH MG: 100 CAPSULE ORAL at 12:10

## 2020-03-01 RX ADMIN — OMEPRAZOLE SCH MG: 20 CAPSULE, DELAYED RELEASE ORAL at 09:04

## 2020-03-01 RX ADMIN — GABAPENTIN SCH MG: 100 CAPSULE ORAL at 09:04

## 2020-03-01 RX ADMIN — ZINC SCH TAB: TAB ORAL at 09:04

## 2020-03-01 RX ADMIN — METOPROLOL SUCCINATE SCH MG: 25 TABLET, EXTENDED RELEASE ORAL at 09:04

## 2020-03-01 RX ADMIN — FUROSEMIDE SCH MG: 20 TABLET ORAL at 09:05

## 2020-03-02 RX ADMIN — WARFARIN SODIUM SCH MG: 5 TABLET ORAL at 19:33

## 2020-03-02 RX ADMIN — ROPINIROLE SCH MG: 0.5 TABLET ORAL at 19:32

## 2020-03-02 RX ADMIN — METOPROLOL SUCCINATE SCH MG: 25 TABLET, EXTENDED RELEASE ORAL at 08:34

## 2020-03-02 RX ADMIN — OMEPRAZOLE SCH MG: 20 CAPSULE, DELAYED RELEASE ORAL at 08:32

## 2020-03-02 RX ADMIN — FUROSEMIDE SCH MG: 20 TABLET ORAL at 08:35

## 2020-03-02 RX ADMIN — ZINC SCH TAB: TAB ORAL at 08:32

## 2020-03-02 RX ADMIN — GABAPENTIN SCH MG: 100 CAPSULE ORAL at 12:26

## 2020-03-02 RX ADMIN — SERTRALINE HYDROCHLORIDE SCH MG: 100 TABLET ORAL at 08:32

## 2020-03-02 RX ADMIN — GABAPENTIN SCH MG: 100 CAPSULE ORAL at 08:35

## 2020-03-02 RX ADMIN — GABAPENTIN SCH MG: 300 CAPSULE ORAL at 19:31

## 2020-03-03 RX ADMIN — SERTRALINE HYDROCHLORIDE SCH MG: 100 TABLET ORAL at 07:43

## 2020-03-03 RX ADMIN — WARFARIN SODIUM SCH MG: 5 TABLET ORAL at 20:31

## 2020-03-03 RX ADMIN — GABAPENTIN SCH MG: 100 CAPSULE ORAL at 13:05

## 2020-03-03 RX ADMIN — GABAPENTIN SCH MG: 100 CAPSULE ORAL at 07:43

## 2020-03-03 RX ADMIN — METOPROLOL SUCCINATE SCH MG: 25 TABLET, EXTENDED RELEASE ORAL at 07:43

## 2020-03-03 RX ADMIN — ROPINIROLE SCH MG: 0.5 TABLET ORAL at 20:31

## 2020-03-03 RX ADMIN — FUROSEMIDE SCH MG: 20 TABLET ORAL at 07:43

## 2020-03-03 RX ADMIN — OMEPRAZOLE SCH MG: 20 CAPSULE, DELAYED RELEASE ORAL at 07:43

## 2020-03-03 RX ADMIN — GABAPENTIN SCH MG: 300 CAPSULE ORAL at 20:30

## 2020-03-03 RX ADMIN — ZINC SCH TAB: TAB ORAL at 07:43

## 2020-03-04 RX ADMIN — SERTRALINE HYDROCHLORIDE SCH MG: 100 TABLET ORAL at 07:53

## 2020-03-04 RX ADMIN — OMEPRAZOLE SCH MG: 20 CAPSULE, DELAYED RELEASE ORAL at 07:51

## 2020-03-04 RX ADMIN — ZINC SCH TAB: TAB ORAL at 07:51

## 2020-03-04 RX ADMIN — ROPINIROLE SCH MG: 0.5 TABLET ORAL at 20:12

## 2020-03-04 RX ADMIN — FUROSEMIDE SCH MG: 20 TABLET ORAL at 07:53

## 2020-03-04 RX ADMIN — METOPROLOL SUCCINATE SCH MG: 25 TABLET, EXTENDED RELEASE ORAL at 07:53

## 2020-03-04 RX ADMIN — GABAPENTIN SCH MG: 100 CAPSULE ORAL at 07:52

## 2020-03-04 RX ADMIN — GABAPENTIN SCH MG: 300 CAPSULE ORAL at 20:10

## 2020-03-04 RX ADMIN — GABAPENTIN SCH MG: 100 CAPSULE ORAL at 12:33

## 2020-03-04 RX ADMIN — WARFARIN SODIUM SCH MG: 5 TABLET ORAL at 20:12

## 2020-03-05 RX ADMIN — OMEPRAZOLE SCH MG: 20 CAPSULE, DELAYED RELEASE ORAL at 07:55

## 2020-03-05 RX ADMIN — GABAPENTIN SCH MG: 100 CAPSULE ORAL at 12:09

## 2020-03-05 RX ADMIN — ZINC SCH TAB: TAB ORAL at 07:55

## 2020-03-05 RX ADMIN — GABAPENTIN SCH MG: 100 CAPSULE ORAL at 07:55

## 2020-03-05 RX ADMIN — ROPINIROLE SCH MG: 0.5 TABLET ORAL at 20:58

## 2020-03-05 RX ADMIN — SERTRALINE HYDROCHLORIDE SCH MG: 100 TABLET ORAL at 07:55

## 2020-03-05 RX ADMIN — FUROSEMIDE SCH MG: 20 TABLET ORAL at 07:55

## 2020-03-05 RX ADMIN — METOPROLOL SUCCINATE SCH MG: 25 TABLET, EXTENDED RELEASE ORAL at 07:55

## 2020-03-05 RX ADMIN — WARFARIN SODIUM SCH MG: 5 TABLET ORAL at 20:58

## 2020-03-05 RX ADMIN — GABAPENTIN SCH MG: 300 CAPSULE ORAL at 20:56

## 2020-03-06 RX ADMIN — FUROSEMIDE SCH MG: 20 TABLET ORAL at 07:55

## 2020-03-06 RX ADMIN — OMEPRAZOLE SCH MG: 20 CAPSULE, DELAYED RELEASE ORAL at 07:55

## 2020-03-06 RX ADMIN — SERTRALINE HYDROCHLORIDE SCH MG: 100 TABLET ORAL at 07:56

## 2020-03-06 RX ADMIN — ZINC SCH TAB: TAB ORAL at 07:55

## 2020-03-06 RX ADMIN — WARFARIN SODIUM SCH MG: 5 TABLET ORAL at 21:24

## 2020-03-06 RX ADMIN — GABAPENTIN SCH MG: 300 CAPSULE ORAL at 21:20

## 2020-03-06 RX ADMIN — METOPROLOL SUCCINATE SCH MG: 25 TABLET, EXTENDED RELEASE ORAL at 07:55

## 2020-03-06 RX ADMIN — ROPINIROLE SCH MG: 0.5 TABLET ORAL at 21:20

## 2020-03-06 RX ADMIN — GABAPENTIN SCH MG: 100 CAPSULE ORAL at 12:46

## 2020-03-06 RX ADMIN — GABAPENTIN SCH MG: 100 CAPSULE ORAL at 07:55

## 2020-03-07 RX ADMIN — WARFARIN SODIUM SCH MG: 5 TABLET ORAL at 20:53

## 2020-03-07 RX ADMIN — SERTRALINE HYDROCHLORIDE SCH MG: 100 TABLET ORAL at 08:30

## 2020-03-07 RX ADMIN — METOPROLOL SUCCINATE SCH MG: 25 TABLET, EXTENDED RELEASE ORAL at 08:30

## 2020-03-07 RX ADMIN — GABAPENTIN SCH MG: 100 CAPSULE ORAL at 08:31

## 2020-03-07 RX ADMIN — OMEPRAZOLE SCH MG: 20 CAPSULE, DELAYED RELEASE ORAL at 08:31

## 2020-03-07 RX ADMIN — ROPINIROLE SCH MG: 0.5 TABLET ORAL at 20:56

## 2020-03-07 RX ADMIN — GABAPENTIN SCH MG: 300 CAPSULE ORAL at 20:54

## 2020-03-07 RX ADMIN — FUROSEMIDE SCH MG: 20 TABLET ORAL at 08:31

## 2020-03-07 RX ADMIN — GABAPENTIN SCH MG: 100 CAPSULE ORAL at 12:28

## 2020-03-07 RX ADMIN — ZINC SCH TAB: TAB ORAL at 08:31

## 2020-03-08 RX ADMIN — WARFARIN SODIUM SCH MG: 5 TABLET ORAL at 19:31

## 2020-03-08 RX ADMIN — GABAPENTIN SCH MG: 100 CAPSULE ORAL at 12:25

## 2020-03-08 RX ADMIN — GABAPENTIN SCH MG: 300 CAPSULE ORAL at 19:30

## 2020-03-08 RX ADMIN — FUROSEMIDE SCH MG: 20 TABLET ORAL at 07:34

## 2020-03-08 RX ADMIN — GABAPENTIN SCH MG: 100 CAPSULE ORAL at 07:34

## 2020-03-08 RX ADMIN — METOPROLOL SUCCINATE SCH MG: 25 TABLET, EXTENDED RELEASE ORAL at 07:40

## 2020-03-08 RX ADMIN — ROPINIROLE SCH MG: 0.5 TABLET ORAL at 19:32

## 2020-03-08 RX ADMIN — SERTRALINE HYDROCHLORIDE SCH MG: 100 TABLET ORAL at 07:34

## 2020-03-08 RX ADMIN — OMEPRAZOLE SCH MG: 20 CAPSULE, DELAYED RELEASE ORAL at 07:33

## 2020-03-08 RX ADMIN — ZINC SCH TAB: TAB ORAL at 07:33

## 2020-03-09 RX ADMIN — GABAPENTIN SCH MG: 100 CAPSULE ORAL at 08:32

## 2020-03-09 RX ADMIN — GABAPENTIN SCH MG: 300 CAPSULE ORAL at 20:00

## 2020-03-09 RX ADMIN — GABAPENTIN SCH MG: 100 CAPSULE ORAL at 14:00

## 2020-03-09 RX ADMIN — WARFARIN SODIUM SCH MG: 5 TABLET ORAL at 20:00

## 2020-03-09 RX ADMIN — FUROSEMIDE SCH MG: 20 TABLET ORAL at 08:32

## 2020-03-09 RX ADMIN — ROPINIROLE SCH MG: 0.5 TABLET ORAL at 20:01

## 2020-03-09 RX ADMIN — OMEPRAZOLE SCH MG: 20 CAPSULE, DELAYED RELEASE ORAL at 08:30

## 2020-03-09 RX ADMIN — ZINC SCH TAB: TAB ORAL at 08:30

## 2020-03-09 RX ADMIN — METOPROLOL SUCCINATE SCH MG: 25 TABLET, EXTENDED RELEASE ORAL at 08:31

## 2020-03-09 RX ADMIN — SERTRALINE HYDROCHLORIDE SCH MG: 100 TABLET ORAL at 08:31

## 2020-03-10 RX ADMIN — GABAPENTIN SCH MG: 100 CAPSULE ORAL at 13:53

## 2020-03-10 RX ADMIN — FUROSEMIDE SCH MG: 20 TABLET ORAL at 08:05

## 2020-03-10 RX ADMIN — GABAPENTIN SCH MG: 300 CAPSULE ORAL at 21:18

## 2020-03-10 RX ADMIN — WARFARIN SODIUM SCH MG: 5 TABLET ORAL at 21:18

## 2020-03-10 RX ADMIN — ROPINIROLE SCH MG: 0.5 TABLET ORAL at 21:17

## 2020-03-10 RX ADMIN — ZINC SCH TAB: TAB ORAL at 08:05

## 2020-03-10 RX ADMIN — SERTRALINE HYDROCHLORIDE SCH MG: 100 TABLET ORAL at 08:05

## 2020-03-10 RX ADMIN — METOPROLOL SUCCINATE SCH MG: 25 TABLET, EXTENDED RELEASE ORAL at 08:06

## 2020-03-10 RX ADMIN — GABAPENTIN SCH MG: 100 CAPSULE ORAL at 08:06

## 2020-03-10 RX ADMIN — OMEPRAZOLE SCH MG: 20 CAPSULE, DELAYED RELEASE ORAL at 08:05

## 2020-03-11 RX ADMIN — FUROSEMIDE SCH MG: 20 TABLET ORAL at 07:47

## 2020-03-11 RX ADMIN — ZINC SCH TAB: TAB ORAL at 07:47

## 2020-03-11 RX ADMIN — METOPROLOL SUCCINATE SCH MG: 25 TABLET, EXTENDED RELEASE ORAL at 07:47

## 2020-03-11 RX ADMIN — ROPINIROLE SCH MG: 0.5 TABLET ORAL at 20:53

## 2020-03-11 RX ADMIN — GABAPENTIN SCH MG: 100 CAPSULE ORAL at 07:47

## 2020-03-11 RX ADMIN — OMEPRAZOLE SCH MG: 20 CAPSULE, DELAYED RELEASE ORAL at 07:47

## 2020-03-11 RX ADMIN — GABAPENTIN SCH MG: 300 CAPSULE ORAL at 20:51

## 2020-03-11 RX ADMIN — GABAPENTIN SCH MG: 100 CAPSULE ORAL at 13:28

## 2020-03-11 RX ADMIN — WARFARIN SODIUM SCH MG: 5 TABLET ORAL at 20:51

## 2020-03-11 RX ADMIN — SERTRALINE HYDROCHLORIDE SCH MG: 100 TABLET ORAL at 07:47

## 2020-03-12 RX ADMIN — SERTRALINE HYDROCHLORIDE SCH MG: 100 TABLET ORAL at 08:36

## 2020-03-12 RX ADMIN — GABAPENTIN SCH MG: 300 CAPSULE ORAL at 20:41

## 2020-03-12 RX ADMIN — OMEPRAZOLE SCH MG: 20 CAPSULE, DELAYED RELEASE ORAL at 08:36

## 2020-03-12 RX ADMIN — METOPROLOL SUCCINATE SCH MG: 25 TABLET, EXTENDED RELEASE ORAL at 08:37

## 2020-03-12 RX ADMIN — GABAPENTIN SCH MG: 100 CAPSULE ORAL at 08:36

## 2020-03-12 RX ADMIN — FUROSEMIDE SCH MG: 20 TABLET ORAL at 08:36

## 2020-03-12 RX ADMIN — GABAPENTIN SCH MG: 100 CAPSULE ORAL at 14:21

## 2020-03-12 RX ADMIN — ZINC SCH TAB: TAB ORAL at 08:36

## 2020-03-12 RX ADMIN — ROPINIROLE SCH MG: 0.5 TABLET ORAL at 20:41

## 2020-03-12 RX ADMIN — WARFARIN SODIUM SCH MG: 5 TABLET ORAL at 20:41

## 2020-03-13 RX ADMIN — WARFARIN SODIUM SCH MG: 5 TABLET ORAL at 21:04

## 2020-03-13 RX ADMIN — FUROSEMIDE SCH MG: 20 TABLET ORAL at 08:39

## 2020-03-13 RX ADMIN — ROPINIROLE SCH MG: 0.5 TABLET ORAL at 21:04

## 2020-03-13 RX ADMIN — GABAPENTIN SCH MG: 100 CAPSULE ORAL at 08:40

## 2020-03-13 RX ADMIN — SERTRALINE HYDROCHLORIDE SCH MG: 100 TABLET ORAL at 08:39

## 2020-03-13 RX ADMIN — METOPROLOL SUCCINATE SCH MG: 25 TABLET, EXTENDED RELEASE ORAL at 08:38

## 2020-03-13 RX ADMIN — OMEPRAZOLE SCH MG: 20 CAPSULE, DELAYED RELEASE ORAL at 08:39

## 2020-03-13 RX ADMIN — GABAPENTIN SCH MG: 100 CAPSULE ORAL at 13:07

## 2020-03-13 RX ADMIN — ZINC SCH TAB: TAB ORAL at 08:39

## 2020-03-13 RX ADMIN — GABAPENTIN SCH MG: 300 CAPSULE ORAL at 21:03

## 2020-03-14 RX ADMIN — GABAPENTIN SCH MG: 100 CAPSULE ORAL at 12:08

## 2020-03-14 RX ADMIN — ROPINIROLE SCH MG: 0.5 TABLET ORAL at 19:54

## 2020-03-14 RX ADMIN — OMEPRAZOLE SCH MG: 20 CAPSULE, DELAYED RELEASE ORAL at 09:32

## 2020-03-14 RX ADMIN — GABAPENTIN SCH MG: 100 CAPSULE ORAL at 09:32

## 2020-03-14 RX ADMIN — METOPROLOL SUCCINATE SCH MG: 25 TABLET, EXTENDED RELEASE ORAL at 09:31

## 2020-03-14 RX ADMIN — FUROSEMIDE SCH MG: 20 TABLET ORAL at 09:31

## 2020-03-14 RX ADMIN — SERTRALINE HYDROCHLORIDE SCH MG: 100 TABLET ORAL at 09:31

## 2020-03-14 RX ADMIN — GABAPENTIN SCH MG: 300 CAPSULE ORAL at 19:53

## 2020-03-14 RX ADMIN — WARFARIN SODIUM SCH MG: 5 TABLET ORAL at 19:54

## 2020-03-14 RX ADMIN — ZINC SCH TAB: TAB ORAL at 09:32

## 2020-03-15 RX ADMIN — GABAPENTIN SCH MG: 100 CAPSULE ORAL at 08:43

## 2020-03-15 RX ADMIN — GABAPENTIN SCH MG: 300 CAPSULE ORAL at 20:55

## 2020-03-15 RX ADMIN — GABAPENTIN SCH MG: 100 CAPSULE ORAL at 14:40

## 2020-03-15 RX ADMIN — FUROSEMIDE SCH MG: 20 TABLET ORAL at 08:45

## 2020-03-15 RX ADMIN — OMEPRAZOLE SCH MG: 20 CAPSULE, DELAYED RELEASE ORAL at 08:44

## 2020-03-15 RX ADMIN — WARFARIN SODIUM SCH MG: 5 TABLET ORAL at 20:55

## 2020-03-15 RX ADMIN — ZINC SCH TAB: TAB ORAL at 08:44

## 2020-03-15 RX ADMIN — ROPINIROLE SCH MG: 0.5 TABLET ORAL at 20:56

## 2020-03-15 RX ADMIN — METOPROLOL SUCCINATE SCH MG: 25 TABLET, EXTENDED RELEASE ORAL at 08:44

## 2020-03-15 RX ADMIN — SERTRALINE HYDROCHLORIDE SCH MG: 100 TABLET ORAL at 08:45

## 2020-03-16 RX ADMIN — FUROSEMIDE SCH MG: 20 TABLET ORAL at 08:35

## 2020-03-16 RX ADMIN — SERTRALINE HYDROCHLORIDE SCH MG: 100 TABLET ORAL at 08:35

## 2020-03-16 RX ADMIN — ROPINIROLE SCH MG: 0.5 TABLET ORAL at 20:52

## 2020-03-16 RX ADMIN — GABAPENTIN SCH MG: 100 CAPSULE ORAL at 08:35

## 2020-03-16 RX ADMIN — WARFARIN SODIUM SCH MG: 5 TABLET ORAL at 20:52

## 2020-03-16 RX ADMIN — METOPROLOL SUCCINATE SCH MG: 25 TABLET, EXTENDED RELEASE ORAL at 08:34

## 2020-03-16 RX ADMIN — GABAPENTIN SCH MG: 100 CAPSULE ORAL at 12:09

## 2020-03-16 RX ADMIN — GABAPENTIN SCH MG: 300 CAPSULE ORAL at 20:51

## 2020-03-16 RX ADMIN — OMEPRAZOLE SCH MG: 20 CAPSULE, DELAYED RELEASE ORAL at 08:36

## 2020-03-16 RX ADMIN — ZINC SCH TAB: TAB ORAL at 08:36

## 2020-03-17 RX ADMIN — GABAPENTIN SCH MG: 100 CAPSULE ORAL at 13:50

## 2020-03-17 RX ADMIN — ROPINIROLE SCH MG: 0.5 TABLET ORAL at 19:51

## 2020-03-17 RX ADMIN — OMEPRAZOLE SCH MG: 20 CAPSULE, DELAYED RELEASE ORAL at 08:17

## 2020-03-17 RX ADMIN — SERTRALINE HYDROCHLORIDE SCH MG: 100 TABLET ORAL at 08:17

## 2020-03-17 RX ADMIN — WARFARIN SODIUM SCH MG: 5 TABLET ORAL at 19:52

## 2020-03-17 RX ADMIN — FUROSEMIDE SCH MG: 20 TABLET ORAL at 08:17

## 2020-03-17 RX ADMIN — METOPROLOL SUCCINATE SCH MG: 25 TABLET, EXTENDED RELEASE ORAL at 08:17

## 2020-03-17 RX ADMIN — ZINC SCH TAB: TAB ORAL at 08:18

## 2020-03-17 RX ADMIN — GABAPENTIN SCH MG: 100 CAPSULE ORAL at 08:18

## 2020-03-17 RX ADMIN — GABAPENTIN SCH MG: 300 CAPSULE ORAL at 19:52

## 2020-03-18 RX ADMIN — ROPINIROLE SCH MG: 0.5 TABLET ORAL at 20:28

## 2020-03-18 RX ADMIN — GABAPENTIN SCH MG: 100 CAPSULE ORAL at 12:45

## 2020-03-18 RX ADMIN — GABAPENTIN SCH MG: 300 CAPSULE ORAL at 20:29

## 2020-03-18 RX ADMIN — ZINC SCH TAB: TAB ORAL at 08:23

## 2020-03-18 RX ADMIN — OMEPRAZOLE SCH MG: 20 CAPSULE, DELAYED RELEASE ORAL at 08:23

## 2020-03-18 RX ADMIN — METOPROLOL SUCCINATE SCH MG: 25 TABLET, EXTENDED RELEASE ORAL at 08:23

## 2020-03-18 RX ADMIN — GABAPENTIN SCH MG: 100 CAPSULE ORAL at 08:23

## 2020-03-18 RX ADMIN — FUROSEMIDE SCH MG: 20 TABLET ORAL at 08:23

## 2020-03-18 RX ADMIN — WARFARIN SODIUM SCH MG: 5 TABLET ORAL at 20:28

## 2020-03-18 RX ADMIN — SERTRALINE HYDROCHLORIDE SCH MG: 100 TABLET ORAL at 08:23

## 2020-03-19 RX ADMIN — METOPROLOL SUCCINATE SCH MG: 25 TABLET, EXTENDED RELEASE ORAL at 07:59

## 2020-03-19 RX ADMIN — FUROSEMIDE SCH MG: 20 TABLET ORAL at 07:59

## 2020-03-19 RX ADMIN — ROPINIROLE SCH MG: 0.5 TABLET ORAL at 20:07

## 2020-03-19 RX ADMIN — GABAPENTIN SCH MG: 100 CAPSULE ORAL at 07:59

## 2020-03-19 RX ADMIN — ZINC SCH TAB: TAB ORAL at 08:00

## 2020-03-19 RX ADMIN — OMEPRAZOLE SCH MG: 20 CAPSULE, DELAYED RELEASE ORAL at 07:59

## 2020-03-19 RX ADMIN — WARFARIN SODIUM SCH MG: 5 TABLET ORAL at 20:06

## 2020-03-19 RX ADMIN — GABAPENTIN SCH MG: 300 CAPSULE ORAL at 20:06

## 2020-03-19 RX ADMIN — GABAPENTIN SCH MG: 100 CAPSULE ORAL at 12:55

## 2020-03-19 RX ADMIN — SERTRALINE HYDROCHLORIDE SCH MG: 100 TABLET ORAL at 07:59

## 2020-03-20 RX ADMIN — GABAPENTIN SCH MG: 300 CAPSULE ORAL at 19:34

## 2020-03-20 RX ADMIN — WARFARIN SODIUM SCH MG: 5 TABLET ORAL at 19:36

## 2020-03-20 RX ADMIN — ZINC SCH TAB: TAB ORAL at 09:22

## 2020-03-20 RX ADMIN — SERTRALINE HYDROCHLORIDE SCH MG: 100 TABLET ORAL at 09:22

## 2020-03-20 RX ADMIN — FUROSEMIDE SCH MG: 20 TABLET ORAL at 09:22

## 2020-03-20 RX ADMIN — GABAPENTIN SCH MG: 100 CAPSULE ORAL at 12:10

## 2020-03-20 RX ADMIN — ROPINIROLE SCH MG: 0.5 TABLET ORAL at 19:35

## 2020-03-20 RX ADMIN — OMEPRAZOLE SCH MG: 20 CAPSULE, DELAYED RELEASE ORAL at 09:22

## 2020-03-20 RX ADMIN — GABAPENTIN SCH MG: 100 CAPSULE ORAL at 09:22

## 2020-03-20 RX ADMIN — METOPROLOL SUCCINATE SCH MG: 25 TABLET, EXTENDED RELEASE ORAL at 09:18

## 2020-03-21 RX ADMIN — SERTRALINE HYDROCHLORIDE SCH MG: 100 TABLET ORAL at 08:26

## 2020-03-21 RX ADMIN — WARFARIN SODIUM SCH MG: 5 TABLET ORAL at 19:35

## 2020-03-21 RX ADMIN — METOPROLOL SUCCINATE SCH MG: 25 TABLET, EXTENDED RELEASE ORAL at 08:25

## 2020-03-21 RX ADMIN — OMEPRAZOLE SCH MG: 20 CAPSULE, DELAYED RELEASE ORAL at 08:26

## 2020-03-21 RX ADMIN — GABAPENTIN SCH MG: 100 CAPSULE ORAL at 12:32

## 2020-03-21 RX ADMIN — GABAPENTIN SCH MG: 300 CAPSULE ORAL at 19:34

## 2020-03-21 RX ADMIN — ROPINIROLE SCH MG: 0.5 TABLET ORAL at 19:35

## 2020-03-21 RX ADMIN — ZINC SCH TAB: TAB ORAL at 08:26

## 2020-03-21 RX ADMIN — GABAPENTIN SCH MG: 100 CAPSULE ORAL at 08:27

## 2020-03-21 RX ADMIN — FUROSEMIDE SCH MG: 20 TABLET ORAL at 08:26

## 2020-03-22 RX ADMIN — GABAPENTIN SCH MG: 300 CAPSULE ORAL at 19:25

## 2020-03-22 RX ADMIN — FUROSEMIDE SCH MG: 20 TABLET ORAL at 07:57

## 2020-03-22 RX ADMIN — SERTRALINE HYDROCHLORIDE SCH MG: 100 TABLET ORAL at 07:58

## 2020-03-22 RX ADMIN — ZINC SCH TAB: TAB ORAL at 07:58

## 2020-03-22 RX ADMIN — GABAPENTIN SCH MG: 100 CAPSULE ORAL at 12:24

## 2020-03-22 RX ADMIN — METOPROLOL SUCCINATE SCH MG: 25 TABLET, EXTENDED RELEASE ORAL at 07:57

## 2020-03-22 RX ADMIN — GABAPENTIN SCH MG: 100 CAPSULE ORAL at 07:59

## 2020-03-22 RX ADMIN — WARFARIN SODIUM SCH MG: 5 TABLET ORAL at 19:26

## 2020-03-22 RX ADMIN — ROPINIROLE SCH MG: 0.5 TABLET ORAL at 19:26

## 2020-03-22 RX ADMIN — OMEPRAZOLE SCH MG: 20 CAPSULE, DELAYED RELEASE ORAL at 07:58

## 2020-03-23 RX ADMIN — GABAPENTIN SCH MG: 300 CAPSULE ORAL at 19:47

## 2020-03-23 RX ADMIN — SERTRALINE HYDROCHLORIDE SCH MG: 100 TABLET ORAL at 08:31

## 2020-03-23 RX ADMIN — GABAPENTIN SCH MG: 100 CAPSULE ORAL at 07:34

## 2020-03-23 RX ADMIN — METOPROLOL SUCCINATE SCH MG: 25 TABLET, EXTENDED RELEASE ORAL at 07:34

## 2020-03-23 RX ADMIN — ROPINIROLE SCH MG: 0.5 TABLET ORAL at 19:48

## 2020-03-23 RX ADMIN — OMEPRAZOLE SCH MG: 20 CAPSULE, DELAYED RELEASE ORAL at 07:33

## 2020-03-23 RX ADMIN — GABAPENTIN SCH MG: 100 CAPSULE ORAL at 13:17

## 2020-03-23 RX ADMIN — WARFARIN SODIUM SCH MG: 5 TABLET ORAL at 19:47

## 2020-03-23 RX ADMIN — FUROSEMIDE SCH MG: 20 TABLET ORAL at 07:33

## 2020-03-23 RX ADMIN — ZINC SCH TAB: TAB ORAL at 07:33

## 2020-03-24 RX ADMIN — GABAPENTIN SCH MG: 100 CAPSULE ORAL at 08:01

## 2020-03-24 RX ADMIN — GABAPENTIN SCH MG: 100 CAPSULE ORAL at 12:06

## 2020-03-24 RX ADMIN — ZINC SCH TAB: TAB ORAL at 08:01

## 2020-03-24 RX ADMIN — METOPROLOL SUCCINATE SCH MG: 25 TABLET, EXTENDED RELEASE ORAL at 08:01

## 2020-03-24 RX ADMIN — GABAPENTIN SCH MG: 300 CAPSULE ORAL at 19:26

## 2020-03-24 RX ADMIN — ROPINIROLE SCH MG: 0.5 TABLET ORAL at 19:28

## 2020-03-24 RX ADMIN — SERTRALINE HYDROCHLORIDE SCH MG: 100 TABLET ORAL at 08:01

## 2020-03-24 RX ADMIN — OMEPRAZOLE SCH MG: 20 CAPSULE, DELAYED RELEASE ORAL at 08:01

## 2020-03-24 RX ADMIN — FUROSEMIDE SCH MG: 20 TABLET ORAL at 08:02

## 2020-03-24 RX ADMIN — WARFARIN SODIUM SCH MG: 5 TABLET ORAL at 19:28

## 2020-03-25 RX ADMIN — ROPINIROLE SCH MG: 0.5 TABLET ORAL at 19:36

## 2020-03-25 RX ADMIN — SERTRALINE HYDROCHLORIDE SCH MG: 100 TABLET ORAL at 07:45

## 2020-03-25 RX ADMIN — WARFARIN SODIUM SCH MG: 5 TABLET ORAL at 19:36

## 2020-03-25 RX ADMIN — GABAPENTIN SCH MG: 300 CAPSULE ORAL at 19:35

## 2020-03-25 RX ADMIN — FUROSEMIDE SCH MG: 20 TABLET ORAL at 07:48

## 2020-03-25 RX ADMIN — ZINC SCH TAB: TAB ORAL at 07:44

## 2020-03-25 RX ADMIN — GABAPENTIN SCH MG: 100 CAPSULE ORAL at 07:45

## 2020-03-25 RX ADMIN — GABAPENTIN SCH MG: 100 CAPSULE ORAL at 12:26

## 2020-03-25 RX ADMIN — METOPROLOL SUCCINATE SCH MG: 25 TABLET, EXTENDED RELEASE ORAL at 07:45

## 2020-03-25 RX ADMIN — OMEPRAZOLE SCH MG: 20 CAPSULE, DELAYED RELEASE ORAL at 07:44

## 2020-03-26 RX ADMIN — FUROSEMIDE SCH MG: 20 TABLET ORAL at 08:13

## 2020-03-26 RX ADMIN — WARFARIN SODIUM SCH MG: 5 TABLET ORAL at 19:22

## 2020-03-26 RX ADMIN — GABAPENTIN SCH MG: 100 CAPSULE ORAL at 12:37

## 2020-03-26 RX ADMIN — GABAPENTIN SCH MG: 100 CAPSULE ORAL at 08:14

## 2020-03-26 RX ADMIN — ROPINIROLE SCH MG: 0.5 TABLET ORAL at 19:22

## 2020-03-26 RX ADMIN — METOPROLOL SUCCINATE SCH MG: 25 TABLET, EXTENDED RELEASE ORAL at 08:13

## 2020-03-26 RX ADMIN — SERTRALINE HYDROCHLORIDE SCH MG: 100 TABLET ORAL at 08:13

## 2020-03-26 RX ADMIN — GABAPENTIN SCH MG: 300 CAPSULE ORAL at 19:22

## 2020-03-26 RX ADMIN — ZINC SCH TAB: TAB ORAL at 08:15

## 2020-03-26 RX ADMIN — OMEPRAZOLE SCH MG: 20 CAPSULE, DELAYED RELEASE ORAL at 08:15

## 2020-03-27 RX ADMIN — GABAPENTIN SCH MG: 300 CAPSULE ORAL at 20:34

## 2020-03-27 RX ADMIN — FUROSEMIDE SCH MG: 20 TABLET ORAL at 07:30

## 2020-03-27 RX ADMIN — WARFARIN SODIUM SCH MG: 5 TABLET ORAL at 20:35

## 2020-03-27 RX ADMIN — ROPINIROLE SCH MG: 0.5 TABLET ORAL at 20:35

## 2020-03-27 RX ADMIN — SERTRALINE HYDROCHLORIDE SCH MG: 100 TABLET ORAL at 07:31

## 2020-03-27 RX ADMIN — OMEPRAZOLE SCH MG: 20 CAPSULE, DELAYED RELEASE ORAL at 07:33

## 2020-03-27 RX ADMIN — METOPROLOL SUCCINATE SCH MG: 25 TABLET, EXTENDED RELEASE ORAL at 07:30

## 2020-03-27 RX ADMIN — ZINC SCH TAB: TAB ORAL at 07:33

## 2020-03-27 RX ADMIN — GABAPENTIN SCH MG: 100 CAPSULE ORAL at 07:30

## 2020-03-27 RX ADMIN — GABAPENTIN SCH MG: 100 CAPSULE ORAL at 12:21

## 2020-03-28 RX ADMIN — ROPINIROLE SCH MG: 0.5 TABLET ORAL at 19:48

## 2020-03-28 RX ADMIN — FUROSEMIDE SCH MG: 20 TABLET ORAL at 07:43

## 2020-03-28 RX ADMIN — WARFARIN SODIUM SCH MG: 5 TABLET ORAL at 19:48

## 2020-03-28 RX ADMIN — ZINC SCH TAB: TAB ORAL at 07:43

## 2020-03-28 RX ADMIN — METOPROLOL SUCCINATE SCH MG: 25 TABLET, EXTENDED RELEASE ORAL at 07:43

## 2020-03-28 RX ADMIN — SERTRALINE HYDROCHLORIDE SCH MG: 100 TABLET ORAL at 07:44

## 2020-03-28 RX ADMIN — GABAPENTIN SCH MG: 100 CAPSULE ORAL at 12:10

## 2020-03-28 RX ADMIN — GABAPENTIN SCH MG: 300 CAPSULE ORAL at 19:48

## 2020-03-28 RX ADMIN — GABAPENTIN SCH MG: 100 CAPSULE ORAL at 07:43

## 2020-03-28 RX ADMIN — OMEPRAZOLE SCH MG: 20 CAPSULE, DELAYED RELEASE ORAL at 07:42

## 2020-03-29 RX ADMIN — SERTRALINE HYDROCHLORIDE SCH MG: 100 TABLET ORAL at 08:41

## 2020-03-29 RX ADMIN — ROPINIROLE SCH MG: 0.5 TABLET ORAL at 20:12

## 2020-03-29 RX ADMIN — GABAPENTIN SCH MG: 300 CAPSULE ORAL at 20:12

## 2020-03-29 RX ADMIN — OMEPRAZOLE SCH MG: 20 CAPSULE, DELAYED RELEASE ORAL at 08:40

## 2020-03-29 RX ADMIN — FUROSEMIDE SCH MG: 20 TABLET ORAL at 08:41

## 2020-03-29 RX ADMIN — WARFARIN SODIUM SCH MG: 5 TABLET ORAL at 20:12

## 2020-03-29 RX ADMIN — METOPROLOL SUCCINATE SCH MG: 25 TABLET, EXTENDED RELEASE ORAL at 08:38

## 2020-03-29 RX ADMIN — GABAPENTIN SCH MG: 100 CAPSULE ORAL at 08:38

## 2020-03-29 RX ADMIN — GABAPENTIN SCH MG: 100 CAPSULE ORAL at 12:24

## 2020-03-29 RX ADMIN — ZINC SCH TAB: TAB ORAL at 08:40

## 2020-03-30 RX ADMIN — OMEPRAZOLE SCH MG: 20 CAPSULE, DELAYED RELEASE ORAL at 07:35

## 2020-03-30 RX ADMIN — WARFARIN SODIUM SCH MG: 5 TABLET ORAL at 19:30

## 2020-03-30 RX ADMIN — GABAPENTIN SCH MG: 300 CAPSULE ORAL at 19:30

## 2020-03-30 RX ADMIN — METOPROLOL SUCCINATE SCH MG: 25 TABLET, EXTENDED RELEASE ORAL at 07:34

## 2020-03-30 RX ADMIN — ZINC SCH TAB: TAB ORAL at 07:35

## 2020-03-30 RX ADMIN — SERTRALINE HYDROCHLORIDE SCH MG: 100 TABLET ORAL at 07:34

## 2020-03-30 RX ADMIN — GABAPENTIN SCH MG: 100 CAPSULE ORAL at 14:13

## 2020-03-30 RX ADMIN — ROPINIROLE SCH MG: 0.5 TABLET ORAL at 19:30

## 2020-03-30 RX ADMIN — FUROSEMIDE SCH MG: 20 TABLET ORAL at 07:34

## 2020-03-30 RX ADMIN — GABAPENTIN SCH MG: 100 CAPSULE ORAL at 07:36

## 2020-03-31 RX ADMIN — ROPINIROLE SCH MG: 0.5 TABLET ORAL at 19:26

## 2020-03-31 RX ADMIN — GABAPENTIN SCH MG: 300 CAPSULE ORAL at 19:27

## 2020-03-31 RX ADMIN — ZINC SCH TAB: TAB ORAL at 07:57

## 2020-03-31 RX ADMIN — METOPROLOL SUCCINATE SCH MG: 25 TABLET, EXTENDED RELEASE ORAL at 07:57

## 2020-03-31 RX ADMIN — WARFARIN SODIUM SCH MG: 5 TABLET ORAL at 19:26

## 2020-03-31 RX ADMIN — FUROSEMIDE SCH MG: 20 TABLET ORAL at 07:57

## 2020-03-31 RX ADMIN — GABAPENTIN SCH MG: 100 CAPSULE ORAL at 07:58

## 2020-03-31 RX ADMIN — GABAPENTIN SCH MG: 100 CAPSULE ORAL at 12:57

## 2020-03-31 RX ADMIN — OMEPRAZOLE SCH MG: 20 CAPSULE, DELAYED RELEASE ORAL at 07:58

## 2020-03-31 RX ADMIN — SERTRALINE HYDROCHLORIDE SCH MG: 100 TABLET ORAL at 07:57

## 2020-04-01 RX ADMIN — GABAPENTIN SCH MG: 100 CAPSULE ORAL at 08:04

## 2020-04-01 RX ADMIN — WARFARIN SODIUM SCH MG: 5 TABLET ORAL at 19:45

## 2020-04-01 RX ADMIN — GABAPENTIN SCH MG: 100 CAPSULE ORAL at 12:07

## 2020-04-01 RX ADMIN — GABAPENTIN SCH MG: 300 CAPSULE ORAL at 19:44

## 2020-04-01 RX ADMIN — OMEPRAZOLE SCH MG: 20 CAPSULE, DELAYED RELEASE ORAL at 08:04

## 2020-04-01 RX ADMIN — ROPINIROLE SCH MG: 0.5 TABLET ORAL at 19:44

## 2020-04-01 RX ADMIN — ZINC SCH TAB: TAB ORAL at 08:04

## 2020-04-01 RX ADMIN — SERTRALINE HYDROCHLORIDE SCH MG: 100 TABLET ORAL at 08:03

## 2020-04-01 RX ADMIN — FUROSEMIDE SCH MG: 20 TABLET ORAL at 08:03

## 2020-04-01 RX ADMIN — METOPROLOL SUCCINATE SCH MG: 25 TABLET, EXTENDED RELEASE ORAL at 08:03

## 2020-04-02 RX ADMIN — OMEPRAZOLE SCH MG: 20 CAPSULE, DELAYED RELEASE ORAL at 08:01

## 2020-04-02 RX ADMIN — GABAPENTIN SCH MG: 300 CAPSULE ORAL at 19:40

## 2020-04-02 RX ADMIN — ZINC SCH TAB: TAB ORAL at 08:01

## 2020-04-02 RX ADMIN — GABAPENTIN SCH MG: 100 CAPSULE ORAL at 08:01

## 2020-04-02 RX ADMIN — SERTRALINE HYDROCHLORIDE SCH MG: 100 TABLET ORAL at 08:01

## 2020-04-02 RX ADMIN — FUROSEMIDE SCH MG: 20 TABLET ORAL at 08:01

## 2020-04-02 RX ADMIN — GABAPENTIN SCH MG: 100 CAPSULE ORAL at 12:14

## 2020-04-02 RX ADMIN — METOPROLOL SUCCINATE SCH MG: 25 TABLET, EXTENDED RELEASE ORAL at 08:00

## 2020-04-02 RX ADMIN — WARFARIN SODIUM SCH MG: 5 TABLET ORAL at 19:40

## 2020-04-02 RX ADMIN — ROPINIROLE SCH MG: 0.5 TABLET ORAL at 19:41

## 2020-04-03 RX ADMIN — GABAPENTIN SCH MG: 300 CAPSULE ORAL at 20:16

## 2020-04-03 RX ADMIN — GABAPENTIN SCH MG: 100 CAPSULE ORAL at 07:57

## 2020-04-03 RX ADMIN — GABAPENTIN SCH MG: 100 CAPSULE ORAL at 12:36

## 2020-04-03 RX ADMIN — OMEPRAZOLE SCH MG: 20 CAPSULE, DELAYED RELEASE ORAL at 07:57

## 2020-04-03 RX ADMIN — WARFARIN SODIUM SCH MG: 5 TABLET ORAL at 20:18

## 2020-04-03 RX ADMIN — SERTRALINE HYDROCHLORIDE SCH MG: 100 TABLET ORAL at 07:57

## 2020-04-03 RX ADMIN — ROPINIROLE SCH MG: 0.5 TABLET ORAL at 20:18

## 2020-04-03 RX ADMIN — ZINC SCH TAB: TAB ORAL at 07:58

## 2020-04-03 RX ADMIN — METOPROLOL SUCCINATE SCH MG: 25 TABLET, EXTENDED RELEASE ORAL at 07:56

## 2020-04-03 RX ADMIN — FUROSEMIDE SCH MG: 20 TABLET ORAL at 07:57

## 2020-04-04 RX ADMIN — ROPINIROLE SCH MG: 0.5 TABLET ORAL at 19:26

## 2020-04-04 RX ADMIN — GABAPENTIN SCH MG: 300 CAPSULE ORAL at 19:26

## 2020-04-04 RX ADMIN — ZINC SCH TAB: TAB ORAL at 07:35

## 2020-04-04 RX ADMIN — METOPROLOL SUCCINATE SCH MG: 25 TABLET, EXTENDED RELEASE ORAL at 07:35

## 2020-04-04 RX ADMIN — WARFARIN SODIUM SCH MG: 5 TABLET ORAL at 19:27

## 2020-04-04 RX ADMIN — SERTRALINE HYDROCHLORIDE SCH MG: 100 TABLET ORAL at 07:36

## 2020-04-04 RX ADMIN — GABAPENTIN SCH MG: 100 CAPSULE ORAL at 07:35

## 2020-04-04 RX ADMIN — GABAPENTIN SCH MG: 100 CAPSULE ORAL at 12:05

## 2020-04-04 RX ADMIN — OMEPRAZOLE SCH MG: 20 CAPSULE, DELAYED RELEASE ORAL at 07:35

## 2020-04-04 RX ADMIN — FUROSEMIDE SCH MG: 20 TABLET ORAL at 07:36

## 2020-04-05 RX ADMIN — SERTRALINE HYDROCHLORIDE SCH MG: 100 TABLET ORAL at 07:25

## 2020-04-05 RX ADMIN — FUROSEMIDE SCH MG: 20 TABLET ORAL at 07:25

## 2020-04-05 RX ADMIN — ROPINIROLE SCH MG: 0.5 TABLET ORAL at 19:50

## 2020-04-05 RX ADMIN — OMEPRAZOLE SCH MG: 20 CAPSULE, DELAYED RELEASE ORAL at 07:25

## 2020-04-05 RX ADMIN — GABAPENTIN SCH MG: 300 CAPSULE ORAL at 19:51

## 2020-04-05 RX ADMIN — GABAPENTIN SCH MG: 100 CAPSULE ORAL at 12:45

## 2020-04-05 RX ADMIN — GABAPENTIN SCH MG: 100 CAPSULE ORAL at 07:26

## 2020-04-05 RX ADMIN — WARFARIN SODIUM SCH MG: 5 TABLET ORAL at 19:50

## 2020-04-05 RX ADMIN — ZINC SCH TAB: TAB ORAL at 07:25

## 2020-04-05 RX ADMIN — METOPROLOL SUCCINATE SCH MG: 25 TABLET, EXTENDED RELEASE ORAL at 07:25

## 2020-04-06 RX ADMIN — METOPROLOL SUCCINATE SCH MG: 25 TABLET, EXTENDED RELEASE ORAL at 07:50

## 2020-04-06 RX ADMIN — GABAPENTIN SCH MG: 100 CAPSULE ORAL at 07:50

## 2020-04-06 RX ADMIN — WARFARIN SODIUM SCH MG: 5 TABLET ORAL at 19:40

## 2020-04-06 RX ADMIN — ROPINIROLE SCH MG: 0.5 TABLET ORAL at 19:40

## 2020-04-06 RX ADMIN — OMEPRAZOLE SCH MG: 20 CAPSULE, DELAYED RELEASE ORAL at 07:52

## 2020-04-06 RX ADMIN — GABAPENTIN SCH MG: 100 CAPSULE ORAL at 12:19

## 2020-04-06 RX ADMIN — SERTRALINE HYDROCHLORIDE SCH MG: 100 TABLET ORAL at 07:51

## 2020-04-06 RX ADMIN — ZINC SCH TAB: TAB ORAL at 07:51

## 2020-04-06 RX ADMIN — GABAPENTIN SCH MG: 300 CAPSULE ORAL at 19:39

## 2020-04-06 RX ADMIN — FUROSEMIDE SCH MG: 20 TABLET ORAL at 07:50

## 2020-04-07 RX ADMIN — METOPROLOL SUCCINATE SCH MG: 25 TABLET, EXTENDED RELEASE ORAL at 08:44

## 2020-04-07 RX ADMIN — SERTRALINE HYDROCHLORIDE SCH MG: 100 TABLET ORAL at 08:45

## 2020-04-07 RX ADMIN — OMEPRAZOLE SCH MG: 20 CAPSULE, DELAYED RELEASE ORAL at 08:47

## 2020-04-07 RX ADMIN — GABAPENTIN SCH MG: 100 CAPSULE ORAL at 08:46

## 2020-04-07 RX ADMIN — FUROSEMIDE SCH MG: 20 TABLET ORAL at 08:45

## 2020-04-07 RX ADMIN — WARFARIN SODIUM SCH MG: 5 TABLET ORAL at 20:05

## 2020-04-07 RX ADMIN — GABAPENTIN SCH MG: 300 CAPSULE ORAL at 20:06

## 2020-04-07 RX ADMIN — ZINC SCH TAB: TAB ORAL at 08:46

## 2020-04-07 RX ADMIN — GABAPENTIN SCH MG: 100 CAPSULE ORAL at 12:25

## 2020-04-07 RX ADMIN — ROPINIROLE SCH MG: 0.5 TABLET ORAL at 20:05

## 2020-04-08 RX ADMIN — FUROSEMIDE SCH MG: 20 TABLET ORAL at 08:59

## 2020-04-08 RX ADMIN — SERTRALINE HYDROCHLORIDE SCH MG: 100 TABLET ORAL at 09:00

## 2020-04-08 RX ADMIN — GABAPENTIN SCH MG: 100 CAPSULE ORAL at 12:55

## 2020-04-08 RX ADMIN — GABAPENTIN SCH MG: 300 CAPSULE ORAL at 20:46

## 2020-04-08 RX ADMIN — ZINC SCH TAB: TAB ORAL at 09:01

## 2020-04-08 RX ADMIN — METOPROLOL SUCCINATE SCH MG: 25 TABLET, EXTENDED RELEASE ORAL at 08:59

## 2020-04-08 RX ADMIN — WARFARIN SODIUM SCH MG: 5 TABLET ORAL at 20:47

## 2020-04-08 RX ADMIN — GABAPENTIN SCH MG: 100 CAPSULE ORAL at 09:00

## 2020-04-08 RX ADMIN — ROPINIROLE SCH MG: 0.5 TABLET ORAL at 20:46

## 2020-04-08 RX ADMIN — OMEPRAZOLE SCH MG: 20 CAPSULE, DELAYED RELEASE ORAL at 09:00

## 2020-04-09 RX ADMIN — SERTRALINE HYDROCHLORIDE SCH MG: 100 TABLET ORAL at 07:52

## 2020-04-09 RX ADMIN — GABAPENTIN SCH MG: 100 CAPSULE ORAL at 12:16

## 2020-04-09 RX ADMIN — GABAPENTIN SCH MG: 100 CAPSULE ORAL at 07:52

## 2020-04-09 RX ADMIN — OMEPRAZOLE SCH MG: 20 CAPSULE, DELAYED RELEASE ORAL at 07:52

## 2020-04-09 RX ADMIN — GABAPENTIN SCH MG: 300 CAPSULE ORAL at 19:54

## 2020-04-09 RX ADMIN — WARFARIN SODIUM SCH MG: 5 TABLET ORAL at 19:57

## 2020-04-09 RX ADMIN — METOPROLOL SUCCINATE SCH MG: 25 TABLET, EXTENDED RELEASE ORAL at 07:52

## 2020-04-09 RX ADMIN — ROPINIROLE SCH MG: 0.5 TABLET ORAL at 19:57

## 2020-04-09 RX ADMIN — ZINC SCH TAB: TAB ORAL at 07:53

## 2020-04-09 RX ADMIN — FUROSEMIDE SCH MG: 20 TABLET ORAL at 07:52

## 2020-04-10 RX ADMIN — ZINC SCH TAB: TAB ORAL at 09:13

## 2020-04-10 RX ADMIN — METOPROLOL SUCCINATE SCH MG: 25 TABLET, EXTENDED RELEASE ORAL at 09:14

## 2020-04-10 RX ADMIN — GABAPENTIN SCH MG: 100 CAPSULE ORAL at 09:14

## 2020-04-10 RX ADMIN — SERTRALINE HYDROCHLORIDE SCH MG: 100 TABLET ORAL at 09:15

## 2020-04-10 RX ADMIN — ROPINIROLE SCH MG: 0.5 TABLET ORAL at 20:15

## 2020-04-10 RX ADMIN — OMEPRAZOLE SCH MG: 20 CAPSULE, DELAYED RELEASE ORAL at 09:13

## 2020-04-10 RX ADMIN — WARFARIN SODIUM SCH MG: 5 TABLET ORAL at 20:15

## 2020-04-10 RX ADMIN — GABAPENTIN SCH MG: 300 CAPSULE ORAL at 20:15

## 2020-04-10 RX ADMIN — GABAPENTIN SCH MG: 100 CAPSULE ORAL at 12:23

## 2020-04-10 RX ADMIN — FUROSEMIDE SCH MG: 20 TABLET ORAL at 09:15

## 2020-04-11 RX ADMIN — GABAPENTIN SCH MG: 300 CAPSULE ORAL at 20:26

## 2020-04-11 RX ADMIN — ZINC SCH TAB: TAB ORAL at 08:15

## 2020-04-11 RX ADMIN — OMEPRAZOLE SCH MG: 20 CAPSULE, DELAYED RELEASE ORAL at 08:15

## 2020-04-11 RX ADMIN — FUROSEMIDE SCH MG: 20 TABLET ORAL at 08:14

## 2020-04-11 RX ADMIN — METOPROLOL SUCCINATE SCH MG: 25 TABLET, EXTENDED RELEASE ORAL at 08:13

## 2020-04-11 RX ADMIN — WARFARIN SODIUM SCH MG: 5 TABLET ORAL at 20:26

## 2020-04-11 RX ADMIN — GABAPENTIN SCH MG: 100 CAPSULE ORAL at 08:14

## 2020-04-11 RX ADMIN — SERTRALINE HYDROCHLORIDE SCH MG: 100 TABLET ORAL at 08:14

## 2020-04-11 RX ADMIN — ROPINIROLE SCH MG: 0.5 TABLET ORAL at 20:26

## 2020-04-11 RX ADMIN — GABAPENTIN SCH MG: 100 CAPSULE ORAL at 12:05

## 2020-04-12 RX ADMIN — ZINC SCH TAB: TAB ORAL at 07:48

## 2020-04-12 RX ADMIN — GABAPENTIN SCH MG: 100 CAPSULE ORAL at 07:48

## 2020-04-12 RX ADMIN — GABAPENTIN SCH MG: 100 CAPSULE ORAL at 12:24

## 2020-04-12 RX ADMIN — GABAPENTIN SCH MG: 300 CAPSULE ORAL at 20:51

## 2020-04-12 RX ADMIN — OMEPRAZOLE SCH MG: 20 CAPSULE, DELAYED RELEASE ORAL at 07:48

## 2020-04-12 RX ADMIN — ROPINIROLE SCH MG: 0.5 TABLET ORAL at 20:52

## 2020-04-12 RX ADMIN — FUROSEMIDE SCH MG: 20 TABLET ORAL at 07:48

## 2020-04-12 RX ADMIN — WARFARIN SODIUM SCH MG: 5 TABLET ORAL at 20:52

## 2020-04-12 RX ADMIN — SERTRALINE HYDROCHLORIDE SCH MG: 100 TABLET ORAL at 07:48

## 2020-04-12 RX ADMIN — METOPROLOL SUCCINATE SCH MG: 25 TABLET, EXTENDED RELEASE ORAL at 07:47

## 2020-04-13 RX ADMIN — WARFARIN SODIUM SCH MG: 5 TABLET ORAL at 20:08

## 2020-04-13 RX ADMIN — ROPINIROLE SCH MG: 0.5 TABLET ORAL at 20:08

## 2020-04-13 RX ADMIN — METOPROLOL SUCCINATE SCH MG: 25 TABLET, EXTENDED RELEASE ORAL at 08:28

## 2020-04-13 RX ADMIN — OMEPRAZOLE SCH MG: 20 CAPSULE, DELAYED RELEASE ORAL at 08:28

## 2020-04-13 RX ADMIN — FUROSEMIDE SCH MG: 20 TABLET ORAL at 08:28

## 2020-04-13 RX ADMIN — GABAPENTIN SCH MG: 300 CAPSULE ORAL at 20:06

## 2020-04-13 RX ADMIN — ZINC SCH TAB: TAB ORAL at 08:29

## 2020-04-13 RX ADMIN — SERTRALINE HYDROCHLORIDE SCH MG: 100 TABLET ORAL at 08:28

## 2020-04-13 RX ADMIN — GABAPENTIN SCH MG: 100 CAPSULE ORAL at 12:04

## 2020-04-13 RX ADMIN — GABAPENTIN SCH MG: 100 CAPSULE ORAL at 08:29

## 2020-04-14 RX ADMIN — METOPROLOL SUCCINATE SCH MG: 25 TABLET, EXTENDED RELEASE ORAL at 07:58

## 2020-04-14 RX ADMIN — WARFARIN SODIUM SCH MG: 5 TABLET ORAL at 20:05

## 2020-04-14 RX ADMIN — SERTRALINE HYDROCHLORIDE SCH MG: 100 TABLET ORAL at 07:58

## 2020-04-14 RX ADMIN — GABAPENTIN SCH MG: 100 CAPSULE ORAL at 07:59

## 2020-04-14 RX ADMIN — ROPINIROLE SCH MG: 0.5 TABLET ORAL at 20:05

## 2020-04-14 RX ADMIN — GABAPENTIN SCH MG: 100 CAPSULE ORAL at 12:20

## 2020-04-14 RX ADMIN — ZINC SCH TAB: TAB ORAL at 07:59

## 2020-04-14 RX ADMIN — GABAPENTIN SCH MG: 300 CAPSULE ORAL at 20:05

## 2020-04-14 RX ADMIN — OMEPRAZOLE SCH MG: 20 CAPSULE, DELAYED RELEASE ORAL at 07:59

## 2020-04-14 RX ADMIN — FUROSEMIDE SCH MG: 20 TABLET ORAL at 07:58

## 2020-04-15 RX ADMIN — GABAPENTIN SCH MG: 300 CAPSULE ORAL at 21:10

## 2020-04-15 RX ADMIN — GABAPENTIN SCH MG: 100 CAPSULE ORAL at 12:28

## 2020-04-15 RX ADMIN — FUROSEMIDE SCH MG: 20 TABLET ORAL at 07:55

## 2020-04-15 RX ADMIN — ZINC SCH TAB: TAB ORAL at 07:54

## 2020-04-15 RX ADMIN — OMEPRAZOLE SCH MG: 20 CAPSULE, DELAYED RELEASE ORAL at 07:54

## 2020-04-15 RX ADMIN — METOPROLOL SUCCINATE SCH MG: 25 TABLET, EXTENDED RELEASE ORAL at 07:55

## 2020-04-15 RX ADMIN — ROPINIROLE SCH MG: 0.5 TABLET ORAL at 21:09

## 2020-04-15 RX ADMIN — SERTRALINE HYDROCHLORIDE SCH MG: 100 TABLET ORAL at 07:55

## 2020-04-15 RX ADMIN — GABAPENTIN SCH MG: 100 CAPSULE ORAL at 07:54

## 2020-04-15 RX ADMIN — WARFARIN SODIUM SCH MG: 5 TABLET ORAL at 21:09

## 2020-04-16 RX ADMIN — METOPROLOL SUCCINATE SCH MG: 25 TABLET, EXTENDED RELEASE ORAL at 07:49

## 2020-04-16 RX ADMIN — WARFARIN SODIUM SCH MG: 5 TABLET ORAL at 20:16

## 2020-04-16 RX ADMIN — GABAPENTIN SCH MG: 300 CAPSULE ORAL at 20:15

## 2020-04-16 RX ADMIN — GABAPENTIN SCH MG: 100 CAPSULE ORAL at 07:49

## 2020-04-16 RX ADMIN — FUROSEMIDE SCH MG: 20 TABLET ORAL at 07:49

## 2020-04-16 RX ADMIN — GABAPENTIN SCH MG: 100 CAPSULE ORAL at 13:13

## 2020-04-16 RX ADMIN — SERTRALINE HYDROCHLORIDE SCH MG: 100 TABLET ORAL at 07:49

## 2020-04-16 RX ADMIN — ROPINIROLE SCH MG: 0.5 TABLET ORAL at 20:15

## 2020-04-16 RX ADMIN — ZINC SCH TAB: TAB ORAL at 07:49

## 2020-04-16 RX ADMIN — OMEPRAZOLE SCH MG: 20 CAPSULE, DELAYED RELEASE ORAL at 07:50

## 2020-04-16 NOTE — PCM.PN
- General Info


Date of Service: 04/16/20


Admission Dx/Problem (Free Text): 











Subjective: Swing bed long-term care note.  No real changes in health.  

Heartburn currently controlled on medicine.  No cardiopulmonary symptoms.  

Anemia appears stable, on long-term anticoagulation for DVT history.  Lesion 

below her right eye she's had for number months just growing slightly.  

Occasionally will complete her scab little bit.  She saw the surgeon was not 

too interested in removing currently but believe she may want it taken off in 

the future.  





Obctive: Physical exam reveals an alert oriented pleasant female no acute 

distress.  Vital signs are stable.  Trace edema bilaterally.  Heart and lungs 

are clear.  Abdomen soft and tender.  Has approximately 3-4 mm exophytic lesion 

with slight scab just below the right lower lid margin.





Assessment and plan:


No new changes in her long-term plan.  Heartburn appears controlled.  Anemia 

stable.  INR for DVT prophylaxis.


I think she probably will still need eyelid lesion removed in the next number 

of months.  Perhaps this summer after cold his situation is settled some.  

Otherwise and she visited with the surgeon last time they decided to observe.








Subjective Update: 





Subjective:


Sixty-day long-term care visit.  She doing pretty well.  Nursing notes frequent 

heartburn, she's on dual H2 PPI therapy.  Still has to take occasional Tums.  

No dysphasia.  Her mild anemia is stable to improved.  Denies any melena or 

hematochezia.  She has some mild clinical heart failure, her edema and weight 

have improved on their own.  She does get some dyspnea with exertion.  CT chest 

showed what looks like a mediastinal thyroid mass.  Going back this was present 

decades ago.  Incidental 1.5 cm breast mass seen by CT.  She is otherwise 

pretty happy on the floor staying busy reading books etc.





Objective: Vital signs are stable.  She has nodular lesion with scab about 3 mm 

in diameter at right lower lid just below


lid/lash margin.  She feels this is new past couple weeks.  Heart and lungs 

clear to auscultation abdomen soft nontender extremities warm well perfused 1+ 

edema.





Assessment and plan:


Chronic dyspnea.  Heart failure appears stable, weight is improved.  Monitor.





She uses oxygen when necessary.  She has a mediastinal thyroid mass, this has 

been present for years to decades and thus appears benign.  Unless she became 

grossly symptomatic with this thoracotomy would not be recommended at her age 

and medical condition.





Still has some issues with heartburn, can use Tums up to four times daily PRN, 

okay to continue H2 PPI therapy.  Again at her age would not pursue further 

workup for this unless anemia becoming grossly worse to the point requiring 

transfusion etc.





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 

versus squamous cell carcinoma.  I would recommend excision of this before it 

became more invasive.  It approaches the lashes and lid margin but is just 

below it, I think general surgeon here can probably remove this in the clinic, 

we'll refer outpatient them.





Presumption would be that the incidental right breast lesion by CT is indolent  

If visited with surgeon could discuss with them whether she and they thought 

mammogram or excision biopsy would be indicated.














- Patient Data


Vitals - Most Recent: 


 Last Vital Signs











Temp  36.3 C   04/16/20 06:00


 


Pulse  82   04/16/20 07:49


 


Resp  17   04/14/20 06:00


 


BP  142/71 H  04/16/20 07:49


 


Pulse Ox  95   04/11/20 06:00











Weight - Most Recent: 86.273 kg


I&O - Last 24 Hours: 


 Intake & Output











 04/15/20 04/16/20 04/16/20





 22:59 06:59 14:59


 


Intake Total 180  10


 


Balance 180  10











Med Orders - Current: 


 Current Medications





Acetaminophen (Tylenol)  650 mg PO 0200,0800,1300,2000 [facility]


   Last Admin: 04/16/20 07:50 Dose:  650 mg


Calcium Carbonate/Glycine (Tums Extra Strength)  750 mg PO TID PRN


   PRN Reason: Dyspepsia


   Last Admin: 03/27/20 17:48 Dose:  750 mg


Docusate Sodium (Colace)  100 mg PO DAILY PRN


   PRN Reason: Constipation


Famotidine (Pepcid)  20 mg PO DAILY [facility]


   Last Admin: 04/16/20 07:49 Dose:  20 mg


Ferrous Sulfate (Ferrous Sulfate)  325 mg PO Q48H [facility]


   Last Admin: 04/15/20 12:28 Dose:  325 mg


Furosemide (Lasix)  20 mg PO DAILY [facility]


   Last Admin: 04/16/20 07:49 Dose:  20 mg


Gabapentin (Neurontin)  100 mg PO BID@0800,1300 [facility]


   Last Admin: 04/16/20 07:49 Dose:  100 mg


Gabapentin (Neurontin)  300 mg PO BEDTIME [facility]


   Last Admin: 04/15/20 21:10 Dose:  300 mg


Loperamide HCl (Imodium)  2 mg PO Q12H PRN


   PRN Reason: Diarrhea


   Last Admin: 02/26/20 08:30 Dose:  2 mg


Melatonin (Melatonin)  6 mg PO BEDTIME [facility]


   Last Admin: 04/15/20 21:10 Dose:  6 mg


Metoprolol Succinate (Toprol Xl)  25 mg PO DAILY [facility]


   Last Admin: 04/16/20 07:49 Dose:  25 mg


Miconazole (Desenex 2%)  0 gm TOP BID PRN


   PRN Reason: Rash


   Last Admin: 11/16/19 08:43 Dose:  1 applic


Nitroglycerin (Nitrostat)  0.4 mg SL Q5M PRN


   PRN Reason: Chest Pain


   Last Admin: 11/11/19 18:01 Dose:  0.4 mg


Omeprazole (Omeprazole)  20 mg PO DAILY [facility]


   Last Admin: 04/16/20 07:50 Dose:  20 mg


Pharmacy Consult (Consult To Pharmacy)  1 each .XX ASDIRECTED [facility]


Polyethylene Glycol (Miralax)  17 gm PO DAILY PRN


   PRN Reason: Constipation


Ropinirole HCl (Requip)  0.5 mg PO BEDTIME [facility]


   Last Admin: 04/15/20 21:09 Dose:  0.5 mg


Senna/Docusate Sodium (Senna Plus)  1 tab PO DAILY PRN


   PRN Reason: Constipation


Sertraline HCl (Zoloft)  100 mg PO DAILY [facility]


   Last Admin: 04/16/20 07:49 Dose:  100 mg


Vitamin B Complex/Vit C/Vit E/Zinc (Stress Formula With Zinc)  1 tab PO DAILY 

[facility]


   Last Admin: 04/16/20 07:49 Dose:  1 tab


Warfarin Sodium (Coumadin)  2.5 mg PO MoTh@2000 [facility]


   Last Admin: 04/13/20 20:08 Dose:  2.5 mg


Warfarin Sodium (Coumadin)  5 mg PO SuTuWeFrSa@2000 [facility]


   Last Admin: 04/15/20 21:09 Dose:  5 mg





Discontinued Medications





Furosemide (Lasix)  20 mg PO DAILY [facility]


   Last Admin: 09/24/19 08:18 Dose:  20 mg


Gabapentin (Neurontin)  100 mg PO BID@0800,1300 [facility]


   Last Admin: 09/24/19 13:48 Dose:  100 mg


Influenza Virus Vaccine (Fluzone High-Dose 2019-20 Syringe)  180 mcg IM .ONCE 

ONE


   Stop: 10/01/19 14:01


   Last Admin: 10/01/19 13:57 Dose:  180 mcg


Iopamidol (Isovue-300 (61%))  100 ml IVPUSH ONETIME ONE


   Stop: 12/26/19 15:19


   Last Admin: 12/26/19 16:33 Dose:  100 ml


Miconazole (Desenex 2%)  1 gm TOP BID [facility]


   Last Admin: 11/13/19 09:20 Dose:  Not Given


Nitrofurantoin Macrocrystals (Macrobid)  100 mg PO BID [facility]


   Stop: 10/26/19 20:00


   Last Admin: 10/22/19 07:54 Dose:  100 mg


Nitrofurantoin Macrocrystals (Macrobid)  100 mg PO BID [facility]


   Stop: 10/26/19 20:01


   Last Admin: 10/26/19 20:34 Dose:  100 mg


Own Supply: Warfarin 2.5mg (1/2 Of 5mg Tab)  0 mg PO MoTh@2000 [facility]


   Last Admin: 09/23/19 19:37 Dose:  2.5 mg


Own Supply: Warfarin (. 5mg)  0 mg PO SuTuWeFrSa@2000 [facility]


   Last Admin: 09/22/19 19:43 Dose:  5 mg


Own Supply: (Gabapentin. 300mg)  0 mg PO BEDTIME [facility]


   Last Admin: 09/23/19 19:36 Dose:  300 mg


Own Supply: (Metoprolol.Er 25mg)  0 mg PO DAILY [facility]


   Last Admin: 09/24/19 08:17 Dose:  25 mg


Polyethylene Glycol (Miralax)  17 gm PO DAILY [facility]


   Last Admin: 08/07/19 08:17 Dose:  Not Given


Ropinirole HCl (Requip)  0.5 mg PO BEDTIME [facility]


   Last Admin: 09/23/19 19:37 Dose:  0.5 mg


Sertraline HCl (Zoloft)  100 mg PO DAILY [facility]


   Last Admin: 03/06/20 07:56 Dose:  100 mg











Sepsis Event Note





- Evaluation


Sepsis Screening Result: No Definite Risk





- Focused Exam


Vital Signs: 


 Vital Signs











  Temp Pulse BP


 


 04/16/20 07:49   82  142/71 H


 


 04/16/20 06:00  36.3 C  











Date Exam was Performed: 04/16/20


Time Exam was Performed: 11:38





- Problem List Review


Problem List Initiated/Reviewed/Updated: Yes





- My Orders


Last 24 Hours: 


My Active Orders





05/08/20 07:00


INR,PT,PROTHROMBIN TIME [COAG] Q28D 





06/05/20 07:00


INR,PT,PROTHROMBIN TIME [COAG] Q28D 





07/03/20 07:00


INR,PT,PROTHROMBIN TIME [COAG] Q28D 





07/31/20 07:00


INR,PT,PROTHROMBIN TIME [COAG] Q28D 





08/28/20 07:00


INR,PT,PROTHROMBIN TIME [COAG] Q28D 





09/25/20 07:00


INR,PT,PROTHROMBIN TIME [COAG] Q28D 





10/23/20 07:00


INR,PT,PROTHROMBIN TIME [COAG] Q28D

## 2020-04-17 RX ADMIN — SERTRALINE HYDROCHLORIDE SCH MG: 100 TABLET ORAL at 07:40

## 2020-04-17 RX ADMIN — ZINC SCH TAB: TAB ORAL at 07:42

## 2020-04-17 RX ADMIN — ROPINIROLE SCH MG: 0.5 TABLET ORAL at 19:54

## 2020-04-17 RX ADMIN — GABAPENTIN SCH MG: 100 CAPSULE ORAL at 12:22

## 2020-04-17 RX ADMIN — GABAPENTIN SCH MG: 300 CAPSULE ORAL at 19:54

## 2020-04-17 RX ADMIN — METOPROLOL SUCCINATE SCH MG: 25 TABLET, EXTENDED RELEASE ORAL at 07:40

## 2020-04-17 RX ADMIN — OMEPRAZOLE SCH MG: 20 CAPSULE, DELAYED RELEASE ORAL at 07:41

## 2020-04-17 RX ADMIN — GABAPENTIN SCH MG: 100 CAPSULE ORAL at 07:40

## 2020-04-17 RX ADMIN — FUROSEMIDE SCH MG: 20 TABLET ORAL at 07:40

## 2020-04-17 RX ADMIN — WARFARIN SODIUM SCH MG: 5 TABLET ORAL at 19:53

## 2020-04-18 RX ADMIN — OMEPRAZOLE SCH MG: 20 CAPSULE, DELAYED RELEASE ORAL at 07:43

## 2020-04-18 RX ADMIN — ROPINIROLE SCH MG: 0.5 TABLET ORAL at 19:35

## 2020-04-18 RX ADMIN — GABAPENTIN SCH MG: 100 CAPSULE ORAL at 12:31

## 2020-04-18 RX ADMIN — GABAPENTIN SCH MG: 100 CAPSULE ORAL at 07:42

## 2020-04-18 RX ADMIN — SERTRALINE HYDROCHLORIDE SCH MG: 100 TABLET ORAL at 07:42

## 2020-04-18 RX ADMIN — FUROSEMIDE SCH MG: 20 TABLET ORAL at 07:42

## 2020-04-18 RX ADMIN — ZINC SCH TAB: TAB ORAL at 07:44

## 2020-04-18 RX ADMIN — GABAPENTIN SCH MG: 300 CAPSULE ORAL at 19:35

## 2020-04-18 RX ADMIN — METOPROLOL SUCCINATE SCH MG: 25 TABLET, EXTENDED RELEASE ORAL at 07:42

## 2020-04-18 RX ADMIN — WARFARIN SODIUM SCH MG: 5 TABLET ORAL at 19:36

## 2020-04-19 RX ADMIN — GABAPENTIN SCH MG: 100 CAPSULE ORAL at 12:21

## 2020-04-19 RX ADMIN — WARFARIN SODIUM SCH MG: 5 TABLET ORAL at 19:46

## 2020-04-19 RX ADMIN — METOPROLOL SUCCINATE SCH MG: 25 TABLET, EXTENDED RELEASE ORAL at 08:43

## 2020-04-19 RX ADMIN — ROPINIROLE SCH MG: 0.5 TABLET ORAL at 19:45

## 2020-04-19 RX ADMIN — ZINC SCH TAB: TAB ORAL at 08:45

## 2020-04-19 RX ADMIN — GABAPENTIN SCH MG: 300 CAPSULE ORAL at 19:46

## 2020-04-19 RX ADMIN — SERTRALINE HYDROCHLORIDE SCH MG: 100 TABLET ORAL at 08:44

## 2020-04-19 RX ADMIN — GABAPENTIN SCH MG: 100 CAPSULE ORAL at 08:44

## 2020-04-19 RX ADMIN — OMEPRAZOLE SCH MG: 20 CAPSULE, DELAYED RELEASE ORAL at 08:44

## 2020-04-19 RX ADMIN — FUROSEMIDE SCH MG: 20 TABLET ORAL at 08:44

## 2020-04-20 RX ADMIN — GABAPENTIN SCH MG: 300 CAPSULE ORAL at 20:40

## 2020-04-20 RX ADMIN — ROPINIROLE SCH MG: 0.5 TABLET ORAL at 20:39

## 2020-04-20 RX ADMIN — FUROSEMIDE SCH MG: 20 TABLET ORAL at 08:14

## 2020-04-20 RX ADMIN — OMEPRAZOLE SCH MG: 20 CAPSULE, DELAYED RELEASE ORAL at 08:14

## 2020-04-20 RX ADMIN — WARFARIN SODIUM SCH MG: 5 TABLET ORAL at 20:39

## 2020-04-20 RX ADMIN — GABAPENTIN SCH MG: 100 CAPSULE ORAL at 13:08

## 2020-04-20 RX ADMIN — SERTRALINE HYDROCHLORIDE SCH MG: 100 TABLET ORAL at 08:14

## 2020-04-20 RX ADMIN — METOPROLOL SUCCINATE SCH MG: 25 TABLET, EXTENDED RELEASE ORAL at 08:14

## 2020-04-20 RX ADMIN — GABAPENTIN SCH MG: 100 CAPSULE ORAL at 08:14

## 2020-04-20 RX ADMIN — ZINC SCH TAB: TAB ORAL at 08:14

## 2020-04-21 RX ADMIN — METOPROLOL SUCCINATE SCH MG: 25 TABLET, EXTENDED RELEASE ORAL at 08:09

## 2020-04-21 RX ADMIN — FUROSEMIDE SCH MG: 20 TABLET ORAL at 08:09

## 2020-04-21 RX ADMIN — GABAPENTIN SCH MG: 300 CAPSULE ORAL at 20:32

## 2020-04-21 RX ADMIN — GABAPENTIN SCH MG: 100 CAPSULE ORAL at 13:26

## 2020-04-21 RX ADMIN — WARFARIN SODIUM SCH MG: 5 TABLET ORAL at 20:31

## 2020-04-21 RX ADMIN — ROPINIROLE SCH MG: 0.5 TABLET ORAL at 20:32

## 2020-04-21 RX ADMIN — ZINC SCH TAB: TAB ORAL at 08:11

## 2020-04-21 RX ADMIN — GABAPENTIN SCH MG: 100 CAPSULE ORAL at 08:10

## 2020-04-21 RX ADMIN — SERTRALINE HYDROCHLORIDE SCH MG: 100 TABLET ORAL at 08:10

## 2020-04-21 RX ADMIN — OMEPRAZOLE SCH MG: 20 CAPSULE, DELAYED RELEASE ORAL at 08:11

## 2020-04-22 RX ADMIN — OMEPRAZOLE SCH MG: 20 CAPSULE, DELAYED RELEASE ORAL at 08:02

## 2020-04-22 RX ADMIN — GABAPENTIN SCH MG: 100 CAPSULE ORAL at 08:01

## 2020-04-22 RX ADMIN — ROPINIROLE SCH MG: 0.5 TABLET ORAL at 20:06

## 2020-04-22 RX ADMIN — GABAPENTIN SCH MG: 300 CAPSULE ORAL at 20:05

## 2020-04-22 RX ADMIN — WARFARIN SODIUM SCH MG: 5 TABLET ORAL at 20:06

## 2020-04-22 RX ADMIN — FUROSEMIDE SCH MG: 20 TABLET ORAL at 08:01

## 2020-04-22 RX ADMIN — METOPROLOL SUCCINATE SCH MG: 25 TABLET, EXTENDED RELEASE ORAL at 08:01

## 2020-04-22 RX ADMIN — GABAPENTIN SCH MG: 100 CAPSULE ORAL at 12:51

## 2020-04-22 RX ADMIN — SERTRALINE HYDROCHLORIDE SCH MG: 100 TABLET ORAL at 08:02

## 2020-04-22 RX ADMIN — ZINC SCH TAB: TAB ORAL at 08:02

## 2020-04-23 RX ADMIN — OMEPRAZOLE SCH MG: 20 CAPSULE, DELAYED RELEASE ORAL at 08:13

## 2020-04-23 RX ADMIN — ROPINIROLE SCH MG: 0.5 TABLET ORAL at 19:42

## 2020-04-23 RX ADMIN — SERTRALINE HYDROCHLORIDE SCH MG: 100 TABLET ORAL at 08:13

## 2020-04-23 RX ADMIN — GABAPENTIN SCH MG: 100 CAPSULE ORAL at 08:14

## 2020-04-23 RX ADMIN — METOPROLOL SUCCINATE SCH MG: 25 TABLET, EXTENDED RELEASE ORAL at 08:13

## 2020-04-23 RX ADMIN — WARFARIN SODIUM SCH MG: 5 TABLET ORAL at 19:42

## 2020-04-23 RX ADMIN — ZINC SCH TAB: TAB ORAL at 08:13

## 2020-04-23 RX ADMIN — GABAPENTIN SCH MG: 100 CAPSULE ORAL at 13:10

## 2020-04-23 RX ADMIN — GABAPENTIN SCH MG: 300 CAPSULE ORAL at 19:42

## 2020-04-23 RX ADMIN — FUROSEMIDE SCH MG: 20 TABLET ORAL at 08:13

## 2020-04-24 RX ADMIN — FUROSEMIDE SCH MG: 20 TABLET ORAL at 07:47

## 2020-04-24 RX ADMIN — WARFARIN SODIUM SCH MG: 5 TABLET ORAL at 21:10

## 2020-04-24 RX ADMIN — ROPINIROLE SCH MG: 0.5 TABLET ORAL at 21:10

## 2020-04-24 RX ADMIN — SERTRALINE HYDROCHLORIDE SCH MG: 100 TABLET ORAL at 07:47

## 2020-04-24 RX ADMIN — GABAPENTIN SCH MG: 100 CAPSULE ORAL at 07:48

## 2020-04-24 RX ADMIN — ZINC SCH TAB: TAB ORAL at 07:48

## 2020-04-24 RX ADMIN — GABAPENTIN SCH MG: 100 CAPSULE ORAL at 12:21

## 2020-04-24 RX ADMIN — GABAPENTIN SCH MG: 300 CAPSULE ORAL at 21:11

## 2020-04-24 RX ADMIN — METOPROLOL SUCCINATE SCH MG: 25 TABLET, EXTENDED RELEASE ORAL at 07:47

## 2020-04-24 RX ADMIN — OMEPRAZOLE SCH MG: 20 CAPSULE, DELAYED RELEASE ORAL at 07:48

## 2020-04-25 RX ADMIN — METOPROLOL SUCCINATE SCH MG: 25 TABLET, EXTENDED RELEASE ORAL at 08:03

## 2020-04-25 RX ADMIN — FUROSEMIDE SCH MG: 20 TABLET ORAL at 08:04

## 2020-04-25 RX ADMIN — OMEPRAZOLE SCH MG: 20 CAPSULE, DELAYED RELEASE ORAL at 08:03

## 2020-04-25 RX ADMIN — GABAPENTIN SCH MG: 100 CAPSULE ORAL at 12:27

## 2020-04-25 RX ADMIN — GABAPENTIN SCH MG: 100 CAPSULE ORAL at 08:04

## 2020-04-25 RX ADMIN — ZINC SCH TAB: TAB ORAL at 08:03

## 2020-04-25 RX ADMIN — WARFARIN SODIUM SCH MG: 5 TABLET ORAL at 20:22

## 2020-04-25 RX ADMIN — SERTRALINE HYDROCHLORIDE SCH MG: 100 TABLET ORAL at 08:03

## 2020-04-25 RX ADMIN — GABAPENTIN SCH MG: 300 CAPSULE ORAL at 20:23

## 2020-04-25 RX ADMIN — ROPINIROLE SCH MG: 0.5 TABLET ORAL at 20:23

## 2020-04-26 RX ADMIN — GABAPENTIN SCH MG: 100 CAPSULE ORAL at 12:14

## 2020-04-26 RX ADMIN — FUROSEMIDE SCH MG: 20 TABLET ORAL at 07:30

## 2020-04-26 RX ADMIN — SERTRALINE HYDROCHLORIDE SCH MG: 100 TABLET ORAL at 07:30

## 2020-04-26 RX ADMIN — OMEPRAZOLE SCH MG: 20 CAPSULE, DELAYED RELEASE ORAL at 07:29

## 2020-04-26 RX ADMIN — GABAPENTIN SCH MG: 100 CAPSULE ORAL at 07:29

## 2020-04-26 RX ADMIN — ROPINIROLE SCH MG: 0.5 TABLET ORAL at 21:37

## 2020-04-26 RX ADMIN — ZINC SCH TAB: TAB ORAL at 07:30

## 2020-04-26 RX ADMIN — METOPROLOL SUCCINATE SCH MG: 25 TABLET, EXTENDED RELEASE ORAL at 07:29

## 2020-04-26 RX ADMIN — WARFARIN SODIUM SCH MG: 5 TABLET ORAL at 21:37

## 2020-04-26 RX ADMIN — GABAPENTIN SCH MG: 300 CAPSULE ORAL at 21:38

## 2020-04-27 RX ADMIN — ROPINIROLE SCH MG: 0.5 TABLET ORAL at 19:36

## 2020-04-27 RX ADMIN — GABAPENTIN SCH MG: 100 CAPSULE ORAL at 08:25

## 2020-04-27 RX ADMIN — OMEPRAZOLE SCH MG: 20 CAPSULE, DELAYED RELEASE ORAL at 08:24

## 2020-04-27 RX ADMIN — ZINC SCH TAB: TAB ORAL at 08:24

## 2020-04-27 RX ADMIN — SERTRALINE HYDROCHLORIDE SCH MG: 100 TABLET ORAL at 08:25

## 2020-04-27 RX ADMIN — WARFARIN SODIUM SCH MG: 5 TABLET ORAL at 19:36

## 2020-04-27 RX ADMIN — GABAPENTIN SCH MG: 300 CAPSULE ORAL at 19:36

## 2020-04-27 RX ADMIN — GABAPENTIN SCH MG: 100 CAPSULE ORAL at 12:57

## 2020-04-27 RX ADMIN — FUROSEMIDE SCH MG: 20 TABLET ORAL at 08:24

## 2020-04-27 RX ADMIN — METOPROLOL SUCCINATE SCH MG: 25 TABLET, EXTENDED RELEASE ORAL at 08:24

## 2020-04-28 RX ADMIN — SERTRALINE HYDROCHLORIDE SCH MG: 100 TABLET ORAL at 07:50

## 2020-04-28 RX ADMIN — ROPINIROLE SCH MG: 0.5 TABLET ORAL at 20:10

## 2020-04-28 RX ADMIN — GABAPENTIN SCH MG: 100 CAPSULE ORAL at 12:37

## 2020-04-28 RX ADMIN — ZINC SCH TAB: TAB ORAL at 07:52

## 2020-04-28 RX ADMIN — FUROSEMIDE SCH MG: 20 TABLET ORAL at 07:51

## 2020-04-28 RX ADMIN — GABAPENTIN SCH MG: 300 CAPSULE ORAL at 20:09

## 2020-04-28 RX ADMIN — OMEPRAZOLE SCH MG: 20 CAPSULE, DELAYED RELEASE ORAL at 07:52

## 2020-04-28 RX ADMIN — GABAPENTIN SCH MG: 100 CAPSULE ORAL at 07:51

## 2020-04-28 RX ADMIN — METOPROLOL SUCCINATE SCH MG: 25 TABLET, EXTENDED RELEASE ORAL at 07:51

## 2020-04-28 RX ADMIN — WARFARIN SODIUM SCH MG: 5 TABLET ORAL at 20:10

## 2020-04-29 RX ADMIN — GABAPENTIN SCH MG: 100 CAPSULE ORAL at 12:30

## 2020-04-29 RX ADMIN — ROPINIROLE SCH MG: 0.5 TABLET ORAL at 21:06

## 2020-04-29 RX ADMIN — FUROSEMIDE SCH MG: 20 TABLET ORAL at 08:08

## 2020-04-29 RX ADMIN — ZINC SCH TAB: TAB ORAL at 08:06

## 2020-04-29 RX ADMIN — METOPROLOL SUCCINATE SCH MG: 25 TABLET, EXTENDED RELEASE ORAL at 08:07

## 2020-04-29 RX ADMIN — WARFARIN SODIUM SCH MG: 5 TABLET ORAL at 21:07

## 2020-04-29 RX ADMIN — GABAPENTIN SCH MG: 300 CAPSULE ORAL at 21:06

## 2020-04-29 RX ADMIN — SERTRALINE HYDROCHLORIDE SCH MG: 100 TABLET ORAL at 08:08

## 2020-04-29 RX ADMIN — GABAPENTIN SCH MG: 100 CAPSULE ORAL at 08:09

## 2020-04-29 RX ADMIN — OMEPRAZOLE SCH MG: 20 CAPSULE, DELAYED RELEASE ORAL at 08:05

## 2020-04-30 RX ADMIN — METOPROLOL SUCCINATE SCH MG: 25 TABLET, EXTENDED RELEASE ORAL at 08:05

## 2020-04-30 RX ADMIN — ROPINIROLE SCH MG: 0.5 TABLET ORAL at 20:33

## 2020-04-30 RX ADMIN — GABAPENTIN SCH MG: 300 CAPSULE ORAL at 20:33

## 2020-04-30 RX ADMIN — GABAPENTIN SCH MG: 100 CAPSULE ORAL at 15:25

## 2020-04-30 RX ADMIN — FUROSEMIDE SCH MG: 20 TABLET ORAL at 08:06

## 2020-04-30 RX ADMIN — WARFARIN SODIUM SCH MG: 5 TABLET ORAL at 20:33

## 2020-04-30 RX ADMIN — ZINC SCH TAB: TAB ORAL at 08:06

## 2020-04-30 RX ADMIN — SERTRALINE HYDROCHLORIDE SCH MG: 100 TABLET ORAL at 08:06

## 2020-04-30 RX ADMIN — OMEPRAZOLE SCH MG: 20 CAPSULE, DELAYED RELEASE ORAL at 08:06

## 2020-04-30 RX ADMIN — GABAPENTIN SCH MG: 100 CAPSULE ORAL at 08:07

## 2020-05-01 RX ADMIN — ROPINIROLE SCH MG: 0.5 TABLET ORAL at 19:54

## 2020-05-01 RX ADMIN — METOPROLOL SUCCINATE SCH MG: 25 TABLET, EXTENDED RELEASE ORAL at 07:21

## 2020-05-01 RX ADMIN — SERTRALINE HYDROCHLORIDE SCH MG: 100 TABLET ORAL at 07:21

## 2020-05-01 RX ADMIN — ZINC SCH TAB: TAB ORAL at 07:20

## 2020-05-01 RX ADMIN — GABAPENTIN SCH MG: 100 CAPSULE ORAL at 13:05

## 2020-05-01 RX ADMIN — FUROSEMIDE SCH MG: 20 TABLET ORAL at 07:21

## 2020-05-01 RX ADMIN — GABAPENTIN SCH MG: 300 CAPSULE ORAL at 19:55

## 2020-05-01 RX ADMIN — OMEPRAZOLE SCH MG: 20 CAPSULE, DELAYED RELEASE ORAL at 07:21

## 2020-05-01 RX ADMIN — GABAPENTIN SCH MG: 100 CAPSULE ORAL at 07:21

## 2020-05-01 RX ADMIN — WARFARIN SODIUM SCH MG: 5 TABLET ORAL at 19:55

## 2020-05-02 RX ADMIN — SERTRALINE HYDROCHLORIDE SCH MG: 100 TABLET ORAL at 08:01

## 2020-05-02 RX ADMIN — METOPROLOL SUCCINATE SCH MG: 25 TABLET, EXTENDED RELEASE ORAL at 08:01

## 2020-05-02 RX ADMIN — WARFARIN SODIUM SCH MG: 5 TABLET ORAL at 19:56

## 2020-05-02 RX ADMIN — ROPINIROLE SCH MG: 0.5 TABLET ORAL at 19:56

## 2020-05-02 RX ADMIN — GABAPENTIN SCH MG: 100 CAPSULE ORAL at 08:01

## 2020-05-02 RX ADMIN — GABAPENTIN SCH MG: 300 CAPSULE ORAL at 19:57

## 2020-05-02 RX ADMIN — GABAPENTIN SCH MG: 100 CAPSULE ORAL at 12:19

## 2020-05-02 RX ADMIN — FUROSEMIDE SCH MG: 20 TABLET ORAL at 08:01

## 2020-05-02 RX ADMIN — ZINC SCH TAB: TAB ORAL at 08:02

## 2020-05-02 RX ADMIN — OMEPRAZOLE SCH MG: 20 CAPSULE, DELAYED RELEASE ORAL at 08:02

## 2020-05-03 RX ADMIN — GABAPENTIN SCH MG: 100 CAPSULE ORAL at 08:05

## 2020-05-03 RX ADMIN — METOPROLOL SUCCINATE SCH MG: 25 TABLET, EXTENDED RELEASE ORAL at 08:05

## 2020-05-03 RX ADMIN — WARFARIN SODIUM SCH MG: 5 TABLET ORAL at 20:06

## 2020-05-03 RX ADMIN — FUROSEMIDE SCH MG: 20 TABLET ORAL at 08:05

## 2020-05-03 RX ADMIN — ZINC SCH TAB: TAB ORAL at 08:05

## 2020-05-03 RX ADMIN — OMEPRAZOLE SCH MG: 20 CAPSULE, DELAYED RELEASE ORAL at 08:05

## 2020-05-03 RX ADMIN — GABAPENTIN SCH MG: 100 CAPSULE ORAL at 15:24

## 2020-05-03 RX ADMIN — SERTRALINE HYDROCHLORIDE SCH MG: 100 TABLET ORAL at 08:05

## 2020-05-03 RX ADMIN — ROPINIROLE SCH MG: 0.5 TABLET ORAL at 20:06

## 2020-05-03 RX ADMIN — GABAPENTIN SCH MG: 300 CAPSULE ORAL at 20:04

## 2020-05-04 RX ADMIN — GABAPENTIN SCH MG: 100 CAPSULE ORAL at 07:28

## 2020-05-04 RX ADMIN — SERTRALINE HYDROCHLORIDE SCH MG: 100 TABLET ORAL at 07:28

## 2020-05-04 RX ADMIN — FUROSEMIDE SCH MG: 20 TABLET ORAL at 07:28

## 2020-05-04 RX ADMIN — WARFARIN SODIUM SCH MG: 5 TABLET ORAL at 21:34

## 2020-05-04 RX ADMIN — METOPROLOL SUCCINATE SCH MG: 25 TABLET, EXTENDED RELEASE ORAL at 07:28

## 2020-05-04 RX ADMIN — ZINC SCH TAB: TAB ORAL at 07:29

## 2020-05-04 RX ADMIN — ROPINIROLE SCH MG: 0.5 TABLET ORAL at 21:34

## 2020-05-04 RX ADMIN — OMEPRAZOLE SCH MG: 20 CAPSULE, DELAYED RELEASE ORAL at 07:29

## 2020-05-04 RX ADMIN — GABAPENTIN SCH MG: 300 CAPSULE ORAL at 21:35

## 2020-05-04 RX ADMIN — GABAPENTIN SCH MG: 100 CAPSULE ORAL at 14:09

## 2020-05-05 RX ADMIN — FUROSEMIDE SCH MG: 20 TABLET ORAL at 07:23

## 2020-05-05 RX ADMIN — ROPINIROLE SCH MG: 0.5 TABLET ORAL at 15:28

## 2020-05-05 RX ADMIN — GABAPENTIN SCH MG: 300 CAPSULE ORAL at 21:00

## 2020-05-05 RX ADMIN — METOPROLOL SUCCINATE SCH MG: 25 TABLET, EXTENDED RELEASE ORAL at 07:23

## 2020-05-05 RX ADMIN — GABAPENTIN SCH MG: 100 CAPSULE ORAL at 13:50

## 2020-05-05 RX ADMIN — GABAPENTIN SCH MG: 100 CAPSULE ORAL at 07:24

## 2020-05-05 RX ADMIN — ROPINIROLE SCH: 0.5 TABLET ORAL at 21:00

## 2020-05-05 RX ADMIN — SERTRALINE HYDROCHLORIDE SCH MG: 100 TABLET ORAL at 07:24

## 2020-05-05 RX ADMIN — WARFARIN SODIUM SCH MG: 5 TABLET ORAL at 21:00

## 2020-05-05 RX ADMIN — OMEPRAZOLE SCH MG: 20 CAPSULE, DELAYED RELEASE ORAL at 07:23

## 2020-05-05 RX ADMIN — ZINC SCH TAB: TAB ORAL at 07:23

## 2020-05-06 RX ADMIN — GABAPENTIN SCH MG: 100 CAPSULE ORAL at 08:01

## 2020-05-06 RX ADMIN — SERTRALINE HYDROCHLORIDE SCH MG: 100 TABLET ORAL at 07:57

## 2020-05-06 RX ADMIN — GABAPENTIN SCH MG: 100 CAPSULE ORAL at 12:22

## 2020-05-06 RX ADMIN — WARFARIN SODIUM SCH MG: 5 TABLET ORAL at 20:49

## 2020-05-06 RX ADMIN — GABAPENTIN SCH MG: 300 CAPSULE ORAL at 20:48

## 2020-05-06 RX ADMIN — ROPINIROLE SCH MG: 0.5 TABLET ORAL at 20:49

## 2020-05-06 RX ADMIN — OMEPRAZOLE SCH MG: 20 CAPSULE, DELAYED RELEASE ORAL at 07:58

## 2020-05-06 RX ADMIN — ZINC SCH TAB: TAB ORAL at 07:58

## 2020-05-06 RX ADMIN — FUROSEMIDE SCH MG: 20 TABLET ORAL at 07:57

## 2020-05-06 RX ADMIN — METOPROLOL SUCCINATE SCH MG: 25 TABLET, EXTENDED RELEASE ORAL at 07:56

## 2020-05-07 RX ADMIN — GABAPENTIN SCH MG: 300 CAPSULE ORAL at 21:11

## 2020-05-07 RX ADMIN — OMEPRAZOLE SCH MG: 20 CAPSULE, DELAYED RELEASE ORAL at 08:06

## 2020-05-07 RX ADMIN — SERTRALINE HYDROCHLORIDE SCH MG: 100 TABLET ORAL at 08:08

## 2020-05-07 RX ADMIN — ZINC SCH TAB: TAB ORAL at 08:06

## 2020-05-07 RX ADMIN — WARFARIN SODIUM SCH MG: 5 TABLET ORAL at 21:12

## 2020-05-07 RX ADMIN — METOPROLOL SUCCINATE SCH MG: 25 TABLET, EXTENDED RELEASE ORAL at 08:08

## 2020-05-07 RX ADMIN — GABAPENTIN SCH MG: 100 CAPSULE ORAL at 08:08

## 2020-05-07 RX ADMIN — GABAPENTIN SCH MG: 100 CAPSULE ORAL at 12:13

## 2020-05-07 RX ADMIN — FUROSEMIDE SCH MG: 20 TABLET ORAL at 08:08

## 2020-05-07 RX ADMIN — ROPINIROLE SCH MG: 0.5 TABLET ORAL at 21:12

## 2020-05-08 RX ADMIN — FUROSEMIDE SCH MG: 20 TABLET ORAL at 10:10

## 2020-05-08 RX ADMIN — WARFARIN SODIUM SCH MG: 5 TABLET ORAL at 21:18

## 2020-05-08 RX ADMIN — GABAPENTIN SCH MG: 100 CAPSULE ORAL at 13:27

## 2020-05-08 RX ADMIN — ZINC SCH TAB: TAB ORAL at 10:10

## 2020-05-08 RX ADMIN — GABAPENTIN SCH MG: 100 CAPSULE ORAL at 10:11

## 2020-05-08 RX ADMIN — SERTRALINE HYDROCHLORIDE SCH MG: 100 TABLET ORAL at 10:10

## 2020-05-08 RX ADMIN — METOPROLOL SUCCINATE SCH MG: 25 TABLET, EXTENDED RELEASE ORAL at 10:09

## 2020-05-08 RX ADMIN — ROPINIROLE SCH MG: 0.5 TABLET ORAL at 21:19

## 2020-05-08 RX ADMIN — GABAPENTIN SCH MG: 300 CAPSULE ORAL at 21:22

## 2020-05-08 RX ADMIN — OMEPRAZOLE SCH MG: 20 CAPSULE, DELAYED RELEASE ORAL at 10:08

## 2020-05-09 RX ADMIN — GABAPENTIN SCH MG: 100 CAPSULE ORAL at 08:53

## 2020-05-09 RX ADMIN — METOPROLOL SUCCINATE SCH MG: 25 TABLET, EXTENDED RELEASE ORAL at 08:53

## 2020-05-09 RX ADMIN — ROPINIROLE SCH MG: 0.5 TABLET ORAL at 20:20

## 2020-05-09 RX ADMIN — GABAPENTIN SCH MG: 300 CAPSULE ORAL at 20:20

## 2020-05-09 RX ADMIN — SERTRALINE HYDROCHLORIDE SCH MG: 100 TABLET ORAL at 08:53

## 2020-05-09 RX ADMIN — FUROSEMIDE SCH MG: 20 TABLET ORAL at 08:53

## 2020-05-09 RX ADMIN — GABAPENTIN SCH MG: 100 CAPSULE ORAL at 12:06

## 2020-05-09 RX ADMIN — OMEPRAZOLE SCH MG: 20 CAPSULE, DELAYED RELEASE ORAL at 08:54

## 2020-05-09 RX ADMIN — ZINC SCH TAB: TAB ORAL at 08:54

## 2020-05-09 RX ADMIN — WARFARIN SODIUM SCH MG: 5 TABLET ORAL at 20:20

## 2020-05-10 RX ADMIN — OMEPRAZOLE SCH MG: 20 CAPSULE, DELAYED RELEASE ORAL at 08:36

## 2020-05-10 RX ADMIN — GABAPENTIN SCH MG: 100 CAPSULE ORAL at 13:00

## 2020-05-10 RX ADMIN — FUROSEMIDE SCH MG: 20 TABLET ORAL at 08:36

## 2020-05-10 RX ADMIN — METOPROLOL SUCCINATE SCH MG: 25 TABLET, EXTENDED RELEASE ORAL at 08:37

## 2020-05-10 RX ADMIN — ZINC SCH TAB: TAB ORAL at 08:35

## 2020-05-10 RX ADMIN — GABAPENTIN SCH MG: 300 CAPSULE ORAL at 20:15

## 2020-05-10 RX ADMIN — WARFARIN SODIUM SCH MG: 5 TABLET ORAL at 20:15

## 2020-05-10 RX ADMIN — GABAPENTIN SCH MG: 100 CAPSULE ORAL at 08:37

## 2020-05-10 RX ADMIN — ROPINIROLE SCH MG: 0.5 TABLET ORAL at 20:15

## 2020-05-10 RX ADMIN — SERTRALINE HYDROCHLORIDE SCH MG: 100 TABLET ORAL at 08:38

## 2020-05-11 RX ADMIN — ZINC SCH TAB: TAB ORAL at 08:07

## 2020-05-11 RX ADMIN — METOPROLOL SUCCINATE SCH MG: 25 TABLET, EXTENDED RELEASE ORAL at 08:06

## 2020-05-11 RX ADMIN — GABAPENTIN SCH MG: 100 CAPSULE ORAL at 12:05

## 2020-05-11 RX ADMIN — FUROSEMIDE SCH MG: 20 TABLET ORAL at 08:07

## 2020-05-11 RX ADMIN — WARFARIN SODIUM SCH MG: 5 TABLET ORAL at 20:05

## 2020-05-11 RX ADMIN — OMEPRAZOLE SCH MG: 20 CAPSULE, DELAYED RELEASE ORAL at 08:07

## 2020-05-11 RX ADMIN — GABAPENTIN SCH MG: 100 CAPSULE ORAL at 08:06

## 2020-05-11 RX ADMIN — ROPINIROLE SCH MG: 0.5 TABLET ORAL at 20:05

## 2020-05-11 RX ADMIN — SERTRALINE HYDROCHLORIDE SCH MG: 100 TABLET ORAL at 08:07

## 2020-05-11 RX ADMIN — GABAPENTIN SCH MG: 300 CAPSULE ORAL at 20:07

## 2020-05-12 RX ADMIN — SERTRALINE HYDROCHLORIDE SCH MG: 100 TABLET ORAL at 07:35

## 2020-05-12 RX ADMIN — GABAPENTIN SCH MG: 100 CAPSULE ORAL at 12:29

## 2020-05-12 RX ADMIN — ZINC SCH TAB: TAB ORAL at 07:35

## 2020-05-12 RX ADMIN — ROPINIROLE SCH MG: 0.5 TABLET ORAL at 20:38

## 2020-05-12 RX ADMIN — WARFARIN SODIUM SCH MG: 5 TABLET ORAL at 20:38

## 2020-05-12 RX ADMIN — GABAPENTIN SCH MG: 100 CAPSULE ORAL at 07:34

## 2020-05-12 RX ADMIN — FUROSEMIDE SCH MG: 20 TABLET ORAL at 07:35

## 2020-05-12 RX ADMIN — OMEPRAZOLE SCH MG: 20 CAPSULE, DELAYED RELEASE ORAL at 07:35

## 2020-05-12 RX ADMIN — METOPROLOL SUCCINATE SCH MG: 25 TABLET, EXTENDED RELEASE ORAL at 07:35

## 2020-05-12 RX ADMIN — GABAPENTIN SCH MG: 300 CAPSULE ORAL at 20:43

## 2020-05-13 RX ADMIN — GABAPENTIN SCH MG: 100 CAPSULE ORAL at 07:39

## 2020-05-13 RX ADMIN — WARFARIN SODIUM SCH MG: 5 TABLET ORAL at 19:59

## 2020-05-13 RX ADMIN — GABAPENTIN SCH MG: 100 CAPSULE ORAL at 14:08

## 2020-05-13 RX ADMIN — METOPROLOL SUCCINATE SCH MG: 25 TABLET, EXTENDED RELEASE ORAL at 07:39

## 2020-05-13 RX ADMIN — ROPINIROLE SCH MG: 0.5 TABLET ORAL at 20:00

## 2020-05-13 RX ADMIN — FUROSEMIDE SCH MG: 20 TABLET ORAL at 07:39

## 2020-05-13 RX ADMIN — OMEPRAZOLE SCH MG: 20 CAPSULE, DELAYED RELEASE ORAL at 07:41

## 2020-05-13 RX ADMIN — ZINC SCH TAB: TAB ORAL at 07:41

## 2020-05-13 RX ADMIN — GABAPENTIN SCH MG: 300 CAPSULE ORAL at 19:59

## 2020-05-13 RX ADMIN — SERTRALINE HYDROCHLORIDE SCH MG: 100 TABLET ORAL at 07:39

## 2020-05-14 RX ADMIN — GABAPENTIN SCH MG: 100 CAPSULE ORAL at 13:03

## 2020-05-14 RX ADMIN — GABAPENTIN SCH MG: 300 CAPSULE ORAL at 19:36

## 2020-05-14 RX ADMIN — WARFARIN SODIUM SCH MG: 5 TABLET ORAL at 19:37

## 2020-05-14 RX ADMIN — METOPROLOL SUCCINATE SCH MG: 25 TABLET, EXTENDED RELEASE ORAL at 07:47

## 2020-05-14 RX ADMIN — OMEPRAZOLE SCH MG: 20 CAPSULE, DELAYED RELEASE ORAL at 07:48

## 2020-05-14 RX ADMIN — ZINC SCH TAB: TAB ORAL at 07:48

## 2020-05-14 RX ADMIN — SERTRALINE HYDROCHLORIDE SCH MG: 100 TABLET ORAL at 07:48

## 2020-05-14 RX ADMIN — FUROSEMIDE SCH MG: 20 TABLET ORAL at 07:48

## 2020-05-14 RX ADMIN — GABAPENTIN SCH MG: 100 CAPSULE ORAL at 10:25

## 2020-05-14 RX ADMIN — ROPINIROLE SCH MG: 0.5 TABLET ORAL at 19:36

## 2020-05-15 RX ADMIN — OMEPRAZOLE SCH MG: 20 CAPSULE, DELAYED RELEASE ORAL at 07:32

## 2020-05-15 RX ADMIN — ZINC SCH TAB: TAB ORAL at 07:32

## 2020-05-15 RX ADMIN — WARFARIN SODIUM SCH MG: 5 TABLET ORAL at 21:52

## 2020-05-15 RX ADMIN — GABAPENTIN SCH MG: 300 CAPSULE ORAL at 21:51

## 2020-05-15 RX ADMIN — ROPINIROLE SCH MG: 0.5 TABLET ORAL at 21:53

## 2020-05-15 RX ADMIN — FUROSEMIDE SCH MG: 20 TABLET ORAL at 07:32

## 2020-05-15 RX ADMIN — SERTRALINE HYDROCHLORIDE SCH MG: 100 TABLET ORAL at 07:32

## 2020-05-15 RX ADMIN — METOPROLOL SUCCINATE SCH MG: 25 TABLET, EXTENDED RELEASE ORAL at 07:32

## 2020-05-15 RX ADMIN — GABAPENTIN SCH MG: 100 CAPSULE ORAL at 12:01

## 2020-05-15 RX ADMIN — GABAPENTIN SCH MG: 100 CAPSULE ORAL at 07:33

## 2020-05-16 RX ADMIN — ROPINIROLE SCH MG: 0.5 TABLET ORAL at 21:37

## 2020-05-16 RX ADMIN — ZINC SCH TAB: TAB ORAL at 07:29

## 2020-05-16 RX ADMIN — OMEPRAZOLE SCH MG: 20 CAPSULE, DELAYED RELEASE ORAL at 07:29

## 2020-05-16 RX ADMIN — GABAPENTIN SCH MG: 300 CAPSULE ORAL at 21:39

## 2020-05-16 RX ADMIN — FUROSEMIDE SCH MG: 20 TABLET ORAL at 07:30

## 2020-05-16 RX ADMIN — SERTRALINE HYDROCHLORIDE SCH MG: 100 TABLET ORAL at 07:30

## 2020-05-16 RX ADMIN — METOPROLOL SUCCINATE SCH MG: 25 TABLET, EXTENDED RELEASE ORAL at 07:29

## 2020-05-16 RX ADMIN — GABAPENTIN SCH MG: 100 CAPSULE ORAL at 12:17

## 2020-05-16 RX ADMIN — WARFARIN SODIUM SCH MG: 5 TABLET ORAL at 21:38

## 2020-05-16 RX ADMIN — GABAPENTIN SCH MG: 100 CAPSULE ORAL at 07:29

## 2020-05-17 RX ADMIN — FUROSEMIDE SCH MG: 20 TABLET ORAL at 08:27

## 2020-05-17 RX ADMIN — GABAPENTIN SCH MG: 100 CAPSULE ORAL at 12:50

## 2020-05-17 RX ADMIN — ROPINIROLE SCH MG: 0.5 TABLET ORAL at 20:13

## 2020-05-17 RX ADMIN — SERTRALINE HYDROCHLORIDE SCH MG: 100 TABLET ORAL at 08:28

## 2020-05-17 RX ADMIN — METOPROLOL SUCCINATE SCH MG: 25 TABLET, EXTENDED RELEASE ORAL at 08:27

## 2020-05-17 RX ADMIN — WARFARIN SODIUM SCH MG: 5 TABLET ORAL at 20:14

## 2020-05-17 RX ADMIN — ZINC SCH TAB: TAB ORAL at 08:27

## 2020-05-17 RX ADMIN — OMEPRAZOLE SCH MG: 20 CAPSULE, DELAYED RELEASE ORAL at 08:27

## 2020-05-17 RX ADMIN — GABAPENTIN SCH MG: 100 CAPSULE ORAL at 08:27

## 2020-05-17 RX ADMIN — GABAPENTIN SCH MG: 300 CAPSULE ORAL at 20:11

## 2020-05-18 RX ADMIN — ZINC SCH TAB: TAB ORAL at 07:19

## 2020-05-18 RX ADMIN — SERTRALINE HYDROCHLORIDE SCH MG: 100 TABLET ORAL at 07:19

## 2020-05-18 RX ADMIN — ROPINIROLE SCH MG: 0.5 TABLET ORAL at 20:10

## 2020-05-18 RX ADMIN — GABAPENTIN SCH MG: 100 CAPSULE ORAL at 07:19

## 2020-05-18 RX ADMIN — GABAPENTIN SCH MG: 100 CAPSULE ORAL at 12:06

## 2020-05-18 RX ADMIN — WARFARIN SODIUM SCH MG: 5 TABLET ORAL at 20:10

## 2020-05-18 RX ADMIN — GABAPENTIN SCH MG: 300 CAPSULE ORAL at 20:10

## 2020-05-18 RX ADMIN — METOPROLOL SUCCINATE SCH MG: 25 TABLET, EXTENDED RELEASE ORAL at 07:19

## 2020-05-18 RX ADMIN — OMEPRAZOLE SCH MG: 20 CAPSULE, DELAYED RELEASE ORAL at 07:19

## 2020-05-18 RX ADMIN — FUROSEMIDE SCH MG: 20 TABLET ORAL at 07:19

## 2020-05-19 RX ADMIN — SERTRALINE HYDROCHLORIDE SCH MG: 100 TABLET ORAL at 08:48

## 2020-05-19 RX ADMIN — GABAPENTIN SCH MG: 100 CAPSULE ORAL at 08:48

## 2020-05-19 RX ADMIN — ROPINIROLE SCH MG: 0.5 TABLET ORAL at 19:26

## 2020-05-19 RX ADMIN — METOPROLOL SUCCINATE SCH MG: 25 TABLET, EXTENDED RELEASE ORAL at 08:48

## 2020-05-19 RX ADMIN — ZINC SCH TAB: TAB ORAL at 08:48

## 2020-05-19 RX ADMIN — WARFARIN SODIUM SCH MG: 5 TABLET ORAL at 19:26

## 2020-05-19 RX ADMIN — OMEPRAZOLE SCH MG: 20 CAPSULE, DELAYED RELEASE ORAL at 08:48

## 2020-05-19 RX ADMIN — FUROSEMIDE SCH MG: 20 TABLET ORAL at 08:48

## 2020-05-19 RX ADMIN — GABAPENTIN SCH MG: 100 CAPSULE ORAL at 13:04

## 2020-05-19 RX ADMIN — GABAPENTIN SCH MG: 300 CAPSULE ORAL at 19:26

## 2020-05-20 RX ADMIN — ROPINIROLE SCH MG: 0.5 TABLET ORAL at 19:39

## 2020-05-20 RX ADMIN — WARFARIN SODIUM SCH MG: 5 TABLET ORAL at 19:39

## 2020-05-20 RX ADMIN — SERTRALINE HYDROCHLORIDE SCH MG: 100 TABLET ORAL at 07:46

## 2020-05-20 RX ADMIN — METOPROLOL SUCCINATE SCH MG: 25 TABLET, EXTENDED RELEASE ORAL at 07:46

## 2020-05-20 RX ADMIN — FUROSEMIDE SCH MG: 20 TABLET ORAL at 07:46

## 2020-05-20 RX ADMIN — GABAPENTIN SCH MG: 300 CAPSULE ORAL at 19:39

## 2020-05-20 RX ADMIN — OMEPRAZOLE SCH MG: 20 CAPSULE, DELAYED RELEASE ORAL at 07:47

## 2020-05-20 RX ADMIN — ZINC SCH TAB: TAB ORAL at 07:47

## 2020-05-20 RX ADMIN — GABAPENTIN SCH MG: 100 CAPSULE ORAL at 12:17

## 2020-05-20 RX ADMIN — GABAPENTIN SCH MG: 100 CAPSULE ORAL at 07:46

## 2020-05-21 RX ADMIN — GABAPENTIN SCH MG: 100 CAPSULE ORAL at 07:40

## 2020-05-21 RX ADMIN — SERTRALINE HYDROCHLORIDE SCH MG: 100 TABLET ORAL at 07:40

## 2020-05-21 RX ADMIN — METOPROLOL SUCCINATE SCH MG: 25 TABLET, EXTENDED RELEASE ORAL at 07:39

## 2020-05-21 RX ADMIN — ERYTHROMYCIN SCH APPLIC: 5 OINTMENT OPHTHALMIC at 19:57

## 2020-05-21 RX ADMIN — FUROSEMIDE SCH MG: 20 TABLET ORAL at 07:40

## 2020-05-21 RX ADMIN — ROPINIROLE SCH MG: 0.5 TABLET ORAL at 19:54

## 2020-05-21 RX ADMIN — GABAPENTIN SCH MG: 300 CAPSULE ORAL at 19:54

## 2020-05-21 RX ADMIN — WARFARIN SODIUM SCH MG: 5 TABLET ORAL at 19:54

## 2020-05-21 RX ADMIN — ERYTHROMYCIN SCH APPLIC: 5 OINTMENT OPHTHALMIC at 16:21

## 2020-05-21 RX ADMIN — ZINC SCH TAB: TAB ORAL at 07:41

## 2020-05-21 RX ADMIN — OMEPRAZOLE SCH MG: 20 CAPSULE, DELAYED RELEASE ORAL at 07:41

## 2020-05-21 RX ADMIN — GABAPENTIN SCH MG: 100 CAPSULE ORAL at 13:52

## 2020-05-22 RX ADMIN — GABAPENTIN SCH MG: 100 CAPSULE ORAL at 12:39

## 2020-05-22 RX ADMIN — SERTRALINE HYDROCHLORIDE SCH MG: 100 TABLET ORAL at 08:10

## 2020-05-22 RX ADMIN — ROPINIROLE SCH MG: 0.5 TABLET ORAL at 20:12

## 2020-05-22 RX ADMIN — METOPROLOL SUCCINATE SCH MG: 25 TABLET, EXTENDED RELEASE ORAL at 08:10

## 2020-05-22 RX ADMIN — WARFARIN SODIUM SCH MG: 5 TABLET ORAL at 20:12

## 2020-05-22 RX ADMIN — ERYTHROMYCIN SCH APPLIC: 5 OINTMENT OPHTHALMIC at 08:10

## 2020-05-22 RX ADMIN — GABAPENTIN SCH MG: 100 CAPSULE ORAL at 08:10

## 2020-05-22 RX ADMIN — ERYTHROMYCIN SCH APPLIC: 5 OINTMENT OPHTHALMIC at 20:13

## 2020-05-22 RX ADMIN — GABAPENTIN SCH MG: 300 CAPSULE ORAL at 20:12

## 2020-05-22 RX ADMIN — OMEPRAZOLE SCH MG: 20 CAPSULE, DELAYED RELEASE ORAL at 08:09

## 2020-05-22 RX ADMIN — FUROSEMIDE SCH MG: 20 TABLET ORAL at 08:10

## 2020-05-22 RX ADMIN — ZINC SCH TAB: TAB ORAL at 08:09

## 2020-05-23 RX ADMIN — ERYTHROMYCIN SCH APPLIC: 5 OINTMENT OPHTHALMIC at 20:26

## 2020-05-23 RX ADMIN — SERTRALINE HYDROCHLORIDE SCH MG: 100 TABLET ORAL at 08:41

## 2020-05-23 RX ADMIN — ERYTHROMYCIN SCH APPLIC: 5 OINTMENT OPHTHALMIC at 08:41

## 2020-05-23 RX ADMIN — GABAPENTIN SCH MG: 100 CAPSULE ORAL at 08:41

## 2020-05-23 RX ADMIN — GABAPENTIN SCH MG: 100 CAPSULE ORAL at 12:51

## 2020-05-23 RX ADMIN — ZINC SCH TAB: TAB ORAL at 08:40

## 2020-05-23 RX ADMIN — OMEPRAZOLE SCH MG: 20 CAPSULE, DELAYED RELEASE ORAL at 08:40

## 2020-05-23 RX ADMIN — GABAPENTIN SCH MG: 300 CAPSULE ORAL at 20:26

## 2020-05-23 RX ADMIN — ROPINIROLE SCH MG: 0.5 TABLET ORAL at 20:26

## 2020-05-23 RX ADMIN — METOPROLOL SUCCINATE SCH MG: 25 TABLET, EXTENDED RELEASE ORAL at 08:41

## 2020-05-23 RX ADMIN — FUROSEMIDE SCH MG: 20 TABLET ORAL at 08:41

## 2020-05-23 RX ADMIN — WARFARIN SODIUM SCH MG: 5 TABLET ORAL at 20:26

## 2020-05-24 RX ADMIN — WARFARIN SODIUM SCH MG: 5 TABLET ORAL at 21:24

## 2020-05-24 RX ADMIN — METOPROLOL SUCCINATE SCH MG: 25 TABLET, EXTENDED RELEASE ORAL at 08:43

## 2020-05-24 RX ADMIN — ROPINIROLE SCH MG: 0.5 TABLET ORAL at 21:24

## 2020-05-24 RX ADMIN — ZINC SCH TAB: TAB ORAL at 08:43

## 2020-05-24 RX ADMIN — ERYTHROMYCIN SCH APPLIC: 5 OINTMENT OPHTHALMIC at 08:44

## 2020-05-24 RX ADMIN — ERYTHROMYCIN SCH APPLIC: 5 OINTMENT OPHTHALMIC at 21:25

## 2020-05-24 RX ADMIN — GABAPENTIN SCH MG: 100 CAPSULE ORAL at 08:43

## 2020-05-24 RX ADMIN — SERTRALINE HYDROCHLORIDE SCH MG: 100 TABLET ORAL at 08:44

## 2020-05-24 RX ADMIN — GABAPENTIN SCH MG: 100 CAPSULE ORAL at 14:33

## 2020-05-24 RX ADMIN — GABAPENTIN SCH MG: 300 CAPSULE ORAL at 21:24

## 2020-05-24 RX ADMIN — OMEPRAZOLE SCH MG: 20 CAPSULE, DELAYED RELEASE ORAL at 08:43

## 2020-05-24 RX ADMIN — FUROSEMIDE SCH MG: 20 TABLET ORAL at 08:44

## 2020-05-25 RX ADMIN — GABAPENTIN SCH MG: 100 CAPSULE ORAL at 12:24

## 2020-05-25 RX ADMIN — WARFARIN SODIUM SCH MG: 5 TABLET ORAL at 20:39

## 2020-05-25 RX ADMIN — FUROSEMIDE SCH MG: 20 TABLET ORAL at 09:02

## 2020-05-25 RX ADMIN — ERYTHROMYCIN SCH APPLIC: 5 OINTMENT OPHTHALMIC at 20:42

## 2020-05-25 RX ADMIN — ZINC SCH TAB: TAB ORAL at 09:04

## 2020-05-25 RX ADMIN — GABAPENTIN SCH MG: 100 CAPSULE ORAL at 09:03

## 2020-05-25 RX ADMIN — ERYTHROMYCIN SCH APPLIC: 5 OINTMENT OPHTHALMIC at 09:05

## 2020-05-25 RX ADMIN — ROPINIROLE SCH MG: 0.5 TABLET ORAL at 20:40

## 2020-05-25 RX ADMIN — OMEPRAZOLE SCH MG: 20 CAPSULE, DELAYED RELEASE ORAL at 09:04

## 2020-05-25 RX ADMIN — METOPROLOL SUCCINATE SCH MG: 25 TABLET, EXTENDED RELEASE ORAL at 09:02

## 2020-05-25 RX ADMIN — SERTRALINE HYDROCHLORIDE SCH MG: 100 TABLET ORAL at 09:02

## 2020-05-25 RX ADMIN — GABAPENTIN SCH MG: 300 CAPSULE ORAL at 20:40

## 2020-05-26 RX ADMIN — OMEPRAZOLE SCH MG: 20 CAPSULE, DELAYED RELEASE ORAL at 08:56

## 2020-05-26 RX ADMIN — METOPROLOL SUCCINATE SCH MG: 25 TABLET, EXTENDED RELEASE ORAL at 08:54

## 2020-05-26 RX ADMIN — FUROSEMIDE SCH MG: 20 TABLET ORAL at 08:54

## 2020-05-26 RX ADMIN — GABAPENTIN SCH MG: 100 CAPSULE ORAL at 08:55

## 2020-05-26 RX ADMIN — ROPINIROLE SCH MG: 0.5 TABLET ORAL at 20:17

## 2020-05-26 RX ADMIN — ERYTHROMYCIN SCH APPLIC: 5 OINTMENT OPHTHALMIC at 20:18

## 2020-05-26 RX ADMIN — ZINC SCH TAB: TAB ORAL at 08:56

## 2020-05-26 RX ADMIN — GABAPENTIN SCH MG: 100 CAPSULE ORAL at 12:32

## 2020-05-26 RX ADMIN — ERYTHROMYCIN SCH APPLIC: 5 OINTMENT OPHTHALMIC at 08:57

## 2020-05-26 RX ADMIN — SERTRALINE HYDROCHLORIDE SCH MG: 100 TABLET ORAL at 08:54

## 2020-05-26 RX ADMIN — WARFARIN SODIUM SCH: 5 TABLET ORAL at 20:40

## 2020-05-26 RX ADMIN — GABAPENTIN SCH MG: 300 CAPSULE ORAL at 20:18

## 2020-05-27 RX ADMIN — SERTRALINE HYDROCHLORIDE SCH MG: 100 TABLET ORAL at 08:44

## 2020-05-27 RX ADMIN — OMEPRAZOLE SCH MG: 20 CAPSULE, DELAYED RELEASE ORAL at 08:45

## 2020-05-27 RX ADMIN — ERYTHROMYCIN SCH APPLIC: 5 OINTMENT OPHTHALMIC at 09:12

## 2020-05-27 RX ADMIN — GABAPENTIN SCH MG: 100 CAPSULE ORAL at 12:30

## 2020-05-27 RX ADMIN — GABAPENTIN SCH MG: 300 CAPSULE ORAL at 19:34

## 2020-05-27 RX ADMIN — FUROSEMIDE SCH MG: 20 TABLET ORAL at 08:45

## 2020-05-27 RX ADMIN — ROPINIROLE SCH MG: 0.5 TABLET ORAL at 19:32

## 2020-05-27 RX ADMIN — WARFARIN SODIUM SCH MG: 5 TABLET ORAL at 19:32

## 2020-05-27 RX ADMIN — ZINC SCH TAB: TAB ORAL at 08:45

## 2020-05-27 RX ADMIN — GABAPENTIN SCH MG: 100 CAPSULE ORAL at 08:44

## 2020-05-27 RX ADMIN — METOPROLOL SUCCINATE SCH MG: 25 TABLET, EXTENDED RELEASE ORAL at 08:44

## 2020-05-27 RX ADMIN — ERYTHROMYCIN SCH APPLIC: 5 OINTMENT OPHTHALMIC at 19:35

## 2020-05-28 RX ADMIN — ROPINIROLE SCH MG: 0.5 TABLET ORAL at 20:34

## 2020-05-28 RX ADMIN — FUROSEMIDE SCH MG: 20 TABLET ORAL at 08:14

## 2020-05-28 RX ADMIN — METOPROLOL SUCCINATE SCH MG: 25 TABLET, EXTENDED RELEASE ORAL at 08:13

## 2020-05-28 RX ADMIN — ZINC SCH TAB: TAB ORAL at 08:14

## 2020-05-28 RX ADMIN — GABAPENTIN SCH MG: 300 CAPSULE ORAL at 20:34

## 2020-05-28 RX ADMIN — OMEPRAZOLE SCH MG: 20 CAPSULE, DELAYED RELEASE ORAL at 08:14

## 2020-05-28 RX ADMIN — GABAPENTIN SCH MG: 100 CAPSULE ORAL at 08:15

## 2020-05-28 RX ADMIN — GABAPENTIN SCH MG: 100 CAPSULE ORAL at 12:43

## 2020-05-28 RX ADMIN — WARFARIN SODIUM SCH MG: 5 TABLET ORAL at 20:33

## 2020-05-28 RX ADMIN — SERTRALINE HYDROCHLORIDE SCH MG: 100 TABLET ORAL at 08:14

## 2020-05-29 RX ADMIN — GABAPENTIN SCH MG: 300 CAPSULE ORAL at 21:04

## 2020-05-29 RX ADMIN — OMEPRAZOLE SCH MG: 20 CAPSULE, DELAYED RELEASE ORAL at 08:30

## 2020-05-29 RX ADMIN — ROPINIROLE SCH MG: 0.5 TABLET ORAL at 21:04

## 2020-05-29 RX ADMIN — GABAPENTIN SCH MG: 100 CAPSULE ORAL at 08:28

## 2020-05-29 RX ADMIN — ZINC SCH TAB: TAB ORAL at 08:30

## 2020-05-29 RX ADMIN — METOPROLOL SUCCINATE SCH MG: 25 TABLET, EXTENDED RELEASE ORAL at 08:29

## 2020-05-29 RX ADMIN — WARFARIN SODIUM SCH MG: 5 TABLET ORAL at 21:05

## 2020-05-29 RX ADMIN — SERTRALINE HYDROCHLORIDE SCH MG: 100 TABLET ORAL at 08:29

## 2020-05-29 RX ADMIN — GABAPENTIN SCH MG: 100 CAPSULE ORAL at 11:59

## 2020-05-29 RX ADMIN — FUROSEMIDE SCH MG: 20 TABLET ORAL at 08:29

## 2020-05-29 RX ADMIN — WARFARIN SODIUM SCH MG: 5 TABLET ORAL at 21:10

## 2020-05-30 RX ADMIN — OMEPRAZOLE SCH MG: 20 CAPSULE, DELAYED RELEASE ORAL at 08:45

## 2020-05-30 RX ADMIN — GABAPENTIN SCH MG: 100 CAPSULE ORAL at 13:45

## 2020-05-30 RX ADMIN — SERTRALINE HYDROCHLORIDE SCH MG: 100 TABLET ORAL at 08:45

## 2020-05-30 RX ADMIN — GABAPENTIN SCH MG: 100 CAPSULE ORAL at 08:45

## 2020-05-30 RX ADMIN — GABAPENTIN SCH MG: 300 CAPSULE ORAL at 20:44

## 2020-05-30 RX ADMIN — FUROSEMIDE SCH MG: 20 TABLET ORAL at 08:45

## 2020-05-30 RX ADMIN — METOPROLOL SUCCINATE SCH MG: 25 TABLET, EXTENDED RELEASE ORAL at 08:45

## 2020-05-30 RX ADMIN — ROPINIROLE SCH MG: 0.5 TABLET ORAL at 20:46

## 2020-05-30 RX ADMIN — WARFARIN SODIUM SCH MG: 5 TABLET ORAL at 20:46

## 2020-05-30 RX ADMIN — ZINC SCH TAB: TAB ORAL at 08:45

## 2020-05-30 NOTE — PCM.SN.2
- Free Text/Narrative


Note: 





Staff requested a UA today I don't see results back yet.  Symptoms were 

weakness by the patient with foul odor and increased incontinence.

## 2020-05-31 RX ADMIN — SERTRALINE HYDROCHLORIDE SCH MG: 100 TABLET ORAL at 08:06

## 2020-05-31 RX ADMIN — NITROGLYCERIN PRN MG: 0.4 TABLET SUBLINGUAL at 11:27

## 2020-05-31 RX ADMIN — ZINC SCH TAB: TAB ORAL at 08:06

## 2020-05-31 RX ADMIN — GABAPENTIN SCH MG: 100 CAPSULE ORAL at 14:13

## 2020-05-31 RX ADMIN — FUROSEMIDE SCH MG: 20 TABLET ORAL at 08:06

## 2020-05-31 RX ADMIN — OMEPRAZOLE SCH MG: 20 CAPSULE, DELAYED RELEASE ORAL at 08:06

## 2020-05-31 RX ADMIN — ROPINIROLE SCH MG: 0.5 TABLET ORAL at 20:00

## 2020-05-31 RX ADMIN — WARFARIN SODIUM SCH MG: 5 TABLET ORAL at 20:00

## 2020-05-31 RX ADMIN — METOPROLOL SUCCINATE SCH MG: 25 TABLET, EXTENDED RELEASE ORAL at 08:06

## 2020-05-31 RX ADMIN — GABAPENTIN SCH MG: 100 CAPSULE ORAL at 08:06

## 2020-05-31 RX ADMIN — GABAPENTIN SCH MG: 300 CAPSULE ORAL at 19:58

## 2020-06-01 RX ADMIN — WARFARIN SODIUM SCH MG: 5 TABLET ORAL at 20:20

## 2020-06-01 RX ADMIN — METOPROLOL SUCCINATE SCH MG: 25 TABLET, EXTENDED RELEASE ORAL at 08:27

## 2020-06-01 RX ADMIN — GABAPENTIN SCH MG: 300 CAPSULE ORAL at 20:18

## 2020-06-01 RX ADMIN — OMEPRAZOLE SCH MG: 20 CAPSULE, DELAYED RELEASE ORAL at 08:28

## 2020-06-01 RX ADMIN — SERTRALINE HYDROCHLORIDE SCH MG: 100 TABLET ORAL at 08:28

## 2020-06-01 RX ADMIN — GABAPENTIN SCH MG: 100 CAPSULE ORAL at 13:34

## 2020-06-01 RX ADMIN — ROPINIROLE SCH MG: 0.5 TABLET ORAL at 20:20

## 2020-06-01 RX ADMIN — GABAPENTIN SCH MG: 100 CAPSULE ORAL at 08:28

## 2020-06-01 RX ADMIN — FUROSEMIDE SCH MG: 20 TABLET ORAL at 08:28

## 2020-06-01 RX ADMIN — ZINC SCH TAB: TAB ORAL at 08:28

## 2020-06-02 RX ADMIN — METOPROLOL SUCCINATE SCH MG: 25 TABLET, EXTENDED RELEASE ORAL at 09:21

## 2020-06-02 RX ADMIN — ZINC SCH TAB: TAB ORAL at 09:19

## 2020-06-02 RX ADMIN — FUROSEMIDE SCH MG: 20 TABLET ORAL at 09:18

## 2020-06-02 RX ADMIN — ROPINIROLE SCH MG: 0.5 TABLET ORAL at 19:53

## 2020-06-02 RX ADMIN — WARFARIN SODIUM SCH MG: 5 TABLET ORAL at 19:52

## 2020-06-02 RX ADMIN — OMEPRAZOLE SCH MG: 20 CAPSULE, DELAYED RELEASE ORAL at 09:19

## 2020-06-02 RX ADMIN — GABAPENTIN SCH MG: 300 CAPSULE ORAL at 19:51

## 2020-06-02 RX ADMIN — SERTRALINE HYDROCHLORIDE SCH MG: 100 TABLET ORAL at 09:17

## 2020-06-02 RX ADMIN — GABAPENTIN SCH MG: 100 CAPSULE ORAL at 09:17

## 2020-06-02 RX ADMIN — GABAPENTIN SCH MG: 100 CAPSULE ORAL at 13:22

## 2020-06-03 RX ADMIN — ZINC SCH TAB: TAB ORAL at 07:53

## 2020-06-03 RX ADMIN — SERTRALINE HYDROCHLORIDE SCH MG: 100 TABLET ORAL at 07:53

## 2020-06-03 RX ADMIN — WARFARIN SODIUM SCH MG: 5 TABLET ORAL at 21:51

## 2020-06-03 RX ADMIN — OMEPRAZOLE SCH MG: 20 CAPSULE, DELAYED RELEASE ORAL at 07:53

## 2020-06-03 RX ADMIN — ROPINIROLE SCH MG: 0.5 TABLET ORAL at 21:51

## 2020-06-03 RX ADMIN — GABAPENTIN SCH MG: 300 CAPSULE ORAL at 21:50

## 2020-06-03 RX ADMIN — GABAPENTIN SCH MG: 100 CAPSULE ORAL at 07:52

## 2020-06-03 RX ADMIN — GABAPENTIN SCH MG: 100 CAPSULE ORAL at 14:37

## 2020-06-03 RX ADMIN — METOPROLOL SUCCINATE SCH MG: 25 TABLET, EXTENDED RELEASE ORAL at 07:57

## 2020-06-03 RX ADMIN — FUROSEMIDE SCH MG: 20 TABLET ORAL at 07:53

## 2020-06-04 RX ADMIN — GABAPENTIN SCH MG: 100 CAPSULE ORAL at 07:47

## 2020-06-04 RX ADMIN — SERTRALINE HYDROCHLORIDE SCH MG: 100 TABLET ORAL at 07:56

## 2020-06-04 RX ADMIN — FUROSEMIDE SCH MG: 20 TABLET ORAL at 07:56

## 2020-06-04 RX ADMIN — METOPROLOL SUCCINATE SCH MG: 25 TABLET, EXTENDED RELEASE ORAL at 07:57

## 2020-06-04 RX ADMIN — ROPINIROLE SCH MG: 0.5 TABLET ORAL at 21:38

## 2020-06-04 RX ADMIN — GABAPENTIN SCH MG: 300 CAPSULE ORAL at 21:37

## 2020-06-04 RX ADMIN — WARFARIN SODIUM SCH MG: 5 TABLET ORAL at 21:38

## 2020-06-04 RX ADMIN — ZINC SCH TAB: TAB ORAL at 07:59

## 2020-06-04 RX ADMIN — OMEPRAZOLE SCH MG: 20 CAPSULE, DELAYED RELEASE ORAL at 07:59

## 2020-06-04 RX ADMIN — GABAPENTIN SCH MG: 100 CAPSULE ORAL at 13:06

## 2020-06-05 RX ADMIN — METOPROLOL SUCCINATE SCH MG: 25 TABLET, EXTENDED RELEASE ORAL at 07:53

## 2020-06-05 RX ADMIN — GABAPENTIN SCH MG: 100 CAPSULE ORAL at 14:17

## 2020-06-05 RX ADMIN — ROPINIROLE SCH MG: 0.5 TABLET ORAL at 19:42

## 2020-06-05 RX ADMIN — GABAPENTIN SCH MG: 300 CAPSULE ORAL at 19:41

## 2020-06-05 RX ADMIN — FUROSEMIDE SCH MG: 20 TABLET ORAL at 07:56

## 2020-06-05 RX ADMIN — WARFARIN SODIUM SCH MG: 5 TABLET ORAL at 19:42

## 2020-06-05 RX ADMIN — OMEPRAZOLE SCH MG: 20 CAPSULE, DELAYED RELEASE ORAL at 08:02

## 2020-06-05 RX ADMIN — ZINC SCH TAB: TAB ORAL at 08:02

## 2020-06-05 RX ADMIN — SERTRALINE HYDROCHLORIDE SCH MG: 100 TABLET ORAL at 07:56

## 2020-06-05 RX ADMIN — GABAPENTIN SCH MG: 100 CAPSULE ORAL at 07:52

## 2020-06-06 RX ADMIN — OMEPRAZOLE SCH MG: 20 CAPSULE, DELAYED RELEASE ORAL at 07:36

## 2020-06-06 RX ADMIN — ZINC SCH TAB: TAB ORAL at 07:36

## 2020-06-06 RX ADMIN — METOPROLOL SUCCINATE SCH MG: 25 TABLET, EXTENDED RELEASE ORAL at 07:35

## 2020-06-06 RX ADMIN — GABAPENTIN SCH MG: 100 CAPSULE ORAL at 13:21

## 2020-06-06 RX ADMIN — SERTRALINE HYDROCHLORIDE SCH MG: 100 TABLET ORAL at 07:35

## 2020-06-06 RX ADMIN — GABAPENTIN SCH MG: 100 CAPSULE ORAL at 07:36

## 2020-06-06 RX ADMIN — GABAPENTIN SCH MG: 300 CAPSULE ORAL at 19:41

## 2020-06-06 RX ADMIN — ROPINIROLE SCH MG: 0.5 TABLET ORAL at 19:42

## 2020-06-06 RX ADMIN — WARFARIN SODIUM SCH MG: 5 TABLET ORAL at 19:42

## 2020-06-06 RX ADMIN — FUROSEMIDE SCH MG: 20 TABLET ORAL at 07:35

## 2020-06-07 RX ADMIN — GABAPENTIN SCH MG: 300 CAPSULE ORAL at 19:42

## 2020-06-07 RX ADMIN — GABAPENTIN SCH MG: 100 CAPSULE ORAL at 07:40

## 2020-06-07 RX ADMIN — WARFARIN SODIUM SCH MG: 5 TABLET ORAL at 19:42

## 2020-06-07 RX ADMIN — GABAPENTIN SCH MG: 100 CAPSULE ORAL at 12:20

## 2020-06-07 RX ADMIN — OMEPRAZOLE SCH MG: 20 CAPSULE, DELAYED RELEASE ORAL at 07:38

## 2020-06-07 RX ADMIN — ROPINIROLE SCH MG: 0.5 TABLET ORAL at 19:42

## 2020-06-07 RX ADMIN — METOPROLOL SUCCINATE SCH MG: 25 TABLET, EXTENDED RELEASE ORAL at 07:40

## 2020-06-07 RX ADMIN — SERTRALINE HYDROCHLORIDE SCH MG: 100 TABLET ORAL at 07:40

## 2020-06-07 RX ADMIN — ZINC SCH TAB: TAB ORAL at 07:38

## 2020-06-07 RX ADMIN — FUROSEMIDE SCH MG: 20 TABLET ORAL at 07:40

## 2020-06-08 RX ADMIN — GABAPENTIN SCH MG: 100 CAPSULE ORAL at 08:42

## 2020-06-08 RX ADMIN — WARFARIN SODIUM SCH MG: 5 TABLET ORAL at 20:59

## 2020-06-08 RX ADMIN — SERTRALINE HYDROCHLORIDE SCH MG: 100 TABLET ORAL at 08:42

## 2020-06-08 RX ADMIN — METOPROLOL SUCCINATE SCH MG: 25 TABLET, EXTENDED RELEASE ORAL at 08:44

## 2020-06-08 RX ADMIN — GABAPENTIN SCH MG: 100 CAPSULE ORAL at 12:33

## 2020-06-08 RX ADMIN — ZINC SCH TAB: TAB ORAL at 08:42

## 2020-06-08 RX ADMIN — OMEPRAZOLE SCH MG: 20 CAPSULE, DELAYED RELEASE ORAL at 08:42

## 2020-06-08 RX ADMIN — GABAPENTIN SCH MG: 300 CAPSULE ORAL at 20:59

## 2020-06-08 RX ADMIN — ROPINIROLE SCH MG: 0.5 TABLET ORAL at 20:59

## 2020-06-08 RX ADMIN — FUROSEMIDE SCH MG: 20 TABLET ORAL at 08:43

## 2020-06-09 RX ADMIN — ZINC SCH TAB: TAB ORAL at 07:58

## 2020-06-09 RX ADMIN — GABAPENTIN SCH MG: 300 CAPSULE ORAL at 19:49

## 2020-06-09 RX ADMIN — GABAPENTIN SCH MG: 100 CAPSULE ORAL at 08:04

## 2020-06-09 RX ADMIN — METOPROLOL SUCCINATE SCH MG: 25 TABLET, EXTENDED RELEASE ORAL at 07:59

## 2020-06-09 RX ADMIN — ROPINIROLE SCH MG: 0.5 TABLET ORAL at 19:50

## 2020-06-09 RX ADMIN — OMEPRAZOLE SCH MG: 20 CAPSULE, DELAYED RELEASE ORAL at 07:59

## 2020-06-09 RX ADMIN — GABAPENTIN SCH MG: 100 CAPSULE ORAL at 12:17

## 2020-06-09 RX ADMIN — SERTRALINE HYDROCHLORIDE SCH MG: 100 TABLET ORAL at 08:04

## 2020-06-09 RX ADMIN — WARFARIN SODIUM SCH MG: 5 TABLET ORAL at 19:49

## 2020-06-09 RX ADMIN — FUROSEMIDE SCH MG: 20 TABLET ORAL at 08:04

## 2020-06-10 RX ADMIN — ROPINIROLE SCH MG: 0.5 TABLET ORAL at 19:50

## 2020-06-10 RX ADMIN — SERTRALINE HYDROCHLORIDE SCH MG: 100 TABLET ORAL at 07:38

## 2020-06-10 RX ADMIN — GABAPENTIN SCH MG: 300 CAPSULE ORAL at 19:50

## 2020-06-10 RX ADMIN — ZINC SCH TAB: TAB ORAL at 07:37

## 2020-06-10 RX ADMIN — GABAPENTIN SCH MG: 100 CAPSULE ORAL at 07:37

## 2020-06-10 RX ADMIN — GABAPENTIN SCH MG: 100 CAPSULE ORAL at 12:11

## 2020-06-10 RX ADMIN — FUROSEMIDE SCH MG: 20 TABLET ORAL at 07:38

## 2020-06-10 RX ADMIN — OMEPRAZOLE SCH MG: 20 CAPSULE, DELAYED RELEASE ORAL at 07:37

## 2020-06-10 RX ADMIN — WARFARIN SODIUM SCH MG: 5 TABLET ORAL at 19:50

## 2020-06-10 RX ADMIN — METOPROLOL SUCCINATE SCH MG: 25 TABLET, EXTENDED RELEASE ORAL at 07:38

## 2020-06-11 RX ADMIN — FUROSEMIDE SCH MG: 20 TABLET ORAL at 07:44

## 2020-06-11 RX ADMIN — SERTRALINE HYDROCHLORIDE SCH MG: 100 TABLET ORAL at 07:44

## 2020-06-11 RX ADMIN — WARFARIN SODIUM SCH MG: 5 TABLET ORAL at 20:15

## 2020-06-11 RX ADMIN — ZINC SCH TAB: TAB ORAL at 07:43

## 2020-06-11 RX ADMIN — METOPROLOL SUCCINATE SCH MG: 25 TABLET, EXTENDED RELEASE ORAL at 07:44

## 2020-06-11 RX ADMIN — GABAPENTIN SCH MG: 100 CAPSULE ORAL at 07:43

## 2020-06-11 RX ADMIN — GABAPENTIN SCH MG: 300 CAPSULE ORAL at 20:14

## 2020-06-11 RX ADMIN — ROPINIROLE SCH MG: 0.5 TABLET ORAL at 20:15

## 2020-06-11 RX ADMIN — OMEPRAZOLE SCH MG: 20 CAPSULE, DELAYED RELEASE ORAL at 07:43

## 2020-06-11 RX ADMIN — GABAPENTIN SCH MG: 100 CAPSULE ORAL at 12:15

## 2020-06-12 RX ADMIN — ROPINIROLE SCH MG: 0.5 TABLET ORAL at 20:39

## 2020-06-12 RX ADMIN — GABAPENTIN SCH MG: 300 CAPSULE ORAL at 20:36

## 2020-06-12 RX ADMIN — METOPROLOL SUCCINATE SCH MG: 25 TABLET, EXTENDED RELEASE ORAL at 09:02

## 2020-06-12 RX ADMIN — OMEPRAZOLE SCH MG: 20 CAPSULE, DELAYED RELEASE ORAL at 09:02

## 2020-06-12 RX ADMIN — GABAPENTIN SCH MG: 100 CAPSULE ORAL at 12:30

## 2020-06-12 RX ADMIN — WARFARIN SODIUM SCH MG: 5 TABLET ORAL at 20:38

## 2020-06-12 RX ADMIN — ZINC SCH TAB: TAB ORAL at 09:02

## 2020-06-12 RX ADMIN — FUROSEMIDE SCH MG: 20 TABLET ORAL at 09:02

## 2020-06-12 RX ADMIN — GABAPENTIN SCH MG: 100 CAPSULE ORAL at 09:01

## 2020-06-12 RX ADMIN — SERTRALINE HYDROCHLORIDE SCH MG: 100 TABLET ORAL at 09:02

## 2020-06-13 RX ADMIN — GABAPENTIN SCH MG: 100 CAPSULE ORAL at 13:01

## 2020-06-13 RX ADMIN — SERTRALINE HYDROCHLORIDE SCH MG: 100 TABLET ORAL at 08:10

## 2020-06-13 RX ADMIN — METOPROLOL SUCCINATE SCH MG: 25 TABLET, EXTENDED RELEASE ORAL at 08:10

## 2020-06-13 RX ADMIN — WARFARIN SODIUM SCH MG: 5 TABLET ORAL at 19:53

## 2020-06-13 RX ADMIN — OMEPRAZOLE SCH MG: 20 CAPSULE, DELAYED RELEASE ORAL at 08:11

## 2020-06-13 RX ADMIN — GABAPENTIN SCH MG: 300 CAPSULE ORAL at 19:52

## 2020-06-13 RX ADMIN — ZINC SCH TAB: TAB ORAL at 08:11

## 2020-06-13 RX ADMIN — GABAPENTIN SCH MG: 100 CAPSULE ORAL at 08:10

## 2020-06-13 RX ADMIN — FUROSEMIDE SCH MG: 20 TABLET ORAL at 08:10

## 2020-06-13 RX ADMIN — ROPINIROLE SCH MG: 0.5 TABLET ORAL at 19:53

## 2020-06-14 RX ADMIN — SERTRALINE HYDROCHLORIDE SCH MG: 100 TABLET ORAL at 08:28

## 2020-06-14 RX ADMIN — METOPROLOL SUCCINATE SCH MG: 25 TABLET, EXTENDED RELEASE ORAL at 08:28

## 2020-06-14 RX ADMIN — GABAPENTIN SCH MG: 100 CAPSULE ORAL at 13:06

## 2020-06-14 RX ADMIN — ROPINIROLE SCH MG: 0.5 TABLET ORAL at 20:32

## 2020-06-14 RX ADMIN — GABAPENTIN SCH MG: 300 CAPSULE ORAL at 20:31

## 2020-06-14 RX ADMIN — WARFARIN SODIUM SCH MG: 5 TABLET ORAL at 20:32

## 2020-06-14 RX ADMIN — ZINC SCH TAB: TAB ORAL at 08:27

## 2020-06-14 RX ADMIN — GABAPENTIN SCH MG: 100 CAPSULE ORAL at 08:28

## 2020-06-14 RX ADMIN — OMEPRAZOLE SCH MG: 20 CAPSULE, DELAYED RELEASE ORAL at 08:27

## 2020-06-14 RX ADMIN — FUROSEMIDE SCH MG: 20 TABLET ORAL at 08:29

## 2020-06-15 RX ADMIN — WARFARIN SODIUM SCH MG: 5 TABLET ORAL at 20:13

## 2020-06-15 RX ADMIN — METOPROLOL SUCCINATE SCH MG: 25 TABLET, EXTENDED RELEASE ORAL at 08:16

## 2020-06-15 RX ADMIN — ROPINIROLE SCH MG: 0.5 TABLET ORAL at 20:13

## 2020-06-15 RX ADMIN — SERTRALINE HYDROCHLORIDE SCH MG: 100 TABLET ORAL at 08:16

## 2020-06-15 RX ADMIN — GABAPENTIN SCH MG: 100 CAPSULE ORAL at 08:22

## 2020-06-15 RX ADMIN — FUROSEMIDE SCH MG: 20 TABLET ORAL at 08:17

## 2020-06-15 RX ADMIN — ZINC SCH TAB: TAB ORAL at 08:22

## 2020-06-15 RX ADMIN — OMEPRAZOLE SCH MG: 20 CAPSULE, DELAYED RELEASE ORAL at 08:21

## 2020-06-15 RX ADMIN — GABAPENTIN SCH MG: 100 CAPSULE ORAL at 13:18

## 2020-06-15 RX ADMIN — GABAPENTIN SCH MG: 300 CAPSULE ORAL at 20:13

## 2020-06-16 RX ADMIN — ROPINIROLE SCH MG: 0.5 TABLET ORAL at 21:39

## 2020-06-16 RX ADMIN — GABAPENTIN SCH MG: 100 CAPSULE ORAL at 07:52

## 2020-06-16 RX ADMIN — ZINC SCH TAB: TAB ORAL at 07:46

## 2020-06-16 RX ADMIN — SERTRALINE HYDROCHLORIDE SCH MG: 100 TABLET ORAL at 07:49

## 2020-06-16 RX ADMIN — METOPROLOL SUCCINATE SCH: 25 TABLET, EXTENDED RELEASE ORAL at 07:54

## 2020-06-16 RX ADMIN — GABAPENTIN SCH MG: 300 CAPSULE ORAL at 21:38

## 2020-06-16 RX ADMIN — FUROSEMIDE SCH MG: 20 TABLET ORAL at 07:49

## 2020-06-16 RX ADMIN — WARFARIN SODIUM SCH MG: 5 TABLET ORAL at 21:39

## 2020-06-16 RX ADMIN — GABAPENTIN SCH MG: 100 CAPSULE ORAL at 13:34

## 2020-06-16 RX ADMIN — OMEPRAZOLE SCH MG: 20 CAPSULE, DELAYED RELEASE ORAL at 07:46

## 2020-06-17 RX ADMIN — SERTRALINE HYDROCHLORIDE SCH MG: 100 TABLET ORAL at 08:50

## 2020-06-17 RX ADMIN — GABAPENTIN SCH MG: 100 CAPSULE ORAL at 12:36

## 2020-06-17 RX ADMIN — FUROSEMIDE SCH MG: 20 TABLET ORAL at 08:50

## 2020-06-17 RX ADMIN — ZINC SCH TAB: TAB ORAL at 08:46

## 2020-06-17 RX ADMIN — ROPINIROLE SCH MG: 0.5 TABLET ORAL at 21:07

## 2020-06-17 RX ADMIN — GABAPENTIN SCH MG: 100 CAPSULE ORAL at 08:49

## 2020-06-17 RX ADMIN — GABAPENTIN SCH MG: 300 CAPSULE ORAL at 21:07

## 2020-06-17 RX ADMIN — WARFARIN SODIUM SCH MG: 5 TABLET ORAL at 21:07

## 2020-06-17 RX ADMIN — METOPROLOL SUCCINATE SCH MG: 25 TABLET, EXTENDED RELEASE ORAL at 08:50

## 2020-06-17 RX ADMIN — OMEPRAZOLE SCH MG: 20 CAPSULE, DELAYED RELEASE ORAL at 08:46

## 2020-06-18 RX ADMIN — SERTRALINE HYDROCHLORIDE SCH MG: 100 TABLET ORAL at 08:42

## 2020-06-18 RX ADMIN — FUROSEMIDE SCH MG: 20 TABLET ORAL at 08:42

## 2020-06-18 RX ADMIN — WARFARIN SODIUM SCH MG: 5 TABLET ORAL at 20:32

## 2020-06-18 RX ADMIN — GABAPENTIN SCH MG: 100 CAPSULE ORAL at 13:05

## 2020-06-18 RX ADMIN — OMEPRAZOLE SCH MG: 20 CAPSULE, DELAYED RELEASE ORAL at 08:43

## 2020-06-18 RX ADMIN — GABAPENTIN SCH MG: 100 CAPSULE ORAL at 08:42

## 2020-06-18 RX ADMIN — ROPINIROLE SCH MG: 0.5 TABLET ORAL at 20:32

## 2020-06-18 RX ADMIN — GABAPENTIN SCH MG: 300 CAPSULE ORAL at 20:32

## 2020-06-18 RX ADMIN — METOPROLOL SUCCINATE SCH MG: 25 TABLET, EXTENDED RELEASE ORAL at 08:42

## 2020-06-18 RX ADMIN — ZINC SCH TAB: TAB ORAL at 08:43

## 2020-06-19 RX ADMIN — METOPROLOL SUCCINATE SCH MG: 25 TABLET, EXTENDED RELEASE ORAL at 08:38

## 2020-06-19 RX ADMIN — SERTRALINE HYDROCHLORIDE SCH MG: 100 TABLET ORAL at 08:38

## 2020-06-19 RX ADMIN — FUROSEMIDE SCH MG: 20 TABLET ORAL at 08:38

## 2020-06-19 RX ADMIN — ROPINIROLE SCH MG: 0.5 TABLET ORAL at 19:59

## 2020-06-19 RX ADMIN — ZINC SCH TAB: TAB ORAL at 08:35

## 2020-06-19 RX ADMIN — GABAPENTIN SCH MG: 100 CAPSULE ORAL at 08:39

## 2020-06-19 RX ADMIN — GABAPENTIN SCH MG: 100 CAPSULE ORAL at 12:27

## 2020-06-19 RX ADMIN — WARFARIN SODIUM SCH MG: 5 TABLET ORAL at 19:59

## 2020-06-19 RX ADMIN — GABAPENTIN SCH MG: 300 CAPSULE ORAL at 19:58

## 2020-06-19 RX ADMIN — OMEPRAZOLE SCH MG: 20 CAPSULE, DELAYED RELEASE ORAL at 08:35

## 2020-06-20 RX ADMIN — WARFARIN SODIUM SCH MG: 5 TABLET ORAL at 20:33

## 2020-06-20 RX ADMIN — FUROSEMIDE SCH MG: 20 TABLET ORAL at 07:58

## 2020-06-20 RX ADMIN — OMEPRAZOLE SCH MG: 20 CAPSULE, DELAYED RELEASE ORAL at 07:58

## 2020-06-20 RX ADMIN — ROPINIROLE SCH MG: 0.5 TABLET ORAL at 20:34

## 2020-06-20 RX ADMIN — ZINC SCH TAB: TAB ORAL at 07:58

## 2020-06-20 RX ADMIN — METOPROLOL SUCCINATE SCH MG: 25 TABLET, EXTENDED RELEASE ORAL at 08:06

## 2020-06-20 RX ADMIN — GABAPENTIN SCH MG: 100 CAPSULE ORAL at 12:18

## 2020-06-20 RX ADMIN — SERTRALINE HYDROCHLORIDE SCH MG: 100 TABLET ORAL at 08:06

## 2020-06-20 RX ADMIN — GABAPENTIN SCH MG: 300 CAPSULE ORAL at 20:33

## 2020-06-20 RX ADMIN — GABAPENTIN SCH MG: 100 CAPSULE ORAL at 08:04

## 2020-06-21 RX ADMIN — GABAPENTIN SCH MG: 100 CAPSULE ORAL at 08:18

## 2020-06-21 RX ADMIN — SERTRALINE HYDROCHLORIDE SCH MG: 100 TABLET ORAL at 08:22

## 2020-06-21 RX ADMIN — GABAPENTIN SCH MG: 300 CAPSULE ORAL at 20:52

## 2020-06-21 RX ADMIN — WARFARIN SODIUM SCH MG: 5 TABLET ORAL at 20:52

## 2020-06-21 RX ADMIN — OMEPRAZOLE SCH MG: 20 CAPSULE, DELAYED RELEASE ORAL at 08:18

## 2020-06-21 RX ADMIN — ROPINIROLE SCH MG: 0.5 TABLET ORAL at 20:52

## 2020-06-21 RX ADMIN — FUROSEMIDE SCH MG: 20 TABLET ORAL at 08:21

## 2020-06-21 RX ADMIN — GABAPENTIN SCH MG: 100 CAPSULE ORAL at 13:02

## 2020-06-21 RX ADMIN — METOPROLOL SUCCINATE SCH MG: 25 TABLET, EXTENDED RELEASE ORAL at 08:22

## 2020-06-21 RX ADMIN — ZINC SCH TAB: TAB ORAL at 08:18

## 2020-06-22 RX ADMIN — GABAPENTIN SCH MG: 300 CAPSULE ORAL at 19:15

## 2020-06-22 RX ADMIN — FUROSEMIDE SCH MG: 20 TABLET ORAL at 08:04

## 2020-06-22 RX ADMIN — OMEPRAZOLE SCH MG: 20 CAPSULE, DELAYED RELEASE ORAL at 08:01

## 2020-06-22 RX ADMIN — METOPROLOL SUCCINATE SCH MG: 25 TABLET, EXTENDED RELEASE ORAL at 08:03

## 2020-06-22 RX ADMIN — GABAPENTIN SCH MG: 100 CAPSULE ORAL at 08:01

## 2020-06-22 RX ADMIN — SERTRALINE HYDROCHLORIDE SCH MG: 100 TABLET ORAL at 08:03

## 2020-06-22 RX ADMIN — WARFARIN SODIUM SCH MG: 5 TABLET ORAL at 19:16

## 2020-06-22 RX ADMIN — GABAPENTIN SCH MG: 100 CAPSULE ORAL at 12:40

## 2020-06-22 RX ADMIN — ROPINIROLE SCH MG: 0.5 TABLET ORAL at 19:16

## 2020-06-22 RX ADMIN — ZINC SCH TAB: TAB ORAL at 08:01

## 2020-06-22 NOTE — PCM.PN
- General Info


Date of Service: 06/17/20


Subjective Update: 





Subjective: Patient is doing well.  Treated for UTI last month, symptoms have 

resolved.  Still occasional reflux/GI upset.  Nothing that interferes much with 

oral intake.  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 

this is completely healed and asymptomatic now.





Obj: Vital signs been stable.  Weight stable.  Alert oriented eating her lunch 

no acute distress pleasant cooperative heart and lungs clear to auscultation 

abdomen soft nontender





Assessment and plan:


Long-term-care 60 day visit no new complaints noted.  


Eyelid currently appears normal after squamous cell excision, watch for any 

recurrence.  


Mild upper GI symptoms are stable.














- Patient Data


Vitals - Most Recent: 


                                Last Vital Signs











Temp  36.5 C   06/22/20 07:56


 


Pulse  80   06/22/20 08:03


 


Resp  15   06/22/20 07:56


 


BP  127/49 L  06/22/20 08:03


 


Pulse Ox  93 L  06/22/20 07:56











Weight - Most Recent: 87.362 kg


I&O - Last 24 Hours: 


                                 Intake & Output











 06/21/20 06/22/20 06/22/20





 22:59 06:59 14:59


 


Intake Total   420


 


Balance   420











Med Orders - Current: 


                               Current Medications





Acetaminophen (Tylenol)  650 mg PO 0200,0800,1300,2000 [facility]


   Last Admin: 06/22/20 12:40 Dose:  650 mg


   Documented by: 


Calcium Carbonate/Glycine (Tums Extra Strength)  750 mg PO TID PRN


   PRN Reason: Dyspepsia


   Last Admin: 06/22/20 08:02 Dose:  750 mg


   Documented by: 


Docusate Sodium (Colace)  100 mg PO DAILY PRN


   PRN Reason: Constipation


Famotidine (Pepcid)  20 mg PO DAILY [facility]


   Last Admin: 06/22/20 08:02 Dose:  20 mg


   Documented by: 


Ferrous Sulfate (Ferrous Sulfate)  325 mg PO Q48H [facility]


   Last Admin: 06/22/20 12:40 Dose:  325 mg


   Documented by: 


Furosemide (Lasix)  20 mg PO DAILY [facility]


   Last Admin: 06/22/20 08:04 Dose:  20 mg


   Documented by: 


Gabapentin (Neurontin)  100 mg PO BID@0800,1300 [facility]


   Last Admin: 06/22/20 12:40 Dose:  100 mg


   Documented by: 


Gabapentin (Neurontin)  300 mg PO BEDTIME [facility]


   Last Admin: 06/21/20 20:52 Dose:  300 mg


   Documented by: 


Loperamide HCl (Imodium)  2 mg PO Q12H PRN


   PRN Reason: Diarrhea


   Last Admin: 02/26/20 08:30 Dose:  2 mg


   Documented by: 


Melatonin (Melatonin)  6 mg PO BEDTIME [facility]


   Last Admin: 06/21/20 20:52 Dose:  6 mg


   Documented by: 


Metoprolol Succinate (Toprol Xl)  25 mg PO DAILY [facility]


   Last Admin: 06/22/20 08:03 Dose:  25 mg


   Documented by: 


Miconazole (Desenex 2%)  0 gm TOP BID PRN


   PRN Reason: Rash


   Last Admin: 11/16/19 08:43 Dose:  1 applic


   Documented by: 


Nitroglycerin (Nitrostat)  0.4 mg SL Q5M PRN


   PRN Reason: Chest Pain


   Last Admin: 05/31/20 11:27 Dose:  0.4 mg


   Documented by: 


Omeprazole (Omeprazole)  20 mg PO DAILY [facility]


   Last Admin: 06/22/20 08:01 Dose:  20 mg


   Documented by: 


Pharmacy Consult (Consult To Pharmacy)  1 each .XX ASDIRECTED [facility]


Polyethylene Glycol (Miralax)  17 gm PO DAILY PRN


   PRN Reason: Constipation


Ropinirole HCl (Requip)  0.5 mg PO BEDTIME [facility]


   Last Admin: 06/21/20 20:52 Dose:  0.5 mg


   Documented by: 


Senna/Docusate Sodium (Senna Plus)  1 tab PO DAILY PRN


   PRN Reason: Constipation


Sertraline HCl (Zoloft)  100 mg PO DAILY [facility]


   Last Admin: 06/22/20 08:03 Dose:  100 mg


   Documented by: 


Vitamin B Complex/Vit C/Vit E/Zinc (Stress Formula With Zinc)  1 tab PO DAILY 

[facility]


   Last Admin: 06/22/20 08:01 Dose:  1 tab


   Documented by: 


Warfarin Sodium (Coumadin)  2.5 mg PO MoTh@2000 [facility]


   Last Admin: 06/18/20 20:32 Dose:  2.5 mg


   Documented by: 


Warfarin Sodium (Coumadin)  5 mg PO SuTuWeFrSa@2000 [facility]


   Last Admin: 06/21/20 20:52 Dose:  5 mg


   Documented by: 





Discontinued Medications





Erythromycin (Erythromycin 0.5% Ophth Oint)  0 gm EYERT BID [facility]


   Stop: 05/27/20 20:01


   Last Admin: 05/27/20 19:35 Dose:  1 applic


   Documented by: 


Furosemide (Lasix)  20 mg PO DAILY [facility]


   Last Admin: 09/24/19 08:18 Dose:  20 mg


   Documented by: 


Gabapentin (Neurontin)  100 mg PO BID@0800,1300 [facility]


   Last Admin: 09/24/19 13:48 Dose:  100 mg


   Documented by: 


Influenza Virus Vaccine (Fluzone High-Dose 2019-20 Syringe)  180 mcg IM .ONCE 

ONE


   Stop: 10/01/19 14:01


   Last Admin: 10/01/19 13:57 Dose:  180 mcg


   Documented by: 


Iopamidol (Isovue-300 (61%))  100 ml IVPUSH ONETIME ONE


   Stop: 12/26/19 15:19


   Last Admin: 12/26/19 16:33 Dose:  100 ml


   Documented by: 


Miconazole (Desenex 2%)  1 gm TOP BID [facility]


   Last Admin: 11/13/19 09:20 Dose:  Not Given


   Documented by: 


Nitrofurantoin Macrocrystals (Macrobid)  100 mg PO BID [facility]


   Stop: 10/26/19 20:00


   Last Admin: 10/22/19 07:54 Dose:  100 mg


   Documented by: 


Nitrofurantoin Macrocrystals (Macrobid)  100 mg PO BID [facility]


   Stop: 10/26/19 20:01


   Last Admin: 10/26/19 20:34 Dose:  100 mg


   Documented by: 


Own Supply: Warfarin 2.5mg (1/2 Of 5mg Tab)  0 mg PO MoTh@2000 [facility]


   Last Admin: 09/23/19 19:37 Dose:  2.5 mg


   Documented by: 


Own Supply: Warfarin (. 5mg)  0 mg PO SuTuWeFrSa@2000 [facility]


   Last Admin: 09/22/19 19:43 Dose:  5 mg


   Documented by: 


Own Supply: (Gabapentin. 300mg)  0 mg PO BEDTIME [facility]


   Last Admin: 09/23/19 19:36 Dose:  300 mg


   Documented by: 


Own Supply: (Metoprolol.Er 25mg)  0 mg PO DAILY [facility]


   Last Admin: 09/24/19 08:17 Dose:  25 mg


   Documented by: 


Polyethylene Glycol (Miralax)  17 gm PO DAILY [facility]


   Last Admin: 08/07/19 08:17 Dose:  Not Given


   Documented by: 


Ropinirole HCl (Requip)  0.5 mg PO BEDTIME [facility]


   Last Admin: 09/23/19 19:37 Dose:  0.5 mg


   Documented by: 


Sertraline HCl (Zoloft)  100 mg PO DAILY [facility]


   Last Admin: 03/06/20 07:56 Dose:  100 mg


   Documented by: 


Trimethoprim/Sulfamethoxazole (Septra Ds)  1 tab PO BID [facility]


   Stop: 06/02/20 23:00


   Last Admin: 06/01/20 08:28 Dose:  1 tab


   Documented by: 


Trimethoprim/Sulfamethoxazole (Septra Ds)  1 tab PO BID [facility]


   Stop: 06/02/20 23:00


   Last Admin: 06/02/20 20:04 Dose:  1 tab


   Documented by: 


Warfarin Sodium (Coumadin)  5 mg PO ONETIME ONE


   Stop: 05/26/20 20:29


   Last Admin: 05/26/20 21:09 Dose:  5 mg


   Documented by: 











Sepsis Event Note





- Evaluation


Sepsis Screening Result: No Definite Risk





- Focused Exam


Vital Signs: 


                                   Vital Signs











  Temp Pulse Pulse Resp BP BP Pulse Ox


 


 06/22/20 08:03   80    127/49 L  


 


 06/22/20 07:56  36.5 C   80  15   127/49 L  93 L











Date Exam was Performed: 06/22/20


Time Exam was Performed: 14:35





- Problem List Review


Problem List Initiated/Reviewed/Updated: Yes





- My Orders


Last 24 Hours: 


My Active Orders





07/03/20 07:00


INR,PT,PROTHROMBIN TIME [COAG] Q28D 





07/31/20 07:00


INR,PT,PROTHROMBIN TIME [COAG] Q28D 





08/28/20 07:00


INR,PT,PROTHROMBIN TIME [COAG] Q28D 





09/25/20 07:00


INR,PT,PROTHROMBIN TIME [COAG] Q28D 





10/23/20 07:00


INR,PT,PROTHROMBIN TIME [COAG] Q28D

## 2020-06-23 RX ADMIN — OMEPRAZOLE SCH MG: 20 CAPSULE, DELAYED RELEASE ORAL at 08:34

## 2020-06-23 RX ADMIN — ROPINIROLE SCH MG: 0.5 TABLET ORAL at 21:29

## 2020-06-23 RX ADMIN — SERTRALINE HYDROCHLORIDE SCH MG: 100 TABLET ORAL at 08:33

## 2020-06-23 RX ADMIN — METOPROLOL SUCCINATE SCH MG: 25 TABLET, EXTENDED RELEASE ORAL at 08:33

## 2020-06-23 RX ADMIN — GABAPENTIN SCH MG: 100 CAPSULE ORAL at 08:34

## 2020-06-23 RX ADMIN — FUROSEMIDE SCH MG: 20 TABLET ORAL at 08:33

## 2020-06-23 RX ADMIN — GABAPENTIN SCH MG: 300 CAPSULE ORAL at 21:29

## 2020-06-23 RX ADMIN — ZINC SCH TAB: TAB ORAL at 08:34

## 2020-06-23 RX ADMIN — WARFARIN SODIUM SCH MG: 5 TABLET ORAL at 21:28

## 2020-06-23 RX ADMIN — GABAPENTIN SCH MG: 100 CAPSULE ORAL at 13:38

## 2020-06-24 RX ADMIN — ZINC SCH TAB: TAB ORAL at 08:39

## 2020-06-24 RX ADMIN — GABAPENTIN SCH MG: 100 CAPSULE ORAL at 12:39

## 2020-06-24 RX ADMIN — GABAPENTIN SCH MG: 300 CAPSULE ORAL at 20:22

## 2020-06-24 RX ADMIN — SERTRALINE HYDROCHLORIDE SCH MG: 100 TABLET ORAL at 08:39

## 2020-06-24 RX ADMIN — WARFARIN SODIUM SCH MG: 5 TABLET ORAL at 20:22

## 2020-06-24 RX ADMIN — GABAPENTIN SCH MG: 100 CAPSULE ORAL at 08:40

## 2020-06-24 RX ADMIN — FUROSEMIDE SCH MG: 20 TABLET ORAL at 08:39

## 2020-06-24 RX ADMIN — ROPINIROLE SCH MG: 0.5 TABLET ORAL at 20:22

## 2020-06-24 RX ADMIN — OMEPRAZOLE SCH MG: 20 CAPSULE, DELAYED RELEASE ORAL at 08:39

## 2020-06-24 RX ADMIN — METOPROLOL SUCCINATE SCH MG: 25 TABLET, EXTENDED RELEASE ORAL at 08:40

## 2020-06-25 RX ADMIN — GABAPENTIN SCH MG: 100 CAPSULE ORAL at 08:22

## 2020-06-25 RX ADMIN — FUROSEMIDE SCH MG: 20 TABLET ORAL at 08:19

## 2020-06-25 RX ADMIN — GABAPENTIN SCH MG: 300 CAPSULE ORAL at 20:05

## 2020-06-25 RX ADMIN — OMEPRAZOLE SCH MG: 20 CAPSULE, DELAYED RELEASE ORAL at 08:24

## 2020-06-25 RX ADMIN — WARFARIN SODIUM SCH MG: 5 TABLET ORAL at 20:05

## 2020-06-25 RX ADMIN — GABAPENTIN SCH MG: 100 CAPSULE ORAL at 14:06

## 2020-06-25 RX ADMIN — SERTRALINE HYDROCHLORIDE SCH MG: 100 TABLET ORAL at 08:20

## 2020-06-25 RX ADMIN — ZINC SCH TAB: TAB ORAL at 08:24

## 2020-06-25 RX ADMIN — METOPROLOL SUCCINATE SCH MG: 25 TABLET, EXTENDED RELEASE ORAL at 08:20

## 2020-06-25 RX ADMIN — ROPINIROLE SCH MG: 0.5 TABLET ORAL at 20:04

## 2020-06-26 RX ADMIN — WARFARIN SODIUM SCH MG: 5 TABLET ORAL at 19:38

## 2020-06-26 RX ADMIN — SERTRALINE HYDROCHLORIDE SCH MG: 100 TABLET ORAL at 09:08

## 2020-06-26 RX ADMIN — FUROSEMIDE SCH MG: 20 TABLET ORAL at 09:08

## 2020-06-26 RX ADMIN — GABAPENTIN SCH MG: 300 CAPSULE ORAL at 19:37

## 2020-06-26 RX ADMIN — GABAPENTIN SCH MG: 100 CAPSULE ORAL at 13:43

## 2020-06-26 RX ADMIN — GABAPENTIN SCH MG: 100 CAPSULE ORAL at 09:04

## 2020-06-26 RX ADMIN — ZINC SCH TAB: TAB ORAL at 09:08

## 2020-06-26 RX ADMIN — OMEPRAZOLE SCH MG: 20 CAPSULE, DELAYED RELEASE ORAL at 09:08

## 2020-06-26 RX ADMIN — METOPROLOL SUCCINATE SCH MG: 25 TABLET, EXTENDED RELEASE ORAL at 09:07

## 2020-06-26 RX ADMIN — ROPINIROLE SCH MG: 0.5 TABLET ORAL at 19:37

## 2020-06-27 RX ADMIN — GABAPENTIN SCH MG: 300 CAPSULE ORAL at 19:57

## 2020-06-27 RX ADMIN — ROPINIROLE SCH MG: 0.5 TABLET ORAL at 19:59

## 2020-06-27 RX ADMIN — METOPROLOL SUCCINATE SCH MG: 25 TABLET, EXTENDED RELEASE ORAL at 09:26

## 2020-06-27 RX ADMIN — WARFARIN SODIUM SCH MG: 5 TABLET ORAL at 19:54

## 2020-06-27 RX ADMIN — ZINC SCH TAB: TAB ORAL at 09:26

## 2020-06-27 RX ADMIN — SERTRALINE HYDROCHLORIDE SCH MG: 100 TABLET ORAL at 09:26

## 2020-06-27 RX ADMIN — GABAPENTIN SCH MG: 100 CAPSULE ORAL at 13:15

## 2020-06-27 RX ADMIN — OMEPRAZOLE SCH MG: 20 CAPSULE, DELAYED RELEASE ORAL at 09:26

## 2020-06-27 RX ADMIN — FUROSEMIDE SCH MG: 20 TABLET ORAL at 09:26

## 2020-06-27 RX ADMIN — GABAPENTIN SCH MG: 100 CAPSULE ORAL at 09:26

## 2020-06-28 RX ADMIN — ZINC SCH TAB: TAB ORAL at 08:18

## 2020-06-28 RX ADMIN — FUROSEMIDE SCH MG: 20 TABLET ORAL at 08:19

## 2020-06-28 RX ADMIN — WARFARIN SODIUM SCH MG: 5 TABLET ORAL at 19:58

## 2020-06-28 RX ADMIN — ROPINIROLE SCH MG: 0.5 TABLET ORAL at 19:57

## 2020-06-28 RX ADMIN — SERTRALINE HYDROCHLORIDE SCH MG: 100 TABLET ORAL at 08:19

## 2020-06-28 RX ADMIN — METOPROLOL SUCCINATE SCH MG: 25 TABLET, EXTENDED RELEASE ORAL at 08:19

## 2020-06-28 RX ADMIN — GABAPENTIN SCH MG: 100 CAPSULE ORAL at 08:17

## 2020-06-28 RX ADMIN — GABAPENTIN SCH MG: 100 CAPSULE ORAL at 16:44

## 2020-06-28 RX ADMIN — OMEPRAZOLE SCH MG: 20 CAPSULE, DELAYED RELEASE ORAL at 08:18

## 2020-06-28 RX ADMIN — GABAPENTIN SCH MG: 300 CAPSULE ORAL at 19:55

## 2020-06-29 RX ADMIN — METOPROLOL SUCCINATE SCH MG: 25 TABLET, EXTENDED RELEASE ORAL at 08:19

## 2020-06-29 RX ADMIN — ROPINIROLE SCH MG: 0.5 TABLET ORAL at 20:30

## 2020-06-29 RX ADMIN — FUROSEMIDE SCH MG: 20 TABLET ORAL at 08:19

## 2020-06-29 RX ADMIN — SERTRALINE HYDROCHLORIDE SCH MG: 100 TABLET ORAL at 08:19

## 2020-06-29 RX ADMIN — WARFARIN SODIUM SCH MG: 5 TABLET ORAL at 20:30

## 2020-06-29 RX ADMIN — GABAPENTIN SCH MG: 100 CAPSULE ORAL at 12:13

## 2020-06-29 RX ADMIN — ZINC SCH TAB: TAB ORAL at 08:19

## 2020-06-29 RX ADMIN — GABAPENTIN SCH MG: 300 CAPSULE ORAL at 20:30

## 2020-06-29 RX ADMIN — OMEPRAZOLE SCH MG: 20 CAPSULE, DELAYED RELEASE ORAL at 08:19

## 2020-06-29 RX ADMIN — GABAPENTIN SCH MG: 100 CAPSULE ORAL at 08:19

## 2020-06-30 RX ADMIN — GABAPENTIN SCH MG: 300 CAPSULE ORAL at 19:36

## 2020-06-30 RX ADMIN — ZINC SCH TAB: TAB ORAL at 07:42

## 2020-06-30 RX ADMIN — GABAPENTIN SCH MG: 100 CAPSULE ORAL at 07:42

## 2020-06-30 RX ADMIN — ROPINIROLE SCH MG: 0.5 TABLET ORAL at 19:37

## 2020-06-30 RX ADMIN — FUROSEMIDE SCH MG: 20 TABLET ORAL at 07:42

## 2020-06-30 RX ADMIN — OMEPRAZOLE SCH MG: 20 CAPSULE, DELAYED RELEASE ORAL at 07:42

## 2020-06-30 RX ADMIN — METOPROLOL SUCCINATE SCH MG: 25 TABLET, EXTENDED RELEASE ORAL at 07:42

## 2020-06-30 RX ADMIN — WARFARIN SODIUM SCH MG: 5 TABLET ORAL at 19:37

## 2020-06-30 RX ADMIN — SERTRALINE HYDROCHLORIDE SCH MG: 100 TABLET ORAL at 07:42

## 2020-06-30 RX ADMIN — GABAPENTIN SCH MG: 100 CAPSULE ORAL at 12:12

## 2020-07-01 ENCOUNTER — HOSPITAL ENCOUNTER (INPATIENT)
Dept: HOSPITAL 50 - VM.MS | Age: 85
LOS: 70 days | Discharge: ANOTHER HOSPITAL | DRG: 641 | End: 2020-09-09
Attending: FAMILY MEDICINE | Admitting: FAMILY MEDICINE
Payer: MEDICAID

## 2020-07-01 ENCOUNTER — HOSPITAL ENCOUNTER (INPATIENT)
Dept: HOSPITAL 50 - VM.MS | Age: 85
Discharge: NURSING, REHAB OR LONG-TERM CARE | DRG: 641 | End: 2020-07-01
Attending: FAMILY MEDICINE | Admitting: FAMILY MEDICINE
Payer: MEDICAID

## 2020-07-01 VITALS — HEART RATE: 81 BPM | SYSTOLIC BLOOD PRESSURE: 146 MMHG | DIASTOLIC BLOOD PRESSURE: 56 MMHG

## 2020-07-01 DIAGNOSIS — Z85.43: ICD-10-CM

## 2020-07-01 DIAGNOSIS — N39.0: ICD-10-CM

## 2020-07-01 DIAGNOSIS — F41.9: ICD-10-CM

## 2020-07-01 DIAGNOSIS — Z88.8: ICD-10-CM

## 2020-07-01 DIAGNOSIS — Z86.718: ICD-10-CM

## 2020-07-01 DIAGNOSIS — M10.9: ICD-10-CM

## 2020-07-01 DIAGNOSIS — R62.7: Primary | ICD-10-CM

## 2020-07-01 DIAGNOSIS — K21.9: ICD-10-CM

## 2020-07-01 DIAGNOSIS — R07.9: ICD-10-CM

## 2020-07-01 DIAGNOSIS — D64.9: ICD-10-CM

## 2020-07-01 DIAGNOSIS — R12: ICD-10-CM

## 2020-07-01 DIAGNOSIS — Z85.828: ICD-10-CM

## 2020-07-01 DIAGNOSIS — Z79.01: ICD-10-CM

## 2020-07-01 DIAGNOSIS — F32.9: ICD-10-CM

## 2020-07-01 DIAGNOSIS — E66.9: ICD-10-CM

## 2020-07-01 DIAGNOSIS — I13.0: ICD-10-CM

## 2020-07-01 DIAGNOSIS — Z79.899: ICD-10-CM

## 2020-07-01 DIAGNOSIS — M81.0: ICD-10-CM

## 2020-07-01 DIAGNOSIS — R53.1: ICD-10-CM

## 2020-07-01 DIAGNOSIS — I25.10: ICD-10-CM

## 2020-07-01 DIAGNOSIS — C50.919: ICD-10-CM

## 2020-07-01 DIAGNOSIS — H43.819: ICD-10-CM

## 2020-07-01 DIAGNOSIS — I25.119: ICD-10-CM

## 2020-07-01 DIAGNOSIS — J44.9: ICD-10-CM

## 2020-07-01 DIAGNOSIS — G25.81: ICD-10-CM

## 2020-07-01 DIAGNOSIS — G31.84: ICD-10-CM

## 2020-07-01 DIAGNOSIS — I50.32: ICD-10-CM

## 2020-07-01 DIAGNOSIS — N18.3: ICD-10-CM

## 2020-07-01 DIAGNOSIS — R79.1: ICD-10-CM

## 2020-07-01 DIAGNOSIS — G62.9: ICD-10-CM

## 2020-07-01 DIAGNOSIS — N18.9: ICD-10-CM

## 2020-07-01 PROCEDURE — G0008 ADMIN INFLUENZA VIRUS VAC: HCPCS

## 2020-07-01 RX ADMIN — ROPINIROLE SCH MG: 0.5 TABLET ORAL at 19:41

## 2020-07-01 RX ADMIN — SERTRALINE HYDROCHLORIDE SCH MG: 100 TABLET ORAL at 08:06

## 2020-07-01 RX ADMIN — METOPROLOL SUCCINATE SCH MG: 25 TABLET, EXTENDED RELEASE ORAL at 08:05

## 2020-07-01 RX ADMIN — GABAPENTIN SCH MG: 100 CAPSULE ORAL at 12:40

## 2020-07-01 RX ADMIN — WARFARIN SODIUM SCH MG: 5 TABLET ORAL at 19:42

## 2020-07-01 RX ADMIN — OMEPRAZOLE SCH MG: 20 CAPSULE, DELAYED RELEASE ORAL at 08:03

## 2020-07-01 RX ADMIN — GABAPENTIN SCH MG: 300 CAPSULE ORAL at 19:39

## 2020-07-01 RX ADMIN — GABAPENTIN SCH MG: 100 CAPSULE ORAL at 08:04

## 2020-07-01 RX ADMIN — FUROSEMIDE SCH MG: 20 TABLET ORAL at 08:06

## 2020-07-02 RX ADMIN — MAGNESIUM OXIDE TAB 400 MG (241.3 MG ELEMENTAL MG) SCH TAB: 400 (241.3 MG) TAB at 08:05

## 2020-07-02 RX ADMIN — GABAPENTIN SCH MG: 300 CAPSULE ORAL at 19:22

## 2020-07-02 RX ADMIN — METOPROLOL SUCCINATE SCH MG: 25 TABLET, EXTENDED RELEASE ORAL at 08:04

## 2020-07-02 RX ADMIN — FUROSEMIDE SCH MG: 20 TABLET ORAL at 08:05

## 2020-07-02 RX ADMIN — GABAPENTIN SCH MG: 100 CAPSULE ORAL at 12:09

## 2020-07-02 RX ADMIN — GABAPENTIN SCH MG: 100 CAPSULE ORAL at 08:05

## 2020-07-02 RX ADMIN — ROPINIROLE SCH MG: 0.5 TABLET ORAL at 19:23

## 2020-07-02 RX ADMIN — SERTRALINE HYDROCHLORIDE SCH MG: 100 TABLET ORAL at 08:05

## 2020-07-02 RX ADMIN — OMEPRAZOLE SCH MG: 20 CAPSULE, DELAYED RELEASE ORAL at 08:06

## 2020-07-03 RX ADMIN — METOPROLOL SUCCINATE SCH MG: 25 TABLET, EXTENDED RELEASE ORAL at 09:33

## 2020-07-03 RX ADMIN — OMEPRAZOLE SCH MG: 20 CAPSULE, DELAYED RELEASE ORAL at 09:34

## 2020-07-03 RX ADMIN — ROPINIROLE SCH MG: 0.5 TABLET ORAL at 19:57

## 2020-07-03 RX ADMIN — FUROSEMIDE SCH MG: 20 TABLET ORAL at 09:33

## 2020-07-03 RX ADMIN — GABAPENTIN SCH MG: 100 CAPSULE ORAL at 09:34

## 2020-07-03 RX ADMIN — WARFARIN SODIUM SCH MG: 5 TABLET ORAL at 19:57

## 2020-07-03 RX ADMIN — MAGNESIUM OXIDE TAB 400 MG (241.3 MG ELEMENTAL MG) SCH TAB: 400 (241.3 MG) TAB at 09:34

## 2020-07-03 RX ADMIN — WARFARIN SODIUM SCH MG: 5 TABLET ORAL at 19:58

## 2020-07-03 RX ADMIN — SERTRALINE HYDROCHLORIDE SCH MG: 100 TABLET ORAL at 09:34

## 2020-07-03 RX ADMIN — GABAPENTIN SCH MG: 300 CAPSULE ORAL at 19:56

## 2020-07-03 RX ADMIN — GABAPENTIN SCH MG: 100 CAPSULE ORAL at 13:05

## 2020-07-04 RX ADMIN — FUROSEMIDE SCH MG: 20 TABLET ORAL at 10:03

## 2020-07-04 RX ADMIN — OMEPRAZOLE SCH MG: 20 CAPSULE, DELAYED RELEASE ORAL at 10:04

## 2020-07-04 RX ADMIN — ROPINIROLE SCH MG: 0.5 TABLET ORAL at 19:52

## 2020-07-04 RX ADMIN — MAGNESIUM OXIDE TAB 400 MG (241.3 MG ELEMENTAL MG) SCH TAB: 400 (241.3 MG) TAB at 10:04

## 2020-07-04 RX ADMIN — WARFARIN SODIUM SCH MG: 5 TABLET ORAL at 19:51

## 2020-07-04 RX ADMIN — GABAPENTIN SCH MG: 300 CAPSULE ORAL at 19:51

## 2020-07-04 RX ADMIN — METOPROLOL SUCCINATE SCH MG: 25 TABLET, EXTENDED RELEASE ORAL at 10:02

## 2020-07-04 RX ADMIN — GABAPENTIN SCH MG: 100 CAPSULE ORAL at 13:53

## 2020-07-04 RX ADMIN — SERTRALINE HYDROCHLORIDE SCH MG: 100 TABLET ORAL at 10:03

## 2020-07-04 RX ADMIN — GABAPENTIN SCH MG: 100 CAPSULE ORAL at 10:02

## 2020-07-05 RX ADMIN — SERTRALINE HYDROCHLORIDE SCH MG: 100 TABLET ORAL at 09:32

## 2020-07-05 RX ADMIN — WARFARIN SODIUM SCH MG: 5 TABLET ORAL at 19:52

## 2020-07-05 RX ADMIN — FUROSEMIDE SCH MG: 20 TABLET ORAL at 09:32

## 2020-07-05 RX ADMIN — GABAPENTIN SCH MG: 100 CAPSULE ORAL at 14:30

## 2020-07-05 RX ADMIN — METOPROLOL SUCCINATE SCH MG: 25 TABLET, EXTENDED RELEASE ORAL at 09:29

## 2020-07-05 RX ADMIN — GABAPENTIN SCH MG: 300 CAPSULE ORAL at 19:52

## 2020-07-05 RX ADMIN — GABAPENTIN SCH MG: 100 CAPSULE ORAL at 09:29

## 2020-07-05 RX ADMIN — ROPINIROLE SCH MG: 0.5 TABLET ORAL at 19:50

## 2020-07-05 RX ADMIN — MAGNESIUM OXIDE TAB 400 MG (241.3 MG ELEMENTAL MG) SCH TAB: 400 (241.3 MG) TAB at 09:33

## 2020-07-05 RX ADMIN — OMEPRAZOLE SCH MG: 20 CAPSULE, DELAYED RELEASE ORAL at 09:33

## 2020-07-06 RX ADMIN — SERTRALINE HYDROCHLORIDE SCH MG: 100 TABLET ORAL at 07:53

## 2020-07-06 RX ADMIN — MAGNESIUM OXIDE TAB 400 MG (241.3 MG ELEMENTAL MG) SCH TAB: 400 (241.3 MG) TAB at 07:54

## 2020-07-06 RX ADMIN — ROPINIROLE SCH MG: 0.5 TABLET ORAL at 20:00

## 2020-07-06 RX ADMIN — GABAPENTIN SCH MG: 100 CAPSULE ORAL at 07:55

## 2020-07-06 RX ADMIN — METOPROLOL SUCCINATE SCH MG: 25 TABLET, EXTENDED RELEASE ORAL at 07:53

## 2020-07-06 RX ADMIN — FUROSEMIDE SCH MG: 20 TABLET ORAL at 07:53

## 2020-07-06 RX ADMIN — GABAPENTIN SCH MG: 100 CAPSULE ORAL at 15:30

## 2020-07-06 RX ADMIN — OMEPRAZOLE SCH MG: 20 CAPSULE, DELAYED RELEASE ORAL at 07:54

## 2020-07-06 RX ADMIN — WARFARIN SODIUM SCH MG: 5 TABLET ORAL at 20:01

## 2020-07-06 RX ADMIN — GABAPENTIN SCH MG: 300 CAPSULE ORAL at 20:02

## 2020-07-07 RX ADMIN — GABAPENTIN SCH MG: 100 CAPSULE ORAL at 17:20

## 2020-07-07 RX ADMIN — FUROSEMIDE SCH MG: 20 TABLET ORAL at 07:53

## 2020-07-07 RX ADMIN — ROPINIROLE SCH MG: 0.5 TABLET ORAL at 20:10

## 2020-07-07 RX ADMIN — MAGNESIUM OXIDE TAB 400 MG (241.3 MG ELEMENTAL MG) SCH TAB: 400 (241.3 MG) TAB at 07:55

## 2020-07-07 RX ADMIN — WARFARIN SODIUM SCH MG: 5 TABLET ORAL at 20:11

## 2020-07-07 RX ADMIN — GABAPENTIN SCH MG: 300 CAPSULE ORAL at 20:10

## 2020-07-07 RX ADMIN — METOPROLOL SUCCINATE SCH MG: 25 TABLET, EXTENDED RELEASE ORAL at 07:51

## 2020-07-07 RX ADMIN — GABAPENTIN SCH MG: 100 CAPSULE ORAL at 07:53

## 2020-07-07 RX ADMIN — OMEPRAZOLE SCH MG: 20 CAPSULE, DELAYED RELEASE ORAL at 07:55

## 2020-07-07 RX ADMIN — SERTRALINE HYDROCHLORIDE SCH MG: 100 TABLET ORAL at 07:53

## 2020-07-08 RX ADMIN — GABAPENTIN SCH MG: 100 CAPSULE ORAL at 14:58

## 2020-07-08 RX ADMIN — SERTRALINE HYDROCHLORIDE SCH MG: 100 TABLET ORAL at 08:36

## 2020-07-08 RX ADMIN — WARFARIN SODIUM SCH MG: 5 TABLET ORAL at 19:23

## 2020-07-08 RX ADMIN — GABAPENTIN SCH MG: 300 CAPSULE ORAL at 19:22

## 2020-07-08 RX ADMIN — FUROSEMIDE SCH MG: 20 TABLET ORAL at 08:36

## 2020-07-08 RX ADMIN — OMEPRAZOLE SCH MG: 20 CAPSULE, DELAYED RELEASE ORAL at 08:36

## 2020-07-08 RX ADMIN — METOPROLOL SUCCINATE SCH MG: 25 TABLET, EXTENDED RELEASE ORAL at 08:36

## 2020-07-08 RX ADMIN — ROPINIROLE SCH MG: 0.5 TABLET ORAL at 19:23

## 2020-07-08 RX ADMIN — MAGNESIUM OXIDE TAB 400 MG (241.3 MG ELEMENTAL MG) SCH TAB: 400 (241.3 MG) TAB at 08:37

## 2020-07-08 RX ADMIN — GABAPENTIN SCH MG: 100 CAPSULE ORAL at 08:37

## 2020-07-09 RX ADMIN — OMEPRAZOLE SCH MG: 20 CAPSULE, DELAYED RELEASE ORAL at 07:20

## 2020-07-09 RX ADMIN — FUROSEMIDE SCH MG: 20 TABLET ORAL at 07:19

## 2020-07-09 RX ADMIN — METOPROLOL SUCCINATE SCH MG: 25 TABLET, EXTENDED RELEASE ORAL at 07:19

## 2020-07-09 RX ADMIN — SERTRALINE HYDROCHLORIDE SCH MG: 100 TABLET ORAL at 07:20

## 2020-07-09 RX ADMIN — ROPINIROLE SCH MG: 0.5 TABLET ORAL at 19:29

## 2020-07-09 RX ADMIN — GABAPENTIN SCH MG: 300 CAPSULE ORAL at 19:29

## 2020-07-09 RX ADMIN — GABAPENTIN SCH MG: 100 CAPSULE ORAL at 07:20

## 2020-07-09 RX ADMIN — MAGNESIUM OXIDE TAB 400 MG (241.3 MG ELEMENTAL MG) SCH TAB: 400 (241.3 MG) TAB at 07:20

## 2020-07-09 RX ADMIN — GABAPENTIN SCH MG: 100 CAPSULE ORAL at 13:08

## 2020-07-09 RX ADMIN — WARFARIN SODIUM SCH MG: 5 TABLET ORAL at 19:29

## 2020-07-10 RX ADMIN — ROPINIROLE SCH MG: 0.5 TABLET ORAL at 19:44

## 2020-07-10 RX ADMIN — MAGNESIUM OXIDE TAB 400 MG (241.3 MG ELEMENTAL MG) SCH TAB: 400 (241.3 MG) TAB at 07:52

## 2020-07-10 RX ADMIN — FUROSEMIDE SCH MG: 20 TABLET ORAL at 07:53

## 2020-07-10 RX ADMIN — OMEPRAZOLE SCH MG: 20 CAPSULE, DELAYED RELEASE ORAL at 07:52

## 2020-07-10 RX ADMIN — SERTRALINE HYDROCHLORIDE SCH MG: 100 TABLET ORAL at 07:53

## 2020-07-10 RX ADMIN — GABAPENTIN SCH MG: 100 CAPSULE ORAL at 14:20

## 2020-07-10 RX ADMIN — WARFARIN SODIUM SCH MG: 5 TABLET ORAL at 19:45

## 2020-07-10 RX ADMIN — METOPROLOL SUCCINATE SCH MG: 25 TABLET, EXTENDED RELEASE ORAL at 07:53

## 2020-07-10 RX ADMIN — GABAPENTIN SCH MG: 300 CAPSULE ORAL at 19:44

## 2020-07-10 RX ADMIN — GABAPENTIN SCH MG: 100 CAPSULE ORAL at 07:52

## 2020-07-11 RX ADMIN — METOPROLOL SUCCINATE SCH MG: 25 TABLET, EXTENDED RELEASE ORAL at 08:58

## 2020-07-11 RX ADMIN — GABAPENTIN SCH MG: 100 CAPSULE ORAL at 12:11

## 2020-07-11 RX ADMIN — GABAPENTIN SCH MG: 300 CAPSULE ORAL at 19:25

## 2020-07-11 RX ADMIN — GABAPENTIN SCH MG: 100 CAPSULE ORAL at 08:57

## 2020-07-11 RX ADMIN — MAGNESIUM OXIDE TAB 400 MG (241.3 MG ELEMENTAL MG) SCH TAB: 400 (241.3 MG) TAB at 08:58

## 2020-07-11 RX ADMIN — ROPINIROLE SCH MG: 0.5 TABLET ORAL at 19:26

## 2020-07-11 RX ADMIN — WARFARIN SODIUM SCH MG: 5 TABLET ORAL at 19:26

## 2020-07-11 RX ADMIN — OMEPRAZOLE SCH MG: 20 CAPSULE, DELAYED RELEASE ORAL at 08:57

## 2020-07-11 RX ADMIN — FUROSEMIDE SCH MG: 20 TABLET ORAL at 08:59

## 2020-07-11 RX ADMIN — SERTRALINE HYDROCHLORIDE SCH MG: 100 TABLET ORAL at 08:59

## 2020-07-12 RX ADMIN — MAGNESIUM OXIDE TAB 400 MG (241.3 MG ELEMENTAL MG) SCH TAB: 400 (241.3 MG) TAB at 07:49

## 2020-07-12 RX ADMIN — FUROSEMIDE SCH MG: 20 TABLET ORAL at 07:48

## 2020-07-12 RX ADMIN — GABAPENTIN SCH MG: 100 CAPSULE ORAL at 12:16

## 2020-07-12 RX ADMIN — METOPROLOL SUCCINATE SCH MG: 25 TABLET, EXTENDED RELEASE ORAL at 07:48

## 2020-07-12 RX ADMIN — SERTRALINE HYDROCHLORIDE SCH MG: 100 TABLET ORAL at 07:48

## 2020-07-12 RX ADMIN — OMEPRAZOLE SCH MG: 20 CAPSULE, DELAYED RELEASE ORAL at 07:49

## 2020-07-12 RX ADMIN — GABAPENTIN SCH MG: 300 CAPSULE ORAL at 19:52

## 2020-07-12 RX ADMIN — WARFARIN SODIUM SCH MG: 5 TABLET ORAL at 19:52

## 2020-07-12 RX ADMIN — GABAPENTIN SCH MG: 100 CAPSULE ORAL at 07:48

## 2020-07-12 RX ADMIN — ROPINIROLE SCH MG: 0.5 TABLET ORAL at 19:52

## 2020-07-13 RX ADMIN — ROPINIROLE SCH MG: 0.5 TABLET ORAL at 19:31

## 2020-07-13 RX ADMIN — FUROSEMIDE SCH MG: 20 TABLET ORAL at 08:53

## 2020-07-13 RX ADMIN — OMEPRAZOLE SCH MG: 20 CAPSULE, DELAYED RELEASE ORAL at 08:53

## 2020-07-13 RX ADMIN — GABAPENTIN SCH MG: 300 CAPSULE ORAL at 19:34

## 2020-07-13 RX ADMIN — WARFARIN SODIUM SCH MG: 5 TABLET ORAL at 19:31

## 2020-07-13 RX ADMIN — MAGNESIUM OXIDE TAB 400 MG (241.3 MG ELEMENTAL MG) SCH TAB: 400 (241.3 MG) TAB at 08:54

## 2020-07-13 RX ADMIN — GABAPENTIN SCH MG: 100 CAPSULE ORAL at 13:50

## 2020-07-13 RX ADMIN — METOPROLOL SUCCINATE SCH MG: 25 TABLET, EXTENDED RELEASE ORAL at 08:53

## 2020-07-13 RX ADMIN — SERTRALINE HYDROCHLORIDE SCH MG: 100 TABLET ORAL at 08:53

## 2020-07-13 RX ADMIN — GABAPENTIN SCH MG: 100 CAPSULE ORAL at 08:54

## 2020-07-14 RX ADMIN — ROPINIROLE SCH MG: 0.5 TABLET ORAL at 19:41

## 2020-07-14 RX ADMIN — GABAPENTIN SCH MG: 100 CAPSULE ORAL at 08:43

## 2020-07-14 RX ADMIN — OMEPRAZOLE SCH MG: 20 CAPSULE, DELAYED RELEASE ORAL at 08:43

## 2020-07-14 RX ADMIN — GABAPENTIN SCH MG: 300 CAPSULE ORAL at 19:40

## 2020-07-14 RX ADMIN — METOPROLOL SUCCINATE SCH MG: 25 TABLET, EXTENDED RELEASE ORAL at 08:43

## 2020-07-14 RX ADMIN — GABAPENTIN SCH MG: 100 CAPSULE ORAL at 12:36

## 2020-07-14 RX ADMIN — MAGNESIUM OXIDE TAB 400 MG (241.3 MG ELEMENTAL MG) SCH TAB: 400 (241.3 MG) TAB at 08:43

## 2020-07-14 RX ADMIN — FUROSEMIDE SCH MG: 20 TABLET ORAL at 08:42

## 2020-07-14 RX ADMIN — WARFARIN SODIUM SCH MG: 5 TABLET ORAL at 19:41

## 2020-07-14 RX ADMIN — SERTRALINE HYDROCHLORIDE SCH MG: 100 TABLET ORAL at 08:42

## 2020-07-15 RX ADMIN — MAGNESIUM OXIDE TAB 400 MG (241.3 MG ELEMENTAL MG) SCH TAB: 400 (241.3 MG) TAB at 07:56

## 2020-07-15 RX ADMIN — GABAPENTIN SCH MG: 100 CAPSULE ORAL at 12:41

## 2020-07-15 RX ADMIN — GABAPENTIN SCH MG: 300 CAPSULE ORAL at 19:49

## 2020-07-15 RX ADMIN — OMEPRAZOLE SCH MG: 20 CAPSULE, DELAYED RELEASE ORAL at 07:56

## 2020-07-15 RX ADMIN — FUROSEMIDE SCH MG: 20 TABLET ORAL at 07:55

## 2020-07-15 RX ADMIN — SERTRALINE HYDROCHLORIDE SCH MG: 100 TABLET ORAL at 07:55

## 2020-07-15 RX ADMIN — ROPINIROLE SCH MG: 0.5 TABLET ORAL at 19:50

## 2020-07-15 RX ADMIN — METOPROLOL SUCCINATE SCH MG: 25 TABLET, EXTENDED RELEASE ORAL at 07:55

## 2020-07-15 RX ADMIN — GABAPENTIN SCH MG: 100 CAPSULE ORAL at 07:55

## 2020-07-15 RX ADMIN — WARFARIN SODIUM SCH MG: 5 TABLET ORAL at 19:50

## 2020-07-16 RX ADMIN — WARFARIN SODIUM SCH MG: 5 TABLET ORAL at 19:52

## 2020-07-16 RX ADMIN — OMEPRAZOLE SCH MG: 20 CAPSULE, DELAYED RELEASE ORAL at 08:35

## 2020-07-16 RX ADMIN — METOPROLOL SUCCINATE SCH MG: 25 TABLET, EXTENDED RELEASE ORAL at 08:35

## 2020-07-16 RX ADMIN — GABAPENTIN SCH MG: 300 CAPSULE ORAL at 19:51

## 2020-07-16 RX ADMIN — GABAPENTIN SCH MG: 100 CAPSULE ORAL at 12:27

## 2020-07-16 RX ADMIN — MAGNESIUM OXIDE TAB 400 MG (241.3 MG ELEMENTAL MG) SCH TAB: 400 (241.3 MG) TAB at 08:35

## 2020-07-16 RX ADMIN — ROPINIROLE SCH MG: 0.5 TABLET ORAL at 19:52

## 2020-07-16 RX ADMIN — GABAPENTIN SCH MG: 100 CAPSULE ORAL at 08:35

## 2020-07-16 RX ADMIN — SERTRALINE HYDROCHLORIDE SCH MG: 100 TABLET ORAL at 08:35

## 2020-07-16 RX ADMIN — FUROSEMIDE SCH MG: 20 TABLET ORAL at 08:34

## 2020-07-17 RX ADMIN — GABAPENTIN SCH MG: 300 CAPSULE ORAL at 21:00

## 2020-07-17 RX ADMIN — SERTRALINE HYDROCHLORIDE SCH MG: 100 TABLET ORAL at 07:28

## 2020-07-17 RX ADMIN — OMEPRAZOLE SCH MG: 20 CAPSULE, DELAYED RELEASE ORAL at 07:27

## 2020-07-17 RX ADMIN — WARFARIN SODIUM SCH MG: 5 TABLET ORAL at 21:00

## 2020-07-17 RX ADMIN — FUROSEMIDE SCH MG: 20 TABLET ORAL at 07:28

## 2020-07-17 RX ADMIN — METOPROLOL SUCCINATE SCH MG: 25 TABLET, EXTENDED RELEASE ORAL at 07:28

## 2020-07-17 RX ADMIN — GABAPENTIN SCH MG: 100 CAPSULE ORAL at 12:05

## 2020-07-17 RX ADMIN — MAGNESIUM OXIDE TAB 400 MG (241.3 MG ELEMENTAL MG) SCH TAB: 400 (241.3 MG) TAB at 07:27

## 2020-07-17 RX ADMIN — ROPINIROLE SCH MG: 0.5 TABLET ORAL at 21:00

## 2020-07-17 RX ADMIN — GABAPENTIN SCH MG: 100 CAPSULE ORAL at 07:28

## 2020-07-18 RX ADMIN — GABAPENTIN SCH MG: 100 CAPSULE ORAL at 12:01

## 2020-07-18 RX ADMIN — SERTRALINE HYDROCHLORIDE SCH MG: 100 TABLET ORAL at 07:52

## 2020-07-18 RX ADMIN — WARFARIN SODIUM SCH MG: 5 TABLET ORAL at 19:54

## 2020-07-18 RX ADMIN — METOPROLOL SUCCINATE SCH MG: 25 TABLET, EXTENDED RELEASE ORAL at 07:52

## 2020-07-18 RX ADMIN — OMEPRAZOLE SCH MG: 20 CAPSULE, DELAYED RELEASE ORAL at 07:51

## 2020-07-18 RX ADMIN — MAGNESIUM OXIDE TAB 400 MG (241.3 MG ELEMENTAL MG) SCH TAB: 400 (241.3 MG) TAB at 07:51

## 2020-07-18 RX ADMIN — ROPINIROLE SCH MG: 0.5 TABLET ORAL at 19:54

## 2020-07-18 RX ADMIN — GABAPENTIN SCH MG: 100 CAPSULE ORAL at 07:52

## 2020-07-18 RX ADMIN — GABAPENTIN SCH MG: 300 CAPSULE ORAL at 19:53

## 2020-07-18 RX ADMIN — FUROSEMIDE SCH MG: 20 TABLET ORAL at 07:52

## 2020-07-19 RX ADMIN — GABAPENTIN SCH MG: 300 CAPSULE ORAL at 20:03

## 2020-07-19 RX ADMIN — WARFARIN SODIUM SCH MG: 5 TABLET ORAL at 20:03

## 2020-07-19 RX ADMIN — OMEPRAZOLE SCH MG: 20 CAPSULE, DELAYED RELEASE ORAL at 08:18

## 2020-07-19 RX ADMIN — GABAPENTIN SCH MG: 100 CAPSULE ORAL at 12:20

## 2020-07-19 RX ADMIN — GABAPENTIN SCH MG: 100 CAPSULE ORAL at 08:19

## 2020-07-19 RX ADMIN — FUROSEMIDE SCH MG: 20 TABLET ORAL at 08:18

## 2020-07-19 RX ADMIN — SERTRALINE HYDROCHLORIDE SCH MG: 100 TABLET ORAL at 08:18

## 2020-07-19 RX ADMIN — METOPROLOL SUCCINATE SCH MG: 25 TABLET, EXTENDED RELEASE ORAL at 08:18

## 2020-07-19 RX ADMIN — MAGNESIUM OXIDE TAB 400 MG (241.3 MG ELEMENTAL MG) SCH TAB: 400 (241.3 MG) TAB at 08:18

## 2020-07-19 RX ADMIN — ROPINIROLE SCH MG: 0.5 TABLET ORAL at 20:03

## 2020-07-20 RX ADMIN — GABAPENTIN SCH MG: 100 CAPSULE ORAL at 13:12

## 2020-07-20 RX ADMIN — GABAPENTIN SCH MG: 300 CAPSULE ORAL at 20:57

## 2020-07-20 RX ADMIN — OMEPRAZOLE SCH MG: 20 CAPSULE, DELAYED RELEASE ORAL at 08:21

## 2020-07-20 RX ADMIN — FUROSEMIDE SCH MG: 20 TABLET ORAL at 08:21

## 2020-07-20 RX ADMIN — METOPROLOL SUCCINATE SCH MG: 25 TABLET, EXTENDED RELEASE ORAL at 08:21

## 2020-07-20 RX ADMIN — GABAPENTIN SCH MG: 100 CAPSULE ORAL at 08:21

## 2020-07-20 RX ADMIN — SERTRALINE HYDROCHLORIDE SCH MG: 100 TABLET ORAL at 08:21

## 2020-07-20 RX ADMIN — WARFARIN SODIUM SCH MG: 5 TABLET ORAL at 20:57

## 2020-07-20 RX ADMIN — MAGNESIUM OXIDE TAB 400 MG (241.3 MG ELEMENTAL MG) SCH TAB: 400 (241.3 MG) TAB at 08:21

## 2020-07-20 RX ADMIN — ROPINIROLE SCH MG: 0.5 TABLET ORAL at 20:58

## 2020-07-21 RX ADMIN — METOPROLOL SUCCINATE SCH MG: 25 TABLET, EXTENDED RELEASE ORAL at 08:29

## 2020-07-21 RX ADMIN — FUROSEMIDE SCH MG: 20 TABLET ORAL at 08:29

## 2020-07-21 RX ADMIN — SERTRALINE HYDROCHLORIDE SCH MG: 100 TABLET ORAL at 08:29

## 2020-07-21 RX ADMIN — MAGNESIUM OXIDE TAB 400 MG (241.3 MG ELEMENTAL MG) SCH TAB: 400 (241.3 MG) TAB at 08:30

## 2020-07-21 RX ADMIN — GABAPENTIN SCH MG: 300 CAPSULE ORAL at 19:47

## 2020-07-21 RX ADMIN — WARFARIN SODIUM SCH MG: 5 TABLET ORAL at 19:46

## 2020-07-21 RX ADMIN — GABAPENTIN SCH MG: 100 CAPSULE ORAL at 08:30

## 2020-07-21 RX ADMIN — OMEPRAZOLE SCH MG: 20 CAPSULE, DELAYED RELEASE ORAL at 08:30

## 2020-07-21 RX ADMIN — ROPINIROLE SCH MG: 0.5 TABLET ORAL at 19:44

## 2020-07-21 RX ADMIN — GABAPENTIN SCH MG: 100 CAPSULE ORAL at 12:21

## 2020-07-22 RX ADMIN — GABAPENTIN SCH MG: 100 CAPSULE ORAL at 07:41

## 2020-07-22 RX ADMIN — WARFARIN SODIUM SCH MG: 5 TABLET ORAL at 19:47

## 2020-07-22 RX ADMIN — ROPINIROLE SCH MG: 0.5 TABLET ORAL at 19:47

## 2020-07-22 RX ADMIN — OMEPRAZOLE SCH MG: 20 CAPSULE, DELAYED RELEASE ORAL at 07:42

## 2020-07-22 RX ADMIN — GABAPENTIN SCH MG: 100 CAPSULE ORAL at 12:23

## 2020-07-22 RX ADMIN — MAGNESIUM OXIDE TAB 400 MG (241.3 MG ELEMENTAL MG) SCH TAB: 400 (241.3 MG) TAB at 07:42

## 2020-07-22 RX ADMIN — SERTRALINE HYDROCHLORIDE SCH MG: 100 TABLET ORAL at 07:41

## 2020-07-22 RX ADMIN — METOPROLOL SUCCINATE SCH MG: 25 TABLET, EXTENDED RELEASE ORAL at 07:41

## 2020-07-22 RX ADMIN — GABAPENTIN SCH MG: 300 CAPSULE ORAL at 19:47

## 2020-07-22 RX ADMIN — FUROSEMIDE SCH MG: 20 TABLET ORAL at 07:41

## 2020-07-23 RX ADMIN — MAGNESIUM OXIDE TAB 400 MG (241.3 MG ELEMENTAL MG) SCH TAB: 400 (241.3 MG) TAB at 08:32

## 2020-07-23 RX ADMIN — FUROSEMIDE SCH MG: 20 TABLET ORAL at 08:33

## 2020-07-23 RX ADMIN — GABAPENTIN SCH MG: 100 CAPSULE ORAL at 08:34

## 2020-07-23 RX ADMIN — SERTRALINE HYDROCHLORIDE SCH MG: 100 TABLET ORAL at 08:34

## 2020-07-23 RX ADMIN — METOPROLOL SUCCINATE SCH MG: 25 TABLET, EXTENDED RELEASE ORAL at 08:33

## 2020-07-23 RX ADMIN — ROPINIROLE SCH MG: 0.5 TABLET ORAL at 19:44

## 2020-07-23 RX ADMIN — GABAPENTIN SCH MG: 100 CAPSULE ORAL at 13:55

## 2020-07-23 RX ADMIN — OMEPRAZOLE SCH MG: 20 CAPSULE, DELAYED RELEASE ORAL at 08:31

## 2020-07-23 RX ADMIN — WARFARIN SODIUM SCH MG: 5 TABLET ORAL at 19:45

## 2020-07-23 RX ADMIN — GABAPENTIN SCH MG: 300 CAPSULE ORAL at 19:44

## 2020-07-24 RX ADMIN — WARFARIN SODIUM SCH MG: 5 TABLET ORAL at 19:38

## 2020-07-24 RX ADMIN — METOPROLOL SUCCINATE SCH MG: 25 TABLET, EXTENDED RELEASE ORAL at 07:53

## 2020-07-24 RX ADMIN — ROPINIROLE SCH MG: 0.5 TABLET ORAL at 19:39

## 2020-07-24 RX ADMIN — GABAPENTIN SCH MG: 100 CAPSULE ORAL at 07:54

## 2020-07-24 RX ADMIN — GABAPENTIN SCH MG: 300 CAPSULE ORAL at 19:38

## 2020-07-24 RX ADMIN — OMEPRAZOLE SCH MG: 20 CAPSULE, DELAYED RELEASE ORAL at 07:54

## 2020-07-24 RX ADMIN — GABAPENTIN SCH MG: 100 CAPSULE ORAL at 12:31

## 2020-07-24 RX ADMIN — FUROSEMIDE SCH MG: 20 TABLET ORAL at 07:54

## 2020-07-24 RX ADMIN — SERTRALINE HYDROCHLORIDE SCH MG: 100 TABLET ORAL at 07:54

## 2020-07-24 RX ADMIN — MAGNESIUM OXIDE TAB 400 MG (241.3 MG ELEMENTAL MG) SCH TAB: 400 (241.3 MG) TAB at 07:54

## 2020-07-25 RX ADMIN — GABAPENTIN SCH MG: 100 CAPSULE ORAL at 08:19

## 2020-07-25 RX ADMIN — FUROSEMIDE SCH MG: 20 TABLET ORAL at 08:19

## 2020-07-25 RX ADMIN — GABAPENTIN SCH MG: 100 CAPSULE ORAL at 13:21

## 2020-07-25 RX ADMIN — WARFARIN SODIUM SCH MG: 5 TABLET ORAL at 20:38

## 2020-07-25 RX ADMIN — MAGNESIUM OXIDE TAB 400 MG (241.3 MG ELEMENTAL MG) SCH TAB: 400 (241.3 MG) TAB at 08:21

## 2020-07-25 RX ADMIN — SERTRALINE HYDROCHLORIDE SCH MG: 100 TABLET ORAL at 08:18

## 2020-07-25 RX ADMIN — GABAPENTIN SCH MG: 300 CAPSULE ORAL at 20:36

## 2020-07-25 RX ADMIN — ROPINIROLE SCH MG: 0.5 TABLET ORAL at 20:36

## 2020-07-25 RX ADMIN — METOPROLOL SUCCINATE SCH MG: 25 TABLET, EXTENDED RELEASE ORAL at 08:18

## 2020-07-25 RX ADMIN — OMEPRAZOLE SCH MG: 20 CAPSULE, DELAYED RELEASE ORAL at 08:17

## 2020-07-26 RX ADMIN — METOPROLOL SUCCINATE SCH MG: 25 TABLET, EXTENDED RELEASE ORAL at 07:59

## 2020-07-26 RX ADMIN — WARFARIN SODIUM SCH MG: 5 TABLET ORAL at 20:10

## 2020-07-26 RX ADMIN — ROPINIROLE SCH MG: 0.5 TABLET ORAL at 20:09

## 2020-07-26 RX ADMIN — MAGNESIUM OXIDE TAB 400 MG (241.3 MG ELEMENTAL MG) SCH TAB: 400 (241.3 MG) TAB at 07:59

## 2020-07-26 RX ADMIN — GABAPENTIN SCH MG: 100 CAPSULE ORAL at 07:59

## 2020-07-26 RX ADMIN — FUROSEMIDE SCH MG: 20 TABLET ORAL at 07:59

## 2020-07-26 RX ADMIN — SERTRALINE HYDROCHLORIDE SCH MG: 100 TABLET ORAL at 07:59

## 2020-07-26 RX ADMIN — GABAPENTIN SCH MG: 300 CAPSULE ORAL at 20:09

## 2020-07-26 RX ADMIN — OMEPRAZOLE SCH MG: 20 CAPSULE, DELAYED RELEASE ORAL at 07:59

## 2020-07-26 RX ADMIN — GABAPENTIN SCH MG: 100 CAPSULE ORAL at 13:25

## 2020-07-27 RX ADMIN — FUROSEMIDE SCH MG: 20 TABLET ORAL at 08:20

## 2020-07-27 RX ADMIN — SERTRALINE HYDROCHLORIDE SCH MG: 100 TABLET ORAL at 08:20

## 2020-07-27 RX ADMIN — GABAPENTIN SCH MG: 300 CAPSULE ORAL at 19:33

## 2020-07-27 RX ADMIN — GABAPENTIN SCH MG: 100 CAPSULE ORAL at 13:42

## 2020-07-27 RX ADMIN — METOPROLOL SUCCINATE SCH MG: 25 TABLET, EXTENDED RELEASE ORAL at 08:20

## 2020-07-27 RX ADMIN — MAGNESIUM OXIDE TAB 400 MG (241.3 MG ELEMENTAL MG) SCH TAB: 400 (241.3 MG) TAB at 08:20

## 2020-07-27 RX ADMIN — OMEPRAZOLE SCH MG: 20 CAPSULE, DELAYED RELEASE ORAL at 08:20

## 2020-07-27 RX ADMIN — GABAPENTIN SCH MG: 100 CAPSULE ORAL at 08:20

## 2020-07-27 RX ADMIN — WARFARIN SODIUM SCH MG: 5 TABLET ORAL at 19:33

## 2020-07-27 RX ADMIN — ROPINIROLE SCH MG: 0.5 TABLET ORAL at 19:33

## 2020-07-28 LAB
ANION GAP SERPL CALC-SCNC: 9.9 MMOL/L (ref 10–20)
CHLORIDE SERPL-SCNC: 105 MMOL/L (ref 98–107)
SODIUM SERPL-SCNC: 145 MMOL/L (ref 136–145)

## 2020-07-28 RX ADMIN — MAGNESIUM OXIDE TAB 400 MG (241.3 MG ELEMENTAL MG) SCH TAB: 400 (241.3 MG) TAB at 08:07

## 2020-07-28 RX ADMIN — FUROSEMIDE SCH MG: 20 TABLET ORAL at 08:06

## 2020-07-28 RX ADMIN — SERTRALINE HYDROCHLORIDE SCH MG: 100 TABLET ORAL at 08:05

## 2020-07-28 RX ADMIN — OMEPRAZOLE SCH MG: 20 CAPSULE, DELAYED RELEASE ORAL at 08:06

## 2020-07-28 RX ADMIN — GABAPENTIN SCH MG: 300 CAPSULE ORAL at 20:30

## 2020-07-28 RX ADMIN — GABAPENTIN SCH MG: 100 CAPSULE ORAL at 08:05

## 2020-07-28 RX ADMIN — WARFARIN SODIUM SCH MG: 5 TABLET ORAL at 20:31

## 2020-07-28 RX ADMIN — GABAPENTIN SCH MG: 100 CAPSULE ORAL at 12:20

## 2020-07-28 RX ADMIN — ROPINIROLE SCH MG: 0.5 TABLET ORAL at 20:31

## 2020-07-28 RX ADMIN — METOPROLOL SUCCINATE SCH MG: 25 TABLET, EXTENDED RELEASE ORAL at 08:05

## 2020-07-28 NOTE — CR
______________________________________________________________________________   

  

[id number] RAD/RAD Chest PA or AP 1V  

EXAM:  RAD Chest PA or AP 1V  

   

 INDICATION:  SHORT OF BREATH.  

   

 COMPARISON:  December 20, 2019.  

   

 DISCUSSION:    

   

 Cardiomegaly and central vascular congestion, similar to the prior examination.  

   

 No infiltrate, effusion, pneumothorax, or edema.  

   

 IMPRESSION:  

   

 As above.  

   

 Electronically signed by Alejandro Rodriguez MD on 7/28/2020 1:24 PM  

   

  

Alejandro Rodriguez MD                 

 07/28/20 1603    

  

Thank you for allowing us to participate in the care of your patient.

## 2020-07-29 RX ADMIN — OMEPRAZOLE SCH MG: 20 CAPSULE, DELAYED RELEASE ORAL at 08:42

## 2020-07-29 RX ADMIN — SERTRALINE HYDROCHLORIDE SCH MG: 100 TABLET ORAL at 08:42

## 2020-07-29 RX ADMIN — ROPINIROLE SCH MG: 0.5 TABLET ORAL at 19:50

## 2020-07-29 RX ADMIN — WARFARIN SODIUM SCH MG: 5 TABLET ORAL at 19:50

## 2020-07-29 RX ADMIN — METOPROLOL SUCCINATE SCH MG: 25 TABLET, EXTENDED RELEASE ORAL at 08:40

## 2020-07-29 RX ADMIN — GABAPENTIN SCH MG: 100 CAPSULE ORAL at 08:40

## 2020-07-29 RX ADMIN — GABAPENTIN SCH MG: 300 CAPSULE ORAL at 19:49

## 2020-07-29 RX ADMIN — MAGNESIUM OXIDE TAB 400 MG (241.3 MG ELEMENTAL MG) SCH TAB: 400 (241.3 MG) TAB at 08:42

## 2020-07-29 RX ADMIN — FUROSEMIDE SCH MG: 20 TABLET ORAL at 08:40

## 2020-07-29 RX ADMIN — GABAPENTIN SCH MG: 100 CAPSULE ORAL at 12:15

## 2020-07-29 NOTE — PCM.PN
- General Info


Date of Service: 07/28/20


Admission Dx/Problem (Free Text): 





Nursing informed about some continued chronic issues.  Legs give out at times, 

gets played out easily.  No angina.  VSS.  Wt is stable.  Some chronic edema.





Labs and CXR largely unchanged from previous.


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  Breast mass has increased slightly past 

6mo - ? neoplasm.





Failure to thrive likely related to age and decondition, consult PT.  Nothing 

else acute seen by labs or imaging.





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  Will ask surgeon here if he could do excisional 

biopsy.  If not would have to discuss with her whether she'd want to go to [facility]

to see breast clinic.











- Patient Data


Vitals - Most Recent: 


                                Last Vital Signs











Temp  36.1 C   07/29/20 08:42


 


Pulse  80   07/29/20 08:40


 


Resp  22 H  07/18/20 06:00


 


BP  129/49 L  07/29/20 08:40


 


Pulse Ox  90 L  07/17/20 08:45











Weight - Most Recent: 89.811 kg


I&O - Last 24 Hours: 


                                 Intake & Output











 07/28/20 07/29/20 07/29/20





 22:59 06:59 14:59


 


Intake Total 140  0


 


Balance 140  0











Lab Results Last 24 Hours: 


                         Laboratory Results - last 24 hr











  07/28/20 07/28/20 Range/Units





  12:22 12:22 


 


WBC  11.2 H   (4.0-10.0)  x10^3/uL


 


RBC  4.10   (4.00-5.50)  x10^6/uL


 


Hgb  12.4   (12.0-16.0)  g/dL


 


Hct  39.6   (33.0-47.0)  %


 


MCV  96.6 H D   (78.0-93.0)  fL


 


MCH  30.2   (26.0-32.0)  pg


 


MCHC  31.3 L   (32.0-36.0)  g/dL


 


RDW Coeff of Javi  15.3 H   (10.0-15.0)  %


 


Plt Count  202   (130-400)  x10^3/uL


 


Add Manual Diff  Yes   


 


Neutrophils % (Manual)  42 L   (50-80)  %


 


Lymphocytes % (Manual)  50   (25-50)  %


 


Monocytes % (Manual)  6   (2-11)  %


 


Eosinophils % (Manual)  2   (0-4)  %


 


Nucleated RBCs  2   (0-5)  /100WBC


 


Platelet Estimate  Adequate   


 


Anisocytosis  1+ slight H   


 


Sodium   145  (136-145)  mmol/L


 


Potassium   3.9  (3.5-5.1)  mmol/L


 


Chloride   105  ()  mmol/L


 


Carbon Dioxide   34 H  (21-32)  mmol/L


 


Anion Gap   9.9 L  (10-20)  mmol/L


 


BUN   21 H  (7-18)  mg/dL


 


Creatinine   1.3 H  (0.55-1.02)  mg/dL


 


Est Cr Clr Drug Dosing   26.82  mL/min


 


Estimated GFR (MDRD)   39  


 


Glucose   139 H  ()  mg/dL


 


Calcium   8.9  (8.5-10.1)  mg/dL


 


Corrected Calcium   9.70  (8.5-10.1)  mg/dL


 


Total Bilirubin   0.4  (0.2-1.0)  mg/dL


 


AST   19  (15-37)  U/L


 


ALT   18  (14-59)  U/L


 


Alkaline Phosphatase   98  ()  U/L


 


NT-Pro-B Natriuret Pep   576 H  (<=450)  pg/mL


 


Total Protein   6.0 L  (6.4-8.2)  g/dL


 


Albumin   3.0 L  (3.4-5.0)  g/dL


 


Globulin   3.0  


 


Albumin/Globulin Ratio   1.00  


 


TSH, Ultra Sensitive   1.520  (0.358-3.74)  uIU/mL











Med Orders - Current: 


                               Current Medications





Acetaminophen (Tylenol)  650 mg PO TID@0800,1300,2000 [facility]


   Last Admin: 07/29/20 08:42 Dose:  650 mg


   Documented by: 


Calcium Carbonate/Glycine (Tums Extra Strength)  750 mg PO TID PRN


   PRN Reason: Dyspepsia


   Last Admin: 07/23/20 08:35 Dose:  750 mg


   Documented by: 


Docusate Sodium (Colace)  100 mg PO DAILY PRN


   PRN Reason: Constipation


Famotidine (Pepcid)  20 mg PO DAILY [facility]


   Last Admin: 07/29/20 08:42 Dose:  20 mg


   Documented by: 


Ferrous Sulfate (Ferrous Sulfate)  325 mg PO Q48H [facility]


   Last Admin: 07/28/20 08:06 Dose:  325 mg


   Documented by: 


Furosemide (Lasix)  20 mg PO DAILY [facility]


   Last Admin: 07/29/20 08:40 Dose:  20 mg


   Documented by: 


Gabapentin (Neurontin)  100 mg PO BID@0800,1300 [facility]


   Last Admin: 07/29/20 08:40 Dose:  100 mg


   Documented by: 


Gabapentin (Neurontin)  300 mg PO BEDTIME [facility]


   Last Admin: 07/28/20 20:30 Dose:  300 mg


   Documented by: 


Loperamide HCl (Imodium)  2 mg PO Q12H PRN


   PRN Reason: Diarrhea


Melatonin (Melatonin)  6 mg PO BEDTIME [facility]


   Last Admin: 07/28/20 20:32 Dose:  6 mg


   Documented by: 


Metoprolol Succinate (Toprol Xl)  25 mg PO DAILY [facility]


   Last Admin: 07/29/20 08:40 Dose:  25 mg


   Documented by: 


Miconazole (Desenex 2%)  0 gm TOP BID PRN


   PRN Reason: RASH UNDER BILATERAL BREASTS


Multivitamins/Minerals (Thera M Plus)  1 tab PO DAILY [facility]


   Last Admin: 07/29/20 08:42 Dose:  1 tab


   Documented by: 


Nitroglycerin (Nitrostat)  0.4 mg SL Q5M PRN


   PRN Reason: Chest Pain


Omeprazole (Omeprazole)  20 mg PO DAILY [facility]


   Last Admin: 07/29/20 08:42 Dose:  20 mg


   Documented by: 


Pharmacy Consult (Consult To Pharmacy)  1 each .XX ASDIRECTED [facility]


Polyethylene Glycol (Miralax)  17 gm PO DAILY PRN


   PRN Reason: Constipation


Ropinirole HCl (Requip)  0.5 mg PO BEDTIME [facility]


   Last Admin: 07/28/20 20:31 Dose:  0.5 mg


   Documented by: 


Senna/Docusate Sodium (Senna Plus)  1 tab PO DAILY PRN


   PRN Reason: Constipation


Sertraline HCl (Zoloft)  100 mg PO DAILY [facility]


   Last Admin: 07/29/20 08:42 Dose:  100 mg


   Documented by: 


Vitamin B Complex (Vitamin B Complex)  1 each PO DAILY [facility]


   Last Admin: 07/29/20 08:42 Dose:  1 each


   Documented by: 


Warfarin Sodium (Coumadin)  2.5 mg PO Th@2000 [facility]


   Last Admin: 07/23/20 19:45 Dose:  2.5 mg


   Documented by: 


Warfarin Sodium (Coumadin)  5 mg PO SuMoTuWeFrSa@2000 [facility]


   Last Admin: 07/28/20 20:31 Dose:  5 mg


   Documented by: 





Discontinued Medications





Iopamidol (Isovue-300 (61%))  100 ml IVPUSH ONETIME ONE


   Stop: 07/28/20 16:01


   Last Admin: 07/28/20 18:21 Dose:  100 ml


   Documented by: 


Warfarin Sodium (Coumadin)  2.5 mg PO MoTh@2000 [facility]


   Last Admin: 07/02/20 19:23 Dose:  2.5 mg


   Documented by: 


Warfarin Sodium (Coumadin)  5 mg PO SuTuWeFrSa@2000 [facility]


   Last Admin: 07/03/20 19:57 Dose:  5 mg


   Documented by: 











Sepsis Event Note





- Evaluation


Sepsis Screening Result: No Definite Risk





- Focused Exam


Vital Signs: 


                                   Vital Signs











  Temp Temp Pulse BP


 


 07/29/20 08:42  36.1 C   


 


 07/29/20 08:40    80  129/49 L


 


 07/29/20 06:00   35.9 C L  











Date Exam was Performed: 07/29/20


Time Exam was Performed: 10:30





- Problem List Review


Problem List Initiated/Reviewed/Updated: Yes





- My Orders


Last 24 Hours: 


My Active Orders





07/28/20 12:01


PT Evaluation and Treatment [CONS] Routine 





07/31/20 07:00


INR,PT,PROTHROMBIN TIME [COAG] Q28D 





08/28/20 07:00


INR,PT,PROTHROMBIN TIME [COAG] Q28D 





09/25/20 07:00


INR,PT,PROTHROMBIN TIME [COAG] Q28D 





10/23/20 07:00


INR,PT,PROTHROMBIN TIME [COAG] Q28D

## 2020-07-30 RX ADMIN — GABAPENTIN SCH MG: 300 CAPSULE ORAL at 21:00

## 2020-07-30 RX ADMIN — METOPROLOL SUCCINATE SCH MG: 25 TABLET, EXTENDED RELEASE ORAL at 08:03

## 2020-07-30 RX ADMIN — FUROSEMIDE SCH MG: 20 TABLET ORAL at 08:03

## 2020-07-30 RX ADMIN — OMEPRAZOLE SCH MG: 20 CAPSULE, DELAYED RELEASE ORAL at 08:04

## 2020-07-30 RX ADMIN — MAGNESIUM OXIDE TAB 400 MG (241.3 MG ELEMENTAL MG) SCH TAB: 400 (241.3 MG) TAB at 08:04

## 2020-07-30 RX ADMIN — SERTRALINE HYDROCHLORIDE SCH MG: 100 TABLET ORAL at 08:03

## 2020-07-30 RX ADMIN — WARFARIN SODIUM SCH MG: 5 TABLET ORAL at 21:02

## 2020-07-30 RX ADMIN — GABAPENTIN SCH MG: 100 CAPSULE ORAL at 08:06

## 2020-07-30 RX ADMIN — GABAPENTIN SCH MG: 100 CAPSULE ORAL at 12:22

## 2020-07-30 RX ADMIN — ROPINIROLE SCH MG: 0.5 TABLET ORAL at 21:01

## 2020-07-31 RX ADMIN — GABAPENTIN SCH MG: 100 CAPSULE ORAL at 12:47

## 2020-07-31 RX ADMIN — MAGNESIUM OXIDE TAB 400 MG (241.3 MG ELEMENTAL MG) SCH TAB: 400 (241.3 MG) TAB at 08:06

## 2020-07-31 RX ADMIN — OMEPRAZOLE SCH MG: 20 CAPSULE, DELAYED RELEASE ORAL at 08:06

## 2020-07-31 RX ADMIN — GABAPENTIN SCH MG: 100 CAPSULE ORAL at 08:06

## 2020-07-31 RX ADMIN — GABAPENTIN SCH MG: 300 CAPSULE ORAL at 22:03

## 2020-07-31 RX ADMIN — SERTRALINE HYDROCHLORIDE SCH MG: 100 TABLET ORAL at 08:06

## 2020-07-31 RX ADMIN — ROPINIROLE SCH MG: 0.5 TABLET ORAL at 22:03

## 2020-07-31 RX ADMIN — FUROSEMIDE SCH MG: 20 TABLET ORAL at 08:06

## 2020-07-31 RX ADMIN — METOPROLOL SUCCINATE SCH MG: 25 TABLET, EXTENDED RELEASE ORAL at 08:06

## 2020-07-31 RX ADMIN — WARFARIN SODIUM SCH MG: 5 TABLET ORAL at 22:04

## 2020-08-01 RX ADMIN — GABAPENTIN SCH MG: 300 CAPSULE ORAL at 21:53

## 2020-08-01 RX ADMIN — FUROSEMIDE SCH MG: 20 TABLET ORAL at 08:57

## 2020-08-01 RX ADMIN — MAGNESIUM OXIDE TAB 400 MG (241.3 MG ELEMENTAL MG) SCH TAB: 400 (241.3 MG) TAB at 08:56

## 2020-08-01 RX ADMIN — OMEPRAZOLE SCH MG: 20 CAPSULE, DELAYED RELEASE ORAL at 08:56

## 2020-08-01 RX ADMIN — ROPINIROLE SCH MG: 0.5 TABLET ORAL at 21:53

## 2020-08-01 RX ADMIN — GABAPENTIN SCH MG: 100 CAPSULE ORAL at 12:09

## 2020-08-01 RX ADMIN — GABAPENTIN SCH MG: 100 CAPSULE ORAL at 08:54

## 2020-08-01 RX ADMIN — SERTRALINE HYDROCHLORIDE SCH MG: 100 TABLET ORAL at 08:55

## 2020-08-01 RX ADMIN — METOPROLOL SUCCINATE SCH MG: 25 TABLET, EXTENDED RELEASE ORAL at 08:55

## 2020-08-01 RX ADMIN — WARFARIN SODIUM SCH MG: 5 TABLET ORAL at 21:53

## 2020-08-02 RX ADMIN — GABAPENTIN SCH MG: 100 CAPSULE ORAL at 12:35

## 2020-08-02 RX ADMIN — METOPROLOL SUCCINATE SCH MG: 25 TABLET, EXTENDED RELEASE ORAL at 08:10

## 2020-08-02 RX ADMIN — SERTRALINE HYDROCHLORIDE SCH MG: 100 TABLET ORAL at 08:10

## 2020-08-02 RX ADMIN — ROPINIROLE SCH MG: 0.5 TABLET ORAL at 20:29

## 2020-08-02 RX ADMIN — GABAPENTIN SCH MG: 100 CAPSULE ORAL at 08:09

## 2020-08-02 RX ADMIN — OMEPRAZOLE SCH MG: 20 CAPSULE, DELAYED RELEASE ORAL at 08:10

## 2020-08-02 RX ADMIN — WARFARIN SODIUM SCH MG: 5 TABLET ORAL at 20:29

## 2020-08-02 RX ADMIN — FUROSEMIDE SCH MG: 20 TABLET ORAL at 08:10

## 2020-08-02 RX ADMIN — MAGNESIUM OXIDE TAB 400 MG (241.3 MG ELEMENTAL MG) SCH TAB: 400 (241.3 MG) TAB at 08:10

## 2020-08-02 RX ADMIN — GABAPENTIN SCH MG: 300 CAPSULE ORAL at 20:28

## 2020-08-03 RX ADMIN — GABAPENTIN SCH MG: 100 CAPSULE ORAL at 12:40

## 2020-08-03 RX ADMIN — METOPROLOL SUCCINATE SCH: 25 TABLET, EXTENDED RELEASE ORAL at 12:44

## 2020-08-03 RX ADMIN — GABAPENTIN SCH MG: 100 CAPSULE ORAL at 09:49

## 2020-08-03 RX ADMIN — FUROSEMIDE SCH MG: 20 TABLET ORAL at 09:50

## 2020-08-03 RX ADMIN — ROPINIROLE SCH MG: 0.5 TABLET ORAL at 20:01

## 2020-08-03 RX ADMIN — WARFARIN SODIUM SCH MG: 5 TABLET ORAL at 20:02

## 2020-08-03 RX ADMIN — SERTRALINE HYDROCHLORIDE SCH MG: 100 TABLET ORAL at 09:50

## 2020-08-03 RX ADMIN — OMEPRAZOLE SCH MG: 20 CAPSULE, DELAYED RELEASE ORAL at 09:49

## 2020-08-03 RX ADMIN — MAGNESIUM OXIDE TAB 400 MG (241.3 MG ELEMENTAL MG) SCH TAB: 400 (241.3 MG) TAB at 09:49

## 2020-08-03 RX ADMIN — GABAPENTIN SCH MG: 300 CAPSULE ORAL at 20:02

## 2020-08-04 RX ADMIN — ROPINIROLE SCH MG: 0.5 TABLET ORAL at 19:58

## 2020-08-04 RX ADMIN — GABAPENTIN SCH MG: 100 CAPSULE ORAL at 08:11

## 2020-08-04 RX ADMIN — FUROSEMIDE SCH MG: 20 TABLET ORAL at 08:11

## 2020-08-04 RX ADMIN — MAGNESIUM OXIDE TAB 400 MG (241.3 MG ELEMENTAL MG) SCH TAB: 400 (241.3 MG) TAB at 08:11

## 2020-08-04 RX ADMIN — OMEPRAZOLE SCH MG: 20 CAPSULE, DELAYED RELEASE ORAL at 08:11

## 2020-08-04 RX ADMIN — METOPROLOL SUCCINATE SCH MG: 25 TABLET, EXTENDED RELEASE ORAL at 08:11

## 2020-08-04 RX ADMIN — GABAPENTIN SCH MG: 100 CAPSULE ORAL at 13:32

## 2020-08-04 RX ADMIN — GABAPENTIN SCH MG: 300 CAPSULE ORAL at 19:57

## 2020-08-04 RX ADMIN — SERTRALINE HYDROCHLORIDE SCH MG: 100 TABLET ORAL at 08:11

## 2020-08-04 RX ADMIN — WARFARIN SODIUM SCH MG: 5 TABLET ORAL at 19:58

## 2020-08-05 RX ADMIN — WARFARIN SODIUM SCH MG: 5 TABLET ORAL at 19:44

## 2020-08-05 RX ADMIN — FUROSEMIDE SCH MG: 20 TABLET ORAL at 07:59

## 2020-08-05 RX ADMIN — GABAPENTIN SCH MG: 300 CAPSULE ORAL at 19:44

## 2020-08-05 RX ADMIN — OMEPRAZOLE SCH MG: 20 CAPSULE, DELAYED RELEASE ORAL at 07:59

## 2020-08-05 RX ADMIN — MAGNESIUM OXIDE TAB 400 MG (241.3 MG ELEMENTAL MG) SCH TAB: 400 (241.3 MG) TAB at 07:59

## 2020-08-05 RX ADMIN — ROPINIROLE SCH MG: 0.5 TABLET ORAL at 19:44

## 2020-08-05 RX ADMIN — SERTRALINE HYDROCHLORIDE SCH MG: 100 TABLET ORAL at 07:59

## 2020-08-05 RX ADMIN — GABAPENTIN SCH: 100 CAPSULE ORAL at 14:01

## 2020-08-05 RX ADMIN — GABAPENTIN SCH MG: 100 CAPSULE ORAL at 07:59

## 2020-08-05 RX ADMIN — METOPROLOL SUCCINATE SCH MG: 25 TABLET, EXTENDED RELEASE ORAL at 07:59

## 2020-08-06 RX ADMIN — MAGNESIUM OXIDE TAB 400 MG (241.3 MG ELEMENTAL MG) SCH TAB: 400 (241.3 MG) TAB at 07:27

## 2020-08-06 RX ADMIN — METOPROLOL SUCCINATE SCH MG: 25 TABLET, EXTENDED RELEASE ORAL at 07:26

## 2020-08-06 RX ADMIN — WARFARIN SODIUM SCH MG: 5 TABLET ORAL at 19:23

## 2020-08-06 RX ADMIN — FUROSEMIDE SCH MG: 20 TABLET ORAL at 07:26

## 2020-08-06 RX ADMIN — OMEPRAZOLE SCH MG: 20 CAPSULE, DELAYED RELEASE ORAL at 07:27

## 2020-08-06 RX ADMIN — SERTRALINE HYDROCHLORIDE SCH MG: 100 TABLET ORAL at 07:26

## 2020-08-06 RX ADMIN — GABAPENTIN SCH MG: 100 CAPSULE ORAL at 13:59

## 2020-08-06 RX ADMIN — ROPINIROLE SCH MG: 0.5 TABLET ORAL at 19:23

## 2020-08-06 RX ADMIN — GABAPENTIN SCH MG: 100 CAPSULE ORAL at 07:27

## 2020-08-06 RX ADMIN — GABAPENTIN SCH MG: 300 CAPSULE ORAL at 19:23

## 2020-08-07 RX ADMIN — ROPINIROLE SCH MG: 0.5 TABLET ORAL at 19:19

## 2020-08-07 RX ADMIN — FUROSEMIDE SCH MG: 20 TABLET ORAL at 07:40

## 2020-08-07 RX ADMIN — GABAPENTIN SCH MG: 300 CAPSULE ORAL at 19:19

## 2020-08-07 RX ADMIN — METOPROLOL SUCCINATE SCH MG: 25 TABLET, EXTENDED RELEASE ORAL at 07:40

## 2020-08-07 RX ADMIN — SERTRALINE HYDROCHLORIDE SCH MG: 100 TABLET ORAL at 07:40

## 2020-08-07 RX ADMIN — GABAPENTIN SCH MG: 100 CAPSULE ORAL at 07:39

## 2020-08-07 RX ADMIN — OMEPRAZOLE SCH MG: 20 CAPSULE, DELAYED RELEASE ORAL at 07:39

## 2020-08-07 RX ADMIN — GABAPENTIN SCH MG: 100 CAPSULE ORAL at 12:24

## 2020-08-07 RX ADMIN — MAGNESIUM OXIDE TAB 400 MG (241.3 MG ELEMENTAL MG) SCH TAB: 400 (241.3 MG) TAB at 07:39

## 2020-08-07 RX ADMIN — WARFARIN SODIUM SCH MG: 5 TABLET ORAL at 19:19

## 2020-08-08 RX ADMIN — GABAPENTIN SCH MG: 100 CAPSULE ORAL at 13:04

## 2020-08-08 RX ADMIN — SERTRALINE HYDROCHLORIDE SCH MG: 100 TABLET ORAL at 07:24

## 2020-08-08 RX ADMIN — GABAPENTIN SCH MG: 300 CAPSULE ORAL at 19:47

## 2020-08-08 RX ADMIN — METOPROLOL SUCCINATE SCH MG: 25 TABLET, EXTENDED RELEASE ORAL at 07:24

## 2020-08-08 RX ADMIN — ROPINIROLE SCH MG: 0.5 TABLET ORAL at 19:47

## 2020-08-08 RX ADMIN — GABAPENTIN SCH MG: 100 CAPSULE ORAL at 07:26

## 2020-08-08 RX ADMIN — OMEPRAZOLE SCH MG: 20 CAPSULE, DELAYED RELEASE ORAL at 07:24

## 2020-08-08 RX ADMIN — FUROSEMIDE SCH MG: 20 TABLET ORAL at 07:23

## 2020-08-08 RX ADMIN — MAGNESIUM OXIDE TAB 400 MG (241.3 MG ELEMENTAL MG) SCH TAB: 400 (241.3 MG) TAB at 07:24

## 2020-08-08 RX ADMIN — WARFARIN SODIUM SCH MG: 5 TABLET ORAL at 19:47

## 2020-08-09 RX ADMIN — OMEPRAZOLE SCH MG: 20 CAPSULE, DELAYED RELEASE ORAL at 07:31

## 2020-08-09 RX ADMIN — WARFARIN SODIUM SCH MG: 5 TABLET ORAL at 19:44

## 2020-08-09 RX ADMIN — GABAPENTIN SCH MG: 100 CAPSULE ORAL at 12:56

## 2020-08-09 RX ADMIN — MAGNESIUM OXIDE TAB 400 MG (241.3 MG ELEMENTAL MG) SCH TAB: 400 (241.3 MG) TAB at 07:30

## 2020-08-09 RX ADMIN — ROPINIROLE SCH MG: 0.5 TABLET ORAL at 19:43

## 2020-08-09 RX ADMIN — GABAPENTIN SCH MG: 100 CAPSULE ORAL at 07:30

## 2020-08-09 RX ADMIN — GABAPENTIN SCH MG: 300 CAPSULE ORAL at 19:44

## 2020-08-09 RX ADMIN — SERTRALINE HYDROCHLORIDE SCH MG: 100 TABLET ORAL at 07:30

## 2020-08-09 RX ADMIN — FUROSEMIDE SCH MG: 20 TABLET ORAL at 07:30

## 2020-08-09 RX ADMIN — METOPROLOL SUCCINATE SCH MG: 25 TABLET, EXTENDED RELEASE ORAL at 07:30

## 2020-08-10 RX ADMIN — GABAPENTIN SCH MG: 300 CAPSULE ORAL at 19:36

## 2020-08-10 RX ADMIN — ROPINIROLE SCH MG: 0.5 TABLET ORAL at 19:37

## 2020-08-10 RX ADMIN — SERTRALINE HYDROCHLORIDE SCH MG: 100 TABLET ORAL at 07:43

## 2020-08-10 RX ADMIN — GABAPENTIN SCH MG: 100 CAPSULE ORAL at 07:43

## 2020-08-10 RX ADMIN — FUROSEMIDE SCH MG: 20 TABLET ORAL at 07:43

## 2020-08-10 RX ADMIN — METOPROLOL SUCCINATE SCH MG: 25 TABLET, EXTENDED RELEASE ORAL at 07:43

## 2020-08-10 RX ADMIN — GABAPENTIN SCH MG: 100 CAPSULE ORAL at 12:18

## 2020-08-10 RX ADMIN — OMEPRAZOLE SCH MG: 20 CAPSULE, DELAYED RELEASE ORAL at 07:43

## 2020-08-10 RX ADMIN — MAGNESIUM OXIDE TAB 400 MG (241.3 MG ELEMENTAL MG) SCH TAB: 400 (241.3 MG) TAB at 07:43

## 2020-08-10 RX ADMIN — WARFARIN SODIUM SCH MG: 5 TABLET ORAL at 19:37

## 2020-08-11 RX ADMIN — FUROSEMIDE SCH MG: 20 TABLET ORAL at 07:33

## 2020-08-11 RX ADMIN — METOPROLOL SUCCINATE SCH MG: 25 TABLET, EXTENDED RELEASE ORAL at 07:34

## 2020-08-11 RX ADMIN — GABAPENTIN SCH MG: 300 CAPSULE ORAL at 19:30

## 2020-08-11 RX ADMIN — SERTRALINE HYDROCHLORIDE SCH MG: 100 TABLET ORAL at 07:33

## 2020-08-11 RX ADMIN — GABAPENTIN SCH MG: 100 CAPSULE ORAL at 07:33

## 2020-08-11 RX ADMIN — OMEPRAZOLE SCH MG: 20 CAPSULE, DELAYED RELEASE ORAL at 07:33

## 2020-08-11 RX ADMIN — ROPINIROLE SCH MG: 0.5 TABLET ORAL at 19:31

## 2020-08-11 RX ADMIN — MAGNESIUM OXIDE TAB 400 MG (241.3 MG ELEMENTAL MG) SCH TAB: 400 (241.3 MG) TAB at 07:33

## 2020-08-11 RX ADMIN — WARFARIN SODIUM SCH MG: 5 TABLET ORAL at 19:31

## 2020-08-11 RX ADMIN — GABAPENTIN SCH MG: 100 CAPSULE ORAL at 14:13

## 2020-08-12 RX ADMIN — GABAPENTIN SCH MG: 100 CAPSULE ORAL at 12:08

## 2020-08-12 RX ADMIN — FUROSEMIDE SCH MG: 20 TABLET ORAL at 08:32

## 2020-08-12 RX ADMIN — ROPINIROLE SCH MG: 0.5 TABLET ORAL at 19:56

## 2020-08-12 RX ADMIN — MAGNESIUM OXIDE TAB 400 MG (241.3 MG ELEMENTAL MG) SCH TAB: 400 (241.3 MG) TAB at 08:32

## 2020-08-12 RX ADMIN — GABAPENTIN SCH MG: 100 CAPSULE ORAL at 08:32

## 2020-08-12 RX ADMIN — GABAPENTIN SCH MG: 300 CAPSULE ORAL at 19:54

## 2020-08-12 RX ADMIN — SERTRALINE HYDROCHLORIDE SCH MG: 100 TABLET ORAL at 08:32

## 2020-08-12 RX ADMIN — METOPROLOL SUCCINATE SCH MG: 25 TABLET, EXTENDED RELEASE ORAL at 08:31

## 2020-08-12 RX ADMIN — WARFARIN SODIUM SCH MG: 5 TABLET ORAL at 19:55

## 2020-08-12 RX ADMIN — OMEPRAZOLE SCH MG: 20 CAPSULE, DELAYED RELEASE ORAL at 08:32

## 2020-08-13 RX ADMIN — OMEPRAZOLE SCH MG: 20 CAPSULE, DELAYED RELEASE ORAL at 08:27

## 2020-08-13 RX ADMIN — WARFARIN SODIUM SCH MG: 5 TABLET ORAL at 19:37

## 2020-08-13 RX ADMIN — MAGNESIUM OXIDE TAB 400 MG (241.3 MG ELEMENTAL MG) SCH TAB: 400 (241.3 MG) TAB at 08:27

## 2020-08-13 RX ADMIN — SERTRALINE HYDROCHLORIDE SCH MG: 100 TABLET ORAL at 08:26

## 2020-08-13 RX ADMIN — GABAPENTIN SCH MG: 100 CAPSULE ORAL at 08:26

## 2020-08-13 RX ADMIN — METOPROLOL SUCCINATE SCH MG: 25 TABLET, EXTENDED RELEASE ORAL at 08:26

## 2020-08-13 RX ADMIN — ROPINIROLE SCH MG: 0.5 TABLET ORAL at 19:38

## 2020-08-13 RX ADMIN — GABAPENTIN SCH MG: 100 CAPSULE ORAL at 12:38

## 2020-08-13 RX ADMIN — GABAPENTIN SCH MG: 300 CAPSULE ORAL at 19:39

## 2020-08-13 RX ADMIN — FUROSEMIDE SCH MG: 20 TABLET ORAL at 08:26

## 2020-08-14 RX ADMIN — ROPINIROLE SCH MG: 0.5 TABLET ORAL at 20:42

## 2020-08-14 RX ADMIN — MAGNESIUM OXIDE TAB 400 MG (241.3 MG ELEMENTAL MG) SCH TAB: 400 (241.3 MG) TAB at 10:04

## 2020-08-14 RX ADMIN — GABAPENTIN SCH MG: 100 CAPSULE ORAL at 10:01

## 2020-08-14 RX ADMIN — GABAPENTIN SCH MG: 300 CAPSULE ORAL at 20:41

## 2020-08-14 RX ADMIN — FUROSEMIDE SCH MG: 20 TABLET ORAL at 10:03

## 2020-08-14 RX ADMIN — SERTRALINE HYDROCHLORIDE SCH MG: 100 TABLET ORAL at 10:01

## 2020-08-14 RX ADMIN — METOPROLOL SUCCINATE SCH MG: 25 TABLET, EXTENDED RELEASE ORAL at 10:01

## 2020-08-14 RX ADMIN — OMEPRAZOLE SCH MG: 20 CAPSULE, DELAYED RELEASE ORAL at 10:05

## 2020-08-14 RX ADMIN — WARFARIN SODIUM SCH MG: 5 TABLET ORAL at 20:42

## 2020-08-14 RX ADMIN — GABAPENTIN SCH MG: 100 CAPSULE ORAL at 13:39

## 2020-08-15 RX ADMIN — GABAPENTIN SCH MG: 100 CAPSULE ORAL at 09:16

## 2020-08-15 RX ADMIN — GABAPENTIN SCH MG: 300 CAPSULE ORAL at 20:38

## 2020-08-15 RX ADMIN — FUROSEMIDE SCH MG: 20 TABLET ORAL at 09:16

## 2020-08-15 RX ADMIN — METOPROLOL SUCCINATE SCH MG: 25 TABLET, EXTENDED RELEASE ORAL at 09:19

## 2020-08-15 RX ADMIN — OMEPRAZOLE SCH MG: 20 CAPSULE, DELAYED RELEASE ORAL at 09:20

## 2020-08-15 RX ADMIN — GABAPENTIN SCH MG: 100 CAPSULE ORAL at 12:49

## 2020-08-15 RX ADMIN — ROPINIROLE SCH MG: 0.5 TABLET ORAL at 20:39

## 2020-08-15 RX ADMIN — MAGNESIUM OXIDE TAB 400 MG (241.3 MG ELEMENTAL MG) SCH TAB: 400 (241.3 MG) TAB at 09:20

## 2020-08-15 RX ADMIN — SERTRALINE HYDROCHLORIDE SCH MG: 100 TABLET ORAL at 09:17

## 2020-08-16 RX ADMIN — MAGNESIUM OXIDE TAB 400 MG (241.3 MG ELEMENTAL MG) SCH TAB: 400 (241.3 MG) TAB at 09:09

## 2020-08-16 RX ADMIN — FUROSEMIDE SCH MG: 20 TABLET ORAL at 09:09

## 2020-08-16 RX ADMIN — SERTRALINE HYDROCHLORIDE SCH MG: 100 TABLET ORAL at 09:09

## 2020-08-16 RX ADMIN — METOPROLOL SUCCINATE SCH MG: 25 TABLET, EXTENDED RELEASE ORAL at 09:34

## 2020-08-16 RX ADMIN — GABAPENTIN SCH MG: 300 CAPSULE ORAL at 19:58

## 2020-08-16 RX ADMIN — GABAPENTIN SCH MG: 100 CAPSULE ORAL at 13:41

## 2020-08-16 RX ADMIN — OMEPRAZOLE SCH MG: 20 CAPSULE, DELAYED RELEASE ORAL at 09:09

## 2020-08-16 RX ADMIN — GABAPENTIN SCH MG: 100 CAPSULE ORAL at 09:07

## 2020-08-16 RX ADMIN — ROPINIROLE SCH MG: 0.5 TABLET ORAL at 19:59

## 2020-08-17 RX ADMIN — FUROSEMIDE SCH MG: 20 TABLET ORAL at 09:03

## 2020-08-17 RX ADMIN — GABAPENTIN SCH MG: 300 CAPSULE ORAL at 19:38

## 2020-08-17 RX ADMIN — ROPINIROLE SCH MG: 0.5 TABLET ORAL at 19:38

## 2020-08-17 RX ADMIN — GABAPENTIN SCH MG: 100 CAPSULE ORAL at 13:56

## 2020-08-17 RX ADMIN — GABAPENTIN SCH MG: 100 CAPSULE ORAL at 09:03

## 2020-08-17 RX ADMIN — MAGNESIUM OXIDE TAB 400 MG (241.3 MG ELEMENTAL MG) SCH TAB: 400 (241.3 MG) TAB at 09:04

## 2020-08-17 RX ADMIN — SERTRALINE HYDROCHLORIDE SCH MG: 100 TABLET ORAL at 09:03

## 2020-08-17 RX ADMIN — OMEPRAZOLE SCH MG: 20 CAPSULE, DELAYED RELEASE ORAL at 09:04

## 2020-08-17 RX ADMIN — METOPROLOL SUCCINATE SCH MG: 25 TABLET, EXTENDED RELEASE ORAL at 09:03

## 2020-08-18 RX ADMIN — GABAPENTIN SCH MG: 100 CAPSULE ORAL at 13:25

## 2020-08-18 RX ADMIN — OMEPRAZOLE SCH MG: 20 CAPSULE, DELAYED RELEASE ORAL at 07:57

## 2020-08-18 RX ADMIN — GABAPENTIN SCH MG: 100 CAPSULE ORAL at 07:55

## 2020-08-18 RX ADMIN — METOPROLOL SUCCINATE SCH MG: 25 TABLET, EXTENDED RELEASE ORAL at 07:55

## 2020-08-18 RX ADMIN — ROPINIROLE SCH MG: 0.5 TABLET ORAL at 19:54

## 2020-08-18 RX ADMIN — GABAPENTIN SCH MG: 300 CAPSULE ORAL at 19:54

## 2020-08-18 RX ADMIN — MAGNESIUM OXIDE TAB 400 MG (241.3 MG ELEMENTAL MG) SCH TAB: 400 (241.3 MG) TAB at 07:57

## 2020-08-18 RX ADMIN — FUROSEMIDE SCH MG: 20 TABLET ORAL at 07:56

## 2020-08-18 RX ADMIN — SERTRALINE HYDROCHLORIDE SCH MG: 100 TABLET ORAL at 07:56

## 2020-08-19 RX ADMIN — GABAPENTIN SCH MG: 300 CAPSULE ORAL at 19:40

## 2020-08-19 RX ADMIN — GABAPENTIN SCH: 100 CAPSULE ORAL at 14:05

## 2020-08-19 RX ADMIN — METOPROLOL SUCCINATE SCH: 25 TABLET, EXTENDED RELEASE ORAL at 07:57

## 2020-08-19 RX ADMIN — MAGNESIUM OXIDE TAB 400 MG (241.3 MG ELEMENTAL MG) SCH: 400 (241.3 MG) TAB at 07:57

## 2020-08-19 RX ADMIN — SERTRALINE HYDROCHLORIDE SCH: 100 TABLET ORAL at 07:58

## 2020-08-19 RX ADMIN — OMEPRAZOLE SCH: 20 CAPSULE, DELAYED RELEASE ORAL at 07:57

## 2020-08-19 RX ADMIN — GABAPENTIN SCH MG: 100 CAPSULE ORAL at 05:33

## 2020-08-19 RX ADMIN — SERTRALINE HYDROCHLORIDE SCH MG: 100 TABLET ORAL at 05:30

## 2020-08-19 RX ADMIN — FUROSEMIDE SCH: 20 TABLET ORAL at 07:57

## 2020-08-19 RX ADMIN — METOPROLOL SUCCINATE SCH MG: 25 TABLET, EXTENDED RELEASE ORAL at 05:31

## 2020-08-19 RX ADMIN — FUROSEMIDE SCH MG: 20 TABLET ORAL at 05:30

## 2020-08-19 RX ADMIN — WARFARIN SODIUM SCH MG: 5 TABLET ORAL at 19:45

## 2020-08-19 RX ADMIN — WARFARIN SODIUM SCH: 5 TABLET ORAL at 19:53

## 2020-08-19 RX ADMIN — GABAPENTIN SCH: 100 CAPSULE ORAL at 07:57

## 2020-08-19 RX ADMIN — ROPINIROLE SCH MG: 0.5 TABLET ORAL at 19:41

## 2020-08-20 RX ADMIN — FUROSEMIDE SCH MG: 20 TABLET ORAL at 08:12

## 2020-08-20 RX ADMIN — GABAPENTIN SCH MG: 300 CAPSULE ORAL at 19:59

## 2020-08-20 RX ADMIN — WARFARIN SODIUM SCH MG: 5 TABLET ORAL at 19:59

## 2020-08-20 RX ADMIN — GABAPENTIN SCH MG: 100 CAPSULE ORAL at 08:12

## 2020-08-20 RX ADMIN — METOPROLOL SUCCINATE SCH MG: 25 TABLET, EXTENDED RELEASE ORAL at 08:12

## 2020-08-20 RX ADMIN — SERTRALINE HYDROCHLORIDE SCH MG: 100 TABLET ORAL at 08:13

## 2020-08-20 RX ADMIN — ROPINIROLE SCH MG: 0.5 TABLET ORAL at 19:59

## 2020-08-20 RX ADMIN — OMEPRAZOLE SCH MG: 20 CAPSULE, DELAYED RELEASE ORAL at 08:13

## 2020-08-20 RX ADMIN — MAGNESIUM OXIDE TAB 400 MG (241.3 MG ELEMENTAL MG) SCH TAB: 400 (241.3 MG) TAB at 08:13

## 2020-08-20 RX ADMIN — GABAPENTIN SCH MG: 100 CAPSULE ORAL at 15:07

## 2020-08-21 RX ADMIN — WARFARIN SODIUM SCH MG: 5 TABLET ORAL at 21:01

## 2020-08-21 RX ADMIN — MAGNESIUM OXIDE TAB 400 MG (241.3 MG ELEMENTAL MG) SCH TAB: 400 (241.3 MG) TAB at 08:08

## 2020-08-21 RX ADMIN — METOPROLOL SUCCINATE SCH MG: 25 TABLET, EXTENDED RELEASE ORAL at 08:08

## 2020-08-21 RX ADMIN — GABAPENTIN SCH MG: 100 CAPSULE ORAL at 14:15

## 2020-08-21 RX ADMIN — GABAPENTIN SCH MG: 100 CAPSULE ORAL at 08:07

## 2020-08-21 RX ADMIN — OMEPRAZOLE SCH MG: 20 CAPSULE, DELAYED RELEASE ORAL at 08:08

## 2020-08-21 RX ADMIN — GABAPENTIN SCH MG: 300 CAPSULE ORAL at 21:02

## 2020-08-21 RX ADMIN — FUROSEMIDE SCH MG: 20 TABLET ORAL at 08:07

## 2020-08-21 RX ADMIN — ROPINIROLE SCH MG: 0.5 TABLET ORAL at 21:01

## 2020-08-21 RX ADMIN — SERTRALINE HYDROCHLORIDE SCH MG: 100 TABLET ORAL at 08:07

## 2020-08-21 NOTE — PCM.SN.2
- Free Text/Narrative


Note: 





CC: chest pain


I was called to see pt because of an episode of chest pain. Pt has a hx of HTN, 

diastolic heart failure. She says she has had 2 heart attacks but do not see CAD

on her problem list. She has prn NTG on her med list and has not received this 

in "a while". Pt states the pain lasted about 5 minutes, went away with NTG 

times one and felt like her heart attack although not as severe. Pt states she 

is feeling back to normal. Nurse's note indicate she had just gotten back from 

commode and was anxious at time of attack.


Other pertinent hx in includes breast biopsy right just 3 days ago, O2 dependent

CO2, DVT


Most recent INR was 1.2 but coumadin was adjusted upwards 2 days ago


OBJ: Pt in NAD,


awake and alert


bp  160/60 heart rate 90, o2 sat  96% on 2 Liters O2


Cor s1s2 normal w/o rubs,murmurs or gallops


Lung Scattered dry rales and wheezes


+ Chest wall tenderness which pt states reproduced her pain





 EKG: Heart rate 91, 1st degree AV block, ovv PVC, small q in lead III, no acute

ischemic changes


 IMP:


1. short episode chest pain: unclear if it was angina or chest wall


Plan: pt confirmed she wishes code level 2 and does not want invasive treatment 

for CAD so will monitor for 24 hours start long acting nitrate


2. Wheezing and rales- trial 1/2 dose albuterol; check bnp, BMP and INR in AM to

assess for CHF and ensure she is therapeutic for DVT .

## 2020-08-22 LAB
ANION GAP SERPL CALC-SCNC: 4.1 MMOL/L (ref 10–20)
CHLORIDE SERPL-SCNC: 108 MMOL/L (ref 98–107)
SODIUM SERPL-SCNC: 146 MMOL/L (ref 136–145)

## 2020-08-22 RX ADMIN — OMEPRAZOLE SCH MG: 20 CAPSULE, DELAYED RELEASE ORAL at 08:37

## 2020-08-22 RX ADMIN — ROPINIROLE SCH MG: 0.5 TABLET ORAL at 19:59

## 2020-08-22 RX ADMIN — FUROSEMIDE SCH MG: 20 TABLET ORAL at 08:37

## 2020-08-22 RX ADMIN — GABAPENTIN SCH MG: 300 CAPSULE ORAL at 19:58

## 2020-08-22 RX ADMIN — MAGNESIUM OXIDE TAB 400 MG (241.3 MG ELEMENTAL MG) SCH TAB: 400 (241.3 MG) TAB at 08:36

## 2020-08-22 RX ADMIN — SERTRALINE HYDROCHLORIDE SCH MG: 100 TABLET ORAL at 08:37

## 2020-08-22 RX ADMIN — GABAPENTIN SCH MG: 100 CAPSULE ORAL at 08:37

## 2020-08-22 RX ADMIN — GABAPENTIN SCH MG: 100 CAPSULE ORAL at 12:39

## 2020-08-22 RX ADMIN — METOPROLOL SUCCINATE SCH MG: 25 TABLET, EXTENDED RELEASE ORAL at 08:39

## 2020-08-22 NOTE — PCM.SN.2
- Free Text/Narrative


Note: 





f/up


1. Chest pain: no recurrence, monitor showed occ pvc


2.SOB: pt states breathing is better


OBJ:  systolic


Lungs : no wheezes, still +basilar rales


Cor s1s2 normal w/o rubs,murmurs or gallops


LABS: BNP 1039, cr 1.0


         INR 1.0


IMP: CAD: No recurrence of pain, continue IMDUR, d/c monitor


       Mild CHF_ additional dose of lasix 20 if BP alows


       DVT: Anticoagulation inadequate, will dose with 10 mg today and tomorrow 

then resume usual dose, check INR monday

## 2020-08-23 RX ADMIN — FUROSEMIDE SCH MG: 20 TABLET ORAL at 09:13

## 2020-08-23 RX ADMIN — METOPROLOL SUCCINATE SCH MG: 25 TABLET, EXTENDED RELEASE ORAL at 09:14

## 2020-08-23 RX ADMIN — GABAPENTIN SCH MG: 100 CAPSULE ORAL at 14:00

## 2020-08-23 RX ADMIN — MAGNESIUM OXIDE TAB 400 MG (241.3 MG ELEMENTAL MG) SCH TAB: 400 (241.3 MG) TAB at 09:11

## 2020-08-23 RX ADMIN — GABAPENTIN SCH MG: 300 CAPSULE ORAL at 19:29

## 2020-08-23 RX ADMIN — OMEPRAZOLE SCH MG: 20 CAPSULE, DELAYED RELEASE ORAL at 09:12

## 2020-08-23 RX ADMIN — GABAPENTIN SCH MG: 100 CAPSULE ORAL at 09:14

## 2020-08-23 RX ADMIN — SERTRALINE HYDROCHLORIDE SCH MG: 100 TABLET ORAL at 09:13

## 2020-08-23 RX ADMIN — ROPINIROLE SCH MG: 0.5 TABLET ORAL at 19:29

## 2020-08-24 RX ADMIN — SERTRALINE HYDROCHLORIDE SCH MG: 100 TABLET ORAL at 08:12

## 2020-08-24 RX ADMIN — FUROSEMIDE SCH MG: 20 TABLET ORAL at 08:12

## 2020-08-24 RX ADMIN — GABAPENTIN SCH MG: 100 CAPSULE ORAL at 08:12

## 2020-08-24 RX ADMIN — ROPINIROLE SCH MG: 0.5 TABLET ORAL at 20:11

## 2020-08-24 RX ADMIN — GABAPENTIN SCH MG: 100 CAPSULE ORAL at 13:39

## 2020-08-24 RX ADMIN — OMEPRAZOLE SCH MG: 20 CAPSULE, DELAYED RELEASE ORAL at 08:12

## 2020-08-24 RX ADMIN — WARFARIN SODIUM SCH MG: 5 TABLET ORAL at 20:11

## 2020-08-24 RX ADMIN — MAGNESIUM OXIDE TAB 400 MG (241.3 MG ELEMENTAL MG) SCH TAB: 400 (241.3 MG) TAB at 08:11

## 2020-08-24 RX ADMIN — METOPROLOL SUCCINATE SCH MG: 25 TABLET, EXTENDED RELEASE ORAL at 08:12

## 2020-08-24 RX ADMIN — GABAPENTIN SCH MG: 300 CAPSULE ORAL at 20:11

## 2020-08-25 RX ADMIN — OMEPRAZOLE SCH MG: 20 CAPSULE, DELAYED RELEASE ORAL at 08:18

## 2020-08-25 RX ADMIN — SERTRALINE HYDROCHLORIDE SCH MG: 100 TABLET ORAL at 08:18

## 2020-08-25 RX ADMIN — GABAPENTIN SCH MG: 100 CAPSULE ORAL at 08:18

## 2020-08-25 RX ADMIN — GABAPENTIN SCH MG: 300 CAPSULE ORAL at 19:46

## 2020-08-25 RX ADMIN — FUROSEMIDE SCH MG: 20 TABLET ORAL at 08:18

## 2020-08-25 RX ADMIN — ISOSORBIDE MONONITRATE SCH MG: 30 TABLET, FILM COATED, EXTENDED RELEASE ORAL at 08:20

## 2020-08-25 RX ADMIN — WARFARIN SODIUM SCH MG: 5 TABLET ORAL at 19:47

## 2020-08-25 RX ADMIN — METOPROLOL SUCCINATE SCH MG: 25 TABLET, EXTENDED RELEASE ORAL at 08:21

## 2020-08-25 RX ADMIN — GABAPENTIN SCH MG: 100 CAPSULE ORAL at 14:30

## 2020-08-25 RX ADMIN — ROPINIROLE SCH MG: 0.5 TABLET ORAL at 19:48

## 2020-08-25 RX ADMIN — MAGNESIUM OXIDE TAB 400 MG (241.3 MG ELEMENTAL MG) SCH TAB: 400 (241.3 MG) TAB at 08:18

## 2020-08-26 RX ADMIN — FUROSEMIDE SCH MG: 20 TABLET ORAL at 08:49

## 2020-08-26 RX ADMIN — WARFARIN SODIUM SCH MG: 5 TABLET ORAL at 19:54

## 2020-08-26 RX ADMIN — GABAPENTIN SCH MG: 100 CAPSULE ORAL at 08:49

## 2020-08-26 RX ADMIN — MAGNESIUM OXIDE TAB 400 MG (241.3 MG ELEMENTAL MG) SCH TAB: 400 (241.3 MG) TAB at 08:49

## 2020-08-26 RX ADMIN — GABAPENTIN SCH MG: 300 CAPSULE ORAL at 19:50

## 2020-08-26 RX ADMIN — SERTRALINE HYDROCHLORIDE SCH MG: 100 TABLET ORAL at 08:49

## 2020-08-26 RX ADMIN — OMEPRAZOLE SCH MG: 20 CAPSULE, DELAYED RELEASE ORAL at 08:49

## 2020-08-26 RX ADMIN — ROPINIROLE SCH MG: 0.5 TABLET ORAL at 19:54

## 2020-08-26 RX ADMIN — GABAPENTIN SCH MG: 100 CAPSULE ORAL at 13:48

## 2020-08-26 RX ADMIN — ISOSORBIDE MONONITRATE SCH MG: 30 TABLET, FILM COATED, EXTENDED RELEASE ORAL at 08:50

## 2020-08-26 RX ADMIN — METOPROLOL SUCCINATE SCH MG: 25 TABLET, EXTENDED RELEASE ORAL at 08:50

## 2020-08-27 RX ADMIN — GABAPENTIN SCH MG: 300 CAPSULE ORAL at 20:00

## 2020-08-27 RX ADMIN — GABAPENTIN SCH: 100 CAPSULE ORAL at 10:15

## 2020-08-27 RX ADMIN — ROPINIROLE SCH MG: 0.5 TABLET ORAL at 19:59

## 2020-08-27 RX ADMIN — ISOSORBIDE MONONITRATE SCH: 30 TABLET, FILM COATED, EXTENDED RELEASE ORAL at 15:36

## 2020-08-27 RX ADMIN — METOPROLOL SUCCINATE SCH MG: 25 TABLET, EXTENDED RELEASE ORAL at 04:53

## 2020-08-27 RX ADMIN — MAGNESIUM OXIDE TAB 400 MG (241.3 MG ELEMENTAL MG) SCH TAB: 400 (241.3 MG) TAB at 15:40

## 2020-08-27 RX ADMIN — METOPROLOL SUCCINATE SCH: 25 TABLET, EXTENDED RELEASE ORAL at 10:15

## 2020-08-27 RX ADMIN — GABAPENTIN SCH MG: 100 CAPSULE ORAL at 05:00

## 2020-08-27 RX ADMIN — GABAPENTIN SCH MG: 100 CAPSULE ORAL at 15:37

## 2020-08-27 RX ADMIN — FUROSEMIDE SCH MG: 20 TABLET ORAL at 15:37

## 2020-08-27 RX ADMIN — OMEPRAZOLE SCH MG: 20 CAPSULE, DELAYED RELEASE ORAL at 15:40

## 2020-08-27 RX ADMIN — WARFARIN SODIUM SCH MG: 5 TABLET ORAL at 20:00

## 2020-08-27 RX ADMIN — SERTRALINE HYDROCHLORIDE SCH MG: 100 TABLET ORAL at 15:36

## 2020-08-28 RX ADMIN — METOPROLOL SUCCINATE SCH MG: 25 TABLET, EXTENDED RELEASE ORAL at 08:39

## 2020-08-28 RX ADMIN — GABAPENTIN SCH MG: 100 CAPSULE ORAL at 15:33

## 2020-08-28 RX ADMIN — FUROSEMIDE SCH MG: 20 TABLET ORAL at 08:38

## 2020-08-28 RX ADMIN — OMEPRAZOLE SCH MG: 20 CAPSULE, DELAYED RELEASE ORAL at 08:36

## 2020-08-28 RX ADMIN — WARFARIN SODIUM SCH MG: 5 TABLET ORAL at 19:48

## 2020-08-28 RX ADMIN — SERTRALINE HYDROCHLORIDE SCH MG: 100 TABLET ORAL at 08:39

## 2020-08-28 RX ADMIN — MAGNESIUM OXIDE TAB 400 MG (241.3 MG ELEMENTAL MG) SCH TAB: 400 (241.3 MG) TAB at 08:36

## 2020-08-28 RX ADMIN — ROPINIROLE SCH MG: 0.5 TABLET ORAL at 19:48

## 2020-08-28 RX ADMIN — ISOSORBIDE MONONITRATE SCH MG: 30 TABLET, FILM COATED, EXTENDED RELEASE ORAL at 08:40

## 2020-08-28 RX ADMIN — GABAPENTIN SCH MG: 300 CAPSULE ORAL at 19:46

## 2020-08-28 RX ADMIN — GABAPENTIN SCH MG: 100 CAPSULE ORAL at 08:37

## 2020-08-29 RX ADMIN — ISOSORBIDE MONONITRATE SCH MG: 30 TABLET, FILM COATED, EXTENDED RELEASE ORAL at 08:29

## 2020-08-29 RX ADMIN — METOPROLOL SUCCINATE SCH MG: 25 TABLET, EXTENDED RELEASE ORAL at 08:29

## 2020-08-29 RX ADMIN — GABAPENTIN SCH MG: 100 CAPSULE ORAL at 13:08

## 2020-08-29 RX ADMIN — GABAPENTIN SCH MG: 300 CAPSULE ORAL at 19:48

## 2020-08-29 RX ADMIN — ROPINIROLE SCH MG: 0.5 TABLET ORAL at 19:48

## 2020-08-29 RX ADMIN — FUROSEMIDE SCH MG: 20 TABLET ORAL at 08:30

## 2020-08-29 RX ADMIN — GABAPENTIN SCH MG: 100 CAPSULE ORAL at 08:27

## 2020-08-29 RX ADMIN — WARFARIN SODIUM SCH MG: 5 TABLET ORAL at 19:48

## 2020-08-29 RX ADMIN — MAGNESIUM OXIDE TAB 400 MG (241.3 MG ELEMENTAL MG) SCH TAB: 400 (241.3 MG) TAB at 08:30

## 2020-08-29 RX ADMIN — SERTRALINE HYDROCHLORIDE SCH MG: 100 TABLET ORAL at 08:30

## 2020-08-29 RX ADMIN — OMEPRAZOLE SCH MG: 20 CAPSULE, DELAYED RELEASE ORAL at 08:30

## 2020-08-30 RX ADMIN — SERTRALINE HYDROCHLORIDE SCH MG: 100 TABLET ORAL at 08:41

## 2020-08-30 RX ADMIN — GABAPENTIN SCH MG: 100 CAPSULE ORAL at 08:40

## 2020-08-30 RX ADMIN — GABAPENTIN SCH MG: 100 CAPSULE ORAL at 13:53

## 2020-08-30 RX ADMIN — OMEPRAZOLE SCH MG: 20 CAPSULE, DELAYED RELEASE ORAL at 08:42

## 2020-08-30 RX ADMIN — GABAPENTIN SCH MG: 300 CAPSULE ORAL at 19:59

## 2020-08-30 RX ADMIN — ROPINIROLE SCH MG: 0.5 TABLET ORAL at 19:59

## 2020-08-30 RX ADMIN — METOPROLOL SUCCINATE SCH MG: 25 TABLET, EXTENDED RELEASE ORAL at 08:41

## 2020-08-30 RX ADMIN — ISOSORBIDE MONONITRATE SCH MG: 30 TABLET, FILM COATED, EXTENDED RELEASE ORAL at 08:41

## 2020-08-30 RX ADMIN — FUROSEMIDE SCH MG: 20 TABLET ORAL at 08:42

## 2020-08-30 RX ADMIN — WARFARIN SODIUM SCH MG: 5 TABLET ORAL at 19:58

## 2020-08-30 RX ADMIN — MAGNESIUM OXIDE TAB 400 MG (241.3 MG ELEMENTAL MG) SCH TAB: 400 (241.3 MG) TAB at 08:42

## 2020-08-31 RX ADMIN — METOPROLOL SUCCINATE SCH MG: 25 TABLET, EXTENDED RELEASE ORAL at 08:31

## 2020-08-31 RX ADMIN — SERTRALINE HYDROCHLORIDE SCH MG: 100 TABLET ORAL at 08:30

## 2020-08-31 RX ADMIN — WARFARIN SODIUM SCH MG: 5 TABLET ORAL at 19:46

## 2020-08-31 RX ADMIN — OMEPRAZOLE SCH MG: 20 CAPSULE, DELAYED RELEASE ORAL at 08:31

## 2020-08-31 RX ADMIN — GABAPENTIN SCH MG: 100 CAPSULE ORAL at 08:30

## 2020-08-31 RX ADMIN — GABAPENTIN SCH MG: 100 CAPSULE ORAL at 13:27

## 2020-08-31 RX ADMIN — FUROSEMIDE SCH MG: 20 TABLET ORAL at 08:30

## 2020-08-31 RX ADMIN — ISOSORBIDE MONONITRATE SCH MG: 30 TABLET, FILM COATED, EXTENDED RELEASE ORAL at 08:31

## 2020-08-31 RX ADMIN — GABAPENTIN SCH MG: 300 CAPSULE ORAL at 19:44

## 2020-08-31 RX ADMIN — MAGNESIUM OXIDE TAB 400 MG (241.3 MG ELEMENTAL MG) SCH TAB: 400 (241.3 MG) TAB at 08:31

## 2020-08-31 RX ADMIN — ROPINIROLE SCH MG: 0.5 TABLET ORAL at 19:45

## 2020-09-01 RX ADMIN — METOPROLOL SUCCINATE SCH MG: 25 TABLET, EXTENDED RELEASE ORAL at 07:49

## 2020-09-01 RX ADMIN — GABAPENTIN SCH MG: 100 CAPSULE ORAL at 07:49

## 2020-09-01 RX ADMIN — GABAPENTIN SCH MG: 300 CAPSULE ORAL at 19:40

## 2020-09-01 RX ADMIN — GABAPENTIN SCH MG: 100 CAPSULE ORAL at 13:13

## 2020-09-01 RX ADMIN — SERTRALINE HYDROCHLORIDE SCH MG: 100 TABLET ORAL at 07:49

## 2020-09-01 RX ADMIN — MAGNESIUM OXIDE TAB 400 MG (241.3 MG ELEMENTAL MG) SCH TAB: 400 (241.3 MG) TAB at 07:49

## 2020-09-01 RX ADMIN — ISOSORBIDE MONONITRATE SCH MG: 30 TABLET, FILM COATED, EXTENDED RELEASE ORAL at 07:49

## 2020-09-01 RX ADMIN — ROPINIROLE SCH MG: 0.5 TABLET ORAL at 19:38

## 2020-09-01 RX ADMIN — FUROSEMIDE SCH MG: 20 TABLET ORAL at 07:48

## 2020-09-01 RX ADMIN — OMEPRAZOLE SCH MG: 20 CAPSULE, DELAYED RELEASE ORAL at 07:50

## 2020-09-01 RX ADMIN — WARFARIN SODIUM SCH MG: 5 TABLET ORAL at 20:32

## 2020-09-01 NOTE — PCM.PN
- General Info


Date of Service: 20


Admission Dx/Problem (Free Text): 








Assessment / Plan





Preoperative clearance





History of DVT (deep vein thrombosis)





History of deep vein thrombosis of lower extremity





CKD (chronic kidney disease) stage 3, GFR 30-59 ml/min ([facility])





Current use of long term anticoagulation








Plan:EKG and labs today


Cleared for surgery otherwise- low risk procedure in medium risk patient, main 

risk factors being age and deconditioning in long-term care patient





She's unsure of any cristela CAD hx


ProBNP runs 500-1000, likely some degree of diastolic CHF that appears stable





Hold warfarin beginning 20, last dose on 9.4.


Check INR on 9.8.If >1.4 could give 1mg Vit K PO prior to procedure on 9.9.


After surgery can restart warfarinper surgeon, perhaps same day.


I don't see indication to bridge currently.





Other meds okto take





Resume PT for gait/strength after above surgery





No follow-ups on file.














HPI / History / ROS 





Pre-Op Exam Right breast. DR. Ewing. DOS: 20, lumpectomy of CA w/ 

sentinel node.


Tolerated similar in past w/o comp.


On coumadin for DVT hx.


86 year old female with [id number] invasive ductal carcinoma of the right breast, 

UIQ, ER/TX+ HER2-, g2


Decreased ambulation at baseline, long-term care patient at .


Remote smoking hx decades ago.


FH non-contributory.


Son lives in [place].

















Medications











MedicationsPriortoVisit


 


Outpatient Medications Prior to Visit





Medication


Sig


Dispense


Refill








busPIRone (BUSPAR) 5 mg tablet


Take 5 mg by mouth 2 times a day














ferrous sulfate (65 MG FE  MG TABLET) 325 mg tablet


Take 325 mg by mouth 1 time per day














isosorbide mononitrate (IMDUR) 30 mg SR tablet (24 hr)


Take 30 mg by mouth 1 time a day in the morning














omeprazole (PRILOSEC) 20 mg capsule


Take 20 mg by mouth 1 time a day in the morning














Multiple Vitamins-Minerals (THERA M PLUS PO)


Take 1 tablet by mouth 1 time per day














melatonin 3 mg tablet


Take 3 mg by mouth every night at bedtime Take two 3mg tablet at bedtime














loperamide (IMODIUM) 2 mg capsule


Take 2 mg by mouth as needed for diarrhea Take 2 caps after the first loose 

stool, then 1 cap after each loose stool, but no more than 8 caps per day.














sertraline (ZOLOFT) 100 mg tablet


Take 100 mg by mouth 1 time per day














gabapentin (NEURONTIN) 300 mg capsule


TAKE 1 CAPSULE BY MOUTH EVERY NIGHT AT BEDTIME


30 capsule


5








metoprolol succinate (TOPROL XL) 25 mg SR tablet (24 hr)


TAKE 1 TABLET (25 MG) BY MOUTH 1 TIME PER DAY


30 tablet


11








JANTOVEN 5 MG tablet


TAKE 1 TABLET BY MOUTH ON TUES, THURS, SAT, &SUN. TAKE 1/2 TABLET ON MON, WED, 

AND FRI. (Patient taking differently: Take 5mg PO Sun, Mon, Tues, Wed, Fri, Sat.

Take 2.5mg on Thurs.)


30 tablet


1








rOPINIRole (REQUIP) 0.5 mg tablet


TAKE ONE TABLET EVERY BEDTIME BY MOUTH


30 tablet


11








nitroglycerin (NITROSTAT) 0.4 mg sublingual tablet


Dissolve 1 tablet (0.4 mg) under the tongue Every 5 minutes as needed for chest 

pain


30 tablet


0








gabapentin (NEURONTIN) 100 mg capsule


Take 1 capsule in the morning. Take 3 capsules at bedtime (Patient taking 

differently: Take 100 mg by mouth 2 times a day Take 1 capsule at 0800 and one 

at 1300.)


180 capsule


3








furosemide (LASIX) 20 mg tablet


Take 1 tablet (20 mg) by mouth 1 time per day


30 tablet


11








famotidine (PEPCID AC MAXIMUM STRENGTH) 20 mg tablet


Take 1 tablet (20 mg) by mouth 1 time a day as needed for heartburn


30 tablet


11








acetaminophen (TYLENOL) 325 mg tablet


Take 2 tablets (650 mg) by mouth 2 times a day (Patient taking differently: Take

650 mg by mouth 3 times a day )


180 tablet


3








docusate sodium (COLACE) 100 mg capsule


Take 1 capsule (100 mg) by mouth 1 time per day


30 capsule


11








polyethylene glycol (MIRALAX) powder


Take 1 capful by mouth 1 time a day as needed for constipation














calcium carbonate (TUMS EX) 750 MG CHEW


Take 1 tablet (750 mg) by mouth 3 times a day as needed














senna-docusate sodium (SENOKOT S) 8.6-50 MG TABS


Take 1 tablet by mouth 1 time per day














Oxygen therapy


Inhale 1 L orally May use throughout the day as needed. Wear while sleeping. May

increase to 3 L at HS 














sertraline (ZOLOFT) 100 mg tablet


Take 1 tablet (100 mg) by mouth 1 time per day


30 tablet


11








No facility-administered medications prior to visit.














Allergies








   


 


Allergies   


 


Allergen Reactions  


 


 Baclofen Confusion 


 


  Slurred speech and fatigue


Other reaction(s): Confusion 








Problem List








 


 


Patient Active Problem List 


 


Diagnosis 


 


 Essential hypertension


 


 Osteoporosis


 


 Malignant neoplasm of ovary (HCC)


 


 Impaired fasting glucose


 


 Nontoxic multinodular goiter


 


 Chronic diastolic congestive heart failure (HCC)


 


 Edema


 


 Melanoma of skin (HCC)


 


 Other malaise and fatigue


 


 Chronic respiratory failure (HCC)


 


 GERD (gastroesophageal reflux disease)


 


 Anxiety and depression


 


 Insomnia


 


 Obesity (BMI 30-39.9)


 


 Shoulder pain


 


 Squamous cell skin cancer


 


 CKD (chronic kidney disease) stage 3, GFR 30-59 ml/min (HCC)


 


 Gout


 


 History of deep vein thrombosis of lower extremity


 


 Cataract, secondary - Both


 


 Dermatochalasia


 


 Posterior vitreous detachment


 


 Cognitive impairment


 


 Restless legs syndrome


 


 Peripheral neuropathy


 


 Anemia, iron deficiency


 


 Chronic deep vein thrombosis (DVT) of femoral vein of left lower extremity 

(HCC)


 


 Acute deep vein thrombosis (DVT) of femoral vein of right lower extremity 

(HCC)


 


 Malignant neoplasm of right breast, estrogen receptor positive (HCC)


 


 Current use of long term anticoagulation








Medical/Surgical/Family/SocialHistory











PastMedicalHistory


 


Past Medical History:





Diagnosis


Date








Squamous cell carcinoma























PastSurgicalHistory


 


Past Surgical History:





Procedure


Laterality


Date








APPENDECTOMY














CATARACT EXTRACTION














CATARACT W PHACO


Right


07








CATARACT W PHACO


Left


01/10/2008








CHOLECYSTECTOMY














HYSTERECTOMY














US CORE BIOPSY BREAST RT


Right


2020








US CORE BIOPSY BREAST RT 2020 Jimmy Woodall MD [facility] US BREAST SC








YAG CAPSULOTOMY - OU - BOTH EYES


Bilateral


2015





























FamilyHistory


 


Family History





Problem


Relation


Age of Onset








Chronic Obstructive Pulmonary Disease


Father











Macular Degeneration


Paternal Aunt











Cataracts


Neg Hx











Diabetes


Neg Hx











Glaucoma


Neg Hx























SocialHistory


 


Social History























Socioeconomic History








Marital status:














Spouse name:


Not on file








Number of children:


1








Years of education:


Not on file








Highest education level:


Not on file





Occupational History








Occupation:


retired





Tobacco Use








Smoking status:


Former Smoker











Types:


Cigarettes











Last attempt to quit:


1974











Years since quittin.6








Smokeless tobacco:


Never Used





Substance and Sexual Activity








Alcohol use:


No








Drug use:


No





Social History Narrative








, lives in  at [facility] has a boyfriend at the [facility]. Son lives 

locally now. Did live in [place] for a time. Nonsmoker quit in , smoked

very little when younger. Occasional alcohol use prior to having children but 

none now.














ROS


Review of Systems 


Constitutional: Negative forfeverand unexpected weight change. 


Respiratory: Negative forshortness of breath. Wheezing:?


Cardiovascular: Negative forchest pain,palpitationsand leg swelling. 


Gastrointestinal: Negative forblood in stool. 


Genitourinary: Negative fordysuriaand vaginal bleeding. 


Neurological: Positive forweakness(geneneralized). Negative forsyncope. 


Psychiatric/Behavioral: Negative fordysphoric mood.











Physical / Results 





/74 Pulse 88 Temp 97.1 F (36.2 C) (Tympanic) Wt 86.6 kg (191 lb) 

SpO2 93% BMI 37.3 kg/m2||


Physical Exam


Constitutional: She appearswell-developedand well-nourished. 


Cardiovascular:Normal rate,regular rhythmand normal heart sounds. 


Pulmonary/Chest:Effort normaland breath sounds normal. 


Musculoskeletal: She exhibitsedema. 


Skin: She isnot diaphoretic. 


Psychiatric: She has anormal mood and affect.














- Patient Data


Vitals - Most Recent: 


                                Last Vital Signs











Temp  36.3 C   20 06:00


 


Pulse  68   20 07:49


 


Resp  16   20 06:00


 


BP  152/74 H  20 07:49


 


Pulse Ox  97   20 06:00











Weight - Most Recent: 86.183 kg


I&O - Last 24 Hours: 


                                 Intake & Output











 20





 22:59 06:59 14:59


 


Intake Total 120  120


 


Balance 120  120











Med Orders - Current: 


                               Current Medications





Acetaminophen (Tylenol)  650 mg PO TID@0800,1300,2000 [facility]


   Last Admin: 20 07:50 Dose:  650 mg


   Documented by: 


Buspirone HCl (Buspar)  5 mg PO BID [facility]


   Last Admin: 20 07:49 Dose:  5 mg


   Documented by: 


Calcium Carbonate/Glycine (Tums Extra Strength)  750 mg PO TID PRN


   PRN Reason: Dyspepsia


   Last Admin: 20 19:47 Dose:  750 mg


   Documented by: 


Docusate Sodium (Colace)  100 mg PO DAILY PRN


   PRN Reason: Constipation


Famotidine (Pepcid)  20 mg PO DAILY [facility]


   Last Admin: 20 07:49 Dose:  20 mg


   Documented by: 


Ferrous Sulfate (Ferrous Sulfate)  325 mg PO Q48H [facility]


   Last Admin: 20 08:31 Dose:  325 mg


   Documented by: 


Furosemide (Lasix)  20 mg PO DAILY [facility]


   Last Admin: 20 07:48 Dose:  20 mg


   Documented by: 


Gabapentin (Neurontin)  100 mg PO BID@0800,1300 [facility]


   Last Admin: 20 07:49 Dose:  100 mg


   Documented by: 


Gabapentin (Neurontin)  300 mg PO BEDTIME [facility]


   Last Admin: 20 19:44 Dose:  300 mg


   Documented by: 


Isosorbide Mononitrate (Imdur)  30 mg PO DAILY [facility]


   Last Admin: 20 07:49 Dose:  30 mg


   Documented by: 


Loperamide HCl (Imodium)  2 mg PO Q12H PRN


   PRN Reason: Diarrhea


   Last Admin: 20 04:57 Dose:  2 mg


   Documented by: 


Melatonin (Melatonin)  6 mg PO BEDTIME [facility]


   Last Admin: 20 19:45 Dose:  6 mg


   Documented by: 


Metoprolol Succinate (Toprol Xl)  25 mg PO DAILY [facility]


   Last Admin: 20 07:49 Dose:  25 mg


   Documented by: 


Miconazole (Desenex 2%)  0 gm TOP BID PRN


   PRN Reason: RASH UNDER BILATERAL BREASTS


Multivitamins/Minerals (Thera M Plus)  1 tab PO DAILY [facility]


   Last Admin: 20 07:49 Dose:  1 tab


   Documented by: 


Nitroglycerin (Nitrostat)  0.4 mg SL Q5M PRN


   PRN Reason: Chest Pain


   Last Admin: 20 20:57 Dose:  0.4 mg


   Documented by: 


Omeprazole (Omeprazole)  20 mg PO DAILY [facility]


   Last Admin: 20 07:50 Dose:  20 mg


   Documented by: 


Pharmacy Consult (Consult To Pharmacy)  1 each .XX ASDIRECTED [facility]


Polyethylene Glycol (Miralax)  17 gm PO DAILY PRN


   PRN Reason: Constipation


Ropinirole HCl (Requip)  0.5 mg PO BEDTIME [facility]


   Last Admin: 20 19:45 Dose:  0.5 mg


   Documented by: 


Senna/Docusate Sodium (Senna Plus)  1 tab PO DAILY PRN


   PRN Reason: Constipation


Sertraline HCl (Zoloft)  100 mg PO DAILY [facility]


   Last Admin: 20 07:49 Dose:  100 mg


   Documented by: 


Vitamin B Complex (Vitamin B Complex)  1 each PO DAILY [facility]


   Last Admin: 20 07:49 Dose:  1 each


   Documented by: 


Warfarin Sodium (Coumadin)  2.5 mg PO Th@ [facility]


   Last Admin: 20 20:00 Dose:  2.5 mg


   Documented by: 


Warfarin Sodium (Coumadin)  5 mg PO SuMoTuWeFrSa@ [facility]


   Last Admin: 20 19:46 Dose:  5 mg


   Documented by: 





Discontinued Medications





Albuterol (Proventil Neb Soln)  1.25 mg NEB ONETIME ONE


   Stop: 20 21:31


   Last Admin: 20 22:34 Dose:  Not Given


   Documented by: 


Albuterol (Proventil Neb Soln)  1.25 mg NEB ONETIME STA


   Stop: 20 21:47


   Last Admin: 20 21:40 Dose:  1.25 mg


   Documented by: 


Albuterol/Ipratropium (Duoneb 3.0-0.5 Mg/3 Ml) Confirm Administered Dose 3 ml 

.ROUTE .STK-MED ONE


   Stop: 20 21:31


   Last Admin: 20 21:44 Dose:  Not Given


   Documented by: 


Furosemide (Lasix)  20 mg PO ONETIME ONE


   Stop: 20 14:01


   Last Admin: 20 14:33 Dose:  20 mg


   Documented by: 


Iopamidol (Isovue-300 (61%))  100 ml IVPUSH ONETIME ONE


   Stop: 20 16:01


   Last Admin: 20 18:21 Dose:  100 ml


   Documented by: 


Isosorbide Mononitrate (Imdur)  30 mg PO DAILY [facility]


   Last Admin: 20 22:34 Dose:  Not Given


   Documented by: 


Isosorbide Mononitrate (Imdur)  30 mg PO DAILY STA


   Stop: 20 21:48


   Last Admin: 20 22:33 Dose:  30 mg


   Documented by: 


Isosorbide Mononitrate (Imdur)  30 mg PO DAILY [facility]


   Last Admin: 20 08:12 Dose:  30 mg


   Documented by: 


Phytonadione (Vitamin K)  1,000 mcg PO ONETIME PRN


   PRN Reason: IF INR OVER 1.4 ON 


   Stop: 20 12:00


Warfarin Sodium (Coumadin)  2.5 mg PO MoTh@ [facility]


   Last Admin: 20 19:23 Dose:  2.5 mg


   Documented by: 


Warfarin Sodium (Coumadin)  5 mg PO SuTuWeFrSa@ [facility]


   Last Admin: 20 19:57 Dose:  5 mg


   Documented by: 


Warfarin Sodium (Coumadin)  5 mg PO SuMoTuWeFrSa@ [facility]


   Stop: 20 20:01


   Last Admin: 20 20:42 Dose:  5 mg


   Documented by: 


Warfarin Sodium (Coumadin)  10 mg PO BEDTIME [facility]


   Stop: 20 23:59


   Last Admin: 20 19:30 Dose:  10 mg


   Documented by: 











Sepsis Event Note





- Evaluation


Sepsis Screening Result: No Definite Risk





- Focused Exam


Vital Signs: 


                                   Vital Signs











  Temp Pulse Pulse Resp BP BP Pulse Ox


 


 20 07:49   68    152/74 H  


 


 20 06:00  36.3 C   81  16   152/74 H  97














- Problem List Review


Problem List Initiated/Reviewed/Updated: Yes





- My Orders


Last 24 Hours: 


My Active Orders





20 06:00


INR,PT,PROTHROMBIN TIME [COAG] Routine 





20 07:00


INR,PT,PROTHROMBIN TIME [COAG] Q28D 





10/23/20 07:00


INR,PT,PROTHROMBIN TIME [COAG] Q28D

## 2020-09-02 LAB
ANION GAP SERPL CALC-SCNC: 6.4 MMOL/L (ref 10–20)
CHLORIDE SERPL-SCNC: 108 MMOL/L (ref 98–107)
SODIUM SERPL-SCNC: 146 MMOL/L (ref 136–145)

## 2020-09-02 RX ADMIN — WARFARIN SODIUM SCH MG: 5 TABLET ORAL at 19:57

## 2020-09-02 RX ADMIN — FUROSEMIDE SCH MG: 20 TABLET ORAL at 09:39

## 2020-09-02 RX ADMIN — ISOSORBIDE MONONITRATE SCH MG: 30 TABLET, FILM COATED, EXTENDED RELEASE ORAL at 08:40

## 2020-09-02 RX ADMIN — GABAPENTIN SCH MG: 100 CAPSULE ORAL at 08:42

## 2020-09-02 RX ADMIN — MAGNESIUM OXIDE TAB 400 MG (241.3 MG ELEMENTAL MG) SCH TAB: 400 (241.3 MG) TAB at 08:42

## 2020-09-02 RX ADMIN — ROPINIROLE SCH MG: 0.5 TABLET ORAL at 19:58

## 2020-09-02 RX ADMIN — SERTRALINE HYDROCHLORIDE SCH MG: 100 TABLET ORAL at 08:40

## 2020-09-02 RX ADMIN — OMEPRAZOLE SCH MG: 20 CAPSULE, DELAYED RELEASE ORAL at 08:42

## 2020-09-02 RX ADMIN — METOPROLOL SUCCINATE SCH MG: 25 TABLET, EXTENDED RELEASE ORAL at 08:40

## 2020-09-02 RX ADMIN — GABAPENTIN SCH MG: 100 CAPSULE ORAL at 14:18

## 2020-09-02 RX ADMIN — GABAPENTIN SCH MG: 300 CAPSULE ORAL at 19:56

## 2020-09-03 RX ADMIN — ROPINIROLE SCH MG: 0.5 TABLET ORAL at 19:40

## 2020-09-03 RX ADMIN — GABAPENTIN SCH MG: 100 CAPSULE ORAL at 13:53

## 2020-09-03 RX ADMIN — ISOSORBIDE MONONITRATE SCH MG: 30 TABLET, FILM COATED, EXTENDED RELEASE ORAL at 09:15

## 2020-09-03 RX ADMIN — METOPROLOL SUCCINATE SCH MG: 25 TABLET, EXTENDED RELEASE ORAL at 09:15

## 2020-09-03 RX ADMIN — OMEPRAZOLE SCH MG: 20 CAPSULE, DELAYED RELEASE ORAL at 09:12

## 2020-09-03 RX ADMIN — SERTRALINE HYDROCHLORIDE SCH MG: 100 TABLET ORAL at 09:16

## 2020-09-03 RX ADMIN — MAGNESIUM OXIDE TAB 400 MG (241.3 MG ELEMENTAL MG) SCH TAB: 400 (241.3 MG) TAB at 09:12

## 2020-09-03 RX ADMIN — GABAPENTIN SCH MG: 300 CAPSULE ORAL at 19:39

## 2020-09-03 RX ADMIN — FUROSEMIDE SCH MG: 20 TABLET ORAL at 09:14

## 2020-09-03 RX ADMIN — WARFARIN SODIUM SCH MG: 5 TABLET ORAL at 19:44

## 2020-09-03 RX ADMIN — GABAPENTIN SCH MG: 100 CAPSULE ORAL at 09:17

## 2020-09-04 RX ADMIN — GABAPENTIN SCH MG: 100 CAPSULE ORAL at 12:43

## 2020-09-04 RX ADMIN — OMEPRAZOLE SCH MG: 20 CAPSULE, DELAYED RELEASE ORAL at 08:49

## 2020-09-04 RX ADMIN — METOPROLOL SUCCINATE SCH MG: 25 TABLET, EXTENDED RELEASE ORAL at 08:54

## 2020-09-04 RX ADMIN — GABAPENTIN SCH MG: 300 CAPSULE ORAL at 21:11

## 2020-09-04 RX ADMIN — ROPINIROLE SCH MG: 0.5 TABLET ORAL at 21:09

## 2020-09-04 RX ADMIN — WARFARIN SODIUM SCH MG: 5 TABLET ORAL at 21:10

## 2020-09-04 RX ADMIN — SERTRALINE HYDROCHLORIDE SCH MG: 100 TABLET ORAL at 08:48

## 2020-09-04 RX ADMIN — FUROSEMIDE SCH MG: 20 TABLET ORAL at 08:49

## 2020-09-04 RX ADMIN — GABAPENTIN SCH MG: 100 CAPSULE ORAL at 08:48

## 2020-09-04 RX ADMIN — ISOSORBIDE MONONITRATE SCH MG: 30 TABLET, FILM COATED, EXTENDED RELEASE ORAL at 08:54

## 2020-09-05 RX ADMIN — SERTRALINE HYDROCHLORIDE SCH MG: 100 TABLET ORAL at 08:46

## 2020-09-05 RX ADMIN — ISOSORBIDE MONONITRATE SCH MG: 30 TABLET, FILM COATED, EXTENDED RELEASE ORAL at 08:49

## 2020-09-05 RX ADMIN — GABAPENTIN SCH MG: 100 CAPSULE ORAL at 12:29

## 2020-09-05 RX ADMIN — METOPROLOL SUCCINATE SCH MG: 25 TABLET, EXTENDED RELEASE ORAL at 08:49

## 2020-09-05 RX ADMIN — ROPINIROLE SCH MG: 0.5 TABLET ORAL at 20:09

## 2020-09-05 RX ADMIN — FUROSEMIDE SCH MG: 20 TABLET ORAL at 08:46

## 2020-09-05 RX ADMIN — OMEPRAZOLE SCH MG: 20 CAPSULE, DELAYED RELEASE ORAL at 08:46

## 2020-09-05 RX ADMIN — GABAPENTIN SCH MG: 300 CAPSULE ORAL at 20:07

## 2020-09-05 RX ADMIN — GABAPENTIN SCH MG: 100 CAPSULE ORAL at 08:45

## 2020-09-06 RX ADMIN — GABAPENTIN SCH MG: 100 CAPSULE ORAL at 08:21

## 2020-09-06 RX ADMIN — FUROSEMIDE SCH MG: 20 TABLET ORAL at 08:21

## 2020-09-06 RX ADMIN — ROPINIROLE SCH MG: 0.5 TABLET ORAL at 19:49

## 2020-09-06 RX ADMIN — METOPROLOL SUCCINATE SCH MG: 25 TABLET, EXTENDED RELEASE ORAL at 08:20

## 2020-09-06 RX ADMIN — GABAPENTIN SCH MG: 100 CAPSULE ORAL at 12:43

## 2020-09-06 RX ADMIN — ISOSORBIDE MONONITRATE SCH MG: 30 TABLET, FILM COATED, EXTENDED RELEASE ORAL at 08:20

## 2020-09-06 RX ADMIN — SERTRALINE HYDROCHLORIDE SCH MG: 100 TABLET ORAL at 08:21

## 2020-09-06 RX ADMIN — GABAPENTIN SCH MG: 300 CAPSULE ORAL at 19:50

## 2020-09-06 RX ADMIN — OMEPRAZOLE SCH MG: 20 CAPSULE, DELAYED RELEASE ORAL at 08:22

## 2020-09-07 RX ADMIN — ISOSORBIDE MONONITRATE SCH MG: 30 TABLET, FILM COATED, EXTENDED RELEASE ORAL at 08:28

## 2020-09-07 RX ADMIN — GABAPENTIN SCH MG: 100 CAPSULE ORAL at 08:28

## 2020-09-07 RX ADMIN — SERTRALINE HYDROCHLORIDE SCH MG: 100 TABLET ORAL at 08:28

## 2020-09-07 RX ADMIN — FUROSEMIDE SCH MG: 20 TABLET ORAL at 08:28

## 2020-09-07 RX ADMIN — METOPROLOL SUCCINATE SCH MG: 25 TABLET, EXTENDED RELEASE ORAL at 08:28

## 2020-09-07 RX ADMIN — GABAPENTIN SCH MG: 100 CAPSULE ORAL at 12:53

## 2020-09-07 RX ADMIN — GABAPENTIN SCH MG: 300 CAPSULE ORAL at 20:02

## 2020-09-07 RX ADMIN — OMEPRAZOLE SCH MG: 20 CAPSULE, DELAYED RELEASE ORAL at 08:29

## 2020-09-07 RX ADMIN — ROPINIROLE SCH MG: 0.5 TABLET ORAL at 20:02

## 2020-09-08 RX ADMIN — ROPINIROLE SCH MG: 0.5 TABLET ORAL at 20:08

## 2020-09-08 RX ADMIN — GABAPENTIN SCH MG: 300 CAPSULE ORAL at 20:08

## 2020-09-08 RX ADMIN — ISOSORBIDE MONONITRATE SCH MG: 30 TABLET, FILM COATED, EXTENDED RELEASE ORAL at 08:03

## 2020-09-08 RX ADMIN — GABAPENTIN SCH MG: 100 CAPSULE ORAL at 08:01

## 2020-09-08 RX ADMIN — SERTRALINE HYDROCHLORIDE SCH MG: 100 TABLET ORAL at 08:03

## 2020-09-08 RX ADMIN — METOPROLOL SUCCINATE SCH MG: 25 TABLET, EXTENDED RELEASE ORAL at 08:02

## 2020-09-08 RX ADMIN — FUROSEMIDE SCH MG: 20 TABLET ORAL at 08:03

## 2020-09-08 RX ADMIN — OMEPRAZOLE SCH MG: 20 CAPSULE, DELAYED RELEASE ORAL at 08:04

## 2020-09-08 RX ADMIN — GABAPENTIN SCH MG: 100 CAPSULE ORAL at 14:13

## 2020-09-09 VITALS — DIASTOLIC BLOOD PRESSURE: 70 MMHG | SYSTOLIC BLOOD PRESSURE: 142 MMHG | HEART RATE: 80 BPM

## 2020-09-09 RX ADMIN — SERTRALINE HYDROCHLORIDE SCH: 100 TABLET ORAL at 07:58

## 2020-09-09 RX ADMIN — METOPROLOL SUCCINATE SCH MG: 25 TABLET, EXTENDED RELEASE ORAL at 06:00

## 2020-09-09 RX ADMIN — ISOSORBIDE MONONITRATE SCH MG: 30 TABLET, FILM COATED, EXTENDED RELEASE ORAL at 06:00

## 2020-09-09 RX ADMIN — GABAPENTIN SCH: 100 CAPSULE ORAL at 07:57

## 2020-09-09 RX ADMIN — ISOSORBIDE MONONITRATE SCH: 30 TABLET, FILM COATED, EXTENDED RELEASE ORAL at 07:57

## 2020-09-09 RX ADMIN — GABAPENTIN SCH: 100 CAPSULE ORAL at 13:16

## 2020-09-09 RX ADMIN — GABAPENTIN SCH MG: 100 CAPSULE ORAL at 05:59

## 2020-09-09 RX ADMIN — SERTRALINE HYDROCHLORIDE SCH MG: 100 TABLET ORAL at 06:00

## 2020-09-09 RX ADMIN — METOPROLOL SUCCINATE SCH: 25 TABLET, EXTENDED RELEASE ORAL at 07:58

## 2020-09-09 NOTE — PCM.DCSUM1
**Discharge Summary





- Hospital Course


Free Text/Narrative:: 








Bureaucratic discharge in order to leave for breast cancer surgery in [place].  No

other health changes.





Diagnosis: Stroke: No





- Discharge Data


Discharge Date: 09/09/20


Discharge Disposition: DC/Tfer to Acute Hospital 02


Condition: Good





- Referral to Home Health


Primary Care Physician: 


Brian Hinson MD








- Patient Summary/Data


Consults: 


                                  Consultations





07/01/20 10:48


Consult to Case Management/ [CONS] Routine 





07/28/20 12:01


PT Evaluation and Treatment [CONS] Routine 














- Discharge Plan


*PRESCRIPTION DRUG MONITORING PROGRAM REVIEWED*: Not Applicable


*COPY OF PRESCRIPTION DRUG MONITORING REPORT IN PATIENT FRANK: Not Applicable


Home Medications: 


                                    Home Meds





Gabapentin 300 mg PO BEDTIME 05/19/15 [History]


Nitroglycerin [Nitrostat] 0.4 mg SL ASDIRECTED PRN 05/19/15 [History]


Sertraline [Zoloft] 100 mg PO DAILY 05/19/15 [History]


rOPINIRole HCl [Requip] 0.5 mg PO BEDTIME 05/20/15 [History]


Calcium Carbonate [Tums Extra Strength] 750 mg PO TID PRN 10/20/16 [History]


Gabapentin [Neurontin] 100 mg PO BID@08,13 10/20/16 [History]


Docusate Sodium [Colace] 100 mg PO DAILY PRN 04/27/18 [History]


Furosemide 20 mg PO DAILY 04/27/18 [History]


Metoprolol Succinate 25 mg PO DAILY 04/27/18 [History]


polyethylene glycoL 3350 [MiraLAX] 17 gm PO DAILY PRN 04/27/18 [History]


Acetaminophen [Tylenol] 650 mg PO Q6H #0 05/01/18 [Rx]


Sennosides/Docusate Sodium [Senna S Tablet] 1 each PO DAILY #30 tablet 05/01/18 

[Rx]


Famotidine 20 mg PO DAILY 01/31/19 [History]


Warfarin Sodium [Jantoven] 5 mg PO ASDIRECTED 01/31/19 [History]


Ferrous Sulfate 325 mg PO Q2D 01/07/20 [History]


Melatonin 6 mg PO BEDTIME 01/07/20 [History]


Miconazole [Desenex 2%] 45 gm TOP BID PRN 01/07/20 [History]


Multivitamin [Daily Rosette] 1 each PO DAILY 01/07/20 [History]











- Discharge Summary/Plan Comment


DC Time >30 min.: No





- Patient Data


Vitals - Most Recent: 


                                Last Vital Signs











Temp  36.2 C   09/09/20 05:59


 


Pulse  80   09/09/20 06:00


 


Resp  16   09/01/20 06:00


 


BP  142/70 H  09/09/20 06:00


 


Pulse Ox  97   09/01/20 06:00











Weight - Most Recent: 85.729 kg


Med Orders - Current: 


                               Current Medications





Acetaminophen (Tylenol)  650 mg PO TID@0800,1300,2000 [facility]


   Last Admin: 09/09/20 13:16 Dose:  Not Given


   Documented by: 


Buspirone HCl (Buspar)  5 mg PO BID [facility]


   Last Admin: 09/09/20 07:57 Dose:  Not Given


   Documented by: 


Calcium Carbonate/Glycine (Tums Extra Strength)  750 mg PO TID PRN


   PRN Reason: Dyspepsia


   Last Admin: 09/03/20 09:19 Dose:  750 mg


   Documented by: 


Docusate Sodium (Colace)  100 mg PO DAILY PRN


   PRN Reason: Constipation


Famotidine (Pepcid)  20 mg PO DAILY [facility]


   Last Admin: 09/08/20 08:05 Dose:  20 mg


   Documented by: 


Ferrous Sulfate (Ferrous Sulfate)  325 mg PO Q48H [facility]


   Last Admin: 09/02/20 08:42 Dose:  325 mg


   Documented by: 


Furosemide (Lasix)  20 mg PO DAILY [facility]


   Last Admin: 09/08/20 08:03 Dose:  20 mg


   Documented by: 


Gabapentin (Neurontin)  100 mg PO BID@0800,1300 [facility]


   Last Admin: 09/09/20 13:16 Dose:  Not Given


   Documented by: 


Gabapentin (Neurontin)  300 mg PO BEDTIME [facility]


   Last Admin: 09/08/20 20:08 Dose:  300 mg


   Documented by: 


Isosorbide Mononitrate (Imdur)  30 mg PO DAILY [facility]


   Last Admin: 09/09/20 07:57 Dose:  Not Given


   Documented by: 


Loperamide HCl (Imodium)  2 mg PO Q12H PRN


   PRN Reason: Diarrhea


   Last Admin: 08/27/20 04:57 Dose:  2 mg


   Documented by: 


Melatonin (Melatonin)  6 mg PO BEDTIME [facility]


   Last Admin: 09/02/20 19:59 Dose:  6 mg


   Documented by: 


Metoprolol Succinate (Toprol Xl)  25 mg PO DAILY [facility]


   Last Admin: 09/09/20 07:58 Dose:  Not Given


   Documented by: 


Miconazole (Desenex 2%)  0 gm TOP BID PRN


   PRN Reason: RASH UNDER BILATERAL BREASTS


Multivitamins/Minerals (Thera M Plus)  1 tab PO DAILY [facility]


   Last Admin: 09/03/20 09:12 Dose:  1 tab


   Documented by: 


Nitroglycerin (Nitrostat)  0.4 mg SL Q5M PRN


   PRN Reason: Chest Pain


   Last Admin: 08/21/20 20:57 Dose:  0.4 mg


   Documented by: 


Omeprazole (Omeprazole)  20 mg PO DAILY [facility]


   Last Admin: 09/08/20 08:04 Dose:  20 mg


   Documented by: 


Pharmacy Consult (Consult To Pharmacy)  1 each .XX ASDIRECTED [facility]


Polyethylene Glycol (Miralax)  17 gm PO DAILY PRN


   PRN Reason: Constipation


Ropinirole HCl (Requip)  0.5 mg PO BEDTIME [facility]


   Last Admin: 09/08/20 20:08 Dose:  0.5 mg


   Documented by: 


Senna/Docusate Sodium (Senna Plus)  1 tab PO DAILY PRN


   PRN Reason: Constipation


Sertraline HCl (Zoloft)  100 mg PO DAILY [facility]


   Last Admin: 09/09/20 07:58 Dose:  Not Given


   Documented by: 


Vitamin B Complex (Vitamin B Complex)  1 each PO DAILY [facility]


   Last Admin: 09/03/20 09:12 Dose:  1 each


   Documented by: 





Discontinued Medications





Albuterol (Proventil Neb Soln)  1.25 mg NEB ONETIME ONE


   Stop: 08/21/20 21:31


   Last Admin: 08/21/20 22:34 Dose:  Not Given


   Documented by: 


Albuterol (Proventil Neb Soln)  1.25 mg NEB ONETIME STA


   Stop: 08/21/20 21:47


   Last Admin: 08/21/20 21:40 Dose:  1.25 mg


   Documented by: 


Albuterol/Ipratropium (Duoneb 3.0-0.5 Mg/3 Ml) Confirm Administered Dose 3 ml 

.ROUTE .STK-MED ONE


   Stop: 08/21/20 21:31


   Last Admin: 08/21/20 21:44 Dose:  Not Given


   Documented by: 


Furosemide (Lasix)  20 mg PO ONETIME ONE


   Stop: 08/22/20 14:01


   Last Admin: 08/22/20 14:33 Dose:  20 mg


   Documented by: 


Influenza Virus Vaccine (Fluzone High-Dose Quad 2020-21)  240 mcg IM .ONCE ONE


   Stop: 09/03/20 16:01


   Last Admin: 09/03/20 18:46 Dose:  Not Given


   Documented by: 


Influenza Virus Vaccine (Fluzone High-Dose Quad 2020-21)  240 mcg IM .ONCE ONE


   Stop: 09/04/20 10:01


   Last Admin: 09/04/20 10:36 Dose:  240 mcg


   Documented by: 


Iopamidol (Isovue-300 (61%))  100 ml IVPUSH ONETIME ONE


   Stop: 07/28/20 16:01


   Last Admin: 07/28/20 18:21 Dose:  100 ml


   Documented by: 


Isosorbide Mononitrate (Imdur)  30 mg PO DAILY [facility]


   Last Admin: 08/21/20 22:34 Dose:  Not Given


   Documented by: 


Isosorbide Mononitrate (Imdur)  30 mg PO DAILY [facility]


   Stop: 08/21/20 21:48


   Last Admin: 08/21/20 22:33 Dose:  30 mg


   Documented by: 


Isosorbide Mononitrate (Imdur)  30 mg PO DAILY [facility]


   Last Admin: 08/24/20 08:12 Dose:  30 mg


   Documented by: 


Phytonadione (Vitamin K)  1,000 mcg PO ONETIME PRN


   PRN Reason: IF INR OVER 1.4 ON 8/18


   Stop: 08/18/20 12:00


Phytonadione (Vitamin K)  1,000 mcg PO ONETIME PRN


   PRN Reason: GIVE IF INR OVER 1.4 ON 9/8


   Stop: 09/08/20 12:00


Warfarin Sodium (Coumadin)  2.5 mg PO MoTh@2000 [facility]


   Last Admin: 07/02/20 19:23 Dose:  2.5 mg


   Documented by: 


Warfarin Sodium (Coumadin)  5 mg PO SuTuWeFrSa@2000 [facility]


   Last Admin: 07/03/20 19:57 Dose:  5 mg


   Documented by: 


Warfarin Sodium (Coumadin)  2.5 mg PO Th@2000 [facility]


   Stop: 09/04/20 20:01


   Last Admin: 09/03/20 19:44 Dose:  2.5 mg


   Documented by: 


Warfarin Sodium (Coumadin)  5 mg PO SuMoTuWeFrSa@2000 [facility]


   Stop: 08/14/20 20:01


   Last Admin: 08/14/20 20:42 Dose:  5 mg


   Documented by: 


Warfarin Sodium (Coumadin)  5 mg PO AyeshaTuWVipul@2000 [facility]


   Stop: 09/04/20 20:01


   Last Admin: 09/04/20 21:10 Dose:  5 mg


   Documented by: 


Warfarin Sodium (Coumadin)  10 mg PO BEDTIME [facility]


   Stop: 08/23/20 23:59


   Last Admin: 08/23/20 19:30 Dose:  10 mg


   Documented by: 


Warfarin Sodium (Coumadin)  5 mg PO ONETIME ONE


   Stop: 09/01/20 20:16


   Last Admin: 09/01/20 20:17 Dose:  5 mg


   Documented by:

## 2020-09-10 ENCOUNTER — HOSPITAL ENCOUNTER (INPATIENT)
Dept: HOSPITAL 50 - VM.MS | Age: 85
LOS: 270 days | Discharge: NURSING, REHAB OR LONG-TERM CARE | DRG: 641 | End: 2021-06-07
Attending: FAMILY MEDICINE | Admitting: FAMILY MEDICINE
Payer: MEDICAID

## 2020-09-10 DIAGNOSIS — Z79.899: ICD-10-CM

## 2020-09-10 DIAGNOSIS — G62.9: ICD-10-CM

## 2020-09-10 DIAGNOSIS — Z88.8: ICD-10-CM

## 2020-09-10 DIAGNOSIS — K21.9: ICD-10-CM

## 2020-09-10 DIAGNOSIS — Z74.1: ICD-10-CM

## 2020-09-10 DIAGNOSIS — I13.0: ICD-10-CM

## 2020-09-10 DIAGNOSIS — Z85.3: ICD-10-CM

## 2020-09-10 DIAGNOSIS — I83.009: ICD-10-CM

## 2020-09-10 DIAGNOSIS — J96.10: ICD-10-CM

## 2020-09-10 DIAGNOSIS — G89.29: ICD-10-CM
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
## 2020-09-10 DIAGNOSIS — N30.01: ICD-10-CM

## 2020-09-10 DIAGNOSIS — M81.0: ICD-10-CM

## 2020-09-10 DIAGNOSIS — Z79.01: ICD-10-CM

## 2020-09-10 DIAGNOSIS — J44.9: ICD-10-CM

## 2020-09-10 DIAGNOSIS — E66.9: ICD-10-CM

## 2020-09-10 DIAGNOSIS — G25.81: ICD-10-CM

## 2020-09-10 DIAGNOSIS — R29.898: ICD-10-CM

## 2020-09-10 DIAGNOSIS — D64.9: ICD-10-CM

## 2020-09-10 DIAGNOSIS — Z98.49: ICD-10-CM

## 2020-09-10 DIAGNOSIS — K59.00: ICD-10-CM

## 2020-09-10 DIAGNOSIS — I50.32: ICD-10-CM

## 2020-09-10 DIAGNOSIS — M54.9: ICD-10-CM

## 2020-09-10 DIAGNOSIS — F32.9: ICD-10-CM

## 2020-09-10 DIAGNOSIS — R62.7: Primary | ICD-10-CM

## 2020-09-10 DIAGNOSIS — B96.5: ICD-10-CM

## 2020-09-10 DIAGNOSIS — G47.00: ICD-10-CM

## 2020-09-10 DIAGNOSIS — Z86.718: ICD-10-CM

## 2020-09-10 DIAGNOSIS — E87.0: ICD-10-CM

## 2020-09-10 DIAGNOSIS — H54.7: ICD-10-CM

## 2020-09-10 DIAGNOSIS — F41.9: ICD-10-CM

## 2020-09-10 DIAGNOSIS — N18.30: ICD-10-CM

## 2020-09-10 DIAGNOSIS — Z90.49: ICD-10-CM

## 2020-09-10 DIAGNOSIS — M19.90: ICD-10-CM

## 2020-09-10 DIAGNOSIS — M10.9: ICD-10-CM

## 2020-09-10 DIAGNOSIS — S50.311A: ICD-10-CM

## 2020-09-10 DIAGNOSIS — E04.9: ICD-10-CM

## 2020-09-10 PROCEDURE — U0002 COVID-19 LAB TEST NON-CDC: HCPCS

## 2020-09-10 RX ADMIN — GABAPENTIN SCH MG: 100 CAPSULE ORAL at 14:31

## 2020-09-10 RX ADMIN — ROPINIROLE SCH MG: 0.5 TABLET ORAL at 19:59

## 2020-09-10 RX ADMIN — GABAPENTIN SCH MG: 300 CAPSULE ORAL at 19:58

## 2020-09-11 RX ADMIN — ROPINIROLE SCH MG: 0.5 TABLET ORAL at 19:54

## 2020-09-11 RX ADMIN — GABAPENTIN SCH MG: 100 CAPSULE ORAL at 12:30

## 2020-09-11 RX ADMIN — FUROSEMIDE SCH MG: 20 TABLET ORAL at 09:01

## 2020-09-11 RX ADMIN — OMEPRAZOLE SCH MG: 20 CAPSULE, DELAYED RELEASE ORAL at 09:02

## 2020-09-11 RX ADMIN — WARFARIN SODIUM SCH MG: 5 TABLET ORAL at 19:54

## 2020-09-11 RX ADMIN — MAGNESIUM OXIDE TAB 400 MG (241.3 MG ELEMENTAL MG) SCH TAB: 400 (241.3 MG) TAB at 09:02

## 2020-09-11 RX ADMIN — GABAPENTIN SCH MG: 300 CAPSULE ORAL at 19:54

## 2020-09-11 RX ADMIN — METOPROLOL SUCCINATE SCH MG: 25 TABLET, EXTENDED RELEASE ORAL at 09:01

## 2020-09-11 RX ADMIN — SERTRALINE HYDROCHLORIDE SCH MG: 100 TABLET ORAL at 09:05

## 2020-09-11 RX ADMIN — GABAPENTIN SCH MG: 100 CAPSULE ORAL at 09:00

## 2020-09-11 RX ADMIN — ISOSORBIDE MONONITRATE SCH MG: 30 TABLET, FILM COATED, EXTENDED RELEASE ORAL at 09:01

## 2020-09-12 RX ADMIN — ISOSORBIDE MONONITRATE SCH MG: 30 TABLET, FILM COATED, EXTENDED RELEASE ORAL at 07:45

## 2020-09-12 RX ADMIN — GABAPENTIN SCH MG: 100 CAPSULE ORAL at 12:29

## 2020-09-12 RX ADMIN — OMEPRAZOLE SCH MG: 20 CAPSULE, DELAYED RELEASE ORAL at 07:43

## 2020-09-12 RX ADMIN — WARFARIN SODIUM SCH MG: 5 TABLET ORAL at 20:31

## 2020-09-12 RX ADMIN — GABAPENTIN SCH MG: 100 CAPSULE ORAL at 07:45

## 2020-09-12 RX ADMIN — SERTRALINE HYDROCHLORIDE SCH MG: 100 TABLET ORAL at 07:44

## 2020-09-12 RX ADMIN — MAGNESIUM OXIDE TAB 400 MG (241.3 MG ELEMENTAL MG) SCH TAB: 400 (241.3 MG) TAB at 07:43

## 2020-09-12 RX ADMIN — FUROSEMIDE SCH MG: 20 TABLET ORAL at 07:44

## 2020-09-12 RX ADMIN — METOPROLOL SUCCINATE SCH MG: 25 TABLET, EXTENDED RELEASE ORAL at 07:44

## 2020-09-12 RX ADMIN — ROPINIROLE SCH MG: 0.5 TABLET ORAL at 20:31

## 2020-09-12 RX ADMIN — GABAPENTIN SCH MG: 300 CAPSULE ORAL at 20:30

## 2020-09-13 RX ADMIN — GABAPENTIN SCH MG: 100 CAPSULE ORAL at 08:10

## 2020-09-13 RX ADMIN — SERTRALINE HYDROCHLORIDE SCH MG: 100 TABLET ORAL at 08:11

## 2020-09-13 RX ADMIN — METOPROLOL SUCCINATE SCH MG: 25 TABLET, EXTENDED RELEASE ORAL at 08:18

## 2020-09-13 RX ADMIN — MAGNESIUM OXIDE TAB 400 MG (241.3 MG ELEMENTAL MG) SCH TAB: 400 (241.3 MG) TAB at 08:13

## 2020-09-13 RX ADMIN — GABAPENTIN SCH MG: 100 CAPSULE ORAL at 12:53

## 2020-09-13 RX ADMIN — ISOSORBIDE MONONITRATE SCH MG: 30 TABLET, FILM COATED, EXTENDED RELEASE ORAL at 08:18

## 2020-09-13 RX ADMIN — ROPINIROLE SCH MG: 0.5 TABLET ORAL at 20:07

## 2020-09-13 RX ADMIN — GABAPENTIN SCH MG: 300 CAPSULE ORAL at 20:05

## 2020-09-13 RX ADMIN — FUROSEMIDE SCH MG: 20 TABLET ORAL at 08:12

## 2020-09-13 RX ADMIN — WARFARIN SODIUM SCH MG: 5 TABLET ORAL at 20:06

## 2020-09-13 RX ADMIN — OMEPRAZOLE SCH MG: 20 CAPSULE, DELAYED RELEASE ORAL at 08:13

## 2020-09-14 RX ADMIN — METOPROLOL SUCCINATE SCH MG: 25 TABLET, EXTENDED RELEASE ORAL at 08:44

## 2020-09-14 RX ADMIN — OMEPRAZOLE SCH MG: 20 CAPSULE, DELAYED RELEASE ORAL at 08:44

## 2020-09-14 RX ADMIN — ROPINIROLE SCH MG: 0.5 TABLET ORAL at 20:12

## 2020-09-14 RX ADMIN — GABAPENTIN SCH MG: 300 CAPSULE ORAL at 20:11

## 2020-09-14 RX ADMIN — GABAPENTIN SCH MG: 100 CAPSULE ORAL at 08:44

## 2020-09-14 RX ADMIN — FUROSEMIDE SCH MG: 20 TABLET ORAL at 08:45

## 2020-09-14 RX ADMIN — GABAPENTIN SCH MG: 100 CAPSULE ORAL at 12:25

## 2020-09-14 RX ADMIN — ISOSORBIDE MONONITRATE SCH MG: 30 TABLET, FILM COATED, EXTENDED RELEASE ORAL at 08:45

## 2020-09-14 RX ADMIN — SERTRALINE HYDROCHLORIDE SCH MG: 100 TABLET ORAL at 08:45

## 2020-09-14 RX ADMIN — WARFARIN SODIUM SCH MG: 5 TABLET ORAL at 20:12

## 2020-09-14 RX ADMIN — MAGNESIUM OXIDE TAB 400 MG (241.3 MG ELEMENTAL MG) SCH TAB: 400 (241.3 MG) TAB at 08:44

## 2020-09-15 RX ADMIN — GABAPENTIN SCH MG: 300 CAPSULE ORAL at 19:45

## 2020-09-15 RX ADMIN — MAGNESIUM OXIDE TAB 400 MG (241.3 MG ELEMENTAL MG) SCH TAB: 400 (241.3 MG) TAB at 08:44

## 2020-09-15 RX ADMIN — WARFARIN SODIUM SCH MG: 5 TABLET ORAL at 19:45

## 2020-09-15 RX ADMIN — SERTRALINE HYDROCHLORIDE SCH MG: 100 TABLET ORAL at 08:44

## 2020-09-15 RX ADMIN — GABAPENTIN SCH MG: 100 CAPSULE ORAL at 14:42

## 2020-09-15 RX ADMIN — METOPROLOL SUCCINATE SCH MG: 25 TABLET, EXTENDED RELEASE ORAL at 08:45

## 2020-09-15 RX ADMIN — ISOSORBIDE MONONITRATE SCH MG: 30 TABLET, FILM COATED, EXTENDED RELEASE ORAL at 08:45

## 2020-09-15 RX ADMIN — FUROSEMIDE SCH MG: 20 TABLET ORAL at 08:44

## 2020-09-15 RX ADMIN — OMEPRAZOLE SCH MG: 20 CAPSULE, DELAYED RELEASE ORAL at 08:44

## 2020-09-15 RX ADMIN — GABAPENTIN SCH MG: 100 CAPSULE ORAL at 08:44

## 2020-09-15 RX ADMIN — ROPINIROLE SCH MG: 0.5 TABLET ORAL at 19:45

## 2020-09-16 RX ADMIN — GABAPENTIN SCH MG: 100 CAPSULE ORAL at 08:53

## 2020-09-16 RX ADMIN — GABAPENTIN SCH MG: 300 CAPSULE ORAL at 20:18

## 2020-09-16 RX ADMIN — GABAPENTIN SCH MG: 100 CAPSULE ORAL at 13:59

## 2020-09-16 RX ADMIN — WARFARIN SODIUM SCH MG: 5 TABLET ORAL at 20:18

## 2020-09-16 RX ADMIN — MAGNESIUM OXIDE TAB 400 MG (241.3 MG ELEMENTAL MG) SCH TAB: 400 (241.3 MG) TAB at 08:53

## 2020-09-16 RX ADMIN — ROPINIROLE SCH MG: 0.5 TABLET ORAL at 20:19

## 2020-09-16 RX ADMIN — OMEPRAZOLE SCH MG: 20 CAPSULE, DELAYED RELEASE ORAL at 08:53

## 2020-09-16 RX ADMIN — SERTRALINE HYDROCHLORIDE SCH MG: 100 TABLET ORAL at 08:53

## 2020-09-16 RX ADMIN — ISOSORBIDE MONONITRATE SCH MG: 30 TABLET, FILM COATED, EXTENDED RELEASE ORAL at 08:53

## 2020-09-16 RX ADMIN — FUROSEMIDE SCH MG: 20 TABLET ORAL at 08:53

## 2020-09-16 RX ADMIN — METOPROLOL SUCCINATE SCH MG: 25 TABLET, EXTENDED RELEASE ORAL at 08:53

## 2020-09-17 RX ADMIN — GABAPENTIN SCH MG: 100 CAPSULE ORAL at 13:24

## 2020-09-17 RX ADMIN — WARFARIN SODIUM SCH MG: 5 TABLET ORAL at 20:07

## 2020-09-17 RX ADMIN — GABAPENTIN SCH MG: 100 CAPSULE ORAL at 08:24

## 2020-09-17 RX ADMIN — OMEPRAZOLE SCH MG: 20 CAPSULE, DELAYED RELEASE ORAL at 08:25

## 2020-09-17 RX ADMIN — SERTRALINE HYDROCHLORIDE SCH MG: 100 TABLET ORAL at 08:25

## 2020-09-17 RX ADMIN — MAGNESIUM OXIDE TAB 400 MG (241.3 MG ELEMENTAL MG) SCH TAB: 400 (241.3 MG) TAB at 08:25

## 2020-09-17 RX ADMIN — ISOSORBIDE MONONITRATE SCH MG: 30 TABLET, FILM COATED, EXTENDED RELEASE ORAL at 08:26

## 2020-09-17 RX ADMIN — FUROSEMIDE SCH MG: 20 TABLET ORAL at 08:26

## 2020-09-17 RX ADMIN — GABAPENTIN SCH MG: 300 CAPSULE ORAL at 20:06

## 2020-09-17 RX ADMIN — METOPROLOL SUCCINATE SCH MG: 25 TABLET, EXTENDED RELEASE ORAL at 08:25

## 2020-09-17 RX ADMIN — ROPINIROLE SCH MG: 0.5 TABLET ORAL at 20:07

## 2020-09-18 RX ADMIN — OMEPRAZOLE SCH MG: 20 CAPSULE, DELAYED RELEASE ORAL at 08:33

## 2020-09-18 RX ADMIN — GABAPENTIN SCH MG: 100 CAPSULE ORAL at 08:32

## 2020-09-18 RX ADMIN — MAGNESIUM OXIDE TAB 400 MG (241.3 MG ELEMENTAL MG) SCH TAB: 400 (241.3 MG) TAB at 08:33

## 2020-09-18 RX ADMIN — ISOSORBIDE MONONITRATE SCH MG: 30 TABLET, FILM COATED, EXTENDED RELEASE ORAL at 08:33

## 2020-09-18 RX ADMIN — FUROSEMIDE SCH MG: 20 TABLET ORAL at 08:33

## 2020-09-18 RX ADMIN — METOPROLOL SUCCINATE SCH MG: 25 TABLET, EXTENDED RELEASE ORAL at 08:33

## 2020-09-18 RX ADMIN — SERTRALINE HYDROCHLORIDE SCH MG: 100 TABLET ORAL at 08:33

## 2020-09-18 RX ADMIN — GABAPENTIN SCH MG: 100 CAPSULE ORAL at 12:45

## 2020-09-18 RX ADMIN — ROPINIROLE SCH MG: 0.5 TABLET ORAL at 19:51

## 2020-09-18 RX ADMIN — GABAPENTIN SCH MG: 300 CAPSULE ORAL at 19:49

## 2020-09-18 RX ADMIN — WARFARIN SODIUM SCH MG: 5 TABLET ORAL at 19:51

## 2020-09-19 RX ADMIN — SERTRALINE HYDROCHLORIDE SCH MG: 100 TABLET ORAL at 08:26

## 2020-09-19 RX ADMIN — ROPINIROLE SCH MG: 0.5 TABLET ORAL at 20:01

## 2020-09-19 RX ADMIN — GABAPENTIN SCH MG: 100 CAPSULE ORAL at 12:27

## 2020-09-19 RX ADMIN — ISOSORBIDE MONONITRATE SCH MG: 30 TABLET, FILM COATED, EXTENDED RELEASE ORAL at 08:25

## 2020-09-19 RX ADMIN — GABAPENTIN SCH MG: 300 CAPSULE ORAL at 20:00

## 2020-09-19 RX ADMIN — WARFARIN SODIUM SCH MG: 5 TABLET ORAL at 20:01

## 2020-09-19 RX ADMIN — FUROSEMIDE SCH MG: 20 TABLET ORAL at 08:27

## 2020-09-19 RX ADMIN — GABAPENTIN SCH MG: 100 CAPSULE ORAL at 08:28

## 2020-09-19 RX ADMIN — MAGNESIUM OXIDE TAB 400 MG (241.3 MG ELEMENTAL MG) SCH TAB: 400 (241.3 MG) TAB at 08:25

## 2020-09-19 RX ADMIN — OMEPRAZOLE SCH MG: 20 CAPSULE, DELAYED RELEASE ORAL at 08:25

## 2020-09-19 RX ADMIN — METOPROLOL SUCCINATE SCH MG: 25 TABLET, EXTENDED RELEASE ORAL at 08:26

## 2020-09-20 RX ADMIN — MAGNESIUM OXIDE TAB 400 MG (241.3 MG ELEMENTAL MG) SCH TAB: 400 (241.3 MG) TAB at 09:04

## 2020-09-20 RX ADMIN — OMEPRAZOLE SCH MG: 20 CAPSULE, DELAYED RELEASE ORAL at 09:04

## 2020-09-20 RX ADMIN — ISOSORBIDE MONONITRATE SCH MG: 30 TABLET, FILM COATED, EXTENDED RELEASE ORAL at 09:03

## 2020-09-20 RX ADMIN — WARFARIN SODIUM SCH MG: 5 TABLET ORAL at 19:55

## 2020-09-20 RX ADMIN — METOPROLOL SUCCINATE SCH MG: 25 TABLET, EXTENDED RELEASE ORAL at 09:02

## 2020-09-20 RX ADMIN — ROPINIROLE SCH MG: 0.5 TABLET ORAL at 19:56

## 2020-09-20 RX ADMIN — GABAPENTIN SCH MG: 300 CAPSULE ORAL at 19:56

## 2020-09-20 RX ADMIN — GABAPENTIN SCH MG: 100 CAPSULE ORAL at 09:01

## 2020-09-20 RX ADMIN — GABAPENTIN SCH MG: 100 CAPSULE ORAL at 12:24

## 2020-09-20 RX ADMIN — FUROSEMIDE SCH MG: 20 TABLET ORAL at 09:06

## 2020-09-20 RX ADMIN — SERTRALINE HYDROCHLORIDE SCH MG: 100 TABLET ORAL at 09:02

## 2020-09-21 RX ADMIN — METOPROLOL SUCCINATE SCH MG: 25 TABLET, EXTENDED RELEASE ORAL at 08:56

## 2020-09-21 RX ADMIN — OMEPRAZOLE SCH MG: 20 CAPSULE, DELAYED RELEASE ORAL at 08:57

## 2020-09-21 RX ADMIN — ROPINIROLE SCH MG: 0.5 TABLET ORAL at 21:58

## 2020-09-21 RX ADMIN — GABAPENTIN SCH MG: 100 CAPSULE ORAL at 08:55

## 2020-09-21 RX ADMIN — GABAPENTIN SCH MG: 300 CAPSULE ORAL at 21:57

## 2020-09-21 RX ADMIN — FUROSEMIDE SCH MG: 20 TABLET ORAL at 08:55

## 2020-09-21 RX ADMIN — SERTRALINE HYDROCHLORIDE SCH MG: 100 TABLET ORAL at 08:56

## 2020-09-21 RX ADMIN — ISOSORBIDE MONONITRATE SCH MG: 30 TABLET, FILM COATED, EXTENDED RELEASE ORAL at 08:55

## 2020-09-21 RX ADMIN — MAGNESIUM OXIDE TAB 400 MG (241.3 MG ELEMENTAL MG) SCH TAB: 400 (241.3 MG) TAB at 08:57

## 2020-09-21 RX ADMIN — WARFARIN SODIUM SCH MG: 5 TABLET ORAL at 21:58

## 2020-09-21 RX ADMIN — GABAPENTIN SCH MG: 100 CAPSULE ORAL at 12:19

## 2020-09-22 RX ADMIN — ISOSORBIDE MONONITRATE SCH MG: 30 TABLET, FILM COATED, EXTENDED RELEASE ORAL at 08:45

## 2020-09-22 RX ADMIN — ROPINIROLE SCH MG: 0.5 TABLET ORAL at 20:24

## 2020-09-22 RX ADMIN — SERTRALINE HYDROCHLORIDE SCH MG: 100 TABLET ORAL at 08:46

## 2020-09-22 RX ADMIN — METOPROLOL SUCCINATE SCH MG: 25 TABLET, EXTENDED RELEASE ORAL at 08:46

## 2020-09-22 RX ADMIN — GABAPENTIN SCH MG: 300 CAPSULE ORAL at 20:24

## 2020-09-22 RX ADMIN — OMEPRAZOLE SCH MG: 20 CAPSULE, DELAYED RELEASE ORAL at 08:45

## 2020-09-22 RX ADMIN — FUROSEMIDE SCH MG: 20 TABLET ORAL at 08:45

## 2020-09-22 RX ADMIN — WARFARIN SODIUM SCH MG: 5 TABLET ORAL at 20:24

## 2020-09-22 RX ADMIN — GABAPENTIN SCH MG: 100 CAPSULE ORAL at 12:31

## 2020-09-22 RX ADMIN — GABAPENTIN SCH MG: 100 CAPSULE ORAL at 08:45

## 2020-09-22 RX ADMIN — MAGNESIUM OXIDE TAB 400 MG (241.3 MG ELEMENTAL MG) SCH TAB: 400 (241.3 MG) TAB at 08:45

## 2020-09-23 RX ADMIN — SERTRALINE HYDROCHLORIDE SCH MG: 100 TABLET ORAL at 08:49

## 2020-09-23 RX ADMIN — GABAPENTIN SCH MG: 100 CAPSULE ORAL at 08:48

## 2020-09-23 RX ADMIN — OMEPRAZOLE SCH MG: 20 CAPSULE, DELAYED RELEASE ORAL at 08:49

## 2020-09-23 RX ADMIN — ROPINIROLE SCH MG: 0.5 TABLET ORAL at 20:36

## 2020-09-23 RX ADMIN — METOPROLOL SUCCINATE SCH MG: 25 TABLET, EXTENDED RELEASE ORAL at 08:49

## 2020-09-23 RX ADMIN — WARFARIN SODIUM SCH MG: 5 TABLET ORAL at 20:36

## 2020-09-23 RX ADMIN — GABAPENTIN SCH MG: 300 CAPSULE ORAL at 20:37

## 2020-09-23 RX ADMIN — ISOSORBIDE MONONITRATE SCH MG: 30 TABLET, FILM COATED, EXTENDED RELEASE ORAL at 08:49

## 2020-09-23 RX ADMIN — GABAPENTIN SCH MG: 100 CAPSULE ORAL at 12:42

## 2020-09-23 RX ADMIN — MAGNESIUM OXIDE TAB 400 MG (241.3 MG ELEMENTAL MG) SCH TAB: 400 (241.3 MG) TAB at 08:49

## 2020-09-23 RX ADMIN — FUROSEMIDE SCH MG: 20 TABLET ORAL at 08:49

## 2020-09-24 RX ADMIN — ROPINIROLE SCH MG: 0.5 TABLET ORAL at 20:37

## 2020-09-24 RX ADMIN — FUROSEMIDE SCH MG: 20 TABLET ORAL at 08:29

## 2020-09-24 RX ADMIN — GABAPENTIN SCH MG: 100 CAPSULE ORAL at 08:28

## 2020-09-24 RX ADMIN — ISOSORBIDE MONONITRATE SCH MG: 30 TABLET, FILM COATED, EXTENDED RELEASE ORAL at 08:29

## 2020-09-24 RX ADMIN — METOPROLOL SUCCINATE SCH MG: 25 TABLET, EXTENDED RELEASE ORAL at 08:29

## 2020-09-24 RX ADMIN — OMEPRAZOLE SCH MG: 20 CAPSULE, DELAYED RELEASE ORAL at 08:28

## 2020-09-24 RX ADMIN — GABAPENTIN SCH MG: 100 CAPSULE ORAL at 13:52

## 2020-09-24 RX ADMIN — GABAPENTIN SCH MG: 300 CAPSULE ORAL at 20:36

## 2020-09-24 RX ADMIN — WARFARIN SODIUM SCH MG: 5 TABLET ORAL at 20:37

## 2020-09-24 RX ADMIN — SERTRALINE HYDROCHLORIDE SCH MG: 100 TABLET ORAL at 08:29

## 2020-09-24 RX ADMIN — MAGNESIUM OXIDE TAB 400 MG (241.3 MG ELEMENTAL MG) SCH TAB: 400 (241.3 MG) TAB at 08:29

## 2020-09-25 RX ADMIN — ROPINIROLE SCH MG: 0.5 TABLET ORAL at 19:34

## 2020-09-25 RX ADMIN — MAGNESIUM OXIDE TAB 400 MG (241.3 MG ELEMENTAL MG) SCH TAB: 400 (241.3 MG) TAB at 07:47

## 2020-09-25 RX ADMIN — ISOSORBIDE MONONITRATE SCH MG: 30 TABLET, FILM COATED, EXTENDED RELEASE ORAL at 07:46

## 2020-09-25 RX ADMIN — FUROSEMIDE SCH MG: 20 TABLET ORAL at 07:46

## 2020-09-25 RX ADMIN — GABAPENTIN SCH MG: 100 CAPSULE ORAL at 07:45

## 2020-09-25 RX ADMIN — GABAPENTIN SCH MG: 300 CAPSULE ORAL at 19:35

## 2020-09-25 RX ADMIN — GABAPENTIN SCH MG: 100 CAPSULE ORAL at 12:07

## 2020-09-25 RX ADMIN — METOPROLOL SUCCINATE SCH MG: 25 TABLET, EXTENDED RELEASE ORAL at 07:45

## 2020-09-25 RX ADMIN — WARFARIN SODIUM SCH MG: 5 TABLET ORAL at 19:34

## 2020-09-25 RX ADMIN — SERTRALINE HYDROCHLORIDE SCH MG: 100 TABLET ORAL at 07:45

## 2020-09-25 RX ADMIN — OMEPRAZOLE SCH MG: 20 CAPSULE, DELAYED RELEASE ORAL at 07:47

## 2020-09-26 RX ADMIN — METOPROLOL SUCCINATE SCH MG: 25 TABLET, EXTENDED RELEASE ORAL at 07:37

## 2020-09-26 RX ADMIN — WARFARIN SODIUM SCH MG: 5 TABLET ORAL at 19:40

## 2020-09-26 RX ADMIN — FUROSEMIDE SCH MG: 20 TABLET ORAL at 07:36

## 2020-09-26 RX ADMIN — GABAPENTIN SCH MG: 300 CAPSULE ORAL at 19:40

## 2020-09-26 RX ADMIN — ISOSORBIDE MONONITRATE SCH MG: 30 TABLET, FILM COATED, EXTENDED RELEASE ORAL at 07:37

## 2020-09-26 RX ADMIN — GABAPENTIN SCH MG: 100 CAPSULE ORAL at 12:13

## 2020-09-26 RX ADMIN — OMEPRAZOLE SCH MG: 20 CAPSULE, DELAYED RELEASE ORAL at 07:38

## 2020-09-26 RX ADMIN — ROPINIROLE SCH MG: 0.5 TABLET ORAL at 19:40

## 2020-09-26 RX ADMIN — GABAPENTIN SCH MG: 100 CAPSULE ORAL at 07:36

## 2020-09-26 RX ADMIN — MAGNESIUM OXIDE TAB 400 MG (241.3 MG ELEMENTAL MG) SCH TAB: 400 (241.3 MG) TAB at 07:39

## 2020-09-26 RX ADMIN — SERTRALINE HYDROCHLORIDE SCH MG: 100 TABLET ORAL at 07:37

## 2020-09-27 RX ADMIN — ROPINIROLE SCH MG: 0.5 TABLET ORAL at 19:20

## 2020-09-27 RX ADMIN — GABAPENTIN SCH MG: 300 CAPSULE ORAL at 19:20

## 2020-09-27 RX ADMIN — ISOSORBIDE MONONITRATE SCH MG: 30 TABLET, FILM COATED, EXTENDED RELEASE ORAL at 07:46

## 2020-09-27 RX ADMIN — WARFARIN SODIUM SCH MG: 5 TABLET ORAL at 19:20

## 2020-09-27 RX ADMIN — OMEPRAZOLE SCH MG: 20 CAPSULE, DELAYED RELEASE ORAL at 07:47

## 2020-09-27 RX ADMIN — SERTRALINE HYDROCHLORIDE SCH MG: 100 TABLET ORAL at 07:45

## 2020-09-27 RX ADMIN — GABAPENTIN SCH MG: 100 CAPSULE ORAL at 07:44

## 2020-09-27 RX ADMIN — METOPROLOL SUCCINATE SCH MG: 25 TABLET, EXTENDED RELEASE ORAL at 07:45

## 2020-09-27 RX ADMIN — FUROSEMIDE SCH MG: 20 TABLET ORAL at 07:45

## 2020-09-27 RX ADMIN — GABAPENTIN SCH MG: 100 CAPSULE ORAL at 12:14

## 2020-09-27 RX ADMIN — MAGNESIUM OXIDE TAB 400 MG (241.3 MG ELEMENTAL MG) SCH TAB: 400 (241.3 MG) TAB at 07:47

## 2020-09-28 RX ADMIN — METOPROLOL SUCCINATE SCH MG: 25 TABLET, EXTENDED RELEASE ORAL at 07:50

## 2020-09-28 RX ADMIN — GABAPENTIN SCH MG: 300 CAPSULE ORAL at 19:51

## 2020-09-28 RX ADMIN — FUROSEMIDE SCH MG: 20 TABLET ORAL at 07:50

## 2020-09-28 RX ADMIN — ISOSORBIDE MONONITRATE SCH MG: 30 TABLET, FILM COATED, EXTENDED RELEASE ORAL at 07:49

## 2020-09-28 RX ADMIN — WARFARIN SODIUM SCH MG: 5 TABLET ORAL at 19:21

## 2020-09-28 RX ADMIN — OMEPRAZOLE SCH MG: 20 CAPSULE, DELAYED RELEASE ORAL at 07:51

## 2020-09-28 RX ADMIN — ROPINIROLE SCH MG: 0.5 TABLET ORAL at 19:21

## 2020-09-28 RX ADMIN — GABAPENTIN SCH MG: 100 CAPSULE ORAL at 07:48

## 2020-09-28 RX ADMIN — MAGNESIUM OXIDE TAB 400 MG (241.3 MG ELEMENTAL MG) SCH TAB: 400 (241.3 MG) TAB at 07:51

## 2020-09-28 RX ADMIN — GABAPENTIN SCH MG: 100 CAPSULE ORAL at 12:26

## 2020-09-28 RX ADMIN — SERTRALINE HYDROCHLORIDE SCH MG: 100 TABLET ORAL at 07:50

## 2020-09-29 RX ADMIN — METOPROLOL SUCCINATE SCH MG: 25 TABLET, EXTENDED RELEASE ORAL at 07:09

## 2020-09-29 RX ADMIN — MAGNESIUM OXIDE TAB 400 MG (241.3 MG ELEMENTAL MG) SCH TAB: 400 (241.3 MG) TAB at 07:06

## 2020-09-29 RX ADMIN — SERTRALINE HYDROCHLORIDE SCH: 100 TABLET ORAL at 17:12

## 2020-09-29 RX ADMIN — GABAPENTIN SCH MG: 300 CAPSULE ORAL at 19:40

## 2020-09-29 RX ADMIN — GABAPENTIN SCH: 100 CAPSULE ORAL at 16:40

## 2020-09-29 RX ADMIN — ROPINIROLE SCH MG: 0.5 TABLET ORAL at 19:40

## 2020-09-29 RX ADMIN — GABAPENTIN SCH MG: 100 CAPSULE ORAL at 16:42

## 2020-09-29 RX ADMIN — WARFARIN SODIUM SCH MG: 5 TABLET ORAL at 19:40

## 2020-09-29 RX ADMIN — OMEPRAZOLE SCH MG: 20 CAPSULE, DELAYED RELEASE ORAL at 07:05

## 2020-09-29 RX ADMIN — FUROSEMIDE SCH MG: 20 TABLET ORAL at 07:09

## 2020-09-29 RX ADMIN — ISOSORBIDE MONONITRATE SCH MG: 30 TABLET, FILM COATED, EXTENDED RELEASE ORAL at 07:08

## 2020-09-30 RX ADMIN — GABAPENTIN SCH MG: 100 CAPSULE ORAL at 12:24

## 2020-09-30 RX ADMIN — GABAPENTIN SCH MG: 300 CAPSULE ORAL at 19:12

## 2020-09-30 RX ADMIN — GABAPENTIN SCH MG: 100 CAPSULE ORAL at 08:38

## 2020-09-30 RX ADMIN — ROPINIROLE SCH MG: 0.5 TABLET ORAL at 19:12

## 2020-09-30 RX ADMIN — OMEPRAZOLE SCH MG: 20 CAPSULE, DELAYED RELEASE ORAL at 08:38

## 2020-09-30 RX ADMIN — METOPROLOL SUCCINATE SCH MG: 25 TABLET, EXTENDED RELEASE ORAL at 08:37

## 2020-09-30 RX ADMIN — MAGNESIUM OXIDE TAB 400 MG (241.3 MG ELEMENTAL MG) SCH TAB: 400 (241.3 MG) TAB at 08:39

## 2020-09-30 RX ADMIN — ISOSORBIDE MONONITRATE SCH MG: 30 TABLET, FILM COATED, EXTENDED RELEASE ORAL at 08:37

## 2020-09-30 RX ADMIN — WARFARIN SODIUM SCH MG: 5 TABLET ORAL at 19:13

## 2020-09-30 RX ADMIN — FUROSEMIDE SCH MG: 20 TABLET ORAL at 08:37

## 2020-09-30 RX ADMIN — SERTRALINE HYDROCHLORIDE SCH MG: 100 TABLET ORAL at 08:37

## 2020-10-01 RX ADMIN — ROPINIROLE SCH MG: 0.5 TABLET ORAL at 19:40

## 2020-10-01 RX ADMIN — GABAPENTIN SCH MG: 300 CAPSULE ORAL at 19:39

## 2020-10-01 RX ADMIN — ISOSORBIDE MONONITRATE SCH MG: 30 TABLET, FILM COATED, EXTENDED RELEASE ORAL at 07:41

## 2020-10-01 RX ADMIN — SERTRALINE HYDROCHLORIDE SCH MG: 100 TABLET ORAL at 07:41

## 2020-10-01 RX ADMIN — MAGNESIUM OXIDE TAB 400 MG (241.3 MG ELEMENTAL MG) SCH TAB: 400 (241.3 MG) TAB at 07:43

## 2020-10-01 RX ADMIN — METOPROLOL SUCCINATE SCH MG: 25 TABLET, EXTENDED RELEASE ORAL at 07:41

## 2020-10-01 RX ADMIN — WARFARIN SODIUM SCH MG: 5 TABLET ORAL at 19:41

## 2020-10-01 RX ADMIN — OMEPRAZOLE SCH MG: 20 CAPSULE, DELAYED RELEASE ORAL at 07:43

## 2020-10-01 RX ADMIN — GABAPENTIN SCH MG: 100 CAPSULE ORAL at 13:34

## 2020-10-01 RX ADMIN — GABAPENTIN SCH MG: 100 CAPSULE ORAL at 07:42

## 2020-10-01 RX ADMIN — FUROSEMIDE SCH MG: 20 TABLET ORAL at 07:41

## 2020-10-02 RX ADMIN — ROPINIROLE SCH MG: 0.5 TABLET ORAL at 19:37

## 2020-10-02 RX ADMIN — GABAPENTIN SCH MG: 300 CAPSULE ORAL at 19:36

## 2020-10-02 RX ADMIN — METOPROLOL SUCCINATE SCH MG: 25 TABLET, EXTENDED RELEASE ORAL at 08:16

## 2020-10-02 RX ADMIN — GABAPENTIN SCH MG: 100 CAPSULE ORAL at 08:17

## 2020-10-02 RX ADMIN — FUROSEMIDE SCH MG: 20 TABLET ORAL at 08:16

## 2020-10-02 RX ADMIN — SERTRALINE HYDROCHLORIDE SCH MG: 100 TABLET ORAL at 08:16

## 2020-10-02 RX ADMIN — GABAPENTIN SCH MG: 100 CAPSULE ORAL at 13:42

## 2020-10-02 RX ADMIN — MAGNESIUM OXIDE TAB 400 MG (241.3 MG ELEMENTAL MG) SCH TAB: 400 (241.3 MG) TAB at 08:16

## 2020-10-02 RX ADMIN — ISOSORBIDE MONONITRATE SCH MG: 30 TABLET, FILM COATED, EXTENDED RELEASE ORAL at 08:17

## 2020-10-02 RX ADMIN — OMEPRAZOLE SCH MG: 20 CAPSULE, DELAYED RELEASE ORAL at 08:16

## 2020-10-02 RX ADMIN — WARFARIN SODIUM SCH MG: 5 TABLET ORAL at 19:37

## 2020-10-03 RX ADMIN — ROPINIROLE SCH MG: 0.5 TABLET ORAL at 19:47

## 2020-10-03 RX ADMIN — GABAPENTIN SCH MG: 300 CAPSULE ORAL at 19:46

## 2020-10-03 RX ADMIN — SERTRALINE HYDROCHLORIDE SCH MG: 100 TABLET ORAL at 08:27

## 2020-10-03 RX ADMIN — OMEPRAZOLE SCH MG: 20 CAPSULE, DELAYED RELEASE ORAL at 08:27

## 2020-10-03 RX ADMIN — MAGNESIUM OXIDE TAB 400 MG (241.3 MG ELEMENTAL MG) SCH TAB: 400 (241.3 MG) TAB at 08:27

## 2020-10-03 RX ADMIN — METOPROLOL SUCCINATE SCH MG: 25 TABLET, EXTENDED RELEASE ORAL at 08:27

## 2020-10-03 RX ADMIN — FUROSEMIDE SCH MG: 20 TABLET ORAL at 08:27

## 2020-10-03 RX ADMIN — WARFARIN SODIUM SCH MG: 5 TABLET ORAL at 19:48

## 2020-10-03 RX ADMIN — ISOSORBIDE MONONITRATE SCH MG: 30 TABLET, FILM COATED, EXTENDED RELEASE ORAL at 08:27

## 2020-10-03 RX ADMIN — GABAPENTIN SCH MG: 100 CAPSULE ORAL at 13:23

## 2020-10-03 RX ADMIN — GABAPENTIN SCH MG: 100 CAPSULE ORAL at 08:27

## 2020-10-04 RX ADMIN — OMEPRAZOLE SCH MG: 20 CAPSULE, DELAYED RELEASE ORAL at 07:42

## 2020-10-04 RX ADMIN — ISOSORBIDE MONONITRATE SCH MG: 30 TABLET, FILM COATED, EXTENDED RELEASE ORAL at 07:42

## 2020-10-04 RX ADMIN — MAGNESIUM OXIDE TAB 400 MG (241.3 MG ELEMENTAL MG) SCH TAB: 400 (241.3 MG) TAB at 07:43

## 2020-10-04 RX ADMIN — GABAPENTIN SCH MG: 300 CAPSULE ORAL at 20:44

## 2020-10-04 RX ADMIN — GABAPENTIN SCH MG: 100 CAPSULE ORAL at 07:42

## 2020-10-04 RX ADMIN — METOPROLOL SUCCINATE SCH MG: 25 TABLET, EXTENDED RELEASE ORAL at 07:42

## 2020-10-04 RX ADMIN — ROPINIROLE SCH MG: 0.5 TABLET ORAL at 20:44

## 2020-10-04 RX ADMIN — GABAPENTIN SCH MG: 100 CAPSULE ORAL at 12:13

## 2020-10-04 RX ADMIN — WARFARIN SODIUM SCH MG: 5 TABLET ORAL at 20:43

## 2020-10-04 RX ADMIN — FUROSEMIDE SCH MG: 20 TABLET ORAL at 07:42

## 2020-10-04 RX ADMIN — SERTRALINE HYDROCHLORIDE SCH MG: 100 TABLET ORAL at 07:42

## 2020-10-05 RX ADMIN — WARFARIN SODIUM SCH MG: 5 TABLET ORAL at 20:29

## 2020-10-05 RX ADMIN — GABAPENTIN SCH MG: 100 CAPSULE ORAL at 09:25

## 2020-10-05 RX ADMIN — OMEPRAZOLE SCH MG: 20 CAPSULE, DELAYED RELEASE ORAL at 09:26

## 2020-10-05 RX ADMIN — MAGNESIUM OXIDE TAB 400 MG (241.3 MG ELEMENTAL MG) SCH TAB: 400 (241.3 MG) TAB at 09:26

## 2020-10-05 RX ADMIN — ISOSORBIDE MONONITRATE SCH MG: 30 TABLET, FILM COATED, EXTENDED RELEASE ORAL at 09:25

## 2020-10-05 RX ADMIN — METOPROLOL SUCCINATE SCH MG: 25 TABLET, EXTENDED RELEASE ORAL at 09:25

## 2020-10-05 RX ADMIN — SERTRALINE HYDROCHLORIDE SCH MG: 100 TABLET ORAL at 09:25

## 2020-10-05 RX ADMIN — GABAPENTIN SCH MG: 300 CAPSULE ORAL at 20:27

## 2020-10-05 RX ADMIN — GABAPENTIN SCH MG: 100 CAPSULE ORAL at 13:22

## 2020-10-05 RX ADMIN — ROPINIROLE SCH MG: 0.5 TABLET ORAL at 20:28

## 2020-10-05 RX ADMIN — FUROSEMIDE SCH MG: 20 TABLET ORAL at 09:25

## 2020-10-06 RX ADMIN — GABAPENTIN SCH MG: 100 CAPSULE ORAL at 08:25

## 2020-10-06 RX ADMIN — MAGNESIUM OXIDE TAB 400 MG (241.3 MG ELEMENTAL MG) SCH TAB: 400 (241.3 MG) TAB at 08:23

## 2020-10-06 RX ADMIN — ISOSORBIDE MONONITRATE SCH MG: 30 TABLET, FILM COATED, EXTENDED RELEASE ORAL at 08:23

## 2020-10-06 RX ADMIN — ROPINIROLE SCH MG: 0.5 TABLET ORAL at 19:55

## 2020-10-06 RX ADMIN — WARFARIN SODIUM SCH MG: 5 TABLET ORAL at 19:55

## 2020-10-06 RX ADMIN — METOPROLOL SUCCINATE SCH MG: 25 TABLET, EXTENDED RELEASE ORAL at 08:23

## 2020-10-06 RX ADMIN — OMEPRAZOLE SCH MG: 20 CAPSULE, DELAYED RELEASE ORAL at 08:24

## 2020-10-06 RX ADMIN — GABAPENTIN SCH MG: 300 CAPSULE ORAL at 19:55

## 2020-10-06 RX ADMIN — GABAPENTIN SCH MG: 100 CAPSULE ORAL at 12:32

## 2020-10-06 RX ADMIN — SERTRALINE HYDROCHLORIDE SCH MG: 100 TABLET ORAL at 08:23

## 2020-10-06 RX ADMIN — FUROSEMIDE SCH MG: 20 TABLET ORAL at 08:23

## 2020-10-07 RX ADMIN — GABAPENTIN SCH MG: 100 CAPSULE ORAL at 08:38

## 2020-10-07 RX ADMIN — ROPINIROLE SCH MG: 0.5 TABLET ORAL at 19:45

## 2020-10-07 RX ADMIN — ISOSORBIDE MONONITRATE SCH MG: 30 TABLET, FILM COATED, EXTENDED RELEASE ORAL at 08:38

## 2020-10-07 RX ADMIN — SERTRALINE HYDROCHLORIDE SCH MG: 100 TABLET ORAL at 08:38

## 2020-10-07 RX ADMIN — GABAPENTIN SCH MG: 300 CAPSULE ORAL at 19:42

## 2020-10-07 RX ADMIN — WARFARIN SODIUM SCH MG: 5 TABLET ORAL at 19:46

## 2020-10-07 RX ADMIN — GABAPENTIN SCH MG: 100 CAPSULE ORAL at 14:08

## 2020-10-07 RX ADMIN — MAGNESIUM OXIDE TAB 400 MG (241.3 MG ELEMENTAL MG) SCH TAB: 400 (241.3 MG) TAB at 08:39

## 2020-10-07 RX ADMIN — METOPROLOL SUCCINATE SCH MG: 25 TABLET, EXTENDED RELEASE ORAL at 08:38

## 2020-10-07 RX ADMIN — OMEPRAZOLE SCH MG: 20 CAPSULE, DELAYED RELEASE ORAL at 08:39

## 2020-10-07 RX ADMIN — FUROSEMIDE SCH MG: 20 TABLET ORAL at 08:38

## 2020-10-08 RX ADMIN — ISOSORBIDE MONONITRATE SCH MG: 30 TABLET, FILM COATED, EXTENDED RELEASE ORAL at 10:18

## 2020-10-08 RX ADMIN — GABAPENTIN SCH MG: 100 CAPSULE ORAL at 10:16

## 2020-10-08 RX ADMIN — ROPINIROLE SCH MG: 0.5 TABLET ORAL at 19:41

## 2020-10-08 RX ADMIN — OMEPRAZOLE SCH MG: 20 CAPSULE, DELAYED RELEASE ORAL at 10:19

## 2020-10-08 RX ADMIN — MAGNESIUM OXIDE TAB 400 MG (241.3 MG ELEMENTAL MG) SCH TAB: 400 (241.3 MG) TAB at 10:19

## 2020-10-08 RX ADMIN — SERTRALINE HYDROCHLORIDE SCH MG: 100 TABLET ORAL at 10:17

## 2020-10-08 RX ADMIN — GABAPENTIN SCH MG: 300 CAPSULE ORAL at 19:41

## 2020-10-08 RX ADMIN — FUROSEMIDE SCH MG: 20 TABLET ORAL at 10:18

## 2020-10-08 RX ADMIN — WARFARIN SODIUM SCH MG: 5 TABLET ORAL at 19:41

## 2020-10-08 RX ADMIN — GABAPENTIN SCH MG: 100 CAPSULE ORAL at 13:23

## 2020-10-08 RX ADMIN — METOPROLOL SUCCINATE SCH MG: 25 TABLET, EXTENDED RELEASE ORAL at 10:17

## 2020-10-09 RX ADMIN — METOPROLOL SUCCINATE SCH MG: 25 TABLET, EXTENDED RELEASE ORAL at 08:13

## 2020-10-09 RX ADMIN — ISOSORBIDE MONONITRATE SCH MG: 30 TABLET, FILM COATED, EXTENDED RELEASE ORAL at 08:13

## 2020-10-09 RX ADMIN — OMEPRAZOLE SCH MG: 20 CAPSULE, DELAYED RELEASE ORAL at 08:14

## 2020-10-09 RX ADMIN — MAGNESIUM OXIDE TAB 400 MG (241.3 MG ELEMENTAL MG) SCH TAB: 400 (241.3 MG) TAB at 08:14

## 2020-10-09 RX ADMIN — ROPINIROLE SCH MG: 0.5 TABLET ORAL at 19:56

## 2020-10-09 RX ADMIN — GABAPENTIN SCH MG: 100 CAPSULE ORAL at 08:12

## 2020-10-09 RX ADMIN — WARFARIN SODIUM SCH MG: 5 TABLET ORAL at 19:56

## 2020-10-09 RX ADMIN — GABAPENTIN SCH MG: 300 CAPSULE ORAL at 19:56

## 2020-10-09 RX ADMIN — SERTRALINE HYDROCHLORIDE SCH MG: 100 TABLET ORAL at 08:13

## 2020-10-09 RX ADMIN — GABAPENTIN SCH MG: 100 CAPSULE ORAL at 12:20

## 2020-10-09 RX ADMIN — FUROSEMIDE SCH MG: 20 TABLET ORAL at 08:13

## 2020-10-10 RX ADMIN — OMEPRAZOLE SCH MG: 20 CAPSULE, DELAYED RELEASE ORAL at 06:39

## 2020-10-10 RX ADMIN — METOPROLOL SUCCINATE SCH MG: 25 TABLET, EXTENDED RELEASE ORAL at 08:51

## 2020-10-10 RX ADMIN — GABAPENTIN SCH MG: 100 CAPSULE ORAL at 08:51

## 2020-10-10 RX ADMIN — FUROSEMIDE SCH MG: 20 TABLET ORAL at 08:51

## 2020-10-10 RX ADMIN — MAGNESIUM OXIDE TAB 400 MG (241.3 MG ELEMENTAL MG) SCH TAB: 400 (241.3 MG) TAB at 08:52

## 2020-10-10 RX ADMIN — GABAPENTIN SCH MG: 100 CAPSULE ORAL at 12:27

## 2020-10-10 RX ADMIN — SERTRALINE HYDROCHLORIDE SCH MG: 100 TABLET ORAL at 08:51

## 2020-10-10 RX ADMIN — ISOSORBIDE MONONITRATE SCH MG: 30 TABLET, FILM COATED, EXTENDED RELEASE ORAL at 08:51

## 2020-10-10 RX ADMIN — GABAPENTIN SCH MG: 300 CAPSULE ORAL at 20:28

## 2020-10-10 RX ADMIN — WARFARIN SODIUM SCH MG: 5 TABLET ORAL at 20:29

## 2020-10-10 RX ADMIN — ROPINIROLE SCH MG: 0.5 TABLET ORAL at 20:29

## 2020-10-11 RX ADMIN — ISOSORBIDE MONONITRATE SCH MG: 30 TABLET, FILM COATED, EXTENDED RELEASE ORAL at 09:04

## 2020-10-11 RX ADMIN — OMEPRAZOLE SCH MG: 20 CAPSULE, DELAYED RELEASE ORAL at 06:07

## 2020-10-11 RX ADMIN — METOPROLOL SUCCINATE SCH MG: 25 TABLET, EXTENDED RELEASE ORAL at 09:04

## 2020-10-11 RX ADMIN — GABAPENTIN SCH MG: 100 CAPSULE ORAL at 12:53

## 2020-10-11 RX ADMIN — MAGNESIUM OXIDE TAB 400 MG (241.3 MG ELEMENTAL MG) SCH TAB: 400 (241.3 MG) TAB at 09:07

## 2020-10-11 RX ADMIN — WARFARIN SODIUM SCH MG: 5 TABLET ORAL at 19:57

## 2020-10-11 RX ADMIN — ROPINIROLE SCH MG: 0.5 TABLET ORAL at 19:57

## 2020-10-11 RX ADMIN — FUROSEMIDE SCH MG: 20 TABLET ORAL at 09:04

## 2020-10-11 RX ADMIN — GABAPENTIN SCH MG: 300 CAPSULE ORAL at 19:57

## 2020-10-11 RX ADMIN — SERTRALINE HYDROCHLORIDE SCH MG: 100 TABLET ORAL at 09:04

## 2020-10-11 RX ADMIN — GABAPENTIN SCH MG: 100 CAPSULE ORAL at 09:05

## 2020-10-12 RX ADMIN — GABAPENTIN SCH MG: 100 CAPSULE ORAL at 12:17

## 2020-10-12 RX ADMIN — GABAPENTIN SCH MG: 100 CAPSULE ORAL at 08:09

## 2020-10-12 RX ADMIN — GABAPENTIN SCH MG: 300 CAPSULE ORAL at 20:01

## 2020-10-12 RX ADMIN — ROPINIROLE SCH MG: 0.5 TABLET ORAL at 20:02

## 2020-10-12 RX ADMIN — METOPROLOL SUCCINATE SCH MG: 25 TABLET, EXTENDED RELEASE ORAL at 08:09

## 2020-10-12 RX ADMIN — SERTRALINE HYDROCHLORIDE SCH MG: 100 TABLET ORAL at 08:08

## 2020-10-12 RX ADMIN — ISOSORBIDE MONONITRATE SCH MG: 30 TABLET, FILM COATED, EXTENDED RELEASE ORAL at 08:09

## 2020-10-12 RX ADMIN — MAGNESIUM OXIDE TAB 400 MG (241.3 MG ELEMENTAL MG) SCH TAB: 400 (241.3 MG) TAB at 08:10

## 2020-10-12 RX ADMIN — OMEPRAZOLE SCH MG: 20 CAPSULE, DELAYED RELEASE ORAL at 06:08

## 2020-10-12 RX ADMIN — FUROSEMIDE SCH MG: 20 TABLET ORAL at 08:08

## 2020-10-12 RX ADMIN — WARFARIN SODIUM SCH MG: 5 TABLET ORAL at 20:02

## 2020-10-13 RX ADMIN — GABAPENTIN SCH MG: 300 CAPSULE ORAL at 19:52

## 2020-10-13 RX ADMIN — SERTRALINE HYDROCHLORIDE SCH MG: 100 TABLET ORAL at 08:10

## 2020-10-13 RX ADMIN — GABAPENTIN SCH MG: 100 CAPSULE ORAL at 08:10

## 2020-10-13 RX ADMIN — MAGNESIUM OXIDE TAB 400 MG (241.3 MG ELEMENTAL MG) SCH TAB: 400 (241.3 MG) TAB at 08:10

## 2020-10-13 RX ADMIN — ROPINIROLE SCH MG: 0.5 TABLET ORAL at 19:53

## 2020-10-13 RX ADMIN — GABAPENTIN SCH MG: 100 CAPSULE ORAL at 12:32

## 2020-10-13 RX ADMIN — FUROSEMIDE SCH MG: 20 TABLET ORAL at 08:10

## 2020-10-13 RX ADMIN — ISOSORBIDE MONONITRATE SCH MG: 30 TABLET, FILM COATED, EXTENDED RELEASE ORAL at 08:10

## 2020-10-13 RX ADMIN — OMEPRAZOLE SCH MG: 20 CAPSULE, DELAYED RELEASE ORAL at 06:53

## 2020-10-13 RX ADMIN — WARFARIN SODIUM SCH MG: 5 TABLET ORAL at 19:53

## 2020-10-13 RX ADMIN — METOPROLOL SUCCINATE SCH MG: 25 TABLET, EXTENDED RELEASE ORAL at 08:10

## 2020-10-14 RX ADMIN — ROPINIROLE SCH MG: 0.5 TABLET ORAL at 20:13

## 2020-10-14 RX ADMIN — GABAPENTIN SCH MG: 100 CAPSULE ORAL at 08:01

## 2020-10-14 RX ADMIN — SERTRALINE HYDROCHLORIDE SCH MG: 100 TABLET ORAL at 08:02

## 2020-10-14 RX ADMIN — GABAPENTIN SCH MG: 100 CAPSULE ORAL at 13:51

## 2020-10-14 RX ADMIN — GABAPENTIN SCH MG: 300 CAPSULE ORAL at 20:12

## 2020-10-14 RX ADMIN — MAGNESIUM OXIDE TAB 400 MG (241.3 MG ELEMENTAL MG) SCH TAB: 400 (241.3 MG) TAB at 08:02

## 2020-10-14 RX ADMIN — METOPROLOL SUCCINATE SCH MG: 25 TABLET, EXTENDED RELEASE ORAL at 08:01

## 2020-10-14 RX ADMIN — WARFARIN SODIUM SCH MG: 5 TABLET ORAL at 20:12

## 2020-10-14 RX ADMIN — FUROSEMIDE SCH MG: 20 TABLET ORAL at 08:01

## 2020-10-14 RX ADMIN — OMEPRAZOLE SCH MG: 20 CAPSULE, DELAYED RELEASE ORAL at 06:46

## 2020-10-14 RX ADMIN — ISOSORBIDE MONONITRATE SCH MG: 30 TABLET, FILM COATED, EXTENDED RELEASE ORAL at 08:02

## 2020-10-15 RX ADMIN — METOPROLOL SUCCINATE SCH MG: 25 TABLET, EXTENDED RELEASE ORAL at 08:31

## 2020-10-15 RX ADMIN — SERTRALINE HYDROCHLORIDE SCH MG: 100 TABLET ORAL at 08:31

## 2020-10-15 RX ADMIN — OMEPRAZOLE SCH MG: 20 CAPSULE, DELAYED RELEASE ORAL at 08:32

## 2020-10-15 RX ADMIN — MAGNESIUM OXIDE TAB 400 MG (241.3 MG ELEMENTAL MG) SCH TAB: 400 (241.3 MG) TAB at 08:32

## 2020-10-15 RX ADMIN — WARFARIN SODIUM SCH MG: 5 TABLET ORAL at 19:48

## 2020-10-15 RX ADMIN — FUROSEMIDE SCH MG: 20 TABLET ORAL at 08:30

## 2020-10-15 RX ADMIN — ISOSORBIDE MONONITRATE SCH MG: 30 TABLET, FILM COATED, EXTENDED RELEASE ORAL at 08:30

## 2020-10-15 RX ADMIN — GABAPENTIN SCH MG: 100 CAPSULE ORAL at 08:29

## 2020-10-15 RX ADMIN — GABAPENTIN SCH MG: 300 CAPSULE ORAL at 19:47

## 2020-10-15 RX ADMIN — GABAPENTIN SCH MG: 100 CAPSULE ORAL at 12:05

## 2020-10-15 RX ADMIN — ROPINIROLE SCH MG: 0.5 TABLET ORAL at 19:49

## 2020-10-16 RX ADMIN — ROPINIROLE SCH MG: 0.5 TABLET ORAL at 19:41

## 2020-10-16 RX ADMIN — ISOSORBIDE MONONITRATE SCH MG: 30 TABLET, FILM COATED, EXTENDED RELEASE ORAL at 09:34

## 2020-10-16 RX ADMIN — SERTRALINE HYDROCHLORIDE SCH MG: 100 TABLET ORAL at 09:34

## 2020-10-16 RX ADMIN — GABAPENTIN SCH MG: 100 CAPSULE ORAL at 09:33

## 2020-10-16 RX ADMIN — MAGNESIUM OXIDE TAB 400 MG (241.3 MG ELEMENTAL MG) SCH TAB: 400 (241.3 MG) TAB at 09:34

## 2020-10-16 RX ADMIN — METOPROLOL SUCCINATE SCH MG: 25 TABLET, EXTENDED RELEASE ORAL at 09:34

## 2020-10-16 RX ADMIN — FUROSEMIDE SCH MG: 20 TABLET ORAL at 09:34

## 2020-10-16 RX ADMIN — GABAPENTIN SCH MG: 100 CAPSULE ORAL at 12:45

## 2020-10-16 RX ADMIN — OMEPRAZOLE SCH MG: 20 CAPSULE, DELAYED RELEASE ORAL at 06:08

## 2020-10-16 RX ADMIN — WARFARIN SODIUM SCH MG: 5 TABLET ORAL at 19:40

## 2020-10-16 RX ADMIN — GABAPENTIN SCH MG: 300 CAPSULE ORAL at 19:40

## 2020-10-17 RX ADMIN — GABAPENTIN SCH MG: 100 CAPSULE ORAL at 12:33

## 2020-10-17 RX ADMIN — ROPINIROLE SCH MG: 0.5 TABLET ORAL at 19:32

## 2020-10-17 RX ADMIN — GABAPENTIN SCH MG: 300 CAPSULE ORAL at 19:31

## 2020-10-17 RX ADMIN — METOPROLOL SUCCINATE SCH MG: 25 TABLET, EXTENDED RELEASE ORAL at 07:51

## 2020-10-17 RX ADMIN — FUROSEMIDE SCH MG: 20 TABLET ORAL at 07:53

## 2020-10-17 RX ADMIN — GABAPENTIN SCH MG: 100 CAPSULE ORAL at 07:50

## 2020-10-17 RX ADMIN — WARFARIN SODIUM SCH MG: 5 TABLET ORAL at 19:32

## 2020-10-17 RX ADMIN — ISOSORBIDE MONONITRATE SCH MG: 30 TABLET, FILM COATED, EXTENDED RELEASE ORAL at 07:50

## 2020-10-17 RX ADMIN — OMEPRAZOLE SCH MG: 20 CAPSULE, DELAYED RELEASE ORAL at 06:05

## 2020-10-17 RX ADMIN — SERTRALINE HYDROCHLORIDE SCH MG: 100 TABLET ORAL at 07:51

## 2020-10-17 RX ADMIN — MAGNESIUM OXIDE TAB 400 MG (241.3 MG ELEMENTAL MG) SCH TAB: 400 (241.3 MG) TAB at 07:51

## 2020-10-18 LAB
ANION GAP SERPL CALC-SCNC: 8.7 MMOL/L (ref 10–20)
CHLORIDE SERPL-SCNC: 105 MMOL/L (ref 98–107)
SODIUM SERPL-SCNC: 143 MMOL/L (ref 136–145)

## 2020-10-18 RX ADMIN — FUROSEMIDE SCH MG: 20 TABLET ORAL at 09:01

## 2020-10-18 RX ADMIN — METOPROLOL SUCCINATE SCH MG: 25 TABLET, EXTENDED RELEASE ORAL at 09:01

## 2020-10-18 RX ADMIN — WARFARIN SODIUM SCH MG: 5 TABLET ORAL at 20:10

## 2020-10-18 RX ADMIN — GABAPENTIN SCH MG: 100 CAPSULE ORAL at 08:58

## 2020-10-18 RX ADMIN — GABAPENTIN SCH MG: 300 CAPSULE ORAL at 20:10

## 2020-10-18 RX ADMIN — MAGNESIUM OXIDE TAB 400 MG (241.3 MG ELEMENTAL MG) SCH TAB: 400 (241.3 MG) TAB at 09:01

## 2020-10-18 RX ADMIN — ISOSORBIDE MONONITRATE SCH MG: 30 TABLET, FILM COATED, EXTENDED RELEASE ORAL at 09:00

## 2020-10-18 RX ADMIN — SERTRALINE HYDROCHLORIDE SCH MG: 100 TABLET ORAL at 09:01

## 2020-10-18 RX ADMIN — OMEPRAZOLE SCH MG: 20 CAPSULE, DELAYED RELEASE ORAL at 06:27

## 2020-10-18 RX ADMIN — GABAPENTIN SCH MG: 100 CAPSULE ORAL at 12:35

## 2020-10-18 RX ADMIN — ROPINIROLE SCH MG: 0.5 TABLET ORAL at 20:10

## 2020-10-18 NOTE — PCM.SN.2
- Free Text/Narrative


Note: 


Contacted by Pharmacy to recheck CMP for liver and kidney function for 

medication management. Will order CMP today.

## 2020-10-18 NOTE — PCM.SN.2
- Free Text/Narrative


Note: 


UA completed for UTI symptoms.  Will start patient on Macrobid 100 mg BID for 7 

days per ACOG guidelines. Await C/S results.

## 2020-10-19 RX ADMIN — ROPINIROLE SCH MG: 0.5 TABLET ORAL at 20:29

## 2020-10-19 RX ADMIN — FUROSEMIDE SCH MG: 20 TABLET ORAL at 08:51

## 2020-10-19 RX ADMIN — GABAPENTIN SCH MG: 100 CAPSULE ORAL at 12:17

## 2020-10-19 RX ADMIN — METOPROLOL SUCCINATE SCH MG: 25 TABLET, EXTENDED RELEASE ORAL at 08:50

## 2020-10-19 RX ADMIN — WARFARIN SODIUM SCH MG: 5 TABLET ORAL at 20:29

## 2020-10-19 RX ADMIN — ISOSORBIDE MONONITRATE SCH MG: 30 TABLET, FILM COATED, EXTENDED RELEASE ORAL at 08:51

## 2020-10-19 RX ADMIN — MAGNESIUM OXIDE TAB 400 MG (241.3 MG ELEMENTAL MG) SCH TAB: 400 (241.3 MG) TAB at 08:52

## 2020-10-19 RX ADMIN — GABAPENTIN SCH MG: 100 CAPSULE ORAL at 08:49

## 2020-10-19 RX ADMIN — SERTRALINE HYDROCHLORIDE SCH MG: 100 TABLET ORAL at 08:50

## 2020-10-19 RX ADMIN — GABAPENTIN SCH MG: 300 CAPSULE ORAL at 20:26

## 2020-10-19 RX ADMIN — OMEPRAZOLE SCH MG: 20 CAPSULE, DELAYED RELEASE ORAL at 06:01

## 2020-10-20 RX ADMIN — WARFARIN SODIUM SCH MG: 5 TABLET ORAL at 19:40

## 2020-10-20 RX ADMIN — GABAPENTIN SCH MG: 100 CAPSULE ORAL at 13:11

## 2020-10-20 RX ADMIN — ISOSORBIDE MONONITRATE SCH MG: 30 TABLET, FILM COATED, EXTENDED RELEASE ORAL at 08:31

## 2020-10-20 RX ADMIN — FUROSEMIDE SCH MG: 20 TABLET ORAL at 08:31

## 2020-10-20 RX ADMIN — METOPROLOL SUCCINATE SCH MG: 25 TABLET, EXTENDED RELEASE ORAL at 08:32

## 2020-10-20 RX ADMIN — MAGNESIUM OXIDE TAB 400 MG (241.3 MG ELEMENTAL MG) SCH TAB: 400 (241.3 MG) TAB at 08:33

## 2020-10-20 RX ADMIN — GABAPENTIN SCH MG: 300 CAPSULE ORAL at 19:38

## 2020-10-20 RX ADMIN — OMEPRAZOLE SCH MG: 20 CAPSULE, DELAYED RELEASE ORAL at 06:30

## 2020-10-20 RX ADMIN — ROPINIROLE SCH MG: 0.5 TABLET ORAL at 19:40

## 2020-10-20 RX ADMIN — GABAPENTIN SCH MG: 100 CAPSULE ORAL at 08:34

## 2020-10-20 RX ADMIN — SERTRALINE HYDROCHLORIDE SCH MG: 100 TABLET ORAL at 08:33

## 2020-10-21 RX ADMIN — GABAPENTIN SCH MG: 100 CAPSULE ORAL at 08:39

## 2020-10-21 RX ADMIN — SERTRALINE HYDROCHLORIDE SCH MG: 100 TABLET ORAL at 08:40

## 2020-10-21 RX ADMIN — ISOSORBIDE MONONITRATE SCH MG: 30 TABLET, FILM COATED, EXTENDED RELEASE ORAL at 08:39

## 2020-10-21 RX ADMIN — METOPROLOL SUCCINATE SCH MG: 25 TABLET, EXTENDED RELEASE ORAL at 08:40

## 2020-10-21 RX ADMIN — GABAPENTIN SCH MG: 300 CAPSULE ORAL at 21:07

## 2020-10-21 RX ADMIN — GABAPENTIN SCH MG: 100 CAPSULE ORAL at 12:43

## 2020-10-21 RX ADMIN — OMEPRAZOLE SCH MG: 20 CAPSULE, DELAYED RELEASE ORAL at 06:27

## 2020-10-21 RX ADMIN — ROPINIROLE SCH MG: 0.5 TABLET ORAL at 21:07

## 2020-10-21 RX ADMIN — WARFARIN SODIUM SCH MG: 5 TABLET ORAL at 21:07

## 2020-10-21 RX ADMIN — FUROSEMIDE SCH MG: 20 TABLET ORAL at 08:39

## 2020-10-21 RX ADMIN — MAGNESIUM OXIDE TAB 400 MG (241.3 MG ELEMENTAL MG) SCH TAB: 400 (241.3 MG) TAB at 08:41

## 2020-10-22 RX ADMIN — ISOSORBIDE MONONITRATE SCH MG: 30 TABLET, FILM COATED, EXTENDED RELEASE ORAL at 08:38

## 2020-10-22 RX ADMIN — SERTRALINE HYDROCHLORIDE SCH MG: 100 TABLET ORAL at 08:36

## 2020-10-22 RX ADMIN — OMEPRAZOLE SCH MG: 20 CAPSULE, DELAYED RELEASE ORAL at 06:12

## 2020-10-22 RX ADMIN — WARFARIN SODIUM SCH MG: 5 TABLET ORAL at 19:23

## 2020-10-22 RX ADMIN — FUROSEMIDE SCH MG: 20 TABLET ORAL at 08:36

## 2020-10-22 RX ADMIN — GABAPENTIN SCH MG: 100 CAPSULE ORAL at 08:34

## 2020-10-22 RX ADMIN — ROPINIROLE SCH MG: 0.5 TABLET ORAL at 19:23

## 2020-10-22 RX ADMIN — MAGNESIUM OXIDE TAB 400 MG (241.3 MG ELEMENTAL MG) SCH TAB: 400 (241.3 MG) TAB at 08:35

## 2020-10-22 RX ADMIN — METOPROLOL SUCCINATE SCH MG: 25 TABLET, EXTENDED RELEASE ORAL at 08:37

## 2020-10-22 RX ADMIN — GABAPENTIN SCH MG: 100 CAPSULE ORAL at 12:36

## 2020-10-22 RX ADMIN — GABAPENTIN SCH MG: 300 CAPSULE ORAL at 19:24

## 2020-10-23 RX ADMIN — ROPINIROLE SCH MG: 0.5 TABLET ORAL at 19:14

## 2020-10-23 RX ADMIN — OMEPRAZOLE SCH MG: 20 CAPSULE, DELAYED RELEASE ORAL at 07:07

## 2020-10-23 RX ADMIN — GABAPENTIN SCH MG: 100 CAPSULE ORAL at 07:49

## 2020-10-23 RX ADMIN — METOPROLOL SUCCINATE SCH MG: 25 TABLET, EXTENDED RELEASE ORAL at 07:49

## 2020-10-23 RX ADMIN — WARFARIN SODIUM SCH MG: 5 TABLET ORAL at 19:14

## 2020-10-23 RX ADMIN — FUROSEMIDE SCH MG: 20 TABLET ORAL at 07:49

## 2020-10-23 RX ADMIN — SERTRALINE HYDROCHLORIDE SCH MG: 100 TABLET ORAL at 07:49

## 2020-10-23 RX ADMIN — ISOSORBIDE MONONITRATE SCH MG: 30 TABLET, FILM COATED, EXTENDED RELEASE ORAL at 07:49

## 2020-10-23 RX ADMIN — GABAPENTIN SCH MG: 300 CAPSULE ORAL at 19:14

## 2020-10-23 RX ADMIN — GABAPENTIN SCH MG: 100 CAPSULE ORAL at 12:34

## 2020-10-23 RX ADMIN — MAGNESIUM OXIDE TAB 400 MG (241.3 MG ELEMENTAL MG) SCH TAB: 400 (241.3 MG) TAB at 07:49

## 2020-10-24 RX ADMIN — FUROSEMIDE SCH MG: 20 TABLET ORAL at 08:37

## 2020-10-24 RX ADMIN — ISOSORBIDE MONONITRATE SCH MG: 30 TABLET, FILM COATED, EXTENDED RELEASE ORAL at 08:38

## 2020-10-24 RX ADMIN — METOPROLOL SUCCINATE SCH MG: 25 TABLET, EXTENDED RELEASE ORAL at 08:38

## 2020-10-24 RX ADMIN — GABAPENTIN SCH MG: 300 CAPSULE ORAL at 19:23

## 2020-10-24 RX ADMIN — WARFARIN SODIUM SCH MG: 5 TABLET ORAL at 19:24

## 2020-10-24 RX ADMIN — ROPINIROLE SCH MG: 0.5 TABLET ORAL at 19:24

## 2020-10-24 RX ADMIN — SERTRALINE HYDROCHLORIDE SCH MG: 100 TABLET ORAL at 08:38

## 2020-10-24 RX ADMIN — MAGNESIUM OXIDE TAB 400 MG (241.3 MG ELEMENTAL MG) SCH TAB: 400 (241.3 MG) TAB at 08:37

## 2020-10-24 RX ADMIN — GABAPENTIN SCH MG: 100 CAPSULE ORAL at 08:37

## 2020-10-24 RX ADMIN — GABAPENTIN SCH MG: 100 CAPSULE ORAL at 12:21

## 2020-10-24 RX ADMIN — OMEPRAZOLE SCH MG: 20 CAPSULE, DELAYED RELEASE ORAL at 06:34

## 2020-10-25 RX ADMIN — WARFARIN SODIUM SCH MG: 5 TABLET ORAL at 19:34

## 2020-10-25 RX ADMIN — ROPINIROLE SCH MG: 0.5 TABLET ORAL at 19:34

## 2020-10-25 RX ADMIN — GABAPENTIN SCH MG: 100 CAPSULE ORAL at 08:21

## 2020-10-25 RX ADMIN — GABAPENTIN SCH MG: 100 CAPSULE ORAL at 13:34

## 2020-10-25 RX ADMIN — GABAPENTIN SCH MG: 300 CAPSULE ORAL at 19:33

## 2020-10-25 RX ADMIN — FUROSEMIDE SCH MG: 20 TABLET ORAL at 08:21

## 2020-10-25 RX ADMIN — SERTRALINE HYDROCHLORIDE SCH MG: 100 TABLET ORAL at 08:22

## 2020-10-25 RX ADMIN — OMEPRAZOLE SCH MG: 20 CAPSULE, DELAYED RELEASE ORAL at 06:31

## 2020-10-25 RX ADMIN — ISOSORBIDE MONONITRATE SCH MG: 30 TABLET, FILM COATED, EXTENDED RELEASE ORAL at 08:22

## 2020-10-25 RX ADMIN — METOPROLOL SUCCINATE SCH MG: 25 TABLET, EXTENDED RELEASE ORAL at 08:21

## 2020-10-25 RX ADMIN — MAGNESIUM OXIDE TAB 400 MG (241.3 MG ELEMENTAL MG) SCH TAB: 400 (241.3 MG) TAB at 08:21

## 2020-10-26 RX ADMIN — MAGNESIUM OXIDE TAB 400 MG (241.3 MG ELEMENTAL MG) SCH TAB: 400 (241.3 MG) TAB at 07:55

## 2020-10-26 RX ADMIN — WARFARIN SODIUM SCH MG: 5 TABLET ORAL at 20:18

## 2020-10-26 RX ADMIN — GABAPENTIN SCH MG: 300 CAPSULE ORAL at 20:17

## 2020-10-26 RX ADMIN — OMEPRAZOLE SCH MG: 20 CAPSULE, DELAYED RELEASE ORAL at 06:18

## 2020-10-26 RX ADMIN — GABAPENTIN SCH MG: 100 CAPSULE ORAL at 13:13

## 2020-10-26 RX ADMIN — METOPROLOL SUCCINATE SCH MG: 25 TABLET, EXTENDED RELEASE ORAL at 07:56

## 2020-10-26 RX ADMIN — ROPINIROLE SCH MG: 0.5 TABLET ORAL at 20:18

## 2020-10-26 RX ADMIN — FUROSEMIDE SCH MG: 20 TABLET ORAL at 07:55

## 2020-10-26 RX ADMIN — ISOSORBIDE MONONITRATE SCH MG: 30 TABLET, FILM COATED, EXTENDED RELEASE ORAL at 07:56

## 2020-10-26 RX ADMIN — SERTRALINE HYDROCHLORIDE SCH MG: 100 TABLET ORAL at 07:56

## 2020-10-26 RX ADMIN — GABAPENTIN SCH MG: 100 CAPSULE ORAL at 07:55

## 2020-10-27 RX ADMIN — GABAPENTIN SCH MG: 100 CAPSULE ORAL at 12:18

## 2020-10-27 RX ADMIN — ROPINIROLE SCH MG: 0.5 TABLET ORAL at 20:08

## 2020-10-27 RX ADMIN — WARFARIN SODIUM SCH MG: 5 TABLET ORAL at 20:08

## 2020-10-27 RX ADMIN — FUROSEMIDE SCH MG: 20 TABLET ORAL at 08:11

## 2020-10-27 RX ADMIN — METOPROLOL SUCCINATE SCH MG: 25 TABLET, EXTENDED RELEASE ORAL at 08:11

## 2020-10-27 RX ADMIN — GABAPENTIN SCH MG: 300 CAPSULE ORAL at 20:07

## 2020-10-27 RX ADMIN — OMEPRAZOLE SCH MG: 20 CAPSULE, DELAYED RELEASE ORAL at 06:04

## 2020-10-27 RX ADMIN — ISOSORBIDE MONONITRATE SCH MG: 30 TABLET, FILM COATED, EXTENDED RELEASE ORAL at 08:11

## 2020-10-27 RX ADMIN — SERTRALINE HYDROCHLORIDE SCH MG: 100 TABLET ORAL at 08:11

## 2020-10-27 RX ADMIN — MAGNESIUM OXIDE TAB 400 MG (241.3 MG ELEMENTAL MG) SCH TAB: 400 (241.3 MG) TAB at 08:12

## 2020-10-27 RX ADMIN — GABAPENTIN SCH MG: 100 CAPSULE ORAL at 08:11

## 2020-10-28 RX ADMIN — MAGNESIUM OXIDE TAB 400 MG (241.3 MG ELEMENTAL MG) SCH TAB: 400 (241.3 MG) TAB at 08:24

## 2020-10-28 RX ADMIN — OMEPRAZOLE SCH MG: 20 CAPSULE, DELAYED RELEASE ORAL at 06:25

## 2020-10-28 RX ADMIN — ISOSORBIDE MONONITRATE SCH MG: 30 TABLET, FILM COATED, EXTENDED RELEASE ORAL at 08:22

## 2020-10-28 RX ADMIN — ROPINIROLE SCH MG: 0.5 TABLET ORAL at 19:55

## 2020-10-28 RX ADMIN — GABAPENTIN SCH MG: 100 CAPSULE ORAL at 12:44

## 2020-10-28 RX ADMIN — GABAPENTIN SCH MG: 100 CAPSULE ORAL at 08:22

## 2020-10-28 RX ADMIN — WARFARIN SODIUM SCH MG: 5 TABLET ORAL at 19:54

## 2020-10-28 RX ADMIN — GABAPENTIN SCH MG: 300 CAPSULE ORAL at 19:54

## 2020-10-28 RX ADMIN — METOPROLOL SUCCINATE SCH MG: 25 TABLET, EXTENDED RELEASE ORAL at 08:23

## 2020-10-28 RX ADMIN — FUROSEMIDE SCH MG: 20 TABLET ORAL at 08:22

## 2020-10-28 RX ADMIN — SERTRALINE HYDROCHLORIDE SCH MG: 100 TABLET ORAL at 08:22

## 2020-10-29 RX ADMIN — METOPROLOL SUCCINATE SCH MG: 25 TABLET, EXTENDED RELEASE ORAL at 08:52

## 2020-10-29 RX ADMIN — ROPINIROLE SCH MG: 0.5 TABLET ORAL at 20:44

## 2020-10-29 RX ADMIN — GABAPENTIN SCH MG: 100 CAPSULE ORAL at 12:35

## 2020-10-29 RX ADMIN — GABAPENTIN SCH MG: 300 CAPSULE ORAL at 20:42

## 2020-10-29 RX ADMIN — FUROSEMIDE SCH MG: 20 TABLET ORAL at 08:52

## 2020-10-29 RX ADMIN — SERTRALINE HYDROCHLORIDE SCH MG: 100 TABLET ORAL at 08:53

## 2020-10-29 RX ADMIN — MAGNESIUM OXIDE TAB 400 MG (241.3 MG ELEMENTAL MG) SCH TAB: 400 (241.3 MG) TAB at 08:53

## 2020-10-29 RX ADMIN — WARFARIN SODIUM SCH MG: 5 TABLET ORAL at 20:44

## 2020-10-29 RX ADMIN — OMEPRAZOLE SCH MG: 20 CAPSULE, DELAYED RELEASE ORAL at 06:04

## 2020-10-29 RX ADMIN — GABAPENTIN SCH MG: 100 CAPSULE ORAL at 08:51

## 2020-10-29 RX ADMIN — ISOSORBIDE MONONITRATE SCH MG: 30 TABLET, FILM COATED, EXTENDED RELEASE ORAL at 08:53

## 2020-10-30 RX ADMIN — METOPROLOL SUCCINATE SCH MG: 25 TABLET, EXTENDED RELEASE ORAL at 08:44

## 2020-10-30 RX ADMIN — FUROSEMIDE SCH MG: 20 TABLET ORAL at 08:44

## 2020-10-30 RX ADMIN — MAGNESIUM OXIDE TAB 400 MG (241.3 MG ELEMENTAL MG) SCH TAB: 400 (241.3 MG) TAB at 08:45

## 2020-10-30 RX ADMIN — ROPINIROLE SCH MG: 0.5 TABLET ORAL at 20:15

## 2020-10-30 RX ADMIN — GABAPENTIN SCH MG: 300 CAPSULE ORAL at 20:13

## 2020-10-30 RX ADMIN — GABAPENTIN SCH MG: 100 CAPSULE ORAL at 08:43

## 2020-10-30 RX ADMIN — WARFARIN SODIUM SCH MG: 5 TABLET ORAL at 20:15

## 2020-10-30 RX ADMIN — SERTRALINE HYDROCHLORIDE SCH MG: 100 TABLET ORAL at 08:44

## 2020-10-30 RX ADMIN — ISOSORBIDE MONONITRATE SCH MG: 30 TABLET, FILM COATED, EXTENDED RELEASE ORAL at 08:45

## 2020-10-30 RX ADMIN — GABAPENTIN SCH MG: 100 CAPSULE ORAL at 12:11

## 2020-10-30 RX ADMIN — OMEPRAZOLE SCH MG: 20 CAPSULE, DELAYED RELEASE ORAL at 06:46

## 2020-10-31 RX ADMIN — ISOSORBIDE MONONITRATE SCH MG: 30 TABLET, FILM COATED, EXTENDED RELEASE ORAL at 08:06

## 2020-10-31 RX ADMIN — MAGNESIUM OXIDE TAB 400 MG (241.3 MG ELEMENTAL MG) SCH TAB: 400 (241.3 MG) TAB at 08:08

## 2020-10-31 RX ADMIN — ROPINIROLE SCH MG: 0.5 TABLET ORAL at 19:38

## 2020-10-31 RX ADMIN — GABAPENTIN SCH MG: 100 CAPSULE ORAL at 08:06

## 2020-10-31 RX ADMIN — SERTRALINE HYDROCHLORIDE SCH MG: 100 TABLET ORAL at 08:06

## 2020-10-31 RX ADMIN — FUROSEMIDE SCH MG: 20 TABLET ORAL at 08:07

## 2020-10-31 RX ADMIN — METOPROLOL SUCCINATE SCH MG: 25 TABLET, EXTENDED RELEASE ORAL at 08:07

## 2020-10-31 RX ADMIN — GABAPENTIN SCH MG: 300 CAPSULE ORAL at 19:38

## 2020-10-31 RX ADMIN — GABAPENTIN SCH MG: 100 CAPSULE ORAL at 12:15

## 2020-10-31 RX ADMIN — WARFARIN SODIUM SCH MG: 5 TABLET ORAL at 19:39

## 2020-10-31 RX ADMIN — OMEPRAZOLE SCH MG: 20 CAPSULE, DELAYED RELEASE ORAL at 06:01

## 2020-11-01 RX ADMIN — ISOSORBIDE MONONITRATE SCH MG: 30 TABLET, FILM COATED, EXTENDED RELEASE ORAL at 08:09

## 2020-11-01 RX ADMIN — WARFARIN SODIUM SCH MG: 5 TABLET ORAL at 20:30

## 2020-11-01 RX ADMIN — OMEPRAZOLE SCH MG: 20 CAPSULE, DELAYED RELEASE ORAL at 06:23

## 2020-11-01 RX ADMIN — FUROSEMIDE SCH MG: 20 TABLET ORAL at 08:09

## 2020-11-01 RX ADMIN — GABAPENTIN SCH MG: 100 CAPSULE ORAL at 13:00

## 2020-11-01 RX ADMIN — SERTRALINE HYDROCHLORIDE SCH MG: 100 TABLET ORAL at 08:09

## 2020-11-01 RX ADMIN — GABAPENTIN SCH MG: 300 CAPSULE ORAL at 20:30

## 2020-11-01 RX ADMIN — GABAPENTIN SCH MG: 100 CAPSULE ORAL at 08:07

## 2020-11-01 RX ADMIN — MAGNESIUM OXIDE TAB 400 MG (241.3 MG ELEMENTAL MG) SCH TAB: 400 (241.3 MG) TAB at 08:09

## 2020-11-01 RX ADMIN — METOPROLOL SUCCINATE SCH MG: 25 TABLET, EXTENDED RELEASE ORAL at 08:08

## 2020-11-01 RX ADMIN — ROPINIROLE SCH MG: 0.5 TABLET ORAL at 20:30

## 2020-11-02 RX ADMIN — ROPINIROLE SCH MG: 0.5 TABLET ORAL at 20:02

## 2020-11-02 RX ADMIN — ISOSORBIDE MONONITRATE SCH MG: 30 TABLET, FILM COATED, EXTENDED RELEASE ORAL at 07:45

## 2020-11-02 RX ADMIN — WARFARIN SODIUM SCH MG: 5 TABLET ORAL at 20:02

## 2020-11-02 RX ADMIN — MAGNESIUM OXIDE TAB 400 MG (241.3 MG ELEMENTAL MG) SCH TAB: 400 (241.3 MG) TAB at 07:44

## 2020-11-02 RX ADMIN — GABAPENTIN SCH MG: 100 CAPSULE ORAL at 12:30

## 2020-11-02 RX ADMIN — GABAPENTIN SCH MG: 300 CAPSULE ORAL at 20:02

## 2020-11-02 RX ADMIN — SERTRALINE HYDROCHLORIDE SCH MG: 100 TABLET ORAL at 07:44

## 2020-11-02 RX ADMIN — GABAPENTIN SCH MG: 100 CAPSULE ORAL at 07:44

## 2020-11-02 RX ADMIN — OMEPRAZOLE SCH MG: 20 CAPSULE, DELAYED RELEASE ORAL at 06:21

## 2020-11-02 RX ADMIN — FUROSEMIDE SCH MG: 20 TABLET ORAL at 07:45

## 2020-11-02 RX ADMIN — METOPROLOL SUCCINATE SCH MG: 25 TABLET, EXTENDED RELEASE ORAL at 07:45

## 2020-11-03 RX ADMIN — GABAPENTIN SCH MG: 100 CAPSULE ORAL at 12:46

## 2020-11-03 RX ADMIN — GABAPENTIN SCH MG: 100 CAPSULE ORAL at 08:04

## 2020-11-03 RX ADMIN — ROPINIROLE SCH MG: 0.5 TABLET ORAL at 19:27

## 2020-11-03 RX ADMIN — FUROSEMIDE SCH MG: 20 TABLET ORAL at 08:03

## 2020-11-03 RX ADMIN — MAGNESIUM OXIDE TAB 400 MG (241.3 MG ELEMENTAL MG) SCH TAB: 400 (241.3 MG) TAB at 08:03

## 2020-11-03 RX ADMIN — SERTRALINE HYDROCHLORIDE SCH MG: 100 TABLET ORAL at 08:03

## 2020-11-03 RX ADMIN — OMEPRAZOLE SCH MG: 20 CAPSULE, DELAYED RELEASE ORAL at 06:44

## 2020-11-03 RX ADMIN — METOPROLOL SUCCINATE SCH MG: 25 TABLET, EXTENDED RELEASE ORAL at 08:04

## 2020-11-03 RX ADMIN — GABAPENTIN SCH MG: 300 CAPSULE ORAL at 19:27

## 2020-11-03 RX ADMIN — WARFARIN SODIUM SCH MG: 5 TABLET ORAL at 19:27

## 2020-11-03 RX ADMIN — ISOSORBIDE MONONITRATE SCH MG: 30 TABLET, FILM COATED, EXTENDED RELEASE ORAL at 08:04

## 2020-11-04 RX ADMIN — OMEPRAZOLE SCH MG: 20 CAPSULE, DELAYED RELEASE ORAL at 06:42

## 2020-11-04 RX ADMIN — GABAPENTIN SCH MG: 100 CAPSULE ORAL at 12:40

## 2020-11-04 RX ADMIN — SERTRALINE HYDROCHLORIDE SCH MG: 100 TABLET ORAL at 08:36

## 2020-11-04 RX ADMIN — ROPINIROLE SCH MG: 0.5 TABLET ORAL at 21:08

## 2020-11-04 RX ADMIN — GABAPENTIN SCH MG: 300 CAPSULE ORAL at 21:07

## 2020-11-04 RX ADMIN — FUROSEMIDE SCH MG: 20 TABLET ORAL at 08:36

## 2020-11-04 RX ADMIN — MAGNESIUM OXIDE TAB 400 MG (241.3 MG ELEMENTAL MG) SCH TAB: 400 (241.3 MG) TAB at 08:37

## 2020-11-04 RX ADMIN — GABAPENTIN SCH MG: 100 CAPSULE ORAL at 08:36

## 2020-11-04 RX ADMIN — WARFARIN SODIUM SCH MG: 5 TABLET ORAL at 21:08

## 2020-11-04 RX ADMIN — METOPROLOL SUCCINATE SCH MG: 25 TABLET, EXTENDED RELEASE ORAL at 08:37

## 2020-11-04 RX ADMIN — ISOSORBIDE MONONITRATE SCH MG: 30 TABLET, FILM COATED, EXTENDED RELEASE ORAL at 08:36

## 2020-11-05 RX ADMIN — FUROSEMIDE SCH MG: 20 TABLET ORAL at 08:19

## 2020-11-05 RX ADMIN — GABAPENTIN SCH MG: 300 CAPSULE ORAL at 20:24

## 2020-11-05 RX ADMIN — ROPINIROLE SCH MG: 0.5 TABLET ORAL at 20:26

## 2020-11-05 RX ADMIN — GABAPENTIN SCH MG: 100 CAPSULE ORAL at 12:58

## 2020-11-05 RX ADMIN — SERTRALINE HYDROCHLORIDE SCH MG: 100 TABLET ORAL at 08:19

## 2020-11-05 RX ADMIN — ISOSORBIDE MONONITRATE SCH MG: 30 TABLET, FILM COATED, EXTENDED RELEASE ORAL at 08:19

## 2020-11-05 RX ADMIN — GABAPENTIN SCH MG: 100 CAPSULE ORAL at 08:20

## 2020-11-05 RX ADMIN — MAGNESIUM OXIDE TAB 400 MG (241.3 MG ELEMENTAL MG) SCH TAB: 400 (241.3 MG) TAB at 08:18

## 2020-11-05 RX ADMIN — WARFARIN SODIUM SCH MG: 5 TABLET ORAL at 20:26

## 2020-11-05 RX ADMIN — OMEPRAZOLE SCH MG: 20 CAPSULE, DELAYED RELEASE ORAL at 06:59

## 2020-11-05 RX ADMIN — METOPROLOL SUCCINATE SCH MG: 25 TABLET, EXTENDED RELEASE ORAL at 08:18

## 2020-11-06 RX ADMIN — FUROSEMIDE SCH MG: 20 TABLET ORAL at 08:27

## 2020-11-06 RX ADMIN — ISOSORBIDE MONONITRATE SCH MG: 30 TABLET, FILM COATED, EXTENDED RELEASE ORAL at 08:28

## 2020-11-06 RX ADMIN — GABAPENTIN SCH MG: 100 CAPSULE ORAL at 12:11

## 2020-11-06 RX ADMIN — METOPROLOL SUCCINATE SCH MG: 25 TABLET, EXTENDED RELEASE ORAL at 08:28

## 2020-11-06 RX ADMIN — OMEPRAZOLE SCH MG: 20 CAPSULE, DELAYED RELEASE ORAL at 06:56

## 2020-11-06 RX ADMIN — MAGNESIUM OXIDE TAB 400 MG (241.3 MG ELEMENTAL MG) SCH TAB: 400 (241.3 MG) TAB at 08:27

## 2020-11-06 RX ADMIN — GABAPENTIN SCH MG: 300 CAPSULE ORAL at 19:37

## 2020-11-06 RX ADMIN — ROPINIROLE SCH MG: 0.5 TABLET ORAL at 19:36

## 2020-11-06 RX ADMIN — SERTRALINE HYDROCHLORIDE SCH MG: 100 TABLET ORAL at 08:28

## 2020-11-06 RX ADMIN — GABAPENTIN SCH MG: 100 CAPSULE ORAL at 08:26

## 2020-11-06 RX ADMIN — WARFARIN SODIUM SCH MG: 5 TABLET ORAL at 19:41

## 2020-11-07 RX ADMIN — ROPINIROLE SCH MG: 0.5 TABLET ORAL at 19:15

## 2020-11-07 RX ADMIN — SERTRALINE HYDROCHLORIDE SCH MG: 100 TABLET ORAL at 08:19

## 2020-11-07 RX ADMIN — GABAPENTIN SCH MG: 100 CAPSULE ORAL at 12:50

## 2020-11-07 RX ADMIN — MAGNESIUM OXIDE TAB 400 MG (241.3 MG ELEMENTAL MG) SCH TAB: 400 (241.3 MG) TAB at 08:17

## 2020-11-07 RX ADMIN — WARFARIN SODIUM SCH MG: 5 TABLET ORAL at 19:15

## 2020-11-07 RX ADMIN — METOPROLOL SUCCINATE SCH MG: 25 TABLET, EXTENDED RELEASE ORAL at 08:18

## 2020-11-07 RX ADMIN — FUROSEMIDE SCH MG: 20 TABLET ORAL at 08:20

## 2020-11-07 RX ADMIN — GABAPENTIN SCH MG: 100 CAPSULE ORAL at 08:18

## 2020-11-07 RX ADMIN — OMEPRAZOLE SCH MG: 20 CAPSULE, DELAYED RELEASE ORAL at 06:19

## 2020-11-07 RX ADMIN — GABAPENTIN SCH MG: 300 CAPSULE ORAL at 19:15

## 2020-11-07 RX ADMIN — ISOSORBIDE MONONITRATE SCH MG: 30 TABLET, FILM COATED, EXTENDED RELEASE ORAL at 08:19

## 2020-11-07 NOTE — PCM.PN
- General Info


Date of Service: 11/06/20


Admission Dx/Problem (Free Text): 


Failure to Thrive, Bilateral LE weakness





Subjective Update: 


Patient has done well over the last month.  Advancement with physical therapy 

has been hit and miss.  Some days she feels strong, some days she doesn't.  

Otherwise she is doing okay at this point.





Functional Status: Reports: Pain Controlled





- Review of Systems


General: Reports: Weakness (lower extremities)


HEENT: Reports: No Symptoms


Pulmonary: Reports: No Symptoms


Cardiovascular: Reports: Edema (bilateral lower extremities)


Gastrointestinal: Reports: No Symptoms


Skin: Reports: Other (wounds on shins, chronic)


Neurological: Reports: No Symptoms


Psychiatric: Reports: No Symptoms





- Patient Data


Vitals - Most Recent: 


                                Last Vital Signs











Temp  97.3 F   11/07/20 06:00


 


Pulse  75   11/07/20 08:18


 


Resp  18   11/07/20 06:00


 


BP  112/72   11/07/20 08:19


 


Pulse Ox  90 L  10/21/20 06:53











Weight - Most Recent: 185 lb 3.2 oz


I&O - Last 24 Hours: 


                                 Intake & Output











 11/06/20 11/07/20 11/07/20





 22:59 06:59 14:59


 


Intake Total 400  


 


Balance 400  











Med Orders - Current: 


                               Current Medications





Acetaminophen (Tylenol)  650 mg PO TID@0800,1300,2000 [facility]


   Last Admin: 11/07/20 08:18 Dose:  650 mg


   Documented by: 


Buspirone HCl (Buspar)  5 mg PO BID [facility]


   Last Admin: 11/07/20 08:20 Dose:  5 mg


   Documented by: 


Calcium Carbonate/Glycine (Tums Extra Strength)  750 mg PO TID PRN


   PRN Reason: Dyspepsia


   Last Admin: 10/24/20 08:37 Dose:  750 mg


   Documented by: 


Docusate Sodium (Colace)  100 mg PO DAILY PRN


   PRN Reason: Constipation


Famotidine (Pepcid)  20 mg PO BEDTIME [facility]


   Last Admin: 11/06/20 19:36 Dose:  20 mg


   Documented by: 


Ferrous Sulfate (Ferrous Sulfate)  325 mg PO Q48H [facility]


   Last Admin: 11/06/20 08:27 Dose:  325 mg


   Documented by: 


Furosemide (Lasix)  20 mg PO DAILY [facility]


   Last Admin: 11/07/20 08:20 Dose:  20 mg


   Documented by: 


Gabapentin (Neurontin)  100 mg PO BID@0800,1300 [facility]


   Last Admin: 11/07/20 08:18 Dose:  100 mg


   Documented by: 


Gabapentin (Neurontin)  300 mg PO BEDTIME [facility]


   Last Admin: 11/06/20 19:37 Dose:  300 mg


   Documented by: 


Isosorbide Mononitrate (Imdur)  30 mg PO DAILY [facility]


   Last Admin: 11/07/20 08:19 Dose:  30 mg


   Documented by: 


Loperamide HCl (Imodium)  2 mg PO Q12H PRN


   PRN Reason: Diarrhea


   Last Admin: 11/05/20 09:04 Dose:  2 mg


   Documented by: 


Melatonin (Melatonin)  6 mg PO BEDTIME [facility]


   Last Admin: 11/06/20 19:36 Dose:  6 mg


   Documented by: 


Metoprolol Succinate (Toprol Xl)  25 mg PO DAILY [facility]


   Last Admin: 11/07/20 08:18 Dose:  25 mg


   Documented by: 


Miconazole (Desenex 2%)  0 gm TOP BID PRN


   PRN Reason: RASH UNDER BILATERAL BREASTS


Multivitamins/Minerals (Thera M Plus)  1 tab PO DAILY [facility]


   Last Admin: 11/07/20 08:17 Dose:  1 tab


   Documented by: 


Nitroglycerin (Nitrostat)  0.4 mg SL Q5M PRN


   PRN Reason: Chest Pain


Omeprazole (Omeprazole)  20 mg PO DAILY@0700 [facility]


   Last Admin: 11/07/20 06:19 Dose:  20 mg


   Documented by: 


Pharmacy Consult (Consult To Pharmacy)  1 each .XX ASDIRECTED [facility]


Polyethylene Glycol (Miralax)  17 gm PO DAILY PRN


   PRN Reason: Constipation


Ropinirole HCl (Requip)  0.5 mg PO BEDTIME [facility]


   Last Admin: 11/06/20 19:36 Dose:  0.5 mg


   Documented by: 


Senna/Docusate Sodium (Senna Plus)  1 tab PO DAILY PRN


   PRN Reason: Constipation


Sertraline HCl (Zoloft)  100 mg PO DAILY [facility]


   Last Admin: 11/07/20 08:19 Dose:  100 mg


   Documented by: 


Vitamin B Complex (Vitamin B Complex)  1 each PO DAILY [facility]


   Last Admin: 11/07/20 08:17 Dose:  1 each


   Documented by: 


Warfarin Sodium (Coumadin)  5 mg PO SuMoTuWeFJuanitaa@2000 [facility]


   Last Admin: 11/06/20 19:41 Dose:  5 mg


   Documented by: 


Warfarin Sodium (Coumadin)  2.5 mg PO Th@2000 [facility]


   Last Admin: 11/05/20 20:26 Dose:  2.5 mg


   Documented by: 





Discontinued Medications





Ciprofloxacin (Ciprofloxacin Hcl)  500 mg PO Q24H [facility]


   Stop: 10/22/20 20:01


   Last Admin: 10/22/20 19:24 Dose:  500 mg


   Documented by: 


Famotidine (Pepcid)  20 mg PO DAILY [facility]


   Last Admin: 10/09/20 08:14 Dose:  20 mg


   Documented by: 


Nitrofurantoin Macrocrystals (Macrobid)  100 mg PO BID [facility]


   Stop: 10/25/20 08:31


   Last Admin: 10/18/20 09:00 Dose:  100 mg


   Documented by: 


Omeprazole (Omeprazole)  20 mg PO DAILY [facility]


   Last Admin: 10/09/20 08:14 Dose:  20 mg


   Documented by: 











- Exam


Quality Assessment: Supplemental Oxygen


General: Alert, Oriented


HEENT: EOMI


Neck: Supple


Lungs: Clear to Auscultation, Normal Respiratory Effort


Cardiovascular: Regular Rate, Regular Rhythm


GI/Abdominal Exam: Normal Bowel Sounds, Soft, Non-Tender


Extremities: Pedal Edema (1+ to the mid shin), Other (tenderness to palpation of

the bilateral LE)


Skin: Warm, Dry, Other (dressings over the wounds on the anterior shins, 

dressing appear dry)


Neurological: No New Focal Deficit


Psy/Mental Status: Alert, Normal Affect, Normal Mood





Sepsis Event Note





- Evaluation


Sepsis Screening Result: No Definite Risk





- Focused Exam


Vital Signs: 


                                   Vital Signs











  Temp Pulse Resp BP


 


 11/07/20 08:19     112/72


 


 11/07/20 08:18   75   112/72


 


 11/07/20 06:00  97.3 F   18 














- Problem List Review


Problem List Initiated/Reviewed/Updated: Yes





- My Orders


Last 24 Hours: 


My Active Orders





11/30/20 07:00


INR,PT,PROTHROMBIN TIME [COAG] Q28D 





12/28/20 07:00


INR,PT,PROTHROMBIN TIME [COAG] Q28D 





01/25/21 07:00


INR,PT,PROTHROMBIN TIME [COAG] Q28D 





02/22/21 07:00


INR,PT,PROTHROMBIN TIME [COAG] Q28D 





03/22/21 07:00


INR,PT,PROTHROMBIN TIME [COAG] Q28D 





04/19/21 07:00


INR,PT,PROTHROMBIN TIME [COAG] Q28D 





05/17/21 07:00


INR,PT,PROTHROMBIN TIME [COAG] Q28D 





06/14/21 07:00


INR,PT,PROTHROMBIN TIME [COAG] Q28D 














- Assessment


Assessment:: 


Failure to Thrive


Chronic Leg Weakness


- Patient is long-term swing bed stay for assistance with ADLs due to the above.


Plan:


- Continue regular daily care is as previously prescribed


- Continue physical therapy as previously prescribed





Breast Cancer, s/p lumpectomy - plan for follow-up with oncology as previously 

planned (repeat breast exam every 6 months, f/u visit with one in Aug 2021 with 

mammo)


HTN - continue metoprolol 25mg daily


GERD - continue omeprazole 20mg daily, prn pepcid 20mg, prn tums


Anxiety/Depression - continue zoloft 100mg daily, Buspar 5mg BID


Angina - continue Imdur 30mg daily, prn Nitro


Restless legs - Requip 0.5mg at bedtime


Chronic pain - Gabapentin 100mg am and noon, 300mg at supper


Hx DVT - continue Warfarin, serial INR per pharmacy


Peripheral edema - continue Lasix 20mg daily


Constipation - continue colace 100mg dialy, prn mirilax


Fe-def anemia - continue iron 325mg e/o day


Insomnia - continue melatonin 3mg at bedtime


Chronic respiratoryfailure - continue supplemental O2


Fungal rash - continue prn miconazole

## 2020-11-08 RX ADMIN — SERTRALINE HYDROCHLORIDE SCH MG: 100 TABLET ORAL at 07:54

## 2020-11-08 RX ADMIN — METOPROLOL SUCCINATE SCH MG: 25 TABLET, EXTENDED RELEASE ORAL at 07:49

## 2020-11-08 RX ADMIN — WARFARIN SODIUM SCH MG: 5 TABLET ORAL at 19:19

## 2020-11-08 RX ADMIN — ROPINIROLE SCH MG: 0.5 TABLET ORAL at 19:19

## 2020-11-08 RX ADMIN — GABAPENTIN SCH MG: 300 CAPSULE ORAL at 19:19

## 2020-11-08 RX ADMIN — GABAPENTIN SCH MG: 100 CAPSULE ORAL at 12:26

## 2020-11-08 RX ADMIN — OMEPRAZOLE SCH MG: 20 CAPSULE, DELAYED RELEASE ORAL at 06:21

## 2020-11-08 RX ADMIN — MAGNESIUM OXIDE TAB 400 MG (241.3 MG ELEMENTAL MG) SCH TAB: 400 (241.3 MG) TAB at 07:51

## 2020-11-08 RX ADMIN — ISOSORBIDE MONONITRATE SCH MG: 30 TABLET, FILM COATED, EXTENDED RELEASE ORAL at 07:50

## 2020-11-08 RX ADMIN — FUROSEMIDE SCH MG: 20 TABLET ORAL at 07:54

## 2020-11-08 RX ADMIN — GABAPENTIN SCH MG: 100 CAPSULE ORAL at 07:48

## 2020-11-09 RX ADMIN — WARFARIN SODIUM SCH MG: 5 TABLET ORAL at 19:26

## 2020-11-09 RX ADMIN — ROPINIROLE SCH MG: 0.5 TABLET ORAL at 19:26

## 2020-11-09 RX ADMIN — METOPROLOL SUCCINATE SCH MG: 25 TABLET, EXTENDED RELEASE ORAL at 08:35

## 2020-11-09 RX ADMIN — OMEPRAZOLE SCH MG: 20 CAPSULE, DELAYED RELEASE ORAL at 06:18

## 2020-11-09 RX ADMIN — MAGNESIUM OXIDE TAB 400 MG (241.3 MG ELEMENTAL MG) SCH TAB: 400 (241.3 MG) TAB at 08:37

## 2020-11-09 RX ADMIN — GABAPENTIN SCH MG: 100 CAPSULE ORAL at 13:51

## 2020-11-09 RX ADMIN — ISOSORBIDE MONONITRATE SCH MG: 30 TABLET, FILM COATED, EXTENDED RELEASE ORAL at 08:34

## 2020-11-09 RX ADMIN — SERTRALINE HYDROCHLORIDE SCH MG: 100 TABLET ORAL at 08:35

## 2020-11-09 RX ADMIN — GABAPENTIN SCH MG: 100 CAPSULE ORAL at 08:36

## 2020-11-09 RX ADMIN — GABAPENTIN SCH MG: 300 CAPSULE ORAL at 19:26

## 2020-11-09 RX ADMIN — FUROSEMIDE SCH MG: 20 TABLET ORAL at 08:35

## 2020-11-10 RX ADMIN — GABAPENTIN SCH MG: 300 CAPSULE ORAL at 21:14

## 2020-11-10 RX ADMIN — FUROSEMIDE SCH MG: 20 TABLET ORAL at 07:55

## 2020-11-10 RX ADMIN — ISOSORBIDE MONONITRATE SCH MG: 30 TABLET, FILM COATED, EXTENDED RELEASE ORAL at 07:54

## 2020-11-10 RX ADMIN — OMEPRAZOLE SCH MG: 20 CAPSULE, DELAYED RELEASE ORAL at 06:03

## 2020-11-10 RX ADMIN — SERTRALINE HYDROCHLORIDE SCH MG: 100 TABLET ORAL at 07:55

## 2020-11-10 RX ADMIN — WARFARIN SODIUM SCH MG: 5 TABLET ORAL at 21:14

## 2020-11-10 RX ADMIN — GABAPENTIN SCH MG: 100 CAPSULE ORAL at 07:54

## 2020-11-10 RX ADMIN — METOPROLOL SUCCINATE SCH MG: 25 TABLET, EXTENDED RELEASE ORAL at 07:54

## 2020-11-10 RX ADMIN — MAGNESIUM OXIDE TAB 400 MG (241.3 MG ELEMENTAL MG) SCH TAB: 400 (241.3 MG) TAB at 07:54

## 2020-11-10 RX ADMIN — ROPINIROLE SCH MG: 0.5 TABLET ORAL at 21:14

## 2020-11-10 RX ADMIN — GABAPENTIN SCH MG: 100 CAPSULE ORAL at 14:22

## 2020-11-11 RX ADMIN — FUROSEMIDE SCH MG: 20 TABLET ORAL at 07:49

## 2020-11-11 RX ADMIN — OMEPRAZOLE SCH MG: 20 CAPSULE, DELAYED RELEASE ORAL at 06:34

## 2020-11-11 RX ADMIN — GABAPENTIN SCH MG: 100 CAPSULE ORAL at 07:48

## 2020-11-11 RX ADMIN — ISOSORBIDE MONONITRATE SCH MG: 30 TABLET, FILM COATED, EXTENDED RELEASE ORAL at 07:49

## 2020-11-11 RX ADMIN — MAGNESIUM OXIDE TAB 400 MG (241.3 MG ELEMENTAL MG) SCH TAB: 400 (241.3 MG) TAB at 07:48

## 2020-11-11 RX ADMIN — GABAPENTIN SCH MG: 300 CAPSULE ORAL at 19:34

## 2020-11-11 RX ADMIN — METOPROLOL SUCCINATE SCH MG: 25 TABLET, EXTENDED RELEASE ORAL at 07:49

## 2020-11-11 RX ADMIN — WARFARIN SODIUM SCH MG: 5 TABLET ORAL at 19:35

## 2020-11-11 RX ADMIN — SERTRALINE HYDROCHLORIDE SCH MG: 100 TABLET ORAL at 07:49

## 2020-11-11 RX ADMIN — ROPINIROLE SCH MG: 0.5 TABLET ORAL at 19:35

## 2020-11-11 RX ADMIN — GABAPENTIN SCH MG: 100 CAPSULE ORAL at 12:55

## 2020-11-12 RX ADMIN — GABAPENTIN SCH MG: 100 CAPSULE ORAL at 12:13

## 2020-11-12 RX ADMIN — GABAPENTIN SCH MG: 100 CAPSULE ORAL at 08:01

## 2020-11-12 RX ADMIN — ISOSORBIDE MONONITRATE SCH MG: 30 TABLET, FILM COATED, EXTENDED RELEASE ORAL at 08:02

## 2020-11-12 RX ADMIN — OMEPRAZOLE SCH MG: 20 CAPSULE, DELAYED RELEASE ORAL at 08:01

## 2020-11-12 RX ADMIN — FUROSEMIDE SCH MG: 20 TABLET ORAL at 08:03

## 2020-11-12 RX ADMIN — MAGNESIUM OXIDE TAB 400 MG (241.3 MG ELEMENTAL MG) SCH TAB: 400 (241.3 MG) TAB at 08:02

## 2020-11-12 RX ADMIN — SERTRALINE HYDROCHLORIDE SCH MG: 100 TABLET ORAL at 08:03

## 2020-11-12 RX ADMIN — METOPROLOL SUCCINATE SCH MG: 25 TABLET, EXTENDED RELEASE ORAL at 08:02

## 2020-11-12 RX ADMIN — ROPINIROLE SCH MG: 0.5 TABLET ORAL at 20:03

## 2020-11-12 RX ADMIN — WARFARIN SODIUM SCH MG: 5 TABLET ORAL at 20:03

## 2020-11-12 RX ADMIN — GABAPENTIN SCH MG: 300 CAPSULE ORAL at 20:02

## 2020-11-13 RX ADMIN — GABAPENTIN SCH MG: 100 CAPSULE ORAL at 08:08

## 2020-11-13 RX ADMIN — MAGNESIUM OXIDE TAB 400 MG (241.3 MG ELEMENTAL MG) SCH TAB: 400 (241.3 MG) TAB at 08:10

## 2020-11-13 RX ADMIN — ISOSORBIDE MONONITRATE SCH MG: 30 TABLET, FILM COATED, EXTENDED RELEASE ORAL at 08:09

## 2020-11-13 RX ADMIN — ROPINIROLE SCH MG: 0.5 TABLET ORAL at 20:33

## 2020-11-13 RX ADMIN — GABAPENTIN SCH MG: 300 CAPSULE ORAL at 20:30

## 2020-11-13 RX ADMIN — OMEPRAZOLE SCH MG: 20 CAPSULE, DELAYED RELEASE ORAL at 06:28

## 2020-11-13 RX ADMIN — METOPROLOL SUCCINATE SCH MG: 25 TABLET, EXTENDED RELEASE ORAL at 08:08

## 2020-11-13 RX ADMIN — GABAPENTIN SCH MG: 100 CAPSULE ORAL at 12:15

## 2020-11-13 RX ADMIN — WARFARIN SODIUM SCH MG: 5 TABLET ORAL at 20:33

## 2020-11-13 RX ADMIN — FUROSEMIDE SCH MG: 20 TABLET ORAL at 08:09

## 2020-11-13 RX ADMIN — SERTRALINE HYDROCHLORIDE SCH MG: 100 TABLET ORAL at 08:08

## 2020-11-13 NOTE — PCM.SN.2
- Free Text/Narrative


Note: 


10/18/2020  UA was ordered for dysuria and foul odor to urine. UA positive for 

Acute cystitis with hematuria. Culture send for sens. for a diagnosis of Acute 

cystitis with hematuria.

## 2020-11-14 RX ADMIN — GABAPENTIN SCH MG: 300 CAPSULE ORAL at 20:12

## 2020-11-14 RX ADMIN — ROPINIROLE SCH MG: 0.5 TABLET ORAL at 20:11

## 2020-11-14 RX ADMIN — WARFARIN SODIUM SCH MG: 5 TABLET ORAL at 20:11

## 2020-11-14 RX ADMIN — METOPROLOL SUCCINATE SCH MG: 25 TABLET, EXTENDED RELEASE ORAL at 08:40

## 2020-11-14 RX ADMIN — SERTRALINE HYDROCHLORIDE SCH MG: 100 TABLET ORAL at 08:40

## 2020-11-14 RX ADMIN — MAGNESIUM OXIDE TAB 400 MG (241.3 MG ELEMENTAL MG) SCH TAB: 400 (241.3 MG) TAB at 08:40

## 2020-11-14 RX ADMIN — GABAPENTIN SCH MG: 100 CAPSULE ORAL at 13:52

## 2020-11-14 RX ADMIN — OMEPRAZOLE SCH MG: 20 CAPSULE, DELAYED RELEASE ORAL at 06:56

## 2020-11-14 RX ADMIN — GABAPENTIN SCH MG: 100 CAPSULE ORAL at 08:41

## 2020-11-14 RX ADMIN — ISOSORBIDE MONONITRATE SCH MG: 30 TABLET, FILM COATED, EXTENDED RELEASE ORAL at 08:39

## 2020-11-14 RX ADMIN — FUROSEMIDE SCH MG: 20 TABLET ORAL at 08:39

## 2020-11-15 RX ADMIN — GABAPENTIN SCH MG: 100 CAPSULE ORAL at 12:15

## 2020-11-15 RX ADMIN — ROPINIROLE SCH MG: 0.5 TABLET ORAL at 20:01

## 2020-11-15 RX ADMIN — OMEPRAZOLE SCH MG: 20 CAPSULE, DELAYED RELEASE ORAL at 06:07

## 2020-11-15 RX ADMIN — FUROSEMIDE SCH MG: 20 TABLET ORAL at 08:28

## 2020-11-15 RX ADMIN — GABAPENTIN SCH MG: 100 CAPSULE ORAL at 08:30

## 2020-11-15 RX ADMIN — WARFARIN SODIUM SCH MG: 5 TABLET ORAL at 19:56

## 2020-11-15 RX ADMIN — MAGNESIUM OXIDE TAB 400 MG (241.3 MG ELEMENTAL MG) SCH TAB: 400 (241.3 MG) TAB at 08:28

## 2020-11-15 RX ADMIN — ISOSORBIDE MONONITRATE SCH MG: 30 TABLET, FILM COATED, EXTENDED RELEASE ORAL at 08:28

## 2020-11-15 RX ADMIN — SERTRALINE HYDROCHLORIDE SCH MG: 100 TABLET ORAL at 08:29

## 2020-11-15 RX ADMIN — METOPROLOL SUCCINATE SCH MG: 25 TABLET, EXTENDED RELEASE ORAL at 08:29

## 2020-11-15 RX ADMIN — GABAPENTIN SCH MG: 300 CAPSULE ORAL at 19:55

## 2020-11-16 RX ADMIN — MAGNESIUM OXIDE TAB 400 MG (241.3 MG ELEMENTAL MG) SCH TAB: 400 (241.3 MG) TAB at 07:56

## 2020-11-16 RX ADMIN — FUROSEMIDE SCH MG: 20 TABLET ORAL at 07:55

## 2020-11-16 RX ADMIN — METOPROLOL SUCCINATE SCH MG: 25 TABLET, EXTENDED RELEASE ORAL at 07:56

## 2020-11-16 RX ADMIN — OMEPRAZOLE SCH MG: 20 CAPSULE, DELAYED RELEASE ORAL at 06:08

## 2020-11-16 RX ADMIN — SERTRALINE HYDROCHLORIDE SCH MG: 100 TABLET ORAL at 07:55

## 2020-11-16 RX ADMIN — GABAPENTIN SCH MG: 300 CAPSULE ORAL at 20:11

## 2020-11-16 RX ADMIN — GABAPENTIN SCH MG: 100 CAPSULE ORAL at 07:54

## 2020-11-16 RX ADMIN — GABAPENTIN SCH MG: 100 CAPSULE ORAL at 12:25

## 2020-11-16 RX ADMIN — ISOSORBIDE MONONITRATE SCH MG: 30 TABLET, FILM COATED, EXTENDED RELEASE ORAL at 07:55

## 2020-11-16 RX ADMIN — WARFARIN SODIUM SCH MG: 5 TABLET ORAL at 20:12

## 2020-11-16 RX ADMIN — ROPINIROLE SCH MG: 0.5 TABLET ORAL at 20:12

## 2020-11-17 RX ADMIN — OMEPRAZOLE SCH MG: 20 CAPSULE, DELAYED RELEASE ORAL at 06:41

## 2020-11-17 RX ADMIN — WARFARIN SODIUM SCH MG: 5 TABLET ORAL at 20:04

## 2020-11-17 RX ADMIN — GABAPENTIN SCH MG: 100 CAPSULE ORAL at 07:38

## 2020-11-17 RX ADMIN — FUROSEMIDE SCH MG: 20 TABLET ORAL at 07:39

## 2020-11-17 RX ADMIN — SERTRALINE HYDROCHLORIDE SCH MG: 100 TABLET ORAL at 07:39

## 2020-11-17 RX ADMIN — ROPINIROLE SCH MG: 0.5 TABLET ORAL at 20:04

## 2020-11-17 RX ADMIN — MAGNESIUM OXIDE TAB 400 MG (241.3 MG ELEMENTAL MG) SCH TAB: 400 (241.3 MG) TAB at 07:40

## 2020-11-17 RX ADMIN — ISOSORBIDE MONONITRATE SCH MG: 30 TABLET, FILM COATED, EXTENDED RELEASE ORAL at 07:38

## 2020-11-17 RX ADMIN — GABAPENTIN SCH MG: 100 CAPSULE ORAL at 12:05

## 2020-11-17 RX ADMIN — GABAPENTIN SCH MG: 300 CAPSULE ORAL at 20:05

## 2020-11-17 RX ADMIN — METOPROLOL SUCCINATE SCH MG: 25 TABLET, EXTENDED RELEASE ORAL at 07:39

## 2020-11-18 RX ADMIN — GABAPENTIN SCH MG: 100 CAPSULE ORAL at 12:18

## 2020-11-18 RX ADMIN — SERTRALINE HYDROCHLORIDE SCH MG: 100 TABLET ORAL at 09:13

## 2020-11-18 RX ADMIN — GABAPENTIN SCH MG: 100 CAPSULE ORAL at 09:13

## 2020-11-18 RX ADMIN — MAGNESIUM OXIDE TAB 400 MG (241.3 MG ELEMENTAL MG) SCH TAB: 400 (241.3 MG) TAB at 09:14

## 2020-11-18 RX ADMIN — METOPROLOL SUCCINATE SCH MG: 25 TABLET, EXTENDED RELEASE ORAL at 09:18

## 2020-11-18 RX ADMIN — OMEPRAZOLE SCH MG: 20 CAPSULE, DELAYED RELEASE ORAL at 06:24

## 2020-11-18 RX ADMIN — GABAPENTIN SCH MG: 300 CAPSULE ORAL at 20:42

## 2020-11-18 RX ADMIN — WARFARIN SODIUM SCH MG: 5 TABLET ORAL at 20:42

## 2020-11-18 RX ADMIN — ISOSORBIDE MONONITRATE SCH MG: 30 TABLET, FILM COATED, EXTENDED RELEASE ORAL at 09:18

## 2020-11-18 RX ADMIN — ROPINIROLE SCH MG: 0.5 TABLET ORAL at 20:42

## 2020-11-18 RX ADMIN — FUROSEMIDE SCH MG: 20 TABLET ORAL at 09:13

## 2020-11-19 RX ADMIN — ISOSORBIDE MONONITRATE SCH MG: 30 TABLET, FILM COATED, EXTENDED RELEASE ORAL at 08:52

## 2020-11-19 RX ADMIN — GABAPENTIN SCH MG: 100 CAPSULE ORAL at 08:53

## 2020-11-19 RX ADMIN — GABAPENTIN SCH MG: 300 CAPSULE ORAL at 19:47

## 2020-11-19 RX ADMIN — METOPROLOL SUCCINATE SCH MG: 25 TABLET, EXTENDED RELEASE ORAL at 08:51

## 2020-11-19 RX ADMIN — FUROSEMIDE SCH MG: 20 TABLET ORAL at 08:52

## 2020-11-19 RX ADMIN — OMEPRAZOLE SCH MG: 20 CAPSULE, DELAYED RELEASE ORAL at 06:20

## 2020-11-19 RX ADMIN — ROPINIROLE SCH MG: 0.5 TABLET ORAL at 19:47

## 2020-11-19 RX ADMIN — SERTRALINE HYDROCHLORIDE SCH MG: 100 TABLET ORAL at 08:50

## 2020-11-19 RX ADMIN — GABAPENTIN SCH MG: 100 CAPSULE ORAL at 16:39

## 2020-11-19 RX ADMIN — MAGNESIUM OXIDE TAB 400 MG (241.3 MG ELEMENTAL MG) SCH TAB: 400 (241.3 MG) TAB at 08:53

## 2020-11-19 RX ADMIN — WARFARIN SODIUM SCH MG: 5 TABLET ORAL at 19:47

## 2020-11-20 RX ADMIN — GABAPENTIN SCH MG: 300 CAPSULE ORAL at 19:44

## 2020-11-20 RX ADMIN — MAGNESIUM OXIDE TAB 400 MG (241.3 MG ELEMENTAL MG) SCH TAB: 400 (241.3 MG) TAB at 08:17

## 2020-11-20 RX ADMIN — OMEPRAZOLE SCH MG: 20 CAPSULE, DELAYED RELEASE ORAL at 06:02

## 2020-11-20 RX ADMIN — GABAPENTIN SCH MG: 100 CAPSULE ORAL at 13:47

## 2020-11-20 RX ADMIN — ROPINIROLE SCH MG: 0.5 TABLET ORAL at 19:44

## 2020-11-20 RX ADMIN — GABAPENTIN SCH MG: 100 CAPSULE ORAL at 08:18

## 2020-11-20 RX ADMIN — WARFARIN SODIUM SCH MG: 5 TABLET ORAL at 19:44

## 2020-11-20 RX ADMIN — METOPROLOL SUCCINATE SCH MG: 25 TABLET, EXTENDED RELEASE ORAL at 08:16

## 2020-11-20 RX ADMIN — ISOSORBIDE MONONITRATE SCH MG: 30 TABLET, FILM COATED, EXTENDED RELEASE ORAL at 08:17

## 2020-11-20 RX ADMIN — FUROSEMIDE SCH MG: 20 TABLET ORAL at 08:16

## 2020-11-20 RX ADMIN — SERTRALINE HYDROCHLORIDE SCH MG: 100 TABLET ORAL at 08:16

## 2020-11-21 RX ADMIN — GABAPENTIN SCH MG: 300 CAPSULE ORAL at 19:20

## 2020-11-21 RX ADMIN — SERTRALINE HYDROCHLORIDE SCH MG: 100 TABLET ORAL at 07:42

## 2020-11-21 RX ADMIN — FUROSEMIDE SCH MG: 20 TABLET ORAL at 07:42

## 2020-11-21 RX ADMIN — ROPINIROLE SCH MG: 0.5 TABLET ORAL at 19:20

## 2020-11-21 RX ADMIN — ISOSORBIDE MONONITRATE SCH MG: 30 TABLET, FILM COATED, EXTENDED RELEASE ORAL at 07:44

## 2020-11-21 RX ADMIN — GABAPENTIN SCH MG: 100 CAPSULE ORAL at 12:29

## 2020-11-21 RX ADMIN — METOPROLOL SUCCINATE SCH MG: 25 TABLET, EXTENDED RELEASE ORAL at 07:43

## 2020-11-21 RX ADMIN — GABAPENTIN SCH MG: 100 CAPSULE ORAL at 07:44

## 2020-11-21 RX ADMIN — MAGNESIUM OXIDE TAB 400 MG (241.3 MG ELEMENTAL MG) SCH TAB: 400 (241.3 MG) TAB at 07:42

## 2020-11-21 RX ADMIN — WARFARIN SODIUM SCH MG: 5 TABLET ORAL at 19:20

## 2020-11-21 RX ADMIN — OMEPRAZOLE SCH MG: 20 CAPSULE, DELAYED RELEASE ORAL at 07:41

## 2020-11-22 RX ADMIN — ISOSORBIDE MONONITRATE SCH MG: 30 TABLET, FILM COATED, EXTENDED RELEASE ORAL at 08:44

## 2020-11-22 RX ADMIN — GABAPENTIN SCH MG: 300 CAPSULE ORAL at 19:14

## 2020-11-22 RX ADMIN — OMEPRAZOLE SCH MG: 20 CAPSULE, DELAYED RELEASE ORAL at 08:37

## 2020-11-22 RX ADMIN — GABAPENTIN SCH MG: 100 CAPSULE ORAL at 08:42

## 2020-11-22 RX ADMIN — ROPINIROLE SCH MG: 0.5 TABLET ORAL at 19:14

## 2020-11-22 RX ADMIN — WARFARIN SODIUM SCH MG: 5 TABLET ORAL at 19:14

## 2020-11-22 RX ADMIN — FUROSEMIDE SCH MG: 20 TABLET ORAL at 08:43

## 2020-11-22 RX ADMIN — METOPROLOL SUCCINATE SCH MG: 25 TABLET, EXTENDED RELEASE ORAL at 08:43

## 2020-11-22 RX ADMIN — GABAPENTIN SCH MG: 100 CAPSULE ORAL at 12:11

## 2020-11-22 RX ADMIN — SERTRALINE HYDROCHLORIDE SCH MG: 100 TABLET ORAL at 08:41

## 2020-11-22 RX ADMIN — MAGNESIUM OXIDE TAB 400 MG (241.3 MG ELEMENTAL MG) SCH TAB: 400 (241.3 MG) TAB at 08:38

## 2020-11-23 RX ADMIN — MAGNESIUM OXIDE TAB 400 MG (241.3 MG ELEMENTAL MG) SCH TAB: 400 (241.3 MG) TAB at 08:45

## 2020-11-23 RX ADMIN — OMEPRAZOLE SCH MG: 20 CAPSULE, DELAYED RELEASE ORAL at 06:19

## 2020-11-23 RX ADMIN — METOPROLOL SUCCINATE SCH MG: 25 TABLET, EXTENDED RELEASE ORAL at 08:46

## 2020-11-23 RX ADMIN — FUROSEMIDE SCH MG: 20 TABLET ORAL at 08:45

## 2020-11-23 RX ADMIN — WARFARIN SODIUM SCH MG: 5 TABLET ORAL at 19:54

## 2020-11-23 RX ADMIN — ISOSORBIDE MONONITRATE SCH MG: 30 TABLET, FILM COATED, EXTENDED RELEASE ORAL at 08:46

## 2020-11-23 RX ADMIN — GABAPENTIN SCH MG: 100 CAPSULE ORAL at 13:21

## 2020-11-23 RX ADMIN — SERTRALINE HYDROCHLORIDE SCH MG: 100 TABLET ORAL at 08:47

## 2020-11-23 RX ADMIN — GABAPENTIN SCH MG: 100 CAPSULE ORAL at 08:47

## 2020-11-23 RX ADMIN — ROPINIROLE SCH MG: 0.5 TABLET ORAL at 19:54

## 2020-11-23 RX ADMIN — GABAPENTIN SCH MG: 300 CAPSULE ORAL at 19:54

## 2020-11-24 RX ADMIN — MAGNESIUM OXIDE TAB 400 MG (241.3 MG ELEMENTAL MG) SCH TAB: 400 (241.3 MG) TAB at 10:00

## 2020-11-24 RX ADMIN — FUROSEMIDE SCH MG: 20 TABLET ORAL at 10:01

## 2020-11-24 RX ADMIN — OMEPRAZOLE SCH MG: 20 CAPSULE, DELAYED RELEASE ORAL at 06:33

## 2020-11-24 RX ADMIN — ROPINIROLE SCH MG: 0.5 TABLET ORAL at 20:23

## 2020-11-24 RX ADMIN — SERTRALINE HYDROCHLORIDE SCH MG: 100 TABLET ORAL at 10:01

## 2020-11-24 RX ADMIN — GABAPENTIN SCH MG: 300 CAPSULE ORAL at 20:24

## 2020-11-24 RX ADMIN — WARFARIN SODIUM SCH MG: 5 TABLET ORAL at 20:23

## 2020-11-24 RX ADMIN — GABAPENTIN SCH MG: 100 CAPSULE ORAL at 13:36

## 2020-11-24 RX ADMIN — METOPROLOL SUCCINATE SCH MG: 25 TABLET, EXTENDED RELEASE ORAL at 10:01

## 2020-11-24 RX ADMIN — GABAPENTIN SCH MG: 100 CAPSULE ORAL at 10:00

## 2020-11-24 RX ADMIN — ISOSORBIDE MONONITRATE SCH MG: 30 TABLET, FILM COATED, EXTENDED RELEASE ORAL at 10:02

## 2020-11-25 RX ADMIN — OMEPRAZOLE SCH MG: 20 CAPSULE, DELAYED RELEASE ORAL at 06:41

## 2020-11-25 RX ADMIN — MAGNESIUM OXIDE TAB 400 MG (241.3 MG ELEMENTAL MG) SCH TAB: 400 (241.3 MG) TAB at 08:52

## 2020-11-25 RX ADMIN — METOPROLOL SUCCINATE SCH MG: 25 TABLET, EXTENDED RELEASE ORAL at 08:51

## 2020-11-25 RX ADMIN — SERTRALINE HYDROCHLORIDE SCH MG: 100 TABLET ORAL at 08:51

## 2020-11-25 RX ADMIN — GABAPENTIN SCH MG: 100 CAPSULE ORAL at 08:52

## 2020-11-25 RX ADMIN — ROPINIROLE SCH MG: 0.5 TABLET ORAL at 19:20

## 2020-11-25 RX ADMIN — GABAPENTIN SCH MG: 100 CAPSULE ORAL at 12:41

## 2020-11-25 RX ADMIN — GABAPENTIN SCH MG: 300 CAPSULE ORAL at 19:20

## 2020-11-25 RX ADMIN — WARFARIN SODIUM SCH MG: 5 TABLET ORAL at 19:20

## 2020-11-25 RX ADMIN — FUROSEMIDE SCH MG: 20 TABLET ORAL at 08:52

## 2020-11-25 RX ADMIN — ISOSORBIDE MONONITRATE SCH MG: 30 TABLET, FILM COATED, EXTENDED RELEASE ORAL at 08:52

## 2020-11-26 RX ADMIN — FUROSEMIDE SCH MG: 20 TABLET ORAL at 09:01

## 2020-11-26 RX ADMIN — SERTRALINE HYDROCHLORIDE SCH MG: 100 TABLET ORAL at 09:00

## 2020-11-26 RX ADMIN — GABAPENTIN SCH MG: 300 CAPSULE ORAL at 19:48

## 2020-11-26 RX ADMIN — GABAPENTIN SCH MG: 100 CAPSULE ORAL at 09:00

## 2020-11-26 RX ADMIN — ISOSORBIDE MONONITRATE SCH MG: 30 TABLET, FILM COATED, EXTENDED RELEASE ORAL at 09:01

## 2020-11-26 RX ADMIN — METOPROLOL SUCCINATE SCH MG: 25 TABLET, EXTENDED RELEASE ORAL at 09:00

## 2020-11-26 RX ADMIN — MAGNESIUM OXIDE TAB 400 MG (241.3 MG ELEMENTAL MG) SCH TAB: 400 (241.3 MG) TAB at 09:01

## 2020-11-26 RX ADMIN — OMEPRAZOLE SCH MG: 20 CAPSULE, DELAYED RELEASE ORAL at 06:37

## 2020-11-26 RX ADMIN — GABAPENTIN SCH MG: 100 CAPSULE ORAL at 14:02

## 2020-11-26 RX ADMIN — WARFARIN SODIUM SCH MG: 5 TABLET ORAL at 19:49

## 2020-11-26 RX ADMIN — ROPINIROLE SCH MG: 0.5 TABLET ORAL at 19:49

## 2020-11-27 RX ADMIN — FUROSEMIDE SCH MG: 20 TABLET ORAL at 07:54

## 2020-11-27 RX ADMIN — GABAPENTIN SCH MG: 100 CAPSULE ORAL at 12:29

## 2020-11-27 RX ADMIN — MAGNESIUM OXIDE TAB 400 MG (241.3 MG ELEMENTAL MG) SCH TAB: 400 (241.3 MG) TAB at 07:55

## 2020-11-27 RX ADMIN — WARFARIN SODIUM SCH MG: 5 TABLET ORAL at 19:43

## 2020-11-27 RX ADMIN — OMEPRAZOLE SCH MG: 20 CAPSULE, DELAYED RELEASE ORAL at 06:33

## 2020-11-27 RX ADMIN — METOPROLOL SUCCINATE SCH MG: 25 TABLET, EXTENDED RELEASE ORAL at 07:54

## 2020-11-27 RX ADMIN — GABAPENTIN SCH MG: 100 CAPSULE ORAL at 07:53

## 2020-11-27 RX ADMIN — ROPINIROLE SCH MG: 0.5 TABLET ORAL at 19:43

## 2020-11-27 RX ADMIN — GABAPENTIN SCH MG: 300 CAPSULE ORAL at 19:43

## 2020-11-27 RX ADMIN — ISOSORBIDE MONONITRATE SCH MG: 30 TABLET, FILM COATED, EXTENDED RELEASE ORAL at 07:54

## 2020-11-27 RX ADMIN — SERTRALINE HYDROCHLORIDE SCH MG: 100 TABLET ORAL at 07:55

## 2020-11-28 RX ADMIN — OMEPRAZOLE SCH MG: 20 CAPSULE, DELAYED RELEASE ORAL at 06:20

## 2020-11-28 RX ADMIN — METOPROLOL SUCCINATE SCH MG: 25 TABLET, EXTENDED RELEASE ORAL at 07:40

## 2020-11-28 RX ADMIN — ISOSORBIDE MONONITRATE SCH MG: 30 TABLET, FILM COATED, EXTENDED RELEASE ORAL at 07:40

## 2020-11-28 RX ADMIN — WARFARIN SODIUM SCH MG: 5 TABLET ORAL at 19:44

## 2020-11-28 RX ADMIN — GABAPENTIN SCH MG: 100 CAPSULE ORAL at 07:39

## 2020-11-28 RX ADMIN — MAGNESIUM OXIDE TAB 400 MG (241.3 MG ELEMENTAL MG) SCH TAB: 400 (241.3 MG) TAB at 07:41

## 2020-11-28 RX ADMIN — SERTRALINE HYDROCHLORIDE SCH MG: 100 TABLET ORAL at 07:40

## 2020-11-28 RX ADMIN — FUROSEMIDE SCH MG: 20 TABLET ORAL at 07:41

## 2020-11-28 RX ADMIN — GABAPENTIN SCH MG: 300 CAPSULE ORAL at 19:43

## 2020-11-28 RX ADMIN — ROPINIROLE SCH MG: 0.5 TABLET ORAL at 19:44

## 2020-11-28 RX ADMIN — GABAPENTIN SCH MG: 100 CAPSULE ORAL at 13:17

## 2020-11-29 RX ADMIN — GABAPENTIN SCH MG: 100 CAPSULE ORAL at 07:33

## 2020-11-29 RX ADMIN — MAGNESIUM OXIDE TAB 400 MG (241.3 MG ELEMENTAL MG) SCH TAB: 400 (241.3 MG) TAB at 07:35

## 2020-11-29 RX ADMIN — WARFARIN SODIUM SCH MG: 5 TABLET ORAL at 19:47

## 2020-11-29 RX ADMIN — OMEPRAZOLE SCH MG: 20 CAPSULE, DELAYED RELEASE ORAL at 06:58

## 2020-11-29 RX ADMIN — GABAPENTIN SCH MG: 300 CAPSULE ORAL at 19:45

## 2020-11-29 RX ADMIN — ROPINIROLE SCH MG: 0.5 TABLET ORAL at 19:47

## 2020-11-29 RX ADMIN — FUROSEMIDE SCH MG: 20 TABLET ORAL at 07:34

## 2020-11-29 RX ADMIN — METOPROLOL SUCCINATE SCH MG: 25 TABLET, EXTENDED RELEASE ORAL at 07:34

## 2020-11-29 RX ADMIN — SERTRALINE HYDROCHLORIDE SCH MG: 100 TABLET ORAL at 07:34

## 2020-11-29 RX ADMIN — GABAPENTIN SCH MG: 100 CAPSULE ORAL at 12:05

## 2020-11-29 RX ADMIN — ISOSORBIDE MONONITRATE SCH MG: 30 TABLET, FILM COATED, EXTENDED RELEASE ORAL at 07:33

## 2020-11-30 RX ADMIN — WARFARIN SODIUM SCH MG: 5 TABLET ORAL at 19:52

## 2020-11-30 RX ADMIN — ISOSORBIDE MONONITRATE SCH MG: 30 TABLET, FILM COATED, EXTENDED RELEASE ORAL at 07:42

## 2020-11-30 RX ADMIN — GABAPENTIN SCH MG: 100 CAPSULE ORAL at 07:40

## 2020-11-30 RX ADMIN — SERTRALINE HYDROCHLORIDE SCH MG: 100 TABLET ORAL at 07:43

## 2020-11-30 RX ADMIN — OMEPRAZOLE SCH MG: 20 CAPSULE, DELAYED RELEASE ORAL at 06:11

## 2020-11-30 RX ADMIN — METOPROLOL SUCCINATE SCH MG: 25 TABLET, EXTENDED RELEASE ORAL at 07:41

## 2020-11-30 RX ADMIN — GABAPENTIN SCH MG: 300 CAPSULE ORAL at 19:51

## 2020-11-30 RX ADMIN — MAGNESIUM OXIDE TAB 400 MG (241.3 MG ELEMENTAL MG) SCH TAB: 400 (241.3 MG) TAB at 07:43

## 2020-11-30 RX ADMIN — ROPINIROLE SCH MG: 0.5 TABLET ORAL at 19:53

## 2020-11-30 RX ADMIN — FUROSEMIDE SCH MG: 20 TABLET ORAL at 07:42

## 2020-11-30 RX ADMIN — GABAPENTIN SCH MG: 100 CAPSULE ORAL at 12:48

## 2020-12-01 RX ADMIN — GABAPENTIN SCH MG: 300 CAPSULE ORAL at 19:26

## 2020-12-01 RX ADMIN — ISOSORBIDE MONONITRATE SCH MG: 30 TABLET, FILM COATED, EXTENDED RELEASE ORAL at 08:56

## 2020-12-01 RX ADMIN — METOPROLOL SUCCINATE SCH MG: 25 TABLET, EXTENDED RELEASE ORAL at 08:56

## 2020-12-01 RX ADMIN — MAGNESIUM OXIDE TAB 400 MG (241.3 MG ELEMENTAL MG) SCH TAB: 400 (241.3 MG) TAB at 08:55

## 2020-12-01 RX ADMIN — SERTRALINE HYDROCHLORIDE SCH MG: 100 TABLET ORAL at 08:56

## 2020-12-01 RX ADMIN — GABAPENTIN SCH MG: 100 CAPSULE ORAL at 08:55

## 2020-12-01 RX ADMIN — FUROSEMIDE SCH MG: 20 TABLET ORAL at 08:56

## 2020-12-01 RX ADMIN — WARFARIN SODIUM SCH MG: 5 TABLET ORAL at 19:28

## 2020-12-01 RX ADMIN — GABAPENTIN SCH MG: 100 CAPSULE ORAL at 15:43

## 2020-12-01 RX ADMIN — ROPINIROLE SCH MG: 0.5 TABLET ORAL at 19:28

## 2020-12-01 RX ADMIN — OMEPRAZOLE SCH MG: 20 CAPSULE, DELAYED RELEASE ORAL at 06:40

## 2020-12-01 NOTE — PCM.SN.2
- Free Text/Narrative


Note: 


Subjective/Obective:


Page by nursing to come and take a look at leg wounds.  Patient has had weeping 

ulcers on her bilateral anterior shins for the last couple of months.  The one 

on the left leg has healed up quite well.  Right one is much slower to heal.  

Patient does have quite a bit of swelling in the legs as well as pain on 

palpation.  The left leg ulcer is completely healed over.  She still has 1+ 

pitting edema to the level of the knee on the side.  Still mildly tender with 

palpation and random spot from his leg.  The right ulcer is larger.  It is 

scabbed over with a small central area that is weeping.  There is no surrounding

erythema.  This leg is also 1+ pitting with edema to the level of the knee and 

is tender with palpation to random spots.  





Assessment/Plan:


Will continue wound cares as previously ordered.  No sign of infection or acute 

cellulitis.  We will increase her Lasix dose for 4 days to see if this helps 

expedite some of the fluid to promote the wound healing.  I will plan to recheck

BMP after she is done at this.  Patient is going to dentist appointment tomorrow

we will plan to start the double dose of Lasix on 12/3/20 with a plan to recheck

the BMP on 12/07/2020.  Patient and nursing staff are okay with this plan.

## 2020-12-02 RX ADMIN — SERTRALINE HYDROCHLORIDE SCH MG: 100 TABLET ORAL at 08:03

## 2020-12-02 RX ADMIN — MAGNESIUM OXIDE TAB 400 MG (241.3 MG ELEMENTAL MG) SCH TAB: 400 (241.3 MG) TAB at 08:03

## 2020-12-02 RX ADMIN — FUROSEMIDE SCH MG: 20 TABLET ORAL at 08:03

## 2020-12-02 RX ADMIN — GABAPENTIN SCH MG: 100 CAPSULE ORAL at 13:48

## 2020-12-02 RX ADMIN — WARFARIN SODIUM SCH MG: 5 TABLET ORAL at 20:09

## 2020-12-02 RX ADMIN — GABAPENTIN SCH MG: 100 CAPSULE ORAL at 08:04

## 2020-12-02 RX ADMIN — ROPINIROLE SCH MG: 0.5 TABLET ORAL at 20:09

## 2020-12-02 RX ADMIN — ISOSORBIDE MONONITRATE SCH MG: 30 TABLET, FILM COATED, EXTENDED RELEASE ORAL at 08:03

## 2020-12-02 RX ADMIN — METOPROLOL SUCCINATE SCH MG: 25 TABLET, EXTENDED RELEASE ORAL at 08:04

## 2020-12-02 RX ADMIN — OMEPRAZOLE SCH MG: 20 CAPSULE, DELAYED RELEASE ORAL at 06:07

## 2020-12-02 RX ADMIN — GABAPENTIN SCH MG: 300 CAPSULE ORAL at 20:09

## 2020-12-03 RX ADMIN — WARFARIN SODIUM SCH MG: 5 TABLET ORAL at 19:25

## 2020-12-03 RX ADMIN — GABAPENTIN SCH MG: 300 CAPSULE ORAL at 19:24

## 2020-12-03 RX ADMIN — GABAPENTIN SCH MG: 100 CAPSULE ORAL at 07:37

## 2020-12-03 RX ADMIN — OMEPRAZOLE SCH MG: 20 CAPSULE, DELAYED RELEASE ORAL at 07:01

## 2020-12-03 RX ADMIN — FUROSEMIDE SCH MG: 20 TABLET ORAL at 12:18

## 2020-12-03 RX ADMIN — ISOSORBIDE MONONITRATE SCH MG: 30 TABLET, FILM COATED, EXTENDED RELEASE ORAL at 07:38

## 2020-12-03 RX ADMIN — MAGNESIUM OXIDE TAB 400 MG (241.3 MG ELEMENTAL MG) SCH TAB: 400 (241.3 MG) TAB at 07:37

## 2020-12-03 RX ADMIN — FUROSEMIDE SCH MG: 20 TABLET ORAL at 07:39

## 2020-12-03 RX ADMIN — ROPINIROLE SCH MG: 0.5 TABLET ORAL at 19:26

## 2020-12-03 RX ADMIN — SERTRALINE HYDROCHLORIDE SCH MG: 100 TABLET ORAL at 07:38

## 2020-12-03 RX ADMIN — METOPROLOL SUCCINATE SCH MG: 25 TABLET, EXTENDED RELEASE ORAL at 07:38

## 2020-12-03 RX ADMIN — GABAPENTIN SCH MG: 100 CAPSULE ORAL at 12:18

## 2020-12-04 RX ADMIN — FUROSEMIDE SCH MG: 20 TABLET ORAL at 07:37

## 2020-12-04 RX ADMIN — WARFARIN SODIUM SCH MG: 5 TABLET ORAL at 20:41

## 2020-12-04 RX ADMIN — ROPINIROLE SCH MG: 0.5 TABLET ORAL at 20:42

## 2020-12-04 RX ADMIN — SERTRALINE HYDROCHLORIDE SCH MG: 100 TABLET ORAL at 07:38

## 2020-12-04 RX ADMIN — FUROSEMIDE SCH MG: 20 TABLET ORAL at 12:19

## 2020-12-04 RX ADMIN — MAGNESIUM OXIDE TAB 400 MG (241.3 MG ELEMENTAL MG) SCH TAB: 400 (241.3 MG) TAB at 07:37

## 2020-12-04 RX ADMIN — GABAPENTIN SCH MG: 100 CAPSULE ORAL at 12:19

## 2020-12-04 RX ADMIN — OMEPRAZOLE SCH MG: 20 CAPSULE, DELAYED RELEASE ORAL at 06:29

## 2020-12-04 RX ADMIN — METOPROLOL SUCCINATE SCH MG: 25 TABLET, EXTENDED RELEASE ORAL at 07:38

## 2020-12-04 RX ADMIN — GABAPENTIN SCH MG: 300 CAPSULE ORAL at 20:41

## 2020-12-04 RX ADMIN — ISOSORBIDE MONONITRATE SCH MG: 30 TABLET, FILM COATED, EXTENDED RELEASE ORAL at 07:38

## 2020-12-04 RX ADMIN — GABAPENTIN SCH MG: 100 CAPSULE ORAL at 07:39

## 2020-12-05 RX ADMIN — OMEPRAZOLE SCH MG: 20 CAPSULE, DELAYED RELEASE ORAL at 08:00

## 2020-12-05 RX ADMIN — WARFARIN SODIUM SCH MG: 5 TABLET ORAL at 20:27

## 2020-12-05 RX ADMIN — FUROSEMIDE SCH MG: 20 TABLET ORAL at 08:01

## 2020-12-05 RX ADMIN — ISOSORBIDE MONONITRATE SCH MG: 30 TABLET, FILM COATED, EXTENDED RELEASE ORAL at 08:05

## 2020-12-05 RX ADMIN — MAGNESIUM OXIDE TAB 400 MG (241.3 MG ELEMENTAL MG) SCH TAB: 400 (241.3 MG) TAB at 08:00

## 2020-12-05 RX ADMIN — GABAPENTIN SCH MG: 300 CAPSULE ORAL at 20:27

## 2020-12-05 RX ADMIN — METOPROLOL SUCCINATE SCH MG: 25 TABLET, EXTENDED RELEASE ORAL at 08:05

## 2020-12-05 RX ADMIN — SERTRALINE HYDROCHLORIDE SCH MG: 100 TABLET ORAL at 08:01

## 2020-12-05 RX ADMIN — GABAPENTIN SCH MG: 100 CAPSULE ORAL at 08:01

## 2020-12-05 RX ADMIN — ROPINIROLE SCH MG: 0.5 TABLET ORAL at 20:28

## 2020-12-05 RX ADMIN — GABAPENTIN SCH MG: 100 CAPSULE ORAL at 12:15

## 2020-12-05 RX ADMIN — FUROSEMIDE SCH MG: 20 TABLET ORAL at 12:15

## 2020-12-06 RX ADMIN — SERTRALINE HYDROCHLORIDE SCH MG: 100 TABLET ORAL at 08:07

## 2020-12-06 RX ADMIN — METOPROLOL SUCCINATE SCH MG: 25 TABLET, EXTENDED RELEASE ORAL at 08:07

## 2020-12-06 RX ADMIN — GABAPENTIN SCH MG: 100 CAPSULE ORAL at 12:16

## 2020-12-06 RX ADMIN — GABAPENTIN SCH MG: 100 CAPSULE ORAL at 08:07

## 2020-12-06 RX ADMIN — GABAPENTIN SCH MG: 300 CAPSULE ORAL at 20:22

## 2020-12-06 RX ADMIN — WARFARIN SODIUM SCH MG: 5 TABLET ORAL at 20:23

## 2020-12-06 RX ADMIN — ROPINIROLE SCH MG: 0.5 TABLET ORAL at 20:22

## 2020-12-06 RX ADMIN — OMEPRAZOLE SCH MG: 20 CAPSULE, DELAYED RELEASE ORAL at 06:36

## 2020-12-06 RX ADMIN — MAGNESIUM OXIDE TAB 400 MG (241.3 MG ELEMENTAL MG) SCH TAB: 400 (241.3 MG) TAB at 08:07

## 2020-12-06 RX ADMIN — FUROSEMIDE SCH MG: 20 TABLET ORAL at 12:16

## 2020-12-06 RX ADMIN — ISOSORBIDE MONONITRATE SCH MG: 30 TABLET, FILM COATED, EXTENDED RELEASE ORAL at 08:07

## 2020-12-06 RX ADMIN — FUROSEMIDE SCH MG: 20 TABLET ORAL at 08:07

## 2020-12-07 LAB
ANION GAP SERPL CALC-SCNC: 8 MMOL/L (ref 10–20)
CHLORIDE SERPL-SCNC: 108 MMOL/L (ref 98–107)
SODIUM SERPL-SCNC: 146 MMOL/L (ref 136–145)

## 2020-12-07 RX ADMIN — SERTRALINE HYDROCHLORIDE SCH MG: 100 TABLET ORAL at 08:53

## 2020-12-07 RX ADMIN — OMEPRAZOLE SCH MG: 20 CAPSULE, DELAYED RELEASE ORAL at 06:25

## 2020-12-07 RX ADMIN — ROPINIROLE SCH MG: 0.5 TABLET ORAL at 20:33

## 2020-12-07 RX ADMIN — GABAPENTIN SCH MG: 300 CAPSULE ORAL at 20:33

## 2020-12-07 RX ADMIN — FUROSEMIDE SCH MG: 20 TABLET ORAL at 08:53

## 2020-12-07 RX ADMIN — MAGNESIUM OXIDE TAB 400 MG (241.3 MG ELEMENTAL MG) SCH TAB: 400 (241.3 MG) TAB at 08:53

## 2020-12-07 RX ADMIN — GABAPENTIN SCH MG: 100 CAPSULE ORAL at 13:21

## 2020-12-07 RX ADMIN — ISOSORBIDE MONONITRATE SCH MG: 30 TABLET, FILM COATED, EXTENDED RELEASE ORAL at 08:53

## 2020-12-07 RX ADMIN — METOPROLOL SUCCINATE SCH MG: 25 TABLET, EXTENDED RELEASE ORAL at 08:53

## 2020-12-07 RX ADMIN — WARFARIN SODIUM SCH MG: 5 TABLET ORAL at 20:33

## 2020-12-07 RX ADMIN — GABAPENTIN SCH MG: 100 CAPSULE ORAL at 08:52

## 2020-12-07 NOTE — PCM.PN
- General Info


Date of Service: 12/07/20


Admission Dx/Problem (Free Text): 


Failure to Thrive, Bilateral LE weakness





Subjective Update: 


Patient has been doing well.  She has no complaints today.  We did recheck her 

leg wound and swelling; this does seem to be improving.  The pharmacist did do a

review of her medication list; recommendation to trial off the PPI and see how 

this goes.  Yocasta is not willing to give this a try as her heartburn is so 

troublesome on occasion and she doesn't want to make it any worse. 





She does bring up that her family has been causing her a lot of worry.  She has 

a lot of concerns surrounding her daughter who has breast cancer and a 

granddaughter who is pregnant.  She is hopeful as her son is hoping to move into

her DIL's house and she may be able to return home with him.  Isolation in the 

hospital with no visitors has been hard on her.  








- Review of Systems


General: Reports: No Symptoms


HEENT: Reports: No Symptoms


Pulmonary: Reports: No Symptoms


Cardiovascular: Reports: Edema


Gastrointestinal: Reports: No Symptoms


Genitourinary: Reports: No Symptoms


Musculoskeletal: Reports: Leg Pain, Foot Pain


Skin: Reports: Other (sore on right leg)


Neurological: Reports: Weakness


Psychiatric: Reports: Depression (very worried about many family members's well 

being), Anxiety





- Patient Data


Vitals - Most Recent: 


                                Last Vital Signs











Temp  98 F   12/07/20 08:53


 


Pulse  95   12/07/20 08:53


 


Resp  20   12/07/20 06:40


 


BP  122/69   12/07/20 08:53


 


Pulse Ox  94 L  12/07/20 06:40











Weight - Most Recent: 187 lb 12.8 oz


I&O - Last 24 Hours: 


                                 Intake & Output











 12/06/20 12/07/20 12/07/20





 22:59 06:59 14:59


 


Intake Total 300  120


 


Balance 300  120











Lab Results Last 24 Hours: 


                         Laboratory Results - last 24 hr











  12/07/20 Range/Units





  06:53 


 


Sodium  146 H  (136-145)  mmol/L


 


Potassium  4.0  (3.5-5.1)  mmol/L


 


Chloride  108 H  ()  mmol/L


 


Carbon Dioxide  34 H  (21-32)  mmol/L


 


Anion Gap  8.0 L  (10-20)  mmol/L


 


BUN  19 H  (7-18)  mg/dL


 


Creatinine  1.3 H  (0.55-1.02)  mg/dL


 


Est Cr Clr Drug Dosing  26.82  mL/min


 


Estimated GFR (MDRD)  39  


 


Glucose  109 H  ()  mg/dL


 


Calcium  8.7  (8.5-10.1)  mg/dL











Med Orders - Current: 


                               Current Medications





Acetaminophen (Tylenol)  650 mg PO TID@0800,1300,2000 [facility]


   Last Admin: 12/07/20 08:53 Dose:  650 mg


   Documented by: 


Buspirone HCl (Buspar)  5 mg PO BID [facility]


   Last Admin: 12/07/20 08:54 Dose:  5 mg


   Documented by: 


Calcium Carbonate/Glycine (Tums Extra Strength)  750 mg PO TID PRN


   PRN Reason: Dyspepsia


   Last Admin: 11/24/20 13:36 Dose:  750 mg


   Documented by: 


Docusate Sodium (Colace)  100 mg PO DAILY PRN


   PRN Reason: Constipation


Famotidine (Pepcid)  20 mg PO BEDTIME [facility]


   Last Admin: 12/06/20 20:23 Dose:  20 mg


   Documented by: 


Ferrous Sulfate (Ferrous Sulfate)  325 mg PO Q48H [facility]


   Last Admin: 12/06/20 08:07 Dose:  325 mg


   Documented by: 


Furosemide (Lasix)  20 mg PO DAILY [facility]


   Last Admin: 12/07/20 08:53 Dose:  20 mg


   Documented by: 


Gabapentin (Neurontin)  100 mg PO BID@0800,1300 [facility]


   Last Admin: 12/07/20 08:52 Dose:  100 mg


   Documented by: 


Gabapentin (Neurontin)  300 mg PO BEDTIME [facility]


   Last Admin: 12/06/20 20:22 Dose:  300 mg


   Documented by: 


Isosorbide Mononitrate (Imdur)  30 mg PO DAILY [facility]


   Last Admin: 12/07/20 08:53 Dose:  30 mg


   Documented by: 


Loperamide HCl (Imodium)  2 mg PO Q12H PRN


   PRN Reason: Diarrhea


   Last Admin: 12/02/20 08:02 Dose:  2 mg


   Documented by: 


Melatonin (Melatonin)  6 mg PO BEDTIME [facility]


   Last Admin: 12/06/20 20:23 Dose:  6 mg


   Documented by: 


Metoprolol Succinate (Toprol Xl)  25 mg PO DAILY [facility]


   Last Admin: 12/07/20 08:53 Dose:  25 mg


   Documented by: 


Miconazole (Desenex 2%)  0 gm TOP BID PRN


   PRN Reason: RASH UNDER BILATERAL BREASTS


Multivitamins/Minerals (Thera M Plus)  1 tab PO DAILY [facility]


   Last Admin: 12/07/20 08:53 Dose:  1 tab


   Documented by: 


Nitroglycerin (Nitrostat)  0.4 mg SL Q5M PRN


   PRN Reason: Chest Pain


Omeprazole (Omeprazole)  20 mg PO DAILY@0700 [facility]


   Last Admin: 12/07/20 06:25 Dose:  20 mg


   Documented by: 


Pharmacy Consult (Consult To Pharmacy)  1 each .XX ASDIRECTED [facility]


Polyethylene Glycol (Miralax)  17 gm PO DAILY PRN


   PRN Reason: Constipation


Ropinirole HCl (Requip)  0.5 mg PO BEDTIME [facility]


   Last Admin: 12/06/20 20:22 Dose:  0.5 mg


   Documented by: 


Senna/Docusate Sodium (Senna Plus)  1 tab PO DAILY PRN


   PRN Reason: Constipation


Sertraline HCl (Zoloft)  100 mg PO DAILY [facility]


   Last Admin: 12/07/20 08:53 Dose:  100 mg


   Documented by: 


Vitamin B Complex (Vitamin B Complex)  1 each PO DAILY [facility]


   Last Admin: 12/07/20 08:53 Dose:  1 each


   Documented by: 


Warfarin Sodium (Coumadin)  5 mg PO SuMoTuWeFrSa@2000 [facility]


   Last Admin: 12/06/20 20:23 Dose:  5 mg


   Documented by: 


Warfarin Sodium (Coumadin)  2.5 mg PO Th@2000 [facility]


   Last Admin: 12/03/20 19:25 Dose:  2.5 mg


   Documented by: 





Discontinued Medications





Ciprofloxacin (Ciprofloxacin Hcl)  500 mg PO Q24H [facility]


   Stop: 10/22/20 20:01


   Last Admin: 10/22/20 19:24 Dose:  500 mg


   Documented by: 


Famotidine (Pepcid)  20 mg PO DAILY [facility]


   Last Admin: 10/09/20 08:14 Dose:  20 mg


   Documented by: 


Furosemide (Lasix)  20 mg PO DAILY@1200 [facility]


   Stop: 12/06/20 12:01


   Last Admin: 12/06/20 12:16 Dose:  20 mg


   Documented by: 


Nitrofurantoin Macrocrystals (Macrobid)  100 mg PO BID [facility]


   Stop: 10/25/20 08:31


   Last Admin: 10/18/20 09:00 Dose:  100 mg


   Documented by: 


Omeprazole (Omeprazole)  20 mg PO DAILY [facility]


   Last Admin: 10/09/20 08:14 Dose:  20 mg


   Documented by: 











- Exam


Quality Assessment: Supplemental Oxygen


General: Alert, Oriented


HEENT: EOMI


Neck: Supple


Lungs: Clear to Auscultation, Normal Respiratory Effort


Cardiovascular: Regular Rate, Regular Rhythm


GI/Abdominal Exam: Normal Bowel Sounds, Soft, Non-Tender


Extremities: Pedal Edema


Skin: Warm, Dry


Wound/Incisions: Healing Well (quarter sized lesion to right distal shin healing

well, very superficial at this time.  No surrounding erythema)


Neurological: No New Focal Deficit


Psy/Mental Status: Alert, Normal Affect, Normal Mood





Sepsis Event Note





- Evaluation


Sepsis Screening Result: No Definite Risk





- Focused Exam


Vital Signs: 


                                   Vital Signs











  Temp Temp Pulse Pulse Resp BP BP


 


 12/07/20 08:53  98 F   95    122/69 


 


 12/07/20 06:40   97.5 F   86  20   135/67


 


 12/07/20 05:49   98.2 F     














  Pulse Ox


 


 12/07/20 08:53 


 


 12/07/20 06:40  94 L


 


 12/07/20 05:49 














- Problem List Review


Problem List Initiated/Reviewed/Updated: Yes





- My Orders


Last 24 Hours: 


My Active Orders





12/28/20 07:00


INR,PT,PROTHROMBIN TIME [COAG] Q28D 





01/25/21 07:00


INR,PT,PROTHROMBIN TIME [COAG] Q28D 





02/22/21 07:00


INR,PT,PROTHROMBIN TIME [COAG] Q28D 





03/22/21 07:00


INR,PT,PROTHROMBIN TIME [COAG] Q28D 





04/19/21 07:00


INR,PT,PROTHROMBIN TIME [COAG] Q28D 





05/17/21 07:00


INR,PT,PROTHROMBIN TIME [COAG] Q28D 





06/14/21 07:00


INR,PT,PROTHROMBIN TIME [COAG] Q28D 














- Assessment


Assessment:: 


Failure to Thrive


Chronic Leg Weakness


- Patient is long-term swing bed stay for assistance with ADLs due to the above


- Patient notes son is moving into [facility]'s house and would like for her to return 

home


Plan:


- Continue regular daily care is as previously prescribed


- Continue physical therapy as previously prescribed


- Pending further discussion with son we may be able to look into  as a safe 

option for return home; will need more information on safety of this going 

forward





Breast Cancer, s/p lumpectomy - plan for follow-up with oncology as previously 

planned (repeat breast exam every 6 months, f/u visit with one in Aug 2021 with 

mammo)


HTN - continue metoprolol 25mg daily


GERD - continue omeprazole 20mg daily, prn Pepcid 20mg, prn Tums


Anxiety/Depression - continue Zoloft 100mg daily, BuSpar 5mg BID


Angina - continue Imdur 30mg daily, prn Nitro


Restless legs - Requip 0.5mg at bedtime


Chronic pain - Gabapentin 100mg am and noon, 300mg at supper


Hx DVT - continue Warfarin, serial INR per pharmacy


Peripheral edema - continue Lasix 20mg daily


Constipation - continue Colace 100mg dialy, prn miralax


Fe-def anemia - continue iron 325mg e/o day


Insomnia - continue melatonin 3mg at bedtime


Chronic respiratory failure - continue supplemental O2


Fungal rash - continue prn miconazole

## 2020-12-08 RX ADMIN — METOPROLOL SUCCINATE SCH MG: 25 TABLET, EXTENDED RELEASE ORAL at 07:35

## 2020-12-08 RX ADMIN — OMEPRAZOLE SCH MG: 20 CAPSULE, DELAYED RELEASE ORAL at 06:25

## 2020-12-08 RX ADMIN — WARFARIN SODIUM SCH MG: 5 TABLET ORAL at 20:08

## 2020-12-08 RX ADMIN — SERTRALINE HYDROCHLORIDE SCH MG: 100 TABLET ORAL at 07:35

## 2020-12-08 RX ADMIN — GABAPENTIN SCH MG: 100 CAPSULE ORAL at 12:51

## 2020-12-08 RX ADMIN — FUROSEMIDE SCH MG: 20 TABLET ORAL at 07:36

## 2020-12-08 RX ADMIN — ROPINIROLE SCH MG: 0.5 TABLET ORAL at 20:07

## 2020-12-08 RX ADMIN — GABAPENTIN SCH MG: 300 CAPSULE ORAL at 20:08

## 2020-12-08 RX ADMIN — ISOSORBIDE MONONITRATE SCH MG: 30 TABLET, FILM COATED, EXTENDED RELEASE ORAL at 07:35

## 2020-12-08 RX ADMIN — GABAPENTIN SCH MG: 100 CAPSULE ORAL at 07:36

## 2020-12-08 RX ADMIN — MAGNESIUM OXIDE TAB 400 MG (241.3 MG ELEMENTAL MG) SCH TAB: 400 (241.3 MG) TAB at 07:34

## 2020-12-09 RX ADMIN — FUROSEMIDE SCH MG: 20 TABLET ORAL at 07:32

## 2020-12-09 RX ADMIN — GABAPENTIN SCH MG: 100 CAPSULE ORAL at 07:31

## 2020-12-09 RX ADMIN — GABAPENTIN SCH MG: 300 CAPSULE ORAL at 20:50

## 2020-12-09 RX ADMIN — OMEPRAZOLE SCH: 20 CAPSULE, DELAYED RELEASE ORAL at 07:35

## 2020-12-09 RX ADMIN — ISOSORBIDE MONONITRATE SCH MG: 30 TABLET, FILM COATED, EXTENDED RELEASE ORAL at 07:31

## 2020-12-09 RX ADMIN — ROPINIROLE SCH MG: 0.5 TABLET ORAL at 20:48

## 2020-12-09 RX ADMIN — MAGNESIUM OXIDE TAB 400 MG (241.3 MG ELEMENTAL MG) SCH TAB: 400 (241.3 MG) TAB at 07:33

## 2020-12-09 RX ADMIN — GABAPENTIN SCH MG: 100 CAPSULE ORAL at 12:41

## 2020-12-09 RX ADMIN — SERTRALINE HYDROCHLORIDE SCH MG: 100 TABLET ORAL at 07:32

## 2020-12-09 RX ADMIN — METOPROLOL SUCCINATE SCH MG: 25 TABLET, EXTENDED RELEASE ORAL at 07:32

## 2020-12-09 RX ADMIN — WARFARIN SODIUM SCH MG: 5 TABLET ORAL at 20:49

## 2020-12-10 RX ADMIN — SERTRALINE HYDROCHLORIDE SCH MG: 100 TABLET ORAL at 07:44

## 2020-12-10 RX ADMIN — GABAPENTIN SCH MG: 100 CAPSULE ORAL at 07:43

## 2020-12-10 RX ADMIN — ROPINIROLE SCH MG: 0.5 TABLET ORAL at 19:29

## 2020-12-10 RX ADMIN — MAGNESIUM OXIDE TAB 400 MG (241.3 MG ELEMENTAL MG) SCH TAB: 400 (241.3 MG) TAB at 07:44

## 2020-12-10 RX ADMIN — WARFARIN SODIUM SCH MG: 5 TABLET ORAL at 19:25

## 2020-12-10 RX ADMIN — GABAPENTIN SCH MG: 100 CAPSULE ORAL at 12:42

## 2020-12-10 RX ADMIN — OMEPRAZOLE SCH MG: 20 CAPSULE, DELAYED RELEASE ORAL at 06:00

## 2020-12-10 RX ADMIN — FUROSEMIDE SCH MG: 20 TABLET ORAL at 07:46

## 2020-12-10 RX ADMIN — ISOSORBIDE MONONITRATE SCH MG: 30 TABLET, FILM COATED, EXTENDED RELEASE ORAL at 07:44

## 2020-12-10 RX ADMIN — METOPROLOL SUCCINATE SCH MG: 25 TABLET, EXTENDED RELEASE ORAL at 07:46

## 2020-12-10 RX ADMIN — GABAPENTIN SCH MG: 300 CAPSULE ORAL at 19:26

## 2020-12-11 RX ADMIN — SERTRALINE HYDROCHLORIDE SCH MG: 100 TABLET ORAL at 08:31

## 2020-12-11 RX ADMIN — OMEPRAZOLE SCH MG: 20 CAPSULE, DELAYED RELEASE ORAL at 07:44

## 2020-12-11 RX ADMIN — METOPROLOL SUCCINATE SCH MG: 25 TABLET, EXTENDED RELEASE ORAL at 08:32

## 2020-12-11 RX ADMIN — GABAPENTIN SCH MG: 300 CAPSULE ORAL at 19:46

## 2020-12-11 RX ADMIN — MAGNESIUM OXIDE TAB 400 MG (241.3 MG ELEMENTAL MG) SCH TAB: 400 (241.3 MG) TAB at 08:31

## 2020-12-11 RX ADMIN — ISOSORBIDE MONONITRATE SCH MG: 30 TABLET, FILM COATED, EXTENDED RELEASE ORAL at 08:32

## 2020-12-11 RX ADMIN — GABAPENTIN SCH MG: 100 CAPSULE ORAL at 12:15

## 2020-12-11 RX ADMIN — WARFARIN SODIUM SCH MG: 5 TABLET ORAL at 19:47

## 2020-12-11 RX ADMIN — GABAPENTIN SCH MG: 100 CAPSULE ORAL at 08:31

## 2020-12-11 RX ADMIN — FUROSEMIDE SCH MG: 20 TABLET ORAL at 08:32

## 2020-12-11 RX ADMIN — ROPINIROLE SCH MG: 0.5 TABLET ORAL at 19:48

## 2020-12-12 RX ADMIN — OMEPRAZOLE SCH MG: 20 CAPSULE, DELAYED RELEASE ORAL at 06:29

## 2020-12-12 RX ADMIN — ISOSORBIDE MONONITRATE SCH MG: 30 TABLET, FILM COATED, EXTENDED RELEASE ORAL at 07:44

## 2020-12-12 RX ADMIN — WARFARIN SODIUM SCH MG: 5 TABLET ORAL at 19:29

## 2020-12-12 RX ADMIN — SERTRALINE HYDROCHLORIDE SCH MG: 100 TABLET ORAL at 07:45

## 2020-12-12 RX ADMIN — METOPROLOL SUCCINATE SCH MG: 25 TABLET, EXTENDED RELEASE ORAL at 07:45

## 2020-12-12 RX ADMIN — GABAPENTIN SCH MG: 100 CAPSULE ORAL at 12:15

## 2020-12-12 RX ADMIN — MAGNESIUM OXIDE TAB 400 MG (241.3 MG ELEMENTAL MG) SCH TAB: 400 (241.3 MG) TAB at 07:43

## 2020-12-12 RX ADMIN — ROPINIROLE SCH MG: 0.5 TABLET ORAL at 19:29

## 2020-12-12 RX ADMIN — GABAPENTIN SCH MG: 300 CAPSULE ORAL at 19:29

## 2020-12-12 RX ADMIN — GABAPENTIN SCH MG: 100 CAPSULE ORAL at 07:45

## 2020-12-12 RX ADMIN — FUROSEMIDE SCH MG: 20 TABLET ORAL at 07:44

## 2020-12-13 RX ADMIN — OMEPRAZOLE SCH MG: 20 CAPSULE, DELAYED RELEASE ORAL at 06:55

## 2020-12-13 RX ADMIN — GABAPENTIN SCH MG: 300 CAPSULE ORAL at 20:23

## 2020-12-13 RX ADMIN — MAGNESIUM OXIDE TAB 400 MG (241.3 MG ELEMENTAL MG) SCH TAB: 400 (241.3 MG) TAB at 08:58

## 2020-12-13 RX ADMIN — SERTRALINE HYDROCHLORIDE SCH MG: 100 TABLET ORAL at 08:57

## 2020-12-13 RX ADMIN — GABAPENTIN SCH MG: 100 CAPSULE ORAL at 08:56

## 2020-12-13 RX ADMIN — FUROSEMIDE SCH MG: 20 TABLET ORAL at 08:58

## 2020-12-13 RX ADMIN — WARFARIN SODIUM SCH MG: 5 TABLET ORAL at 20:26

## 2020-12-13 RX ADMIN — ROPINIROLE SCH MG: 0.5 TABLET ORAL at 20:26

## 2020-12-13 RX ADMIN — METOPROLOL SUCCINATE SCH MG: 25 TABLET, EXTENDED RELEASE ORAL at 08:57

## 2020-12-13 RX ADMIN — ISOSORBIDE MONONITRATE SCH MG: 30 TABLET, FILM COATED, EXTENDED RELEASE ORAL at 08:58

## 2020-12-13 RX ADMIN — GABAPENTIN SCH MG: 100 CAPSULE ORAL at 12:30

## 2020-12-14 RX ADMIN — GABAPENTIN SCH MG: 300 CAPSULE ORAL at 19:45

## 2020-12-14 RX ADMIN — ROPINIROLE SCH MG: 0.5 TABLET ORAL at 19:46

## 2020-12-14 RX ADMIN — FUROSEMIDE SCH MG: 20 TABLET ORAL at 07:23

## 2020-12-14 RX ADMIN — GABAPENTIN SCH MG: 100 CAPSULE ORAL at 12:38

## 2020-12-14 RX ADMIN — GABAPENTIN SCH MG: 100 CAPSULE ORAL at 07:20

## 2020-12-14 RX ADMIN — ISOSORBIDE MONONITRATE SCH MG: 30 TABLET, FILM COATED, EXTENDED RELEASE ORAL at 07:21

## 2020-12-14 RX ADMIN — WARFARIN SODIUM SCH MG: 5 TABLET ORAL at 19:46

## 2020-12-14 RX ADMIN — MAGNESIUM OXIDE TAB 400 MG (241.3 MG ELEMENTAL MG) SCH TAB: 400 (241.3 MG) TAB at 07:20

## 2020-12-14 RX ADMIN — SERTRALINE HYDROCHLORIDE SCH MG: 100 TABLET ORAL at 07:23

## 2020-12-14 RX ADMIN — METOPROLOL SUCCINATE SCH MG: 25 TABLET, EXTENDED RELEASE ORAL at 07:23

## 2020-12-14 RX ADMIN — OMEPRAZOLE SCH MG: 20 CAPSULE, DELAYED RELEASE ORAL at 06:05

## 2020-12-15 RX ADMIN — WARFARIN SODIUM SCH MG: 5 TABLET ORAL at 19:25

## 2020-12-15 RX ADMIN — OMEPRAZOLE SCH MG: 20 CAPSULE, DELAYED RELEASE ORAL at 06:04

## 2020-12-15 RX ADMIN — METOPROLOL SUCCINATE SCH MG: 25 TABLET, EXTENDED RELEASE ORAL at 07:26

## 2020-12-15 RX ADMIN — GABAPENTIN SCH MG: 100 CAPSULE ORAL at 12:41

## 2020-12-15 RX ADMIN — GABAPENTIN SCH MG: 100 CAPSULE ORAL at 07:25

## 2020-12-15 RX ADMIN — ROPINIROLE SCH MG: 0.5 TABLET ORAL at 19:25

## 2020-12-15 RX ADMIN — SERTRALINE HYDROCHLORIDE SCH MG: 100 TABLET ORAL at 07:26

## 2020-12-15 RX ADMIN — FUROSEMIDE SCH MG: 20 TABLET ORAL at 07:26

## 2020-12-15 RX ADMIN — GABAPENTIN SCH MG: 300 CAPSULE ORAL at 19:25

## 2020-12-15 RX ADMIN — MAGNESIUM OXIDE TAB 400 MG (241.3 MG ELEMENTAL MG) SCH TAB: 400 (241.3 MG) TAB at 07:26

## 2020-12-15 RX ADMIN — ISOSORBIDE MONONITRATE SCH MG: 30 TABLET, FILM COATED, EXTENDED RELEASE ORAL at 07:26

## 2020-12-16 RX ADMIN — OMEPRAZOLE SCH MG: 20 CAPSULE, DELAYED RELEASE ORAL at 06:25

## 2020-12-16 RX ADMIN — GABAPENTIN SCH MG: 100 CAPSULE ORAL at 12:33

## 2020-12-16 RX ADMIN — SERTRALINE HYDROCHLORIDE SCH MG: 100 TABLET ORAL at 08:03

## 2020-12-16 RX ADMIN — ROPINIROLE SCH MG: 0.5 TABLET ORAL at 19:38

## 2020-12-16 RX ADMIN — FUROSEMIDE SCH MG: 20 TABLET ORAL at 08:04

## 2020-12-16 RX ADMIN — GABAPENTIN SCH MG: 300 CAPSULE ORAL at 19:37

## 2020-12-16 RX ADMIN — GABAPENTIN SCH MG: 100 CAPSULE ORAL at 08:02

## 2020-12-16 RX ADMIN — MAGNESIUM OXIDE TAB 400 MG (241.3 MG ELEMENTAL MG) SCH TAB: 400 (241.3 MG) TAB at 08:04

## 2020-12-16 RX ADMIN — WARFARIN SODIUM SCH MG: 5 TABLET ORAL at 19:38

## 2020-12-16 RX ADMIN — METOPROLOL SUCCINATE SCH MG: 25 TABLET, EXTENDED RELEASE ORAL at 08:03

## 2020-12-16 RX ADMIN — ISOSORBIDE MONONITRATE SCH MG: 30 TABLET, FILM COATED, EXTENDED RELEASE ORAL at 08:03

## 2020-12-17 RX ADMIN — SERTRALINE HYDROCHLORIDE SCH MG: 100 TABLET ORAL at 07:55

## 2020-12-17 RX ADMIN — GABAPENTIN SCH MG: 300 CAPSULE ORAL at 19:42

## 2020-12-17 RX ADMIN — GABAPENTIN SCH MG: 100 CAPSULE ORAL at 07:53

## 2020-12-17 RX ADMIN — METOPROLOL SUCCINATE SCH MG: 25 TABLET, EXTENDED RELEASE ORAL at 07:54

## 2020-12-17 RX ADMIN — OMEPRAZOLE SCH MG: 20 CAPSULE, DELAYED RELEASE ORAL at 06:03

## 2020-12-17 RX ADMIN — MAGNESIUM OXIDE TAB 400 MG (241.3 MG ELEMENTAL MG) SCH TAB: 400 (241.3 MG) TAB at 07:55

## 2020-12-17 RX ADMIN — ROPINIROLE SCH MG: 0.5 TABLET ORAL at 19:42

## 2020-12-17 RX ADMIN — GABAPENTIN SCH MG: 100 CAPSULE ORAL at 12:43

## 2020-12-17 RX ADMIN — ISOSORBIDE MONONITRATE SCH MG: 30 TABLET, FILM COATED, EXTENDED RELEASE ORAL at 07:54

## 2020-12-17 RX ADMIN — WARFARIN SODIUM SCH MG: 5 TABLET ORAL at 19:42

## 2020-12-17 RX ADMIN — FUROSEMIDE SCH MG: 20 TABLET ORAL at 07:54

## 2020-12-18 RX ADMIN — GABAPENTIN SCH MG: 300 CAPSULE ORAL at 19:43

## 2020-12-18 RX ADMIN — FUROSEMIDE SCH MG: 20 TABLET ORAL at 07:56

## 2020-12-18 RX ADMIN — METOPROLOL SUCCINATE SCH MG: 25 TABLET, EXTENDED RELEASE ORAL at 07:56

## 2020-12-18 RX ADMIN — MAGNESIUM OXIDE TAB 400 MG (241.3 MG ELEMENTAL MG) SCH TAB: 400 (241.3 MG) TAB at 07:57

## 2020-12-18 RX ADMIN — GABAPENTIN SCH MG: 100 CAPSULE ORAL at 07:55

## 2020-12-18 RX ADMIN — ISOSORBIDE MONONITRATE SCH MG: 30 TABLET, FILM COATED, EXTENDED RELEASE ORAL at 07:56

## 2020-12-18 RX ADMIN — ROPINIROLE SCH MG: 0.5 TABLET ORAL at 19:42

## 2020-12-18 RX ADMIN — GABAPENTIN SCH MG: 100 CAPSULE ORAL at 12:27

## 2020-12-18 RX ADMIN — WARFARIN SODIUM SCH MG: 5 TABLET ORAL at 19:42

## 2020-12-18 RX ADMIN — OMEPRAZOLE SCH MG: 20 CAPSULE, DELAYED RELEASE ORAL at 06:16

## 2020-12-18 RX ADMIN — SERTRALINE HYDROCHLORIDE SCH MG: 100 TABLET ORAL at 07:56

## 2020-12-19 RX ADMIN — WARFARIN SODIUM SCH MG: 5 TABLET ORAL at 19:41

## 2020-12-19 RX ADMIN — GABAPENTIN SCH MG: 100 CAPSULE ORAL at 07:45

## 2020-12-19 RX ADMIN — SERTRALINE HYDROCHLORIDE SCH MG: 100 TABLET ORAL at 07:46

## 2020-12-19 RX ADMIN — GABAPENTIN SCH MG: 300 CAPSULE ORAL at 19:44

## 2020-12-19 RX ADMIN — ISOSORBIDE MONONITRATE SCH MG: 30 TABLET, FILM COATED, EXTENDED RELEASE ORAL at 07:46

## 2020-12-19 RX ADMIN — FUROSEMIDE SCH MG: 20 TABLET ORAL at 07:46

## 2020-12-19 RX ADMIN — ROPINIROLE SCH MG: 0.5 TABLET ORAL at 19:41

## 2020-12-19 RX ADMIN — METOPROLOL SUCCINATE SCH MG: 25 TABLET, EXTENDED RELEASE ORAL at 07:45

## 2020-12-19 RX ADMIN — MAGNESIUM OXIDE TAB 400 MG (241.3 MG ELEMENTAL MG) SCH TAB: 400 (241.3 MG) TAB at 07:47

## 2020-12-19 RX ADMIN — OMEPRAZOLE SCH MG: 20 CAPSULE, DELAYED RELEASE ORAL at 06:19

## 2020-12-19 RX ADMIN — GABAPENTIN SCH MG: 100 CAPSULE ORAL at 12:13

## 2020-12-20 RX ADMIN — OMEPRAZOLE SCH MG: 20 CAPSULE, DELAYED RELEASE ORAL at 06:03

## 2020-12-20 RX ADMIN — METOPROLOL SUCCINATE SCH MG: 25 TABLET, EXTENDED RELEASE ORAL at 07:47

## 2020-12-20 RX ADMIN — GABAPENTIN SCH MG: 100 CAPSULE ORAL at 07:46

## 2020-12-20 RX ADMIN — ROPINIROLE SCH MG: 0.5 TABLET ORAL at 19:19

## 2020-12-20 RX ADMIN — SERTRALINE HYDROCHLORIDE SCH MG: 100 TABLET ORAL at 07:47

## 2020-12-20 RX ADMIN — WARFARIN SODIUM SCH MG: 5 TABLET ORAL at 19:19

## 2020-12-20 RX ADMIN — GABAPENTIN SCH MG: 300 CAPSULE ORAL at 19:19

## 2020-12-20 RX ADMIN — FUROSEMIDE SCH MG: 20 TABLET ORAL at 07:47

## 2020-12-20 RX ADMIN — MAGNESIUM OXIDE TAB 400 MG (241.3 MG ELEMENTAL MG) SCH TAB: 400 (241.3 MG) TAB at 07:48

## 2020-12-20 RX ADMIN — ISOSORBIDE MONONITRATE SCH MG: 30 TABLET, FILM COATED, EXTENDED RELEASE ORAL at 07:47

## 2020-12-20 RX ADMIN — GABAPENTIN SCH MG: 100 CAPSULE ORAL at 12:17

## 2020-12-21 RX ADMIN — OMEPRAZOLE SCH MG: 20 CAPSULE, DELAYED RELEASE ORAL at 06:23

## 2020-12-21 RX ADMIN — FUROSEMIDE SCH MG: 20 TABLET ORAL at 08:52

## 2020-12-21 RX ADMIN — ROPINIROLE SCH MG: 0.5 TABLET ORAL at 19:30

## 2020-12-21 RX ADMIN — MAGNESIUM OXIDE TAB 400 MG (241.3 MG ELEMENTAL MG) SCH TAB: 400 (241.3 MG) TAB at 08:39

## 2020-12-21 RX ADMIN — GABAPENTIN SCH MG: 100 CAPSULE ORAL at 12:42

## 2020-12-21 RX ADMIN — SERTRALINE HYDROCHLORIDE SCH MG: 100 TABLET ORAL at 08:39

## 2020-12-21 RX ADMIN — GABAPENTIN SCH MG: 100 CAPSULE ORAL at 08:38

## 2020-12-21 RX ADMIN — GABAPENTIN SCH MG: 300 CAPSULE ORAL at 19:31

## 2020-12-21 RX ADMIN — WARFARIN SODIUM SCH MG: 5 TABLET ORAL at 19:30

## 2020-12-21 RX ADMIN — METOPROLOL SUCCINATE SCH MG: 25 TABLET, EXTENDED RELEASE ORAL at 08:40

## 2020-12-21 RX ADMIN — ISOSORBIDE MONONITRATE SCH MG: 30 TABLET, FILM COATED, EXTENDED RELEASE ORAL at 08:37

## 2020-12-21 NOTE — PCM.SN.2
- Free Text/Narrative


Note: 


Provider paged on 12/18/20 for concerns surrounding ulcer on patients anterior 

right shin.  Nurse concerned that this is looking worse.  Patient did have a 

warm bath this morning and afterwards the nurse noted that the lesion was opened

and leaking fluid.  The nurse did put pressure on the patient's lower legs to 

help push more fluid out.  On my review her chronic stasis ulcer continues to 

look like it is healing.  The open areas only about the size of a dime with the 

surrounding healing area being about the size of a silver dollar.  As always 

patient is very tender to palpation, but this is nothing new.  We did try to do 

a trial of Lasix a few weeks back that did lend some aid to the amount of 

swelling.  Patient refuses BLAINE stockings.  We will try to wrap the leg in Ace 

bandages to help with offloading the fluid.

## 2020-12-22 RX ADMIN — ROPINIROLE SCH MG: 0.5 TABLET ORAL at 19:31

## 2020-12-22 RX ADMIN — GABAPENTIN SCH MG: 100 CAPSULE ORAL at 13:14

## 2020-12-22 RX ADMIN — GABAPENTIN SCH MG: 300 CAPSULE ORAL at 19:31

## 2020-12-22 RX ADMIN — FUROSEMIDE SCH MG: 20 TABLET ORAL at 08:59

## 2020-12-22 RX ADMIN — SERTRALINE HYDROCHLORIDE SCH MG: 100 TABLET ORAL at 09:03

## 2020-12-22 RX ADMIN — MAGNESIUM OXIDE TAB 400 MG (241.3 MG ELEMENTAL MG) SCH TAB: 400 (241.3 MG) TAB at 09:02

## 2020-12-22 RX ADMIN — ISOSORBIDE MONONITRATE SCH MG: 30 TABLET, FILM COATED, EXTENDED RELEASE ORAL at 09:01

## 2020-12-22 RX ADMIN — METOPROLOL SUCCINATE SCH MG: 25 TABLET, EXTENDED RELEASE ORAL at 09:01

## 2020-12-22 RX ADMIN — WARFARIN SODIUM SCH MG: 5 TABLET ORAL at 19:31

## 2020-12-22 RX ADMIN — OMEPRAZOLE SCH MG: 20 CAPSULE, DELAYED RELEASE ORAL at 06:08

## 2020-12-22 RX ADMIN — GABAPENTIN SCH MG: 100 CAPSULE ORAL at 09:00

## 2020-12-23 RX ADMIN — OMEPRAZOLE SCH MG: 20 CAPSULE, DELAYED RELEASE ORAL at 06:03

## 2020-12-23 RX ADMIN — ROPINIROLE SCH MG: 0.5 TABLET ORAL at 19:28

## 2020-12-23 RX ADMIN — ISOSORBIDE MONONITRATE SCH MG: 30 TABLET, FILM COATED, EXTENDED RELEASE ORAL at 08:10

## 2020-12-23 RX ADMIN — GABAPENTIN SCH MG: 300 CAPSULE ORAL at 19:27

## 2020-12-23 RX ADMIN — FUROSEMIDE SCH MG: 20 TABLET ORAL at 08:11

## 2020-12-23 RX ADMIN — MAGNESIUM OXIDE TAB 400 MG (241.3 MG ELEMENTAL MG) SCH TAB: 400 (241.3 MG) TAB at 08:11

## 2020-12-23 RX ADMIN — SERTRALINE HYDROCHLORIDE SCH MG: 100 TABLET ORAL at 08:11

## 2020-12-23 RX ADMIN — WARFARIN SODIUM SCH MG: 5 TABLET ORAL at 19:28

## 2020-12-23 RX ADMIN — GABAPENTIN SCH MG: 100 CAPSULE ORAL at 13:25

## 2020-12-23 RX ADMIN — GABAPENTIN SCH MG: 100 CAPSULE ORAL at 08:06

## 2020-12-23 RX ADMIN — METOPROLOL SUCCINATE SCH MG: 25 TABLET, EXTENDED RELEASE ORAL at 08:10

## 2020-12-24 RX ADMIN — GABAPENTIN SCH MG: 100 CAPSULE ORAL at 08:35

## 2020-12-24 RX ADMIN — MAGNESIUM OXIDE TAB 400 MG (241.3 MG ELEMENTAL MG) SCH TAB: 400 (241.3 MG) TAB at 08:35

## 2020-12-24 RX ADMIN — FUROSEMIDE SCH MG: 20 TABLET ORAL at 08:36

## 2020-12-24 RX ADMIN — METOPROLOL SUCCINATE SCH MG: 25 TABLET, EXTENDED RELEASE ORAL at 08:36

## 2020-12-24 RX ADMIN — GABAPENTIN SCH MG: 100 CAPSULE ORAL at 12:01

## 2020-12-24 RX ADMIN — ISOSORBIDE MONONITRATE SCH MG: 30 TABLET, FILM COATED, EXTENDED RELEASE ORAL at 08:35

## 2020-12-24 RX ADMIN — WARFARIN SODIUM SCH MG: 5 TABLET ORAL at 19:17

## 2020-12-24 RX ADMIN — SERTRALINE HYDROCHLORIDE SCH MG: 100 TABLET ORAL at 08:36

## 2020-12-24 RX ADMIN — ROPINIROLE SCH MG: 0.5 TABLET ORAL at 19:17

## 2020-12-24 RX ADMIN — OMEPRAZOLE SCH MG: 20 CAPSULE, DELAYED RELEASE ORAL at 06:03

## 2020-12-24 RX ADMIN — GABAPENTIN SCH MG: 300 CAPSULE ORAL at 19:18

## 2020-12-25 RX ADMIN — ROPINIROLE SCH MG: 0.5 TABLET ORAL at 20:10

## 2020-12-25 RX ADMIN — ISOSORBIDE MONONITRATE SCH MG: 30 TABLET, FILM COATED, EXTENDED RELEASE ORAL at 08:58

## 2020-12-25 RX ADMIN — WARFARIN SODIUM SCH MG: 5 TABLET ORAL at 20:10

## 2020-12-25 RX ADMIN — METOPROLOL SUCCINATE SCH MG: 25 TABLET, EXTENDED RELEASE ORAL at 08:58

## 2020-12-25 RX ADMIN — MAGNESIUM OXIDE TAB 400 MG (241.3 MG ELEMENTAL MG) SCH TAB: 400 (241.3 MG) TAB at 08:57

## 2020-12-25 RX ADMIN — SERTRALINE HYDROCHLORIDE SCH MG: 100 TABLET ORAL at 08:58

## 2020-12-25 RX ADMIN — FUROSEMIDE SCH MG: 20 TABLET ORAL at 08:58

## 2020-12-25 RX ADMIN — GABAPENTIN SCH MG: 300 CAPSULE ORAL at 20:10

## 2020-12-25 RX ADMIN — OMEPRAZOLE SCH MG: 20 CAPSULE, DELAYED RELEASE ORAL at 06:08

## 2020-12-25 RX ADMIN — GABAPENTIN SCH MG: 100 CAPSULE ORAL at 08:56

## 2020-12-25 RX ADMIN — GABAPENTIN SCH MG: 100 CAPSULE ORAL at 14:06

## 2020-12-26 RX ADMIN — SERTRALINE HYDROCHLORIDE SCH MG: 100 TABLET ORAL at 08:26

## 2020-12-26 RX ADMIN — GABAPENTIN SCH MG: 100 CAPSULE ORAL at 13:51

## 2020-12-26 RX ADMIN — MAGNESIUM OXIDE TAB 400 MG (241.3 MG ELEMENTAL MG) SCH TAB: 400 (241.3 MG) TAB at 08:30

## 2020-12-26 RX ADMIN — GABAPENTIN SCH MG: 100 CAPSULE ORAL at 08:29

## 2020-12-26 RX ADMIN — METOPROLOL SUCCINATE SCH MG: 25 TABLET, EXTENDED RELEASE ORAL at 08:28

## 2020-12-26 RX ADMIN — WARFARIN SODIUM SCH MG: 5 TABLET ORAL at 19:55

## 2020-12-26 RX ADMIN — OMEPRAZOLE SCH MG: 20 CAPSULE, DELAYED RELEASE ORAL at 06:17

## 2020-12-26 RX ADMIN — FUROSEMIDE SCH MG: 20 TABLET ORAL at 08:26

## 2020-12-26 RX ADMIN — ISOSORBIDE MONONITRATE SCH MG: 30 TABLET, FILM COATED, EXTENDED RELEASE ORAL at 08:26

## 2020-12-26 RX ADMIN — ROPINIROLE SCH MG: 0.5 TABLET ORAL at 19:54

## 2020-12-26 RX ADMIN — GABAPENTIN SCH MG: 300 CAPSULE ORAL at 19:52

## 2020-12-27 RX ADMIN — GABAPENTIN SCH MG: 100 CAPSULE ORAL at 13:26

## 2020-12-27 RX ADMIN — METOPROLOL SUCCINATE SCH MG: 25 TABLET, EXTENDED RELEASE ORAL at 10:47

## 2020-12-27 RX ADMIN — ROPINIROLE SCH MG: 0.5 TABLET ORAL at 20:08

## 2020-12-27 RX ADMIN — ISOSORBIDE MONONITRATE SCH MG: 30 TABLET, FILM COATED, EXTENDED RELEASE ORAL at 10:48

## 2020-12-27 RX ADMIN — WARFARIN SODIUM SCH MG: 5 TABLET ORAL at 20:09

## 2020-12-27 RX ADMIN — GABAPENTIN SCH MG: 100 CAPSULE ORAL at 10:49

## 2020-12-27 RX ADMIN — FUROSEMIDE SCH MG: 20 TABLET ORAL at 10:48

## 2020-12-27 RX ADMIN — OMEPRAZOLE SCH MG: 20 CAPSULE, DELAYED RELEASE ORAL at 06:01

## 2020-12-27 RX ADMIN — GABAPENTIN SCH MG: 300 CAPSULE ORAL at 20:08

## 2020-12-27 RX ADMIN — SERTRALINE HYDROCHLORIDE SCH MG: 100 TABLET ORAL at 10:48

## 2020-12-27 RX ADMIN — MAGNESIUM OXIDE TAB 400 MG (241.3 MG ELEMENTAL MG) SCH TAB: 400 (241.3 MG) TAB at 10:47

## 2020-12-28 RX ADMIN — FUROSEMIDE SCH MG: 20 TABLET ORAL at 09:09

## 2020-12-28 RX ADMIN — METOPROLOL SUCCINATE SCH MG: 25 TABLET, EXTENDED RELEASE ORAL at 09:09

## 2020-12-28 RX ADMIN — WARFARIN SODIUM SCH MG: 5 TABLET ORAL at 19:51

## 2020-12-28 RX ADMIN — SERTRALINE HYDROCHLORIDE SCH MG: 100 TABLET ORAL at 09:10

## 2020-12-28 RX ADMIN — GABAPENTIN SCH MG: 300 CAPSULE ORAL at 19:51

## 2020-12-28 RX ADMIN — ISOSORBIDE MONONITRATE SCH MG: 30 TABLET, FILM COATED, EXTENDED RELEASE ORAL at 09:10

## 2020-12-28 RX ADMIN — GABAPENTIN SCH MG: 100 CAPSULE ORAL at 09:11

## 2020-12-28 RX ADMIN — ROPINIROLE SCH MG: 0.5 TABLET ORAL at 19:51

## 2020-12-28 RX ADMIN — MAGNESIUM OXIDE TAB 400 MG (241.3 MG ELEMENTAL MG) SCH TAB: 400 (241.3 MG) TAB at 09:10

## 2020-12-28 RX ADMIN — GABAPENTIN SCH MG: 100 CAPSULE ORAL at 12:59

## 2020-12-28 RX ADMIN — OMEPRAZOLE SCH MG: 20 CAPSULE, DELAYED RELEASE ORAL at 06:03

## 2020-12-29 RX ADMIN — GABAPENTIN SCH MG: 100 CAPSULE ORAL at 08:17

## 2020-12-29 RX ADMIN — FUROSEMIDE SCH MG: 20 TABLET ORAL at 08:19

## 2020-12-29 RX ADMIN — METOPROLOL SUCCINATE SCH MG: 25 TABLET, EXTENDED RELEASE ORAL at 08:19

## 2020-12-29 RX ADMIN — ISOSORBIDE MONONITRATE SCH MG: 30 TABLET, FILM COATED, EXTENDED RELEASE ORAL at 08:19

## 2020-12-29 RX ADMIN — GABAPENTIN SCH MG: 100 CAPSULE ORAL at 12:18

## 2020-12-29 RX ADMIN — MAGNESIUM OXIDE TAB 400 MG (241.3 MG ELEMENTAL MG) SCH TAB: 400 (241.3 MG) TAB at 08:21

## 2020-12-29 RX ADMIN — WARFARIN SODIUM SCH MG: 5 TABLET ORAL at 20:24

## 2020-12-29 RX ADMIN — OMEPRAZOLE SCH MG: 20 CAPSULE, DELAYED RELEASE ORAL at 06:21

## 2020-12-29 RX ADMIN — SERTRALINE HYDROCHLORIDE SCH MG: 100 TABLET ORAL at 08:19

## 2020-12-29 RX ADMIN — GABAPENTIN SCH MG: 300 CAPSULE ORAL at 20:23

## 2020-12-29 RX ADMIN — ROPINIROLE SCH MG: 0.5 TABLET ORAL at 20:24

## 2020-12-30 RX ADMIN — METOPROLOL SUCCINATE SCH MG: 25 TABLET, EXTENDED RELEASE ORAL at 09:47

## 2020-12-30 RX ADMIN — FUROSEMIDE SCH MG: 20 TABLET ORAL at 09:46

## 2020-12-30 RX ADMIN — OMEPRAZOLE SCH MG: 20 CAPSULE, DELAYED RELEASE ORAL at 06:04

## 2020-12-30 RX ADMIN — GABAPENTIN SCH MG: 100 CAPSULE ORAL at 13:43

## 2020-12-30 RX ADMIN — ISOSORBIDE MONONITRATE SCH MG: 30 TABLET, FILM COATED, EXTENDED RELEASE ORAL at 09:47

## 2020-12-30 RX ADMIN — WARFARIN SODIUM SCH MG: 5 TABLET ORAL at 20:32

## 2020-12-30 RX ADMIN — MAGNESIUM OXIDE TAB 400 MG (241.3 MG ELEMENTAL MG) SCH TAB: 400 (241.3 MG) TAB at 09:48

## 2020-12-30 RX ADMIN — ROPINIROLE SCH MG: 0.5 TABLET ORAL at 20:32

## 2020-12-30 RX ADMIN — SERTRALINE HYDROCHLORIDE SCH MG: 100 TABLET ORAL at 09:47

## 2020-12-30 RX ADMIN — GABAPENTIN SCH MG: 100 CAPSULE ORAL at 09:49

## 2020-12-30 RX ADMIN — GABAPENTIN SCH MG: 300 CAPSULE ORAL at 20:32

## 2020-12-31 RX ADMIN — METOPROLOL SUCCINATE SCH MG: 25 TABLET, EXTENDED RELEASE ORAL at 08:27

## 2020-12-31 RX ADMIN — GABAPENTIN SCH MG: 100 CAPSULE ORAL at 08:28

## 2020-12-31 RX ADMIN — ROPINIROLE SCH MG: 0.5 TABLET ORAL at 20:45

## 2020-12-31 RX ADMIN — WARFARIN SODIUM SCH MG: 5 TABLET ORAL at 20:44

## 2020-12-31 RX ADMIN — FUROSEMIDE SCH MG: 20 TABLET ORAL at 08:26

## 2020-12-31 RX ADMIN — GABAPENTIN SCH MG: 100 CAPSULE ORAL at 12:44

## 2020-12-31 RX ADMIN — GABAPENTIN SCH MG: 300 CAPSULE ORAL at 20:46

## 2020-12-31 RX ADMIN — ISOSORBIDE MONONITRATE SCH MG: 30 TABLET, FILM COATED, EXTENDED RELEASE ORAL at 08:27

## 2020-12-31 RX ADMIN — SERTRALINE HYDROCHLORIDE SCH MG: 100 TABLET ORAL at 08:27

## 2020-12-31 RX ADMIN — OMEPRAZOLE SCH MG: 20 CAPSULE, DELAYED RELEASE ORAL at 08:26

## 2020-12-31 RX ADMIN — MAGNESIUM OXIDE TAB 400 MG (241.3 MG ELEMENTAL MG) SCH TAB: 400 (241.3 MG) TAB at 08:26

## 2021-01-01 RX ADMIN — GABAPENTIN SCH MG: 300 CAPSULE ORAL at 19:59

## 2021-01-01 RX ADMIN — ROPINIROLE SCH MG: 0.5 TABLET ORAL at 19:59

## 2021-01-01 RX ADMIN — WARFARIN SODIUM SCH MG: 5 TABLET ORAL at 20:00

## 2021-01-01 RX ADMIN — SERTRALINE HYDROCHLORIDE SCH MG: 100 TABLET ORAL at 07:56

## 2021-01-01 RX ADMIN — FUROSEMIDE SCH MG: 20 TABLET ORAL at 07:55

## 2021-01-01 RX ADMIN — GABAPENTIN SCH MG: 100 CAPSULE ORAL at 07:55

## 2021-01-01 RX ADMIN — ISOSORBIDE MONONITRATE SCH MG: 30 TABLET, FILM COATED, EXTENDED RELEASE ORAL at 07:56

## 2021-01-01 RX ADMIN — MAGNESIUM OXIDE TAB 400 MG (241.3 MG ELEMENTAL MG) SCH TAB: 400 (241.3 MG) TAB at 07:57

## 2021-01-01 RX ADMIN — OMEPRAZOLE SCH MG: 20 CAPSULE, DELAYED RELEASE ORAL at 06:20

## 2021-01-01 RX ADMIN — METOPROLOL SUCCINATE SCH MG: 25 TABLET, EXTENDED RELEASE ORAL at 07:56

## 2021-01-01 RX ADMIN — GABAPENTIN SCH MG: 100 CAPSULE ORAL at 12:39

## 2021-01-01 NOTE — CT
Bedside and Verbal shift change report given to Latanya Atkinson RN (oncoming nurse) by Neha Espana RN (offgoing nurse). Report included the following information SBAR, Kardex, Procedure Summary, Intake/Output and MAR. ______________________________________________________________________________   

  

[id number] CT/CT Chest W IV  

EXAM: CT Chest W IV  

   

 CLINICAL DATA: LYMPHOCYTOSIS, THYROID MASS,LUNG NODULE,BREAST MASS.  

   

 COMPARISON: December 26, 2019. Radiograph from today.  

   

 FINDINGS:   

   

 LUNGS: No change in appearance of 18 mm groundglass nodular opacity in the right  

 upper lobe compared to the prior examination. No new pulmonary nodules or  

 masses. No pneumonia, effusion, edema, or pneumothorax.  

   

 HEART AND GREAT VESSELS: Unchanged from the prior examination.  

   

 MEDIASTINUM AND LYMPHATICS: Stable appearance of left thyroid lobe mass compared  

 to the prior examination.  

   

 Asymmetric enlargement of the right submandibular gland compared to the left.  

 Findings are nonspecific. This is similar compared to the prior examination.  

   

 UPPER ABDOMINAL ORGANS: Gallbladder has been resected.  

   

 BONES: Unchanged from the prior examination.  

   

 Soft tissues: Again seen is a soft tissue mass in the breast measuring 14 x 12 x  

 19 mm compared to 13 x 10 x 16 mm previously. Margins are irregular, best seen  

 on sagittal series 4 images 113-114.  

   

 IMPRESSION:  

   

 Slight interval enlargement of the right breast mass compared to prior  

 examination. Appearance and enlargement over the imaging interval suggests  

 underlying neoplasm. Diagnostic mammogram and ultrasound is recommended if not  

 already performed.  

   

 Stable right upper lobe groundglass nodular opacity compared to the prior  

 examination. No new nodules.  

   

 Stable left thyroid lobe mass.  

   

 Other findings are described above.  

   

 Electronically signed by Alejandro Rodriguez MD on 7/28/2020 6:43 PM  

   

  

Alejandro Rodriguez MD                 

 07/28/20 [id number]    

  

Thank you for allowing us to participate in the care of your patient.

## 2021-01-02 RX ADMIN — SERTRALINE HYDROCHLORIDE SCH MG: 100 TABLET ORAL at 07:30

## 2021-01-02 RX ADMIN — GABAPENTIN SCH MG: 300 CAPSULE ORAL at 19:38

## 2021-01-02 RX ADMIN — ISOSORBIDE MONONITRATE SCH MG: 30 TABLET, FILM COATED, EXTENDED RELEASE ORAL at 07:30

## 2021-01-02 RX ADMIN — WARFARIN SODIUM SCH MG: 5 TABLET ORAL at 19:39

## 2021-01-02 RX ADMIN — MAGNESIUM OXIDE TAB 400 MG (241.3 MG ELEMENTAL MG) SCH TAB: 400 (241.3 MG) TAB at 07:29

## 2021-01-02 RX ADMIN — ROPINIROLE SCH MG: 0.5 TABLET ORAL at 19:39

## 2021-01-02 RX ADMIN — OMEPRAZOLE SCH MG: 20 CAPSULE, DELAYED RELEASE ORAL at 06:36

## 2021-01-02 RX ADMIN — FUROSEMIDE SCH MG: 20 TABLET ORAL at 07:31

## 2021-01-02 RX ADMIN — METOPROLOL SUCCINATE SCH MG: 25 TABLET, EXTENDED RELEASE ORAL at 07:31

## 2021-01-02 RX ADMIN — GABAPENTIN SCH MG: 100 CAPSULE ORAL at 07:30

## 2021-01-02 RX ADMIN — GABAPENTIN SCH MG: 100 CAPSULE ORAL at 12:57

## 2021-01-03 RX ADMIN — SERTRALINE HYDROCHLORIDE SCH MG: 100 TABLET ORAL at 07:54

## 2021-01-03 RX ADMIN — GABAPENTIN SCH MG: 100 CAPSULE ORAL at 12:51

## 2021-01-03 RX ADMIN — ROPINIROLE SCH MG: 0.5 TABLET ORAL at 19:45

## 2021-01-03 RX ADMIN — MAGNESIUM OXIDE TAB 400 MG (241.3 MG ELEMENTAL MG) SCH TAB: 400 (241.3 MG) TAB at 07:53

## 2021-01-03 RX ADMIN — METOPROLOL SUCCINATE SCH MG: 25 TABLET, EXTENDED RELEASE ORAL at 07:54

## 2021-01-03 RX ADMIN — GABAPENTIN SCH MG: 300 CAPSULE ORAL at 19:45

## 2021-01-03 RX ADMIN — GABAPENTIN SCH MG: 100 CAPSULE ORAL at 07:53

## 2021-01-03 RX ADMIN — ISOSORBIDE MONONITRATE SCH MG: 30 TABLET, FILM COATED, EXTENDED RELEASE ORAL at 07:54

## 2021-01-03 RX ADMIN — WARFARIN SODIUM SCH MG: 5 TABLET ORAL at 19:45

## 2021-01-03 RX ADMIN — FUROSEMIDE SCH MG: 20 TABLET ORAL at 07:53

## 2021-01-03 RX ADMIN — OMEPRAZOLE SCH MG: 20 CAPSULE, DELAYED RELEASE ORAL at 06:50

## 2021-01-04 RX ADMIN — GABAPENTIN SCH MG: 300 CAPSULE ORAL at 19:36

## 2021-01-04 RX ADMIN — OMEPRAZOLE SCH MG: 20 CAPSULE, DELAYED RELEASE ORAL at 06:29

## 2021-01-04 RX ADMIN — GABAPENTIN SCH MG: 100 CAPSULE ORAL at 12:17

## 2021-01-04 RX ADMIN — GABAPENTIN SCH MG: 100 CAPSULE ORAL at 08:29

## 2021-01-04 RX ADMIN — MAGNESIUM OXIDE TAB 400 MG (241.3 MG ELEMENTAL MG) SCH TAB: 400 (241.3 MG) TAB at 08:30

## 2021-01-04 RX ADMIN — FUROSEMIDE SCH MG: 20 TABLET ORAL at 08:31

## 2021-01-04 RX ADMIN — WARFARIN SODIUM SCH MG: 5 TABLET ORAL at 19:35

## 2021-01-04 RX ADMIN — METOPROLOL SUCCINATE SCH MG: 25 TABLET, EXTENDED RELEASE ORAL at 08:30

## 2021-01-04 RX ADMIN — ISOSORBIDE MONONITRATE SCH MG: 30 TABLET, FILM COATED, EXTENDED RELEASE ORAL at 08:30

## 2021-01-04 RX ADMIN — ROPINIROLE SCH MG: 0.5 TABLET ORAL at 19:36

## 2021-01-04 RX ADMIN — SERTRALINE HYDROCHLORIDE SCH MG: 100 TABLET ORAL at 08:30

## 2021-01-05 RX ADMIN — OMEPRAZOLE SCH MG: 20 CAPSULE, DELAYED RELEASE ORAL at 06:35

## 2021-01-05 RX ADMIN — GABAPENTIN SCH MG: 100 CAPSULE ORAL at 08:39

## 2021-01-05 RX ADMIN — MAGNESIUM OXIDE TAB 400 MG (241.3 MG ELEMENTAL MG) SCH TAB: 400 (241.3 MG) TAB at 08:41

## 2021-01-05 RX ADMIN — SERTRALINE HYDROCHLORIDE SCH MG: 100 TABLET ORAL at 08:42

## 2021-01-05 RX ADMIN — ISOSORBIDE MONONITRATE SCH MG: 30 TABLET, FILM COATED, EXTENDED RELEASE ORAL at 08:41

## 2021-01-05 RX ADMIN — GABAPENTIN SCH MG: 100 CAPSULE ORAL at 12:29

## 2021-01-05 RX ADMIN — WARFARIN SODIUM SCH MG: 5 TABLET ORAL at 19:54

## 2021-01-05 RX ADMIN — FUROSEMIDE SCH MG: 20 TABLET ORAL at 08:42

## 2021-01-05 RX ADMIN — ROPINIROLE SCH MG: 0.5 TABLET ORAL at 19:55

## 2021-01-05 RX ADMIN — METOPROLOL SUCCINATE SCH MG: 25 TABLET, EXTENDED RELEASE ORAL at 08:42

## 2021-01-05 RX ADMIN — GABAPENTIN SCH MG: 300 CAPSULE ORAL at 19:55

## 2021-01-06 RX ADMIN — ISOSORBIDE MONONITRATE SCH MG: 30 TABLET, FILM COATED, EXTENDED RELEASE ORAL at 09:55

## 2021-01-06 RX ADMIN — SERTRALINE HYDROCHLORIDE SCH MG: 100 TABLET ORAL at 09:55

## 2021-01-06 RX ADMIN — METOPROLOL SUCCINATE SCH MG: 25 TABLET, EXTENDED RELEASE ORAL at 09:56

## 2021-01-06 RX ADMIN — OMEPRAZOLE SCH MG: 20 CAPSULE, DELAYED RELEASE ORAL at 10:09

## 2021-01-06 RX ADMIN — WARFARIN SODIUM SCH MG: 5 TABLET ORAL at 20:49

## 2021-01-06 RX ADMIN — ROPINIROLE SCH MG: 0.5 TABLET ORAL at 20:49

## 2021-01-06 RX ADMIN — GABAPENTIN SCH: 100 CAPSULE ORAL at 17:03

## 2021-01-06 RX ADMIN — GABAPENTIN SCH MG: 100 CAPSULE ORAL at 09:57

## 2021-01-06 RX ADMIN — GABAPENTIN SCH MG: 300 CAPSULE ORAL at 20:48

## 2021-01-06 RX ADMIN — MAGNESIUM OXIDE TAB 400 MG (241.3 MG ELEMENTAL MG) SCH TAB: 400 (241.3 MG) TAB at 09:55

## 2021-01-06 RX ADMIN — FUROSEMIDE SCH MG: 20 TABLET ORAL at 09:56

## 2021-01-07 RX ADMIN — SERTRALINE HYDROCHLORIDE SCH MG: 100 TABLET ORAL at 08:03

## 2021-01-07 RX ADMIN — MAGNESIUM OXIDE TAB 400 MG (241.3 MG ELEMENTAL MG) SCH TAB: 400 (241.3 MG) TAB at 08:03

## 2021-01-07 RX ADMIN — GABAPENTIN SCH MG: 300 CAPSULE ORAL at 20:05

## 2021-01-07 RX ADMIN — GABAPENTIN SCH MG: 100 CAPSULE ORAL at 13:37

## 2021-01-07 RX ADMIN — OMEPRAZOLE SCH MG: 20 CAPSULE, DELAYED RELEASE ORAL at 06:05

## 2021-01-07 RX ADMIN — ISOSORBIDE MONONITRATE SCH MG: 30 TABLET, FILM COATED, EXTENDED RELEASE ORAL at 08:03

## 2021-01-07 RX ADMIN — FUROSEMIDE SCH MG: 20 TABLET ORAL at 08:03

## 2021-01-07 RX ADMIN — METOPROLOL SUCCINATE SCH MG: 25 TABLET, EXTENDED RELEASE ORAL at 08:03

## 2021-01-07 RX ADMIN — ROPINIROLE SCH MG: 0.5 TABLET ORAL at 20:07

## 2021-01-07 RX ADMIN — WARFARIN SODIUM SCH MG: 5 TABLET ORAL at 20:07

## 2021-01-07 RX ADMIN — GABAPENTIN SCH MG: 100 CAPSULE ORAL at 08:03

## 2021-01-07 NOTE — PCM.PN
- General Info


Date of Service: 01/06/21


Admission Dx/Problem (Free Text): 


Failure to Thrive, Bilateral LE weakness





Subjective Update: 


Patient has been doing well.  She has been having some right-sided arm pain; 

this has come and gone since her previous breast procedure.  Has been doing some

pain control and heating pad per nursing and OT.  





We did recheck her leg wound and swelling; this has improved greatly since we 

last saw it; wound is about 6mm wide and very superficial at this time.  Patient

continues to be tender to touch almost everywhere. 





She does bring up that she was unable to make her follow-up appointment due to 

bad weather the other week; nursing is working to get this set up (dental appt).










- Review of Systems


General: Reports: Weakness


HEENT: Reports: No Symptoms


Pulmonary: Reports: No Symptoms


Cardiovascular: Reports: No Symptoms


Gastrointestinal: Reports: No Symptoms


Genitourinary: Reports: No Symptoms


Musculoskeletal: Reports: Arm Pain, Back Pain, Leg Pain


Skin: Reports: Other (lesion is healing)


Neurological: Reports: Difficulty Walking, Weakness


Psychiatric: Reports: No Symptoms





- Patient Data


Vitals - Most Recent: 


                                Last Vital Signs











Temp  97.7 F   01/07/21 05:58


 


Pulse  74   01/07/21 08:03


 


Resp  17   12/18/20 08:42


 


BP  130/68   01/07/21 08:03


 


Pulse Ox  95   01/05/21 21:45











Weight - Most Recent: 187 lb


I&O - Last 24 Hours: 


                                 Intake & Output











 01/06/21 01/07/21 01/07/21





 22:59 06:59 14:59


 


Intake Total 180  60


 


Balance 180  60











Med Orders - Current: 


                               Current Medications





Acetaminophen (Tylenol Extra Strength)  1,000 mg PO BID PRN


   PRN Reason: Pain/Fever


Acetaminophen (Tylenol)  650 mg PO TID@0800,1300,2000 [facility]


   Last Admin: 01/07/21 08:03 Dose:  650 mg


   Documented by: 


Buspirone HCl (Buspar)  5 mg PO BID [facility]


   Last Admin: 01/07/21 08:02 Dose:  5 mg


   Documented by: 


Calcium Carbonate/Glycine (Tums Extra Strength)  750 mg PO TID PRN


   PRN Reason: Dyspepsia


   Last Admin: 12/17/20 19:49 Dose:  750 mg


   Documented by: 


Docusate Sodium (Colace)  100 mg PO DAILY PRN


   PRN Reason: Constipation


Famotidine (Pepcid)  20 mg PO BEDTIME [facility]


   Last Admin: 01/06/21 20:53 Dose:  20 mg


   Documented by: 


Ferrous Sulfate (Ferrous Sulfate)  325 mg PO Q48H [facility]


   Last Admin: 01/07/21 08:03 Dose:  325 mg


   Documented by: 


Furosemide (Lasix)  20 mg PO DAILY [facility]


   Last Admin: 01/07/21 08:03 Dose:  20 mg


   Documented by: 


Gabapentin (Neurontin)  100 mg PO BID@0800,1300 [facility]


   Last Admin: 01/07/21 08:03 Dose:  100 mg


   Documented by: 


Gabapentin (Neurontin)  300 mg PO BEDTIME [facility]


   Last Admin: 01/06/21 20:48 Dose:  300 mg


   Documented by: 


Isosorbide Mononitrate (Imdur)  30 mg PO DAILY [facility]


   Last Admin: 01/07/21 08:03 Dose:  30 mg


   Documented by: 


Loperamide HCl (Imodium)  2 mg PO Q12H PRN


   PRN Reason: Diarrhea


   Last Admin: 12/16/20 08:38 Dose:  2 mg


   Documented by: 


Melatonin (Melatonin)  6 mg PO BEDTIME [facility]


   Last Admin: 01/06/21 20:53 Dose:  6 mg


   Documented by: 


Metoprolol Succinate (Toprol Xl)  25 mg PO DAILY [facility]


   Last Admin: 01/07/21 08:03 Dose:  25 mg


   Documented by: 


Miconazole (Desenex 2%)  0 gm TOP BID PRN


   PRN Reason: RASH UNDER BILATERAL BREASTS


Multivitamins/Minerals (Thera M Plus)  1 tab PO DAILY [facility]


   Last Admin: 01/07/21 08:03 Dose:  1 tab


   Documented by: 


Nitroglycerin (Nitrostat)  0.4 mg SL Q5M PRN


   PRN Reason: Chest Pain


Omeprazole (Omeprazole)  20 mg PO DAILY@0700 [facility]


   Last Admin: 01/07/21 06:05 Dose:  20 mg


   Documented by: 


Pharmacy Consult (Consult To Pharmacy)  1 each .XX ASDIRECTED [facility]


Polyethylene Glycol (Miralax)  17 gm PO DAILY PRN


   PRN Reason: Constipation


Ropinirole HCl (Requip)  0.5 mg PO BEDTIME [facility]


   Last Admin: 01/06/21 20:49 Dose:  0.5 mg


   Documented by: 


Senna/Docusate Sodium (Senna Plus)  1 tab PO DAILY PRN


   PRN Reason: Constipation


Sertraline HCl (Zoloft)  100 mg PO DAILY [facility]


   Last Admin: 01/07/21 08:03 Dose:  100 mg


   Documented by: 


Vitamin B Complex (Vitamin B Complex)  1 each PO DAILY [facility]


   Last Admin: 01/07/21 08:03 Dose:  1 each


   Documented by: 


Warfarin Sodium (Coumadin)  5 mg PO SuMoTuWeFrSa@2000 [facility]


   Last Admin: 01/06/21 20:49 Dose:  5 mg


   Documented by: 


Warfarin Sodium (Coumadin)  2.5 mg PO Th@2000 [facility]


   Last Admin: 12/31/20 20:44 Dose:  2.5 mg


   Documented by: 





Discontinued Medications





Ciprofloxacin (Ciprofloxacin Hcl)  500 mg PO Q24H [facility]


   Stop: 10/22/20 20:01


   Last Admin: 10/22/20 19:24 Dose:  500 mg


   Documented by: 


Famotidine (Pepcid)  20 mg PO DAILY [facility]


   Last Admin: 10/09/20 08:14 Dose:  20 mg


   Documented by: 


Furosemide (Lasix)  20 mg PO DAILY@1200 [facility]


   Stop: 12/06/20 12:01


   Last Admin: 12/06/20 12:16 Dose:  20 mg


   Documented by: 


Nitrofurantoin Macrocrystals (Macrobid)  100 mg PO BID [facility]


   Stop: 10/25/20 08:31


   Last Admin: 10/18/20 09:00 Dose:  100 mg


   Documented by: 


Omeprazole (Omeprazole)  20 mg PO DAILY [facility]


   Last Admin: 10/09/20 08:14 Dose:  20 mg


   Documented by: 











- Exam


General: Alert, Oriented


HEENT: Pupils Equal, Mucous Membr. Moist/Pink


Neck: Supple


Lungs: Clear to Auscultation, Normal Respiratory Effort


Cardiovascular: Regular Rate, Regular Rhythm


GI/Abdominal Exam: Normal Bowel Sounds, Soft, Non-Tender


Extremities: Pedal Edema (1+ to the bilateral ankles, tender to palpation), Slow

Capillary Refill


Skin: Warm, Dry


Wound/Incisions: Healing Well (lesion on right distal shin, about 6mm in 

diameter, very superficial)


Neurological: No New Focal Deficit


Psy/Mental Status: Alert, Normal Affect, Normal Mood





Sepsis Event Note





- Evaluation


Sepsis Screening Result: No Definite Risk





- Focused Exam


Vital Signs: 


                                   Vital Signs











  Temp Pulse BP


 


 01/07/21 08:03   74  130/68


 


 01/07/21 05:58  97.7 F  














- Problem List & Annotations


(1) Need for assistance with personal care


SNOMED Code(s): 07415844469554179


   Code(s): Z74.1 - NEED FOR ASSISTANCE WITH PERSONAL CARE   Status: Acute   

Current Visit: No   





(2) Anxiety


SNOMED Code(s): 28432037


   Code(s): F41.9 - ANXIETY DISORDER, UNSPECIFIED   Status: Chronic   Current 

Visit: No   





(3) CKD (chronic kidney disease)


SNOMED Code(s): 018489021


   Code(s): N18.9 - CHRONIC KIDNEY DISEASE, UNSPECIFIED   Status: Chronic   

Current Visit: No   





(4) COPD (chronic obstructive pulmonary disease)


SNOMED Code(s): 62614734


   Code(s): J44.9 - CHRONIC OBSTRUCTIVE PULMONARY DISEASE, UNSPECIFIED   Status:

Chronic   Current Visit: No   





(5) Depression


SNOMED Code(s): 70552290


   Code(s): F32.9 - MAJOR DEPRESSIVE DISORDER, SINGLE EPISODE, UNSPECIFIED   

Status: Chronic   Current Visit: No   





(6) Diastolic HF (heart failure)


SNOMED Code(s): 759131595


   Code(s): I50.30 - UNSPECIFIED DIASTOLIC (CONGESTIVE) HEART FAILURE   Status: 

Chronic   Current Visit: No   





(7) GERD (gastroesophageal reflux disease)


SNOMED Code(s): 139762026


   Code(s): K21.9 - GASTRO-ESOPHAGEAL REFLUX DISEASE WITHOUT ESOPHAGITIS   

Status: Chronic   Current Visit: No   





(8) Hypertension


SNOMED Code(s): 21923019


   Code(s): I10 - ESSENTIAL (PRIMARY) HYPERTENSION   Status: Chronic   Current 

Visit: No   





- Problem List Review


Problem List Initiated/Reviewed/Updated: Yes





- My Orders


Last 24 Hours: 


My Active Orders





01/25/21 07:00


INR,PT,PROTHROMBIN TIME [COAG] Q28D 





02/22/21 07:00


INR,PT,PROTHROMBIN TIME [COAG] Q28D 





03/22/21 07:00


INR,PT,PROTHROMBIN TIME [COAG] Q28D 





04/19/21 07:00


INR,PT,PROTHROMBIN TIME [COAG] Q28D 





05/17/21 07:00


INR,PT,PROTHROMBIN TIME [COAG] Q28D 





06/14/21 07:00


INR,PT,PROTHROMBIN TIME [COAG] Q28D 














- Assessment


Assessment:: 


Failure to Thrive


Chronic Leg Weakness


- Patient is long-term swing bed stay for assistance with ADLs due to the above


Plan:


- Continue regular daily care is as previously prescribed


- Continue physical therapy as previously prescribed


- Pending further discussion with son we may be able to look into HH as a safe 

option for return home; will need more information on safety of this going 

forward





Leg wound


Diastolic CHF


- Healing very well, anticipate this will be healed over completely by next 

rounds


Plan:


- Continue current wound cares





Breast Cancer, s/p lumpectomy - plan for follow-up with oncology as previously 

planned (repeat breast exam every 6 months, f/u visit with one in Aug 2021 with 

mammo)


HTN - continue metoprolol 25mg daily


GERD - continue omeprazole 20mg daily, prn Pepcid 20mg, prn Tums


Anxiety/Depression - continue Zoloft 100mg daily, BuSpar 5mg BID


Angina - continue Imdur 30mg daily, prn Nitro


Restless legs - Requip 0.5mg at bedtime


Chronic pain - Gabapentin 100mg am and noon, 300mg at supper


Hx DVT - continue Warfarin, serial INR per pharmacy


Peripheral edema - continue Lasix 20mg daily


Constipation - continue Colace 100mg dialy, prn miralax


Fe-def anemia - continue iron 325mg e/o day


Insomnia - continue melatonin 3mg at bedtime


Chronic respiratory failure - continue supplemental O2


Fungal rash - continue prn miconazole

## 2021-01-08 RX ADMIN — SERTRALINE HYDROCHLORIDE SCH MG: 100 TABLET ORAL at 07:40

## 2021-01-08 RX ADMIN — GABAPENTIN SCH MG: 100 CAPSULE ORAL at 07:38

## 2021-01-08 RX ADMIN — GABAPENTIN SCH MG: 100 CAPSULE ORAL at 12:20

## 2021-01-08 RX ADMIN — METOPROLOL SUCCINATE SCH MG: 25 TABLET, EXTENDED RELEASE ORAL at 07:40

## 2021-01-08 RX ADMIN — FUROSEMIDE SCH MG: 20 TABLET ORAL at 07:39

## 2021-01-08 RX ADMIN — WARFARIN SODIUM SCH MG: 5 TABLET ORAL at 19:12

## 2021-01-08 RX ADMIN — OMEPRAZOLE SCH MG: 20 CAPSULE, DELAYED RELEASE ORAL at 06:33

## 2021-01-08 RX ADMIN — GABAPENTIN SCH MG: 300 CAPSULE ORAL at 19:12

## 2021-01-08 RX ADMIN — Medication SCH APPLIC: at 19:19

## 2021-01-08 RX ADMIN — Medication SCH APPLIC: at 08:53

## 2021-01-08 RX ADMIN — ISOSORBIDE MONONITRATE SCH MG: 30 TABLET, FILM COATED, EXTENDED RELEASE ORAL at 07:39

## 2021-01-08 RX ADMIN — ROPINIROLE SCH MG: 0.5 TABLET ORAL at 19:12

## 2021-01-08 RX ADMIN — MAGNESIUM OXIDE TAB 400 MG (241.3 MG ELEMENTAL MG) SCH TAB: 400 (241.3 MG) TAB at 07:40

## 2021-01-09 RX ADMIN — Medication SCH APPLIC: at 19:44

## 2021-01-09 RX ADMIN — GABAPENTIN SCH MG: 100 CAPSULE ORAL at 07:59

## 2021-01-09 RX ADMIN — METOPROLOL SUCCINATE SCH MG: 25 TABLET, EXTENDED RELEASE ORAL at 08:01

## 2021-01-09 RX ADMIN — GABAPENTIN SCH MG: 100 CAPSULE ORAL at 12:29

## 2021-01-09 RX ADMIN — SERTRALINE HYDROCHLORIDE SCH MG: 100 TABLET ORAL at 08:00

## 2021-01-09 RX ADMIN — ISOSORBIDE MONONITRATE SCH MG: 30 TABLET, FILM COATED, EXTENDED RELEASE ORAL at 08:01

## 2021-01-09 RX ADMIN — WARFARIN SODIUM SCH MG: 5 TABLET ORAL at 19:40

## 2021-01-09 RX ADMIN — FUROSEMIDE SCH MG: 20 TABLET ORAL at 08:01

## 2021-01-09 RX ADMIN — GABAPENTIN SCH MG: 300 CAPSULE ORAL at 19:38

## 2021-01-09 RX ADMIN — ROPINIROLE SCH MG: 0.5 TABLET ORAL at 19:40

## 2021-01-09 RX ADMIN — MAGNESIUM OXIDE TAB 400 MG (241.3 MG ELEMENTAL MG) SCH TAB: 400 (241.3 MG) TAB at 08:03

## 2021-01-09 RX ADMIN — OMEPRAZOLE SCH MG: 20 CAPSULE, DELAYED RELEASE ORAL at 06:31

## 2021-01-09 RX ADMIN — Medication SCH APPLIC: at 08:02

## 2021-01-10 RX ADMIN — MAGNESIUM OXIDE TAB 400 MG (241.3 MG ELEMENTAL MG) SCH TAB: 400 (241.3 MG) TAB at 08:45

## 2021-01-10 RX ADMIN — FUROSEMIDE SCH MG: 20 TABLET ORAL at 08:44

## 2021-01-10 RX ADMIN — GABAPENTIN SCH MG: 300 CAPSULE ORAL at 19:38

## 2021-01-10 RX ADMIN — METOPROLOL SUCCINATE SCH MG: 25 TABLET, EXTENDED RELEASE ORAL at 08:44

## 2021-01-10 RX ADMIN — Medication SCH APPLIC: at 19:40

## 2021-01-10 RX ADMIN — Medication SCH APPLIC: at 09:02

## 2021-01-10 RX ADMIN — WARFARIN SODIUM SCH MG: 5 TABLET ORAL at 19:40

## 2021-01-10 RX ADMIN — OMEPRAZOLE SCH MG: 20 CAPSULE, DELAYED RELEASE ORAL at 06:23

## 2021-01-10 RX ADMIN — GABAPENTIN SCH MG: 100 CAPSULE ORAL at 08:43

## 2021-01-10 RX ADMIN — GABAPENTIN SCH MG: 100 CAPSULE ORAL at 12:55

## 2021-01-10 RX ADMIN — ISOSORBIDE MONONITRATE SCH MG: 30 TABLET, FILM COATED, EXTENDED RELEASE ORAL at 08:43

## 2021-01-10 RX ADMIN — SERTRALINE HYDROCHLORIDE SCH MG: 100 TABLET ORAL at 08:44

## 2021-01-10 RX ADMIN — ROPINIROLE SCH MG: 0.5 TABLET ORAL at 19:39

## 2021-01-11 RX ADMIN — Medication SCH APPLIC: at 08:31

## 2021-01-11 RX ADMIN — ROPINIROLE SCH MG: 0.5 TABLET ORAL at 19:38

## 2021-01-11 RX ADMIN — WARFARIN SODIUM SCH MG: 5 TABLET ORAL at 19:38

## 2021-01-11 RX ADMIN — ISOSORBIDE MONONITRATE SCH MG: 30 TABLET, FILM COATED, EXTENDED RELEASE ORAL at 08:30

## 2021-01-11 RX ADMIN — GABAPENTIN SCH MG: 300 CAPSULE ORAL at 19:39

## 2021-01-11 RX ADMIN — METOPROLOL SUCCINATE SCH MG: 25 TABLET, EXTENDED RELEASE ORAL at 08:30

## 2021-01-11 RX ADMIN — SERTRALINE HYDROCHLORIDE SCH MG: 100 TABLET ORAL at 08:30

## 2021-01-11 RX ADMIN — GABAPENTIN SCH MG: 100 CAPSULE ORAL at 08:28

## 2021-01-11 RX ADMIN — Medication SCH APPLIC: at 19:39

## 2021-01-11 RX ADMIN — OMEPRAZOLE SCH MG: 20 CAPSULE, DELAYED RELEASE ORAL at 06:10

## 2021-01-11 RX ADMIN — MAGNESIUM OXIDE TAB 400 MG (241.3 MG ELEMENTAL MG) SCH TAB: 400 (241.3 MG) TAB at 08:29

## 2021-01-11 RX ADMIN — FUROSEMIDE SCH MG: 20 TABLET ORAL at 08:30

## 2021-01-11 RX ADMIN — GABAPENTIN SCH MG: 100 CAPSULE ORAL at 12:20

## 2021-01-12 RX ADMIN — Medication SCH APPLIC: at 07:41

## 2021-01-12 RX ADMIN — MAGNESIUM OXIDE TAB 400 MG (241.3 MG ELEMENTAL MG) SCH TAB: 400 (241.3 MG) TAB at 07:43

## 2021-01-12 RX ADMIN — GABAPENTIN SCH MG: 100 CAPSULE ORAL at 07:41

## 2021-01-12 RX ADMIN — ROPINIROLE SCH MG: 0.5 TABLET ORAL at 19:42

## 2021-01-12 RX ADMIN — SERTRALINE HYDROCHLORIDE SCH MG: 100 TABLET ORAL at 07:42

## 2021-01-12 RX ADMIN — Medication SCH APPLIC: at 19:43

## 2021-01-12 RX ADMIN — ISOSORBIDE MONONITRATE SCH MG: 30 TABLET, FILM COATED, EXTENDED RELEASE ORAL at 07:42

## 2021-01-12 RX ADMIN — FUROSEMIDE SCH MG: 20 TABLET ORAL at 07:41

## 2021-01-12 RX ADMIN — GABAPENTIN SCH MG: 100 CAPSULE ORAL at 12:50

## 2021-01-12 RX ADMIN — WARFARIN SODIUM SCH MG: 5 TABLET ORAL at 19:42

## 2021-01-12 RX ADMIN — OMEPRAZOLE SCH MG: 20 CAPSULE, DELAYED RELEASE ORAL at 06:47

## 2021-01-12 RX ADMIN — METOPROLOL SUCCINATE SCH MG: 25 TABLET, EXTENDED RELEASE ORAL at 07:42

## 2021-01-12 RX ADMIN — GABAPENTIN SCH MG: 300 CAPSULE ORAL at 19:42

## 2021-01-13 RX ADMIN — SERTRALINE HYDROCHLORIDE SCH MG: 100 TABLET ORAL at 07:32

## 2021-01-13 RX ADMIN — Medication SCH APPLIC: at 07:34

## 2021-01-13 RX ADMIN — Medication SCH APPLIC: at 19:45

## 2021-01-13 RX ADMIN — METOPROLOL SUCCINATE SCH MG: 25 TABLET, EXTENDED RELEASE ORAL at 07:33

## 2021-01-13 RX ADMIN — OMEPRAZOLE SCH MG: 20 CAPSULE, DELAYED RELEASE ORAL at 06:39

## 2021-01-13 RX ADMIN — FUROSEMIDE SCH MG: 20 TABLET ORAL at 07:33

## 2021-01-13 RX ADMIN — WARFARIN SODIUM SCH MG: 5 TABLET ORAL at 19:44

## 2021-01-13 RX ADMIN — GABAPENTIN SCH MG: 300 CAPSULE ORAL at 19:44

## 2021-01-13 RX ADMIN — GABAPENTIN SCH MG: 100 CAPSULE ORAL at 13:03

## 2021-01-13 RX ADMIN — ISOSORBIDE MONONITRATE SCH MG: 30 TABLET, FILM COATED, EXTENDED RELEASE ORAL at 07:32

## 2021-01-13 RX ADMIN — ROPINIROLE SCH MG: 0.5 TABLET ORAL at 19:44

## 2021-01-13 RX ADMIN — GABAPENTIN SCH MG: 100 CAPSULE ORAL at 07:31

## 2021-01-13 RX ADMIN — MAGNESIUM OXIDE TAB 400 MG (241.3 MG ELEMENTAL MG) SCH TAB: 400 (241.3 MG) TAB at 07:34

## 2021-01-14 RX ADMIN — OMEPRAZOLE SCH MG: 20 CAPSULE, DELAYED RELEASE ORAL at 06:20

## 2021-01-14 RX ADMIN — ISOSORBIDE MONONITRATE SCH MG: 30 TABLET, FILM COATED, EXTENDED RELEASE ORAL at 07:59

## 2021-01-14 RX ADMIN — WARFARIN SODIUM SCH MG: 5 TABLET ORAL at 19:50

## 2021-01-14 RX ADMIN — GABAPENTIN SCH MG: 300 CAPSULE ORAL at 19:49

## 2021-01-14 RX ADMIN — ROPINIROLE SCH MG: 0.5 TABLET ORAL at 19:49

## 2021-01-14 RX ADMIN — Medication SCH APPLIC: at 08:00

## 2021-01-14 RX ADMIN — Medication SCH APPLIC: at 19:50

## 2021-01-14 RX ADMIN — FUROSEMIDE SCH MG: 20 TABLET ORAL at 07:58

## 2021-01-14 RX ADMIN — GABAPENTIN SCH MG: 100 CAPSULE ORAL at 07:58

## 2021-01-14 RX ADMIN — METOPROLOL SUCCINATE SCH MG: 25 TABLET, EXTENDED RELEASE ORAL at 07:59

## 2021-01-14 RX ADMIN — SERTRALINE HYDROCHLORIDE SCH MG: 100 TABLET ORAL at 07:59

## 2021-01-14 RX ADMIN — GABAPENTIN SCH MG: 100 CAPSULE ORAL at 12:27

## 2021-01-14 RX ADMIN — MAGNESIUM OXIDE TAB 400 MG (241.3 MG ELEMENTAL MG) SCH TAB: 400 (241.3 MG) TAB at 07:59

## 2021-01-15 RX ADMIN — METOPROLOL SUCCINATE SCH MG: 25 TABLET, EXTENDED RELEASE ORAL at 08:16

## 2021-01-15 RX ADMIN — GABAPENTIN SCH MG: 100 CAPSULE ORAL at 08:17

## 2021-01-15 RX ADMIN — Medication SCH APPLIC: at 08:16

## 2021-01-15 RX ADMIN — MAGNESIUM OXIDE TAB 400 MG (241.3 MG ELEMENTAL MG) SCH TAB: 400 (241.3 MG) TAB at 08:17

## 2021-01-15 RX ADMIN — GABAPENTIN SCH MG: 300 CAPSULE ORAL at 20:17

## 2021-01-15 RX ADMIN — OMEPRAZOLE SCH MG: 20 CAPSULE, DELAYED RELEASE ORAL at 06:27

## 2021-01-15 RX ADMIN — FUROSEMIDE SCH MG: 20 TABLET ORAL at 08:16

## 2021-01-15 RX ADMIN — ISOSORBIDE MONONITRATE SCH MG: 30 TABLET, FILM COATED, EXTENDED RELEASE ORAL at 08:16

## 2021-01-15 RX ADMIN — Medication SCH APPLIC: at 22:27

## 2021-01-15 RX ADMIN — ROPINIROLE SCH MG: 0.5 TABLET ORAL at 20:18

## 2021-01-15 RX ADMIN — GABAPENTIN SCH MG: 100 CAPSULE ORAL at 12:27

## 2021-01-15 RX ADMIN — WARFARIN SODIUM SCH MG: 5 TABLET ORAL at 20:16

## 2021-01-15 RX ADMIN — SERTRALINE HYDROCHLORIDE SCH MG: 100 TABLET ORAL at 08:16

## 2021-01-16 RX ADMIN — GABAPENTIN SCH MG: 100 CAPSULE ORAL at 09:24

## 2021-01-16 RX ADMIN — WARFARIN SODIUM SCH MG: 5 TABLET ORAL at 20:01

## 2021-01-16 RX ADMIN — GABAPENTIN SCH MG: 300 CAPSULE ORAL at 20:00

## 2021-01-16 RX ADMIN — MAGNESIUM OXIDE TAB 400 MG (241.3 MG ELEMENTAL MG) SCH TAB: 400 (241.3 MG) TAB at 09:24

## 2021-01-16 RX ADMIN — GABAPENTIN SCH MG: 100 CAPSULE ORAL at 13:35

## 2021-01-16 RX ADMIN — SERTRALINE HYDROCHLORIDE SCH MG: 100 TABLET ORAL at 09:22

## 2021-01-16 RX ADMIN — ROPINIROLE SCH MG: 0.5 TABLET ORAL at 20:01

## 2021-01-16 RX ADMIN — ISOSORBIDE MONONITRATE SCH MG: 30 TABLET, FILM COATED, EXTENDED RELEASE ORAL at 09:23

## 2021-01-16 RX ADMIN — Medication SCH APPLIC: at 09:25

## 2021-01-16 RX ADMIN — METOPROLOL SUCCINATE SCH MG: 25 TABLET, EXTENDED RELEASE ORAL at 09:24

## 2021-01-16 RX ADMIN — OMEPRAZOLE SCH MG: 20 CAPSULE, DELAYED RELEASE ORAL at 07:04

## 2021-01-16 RX ADMIN — FUROSEMIDE SCH MG: 20 TABLET ORAL at 09:22

## 2021-01-17 RX ADMIN — GABAPENTIN SCH MG: 300 CAPSULE ORAL at 20:35

## 2021-01-17 RX ADMIN — Medication SCH APPLIC: at 12:19

## 2021-01-17 RX ADMIN — Medication SCH APPLIC: at 00:38

## 2021-01-17 RX ADMIN — GABAPENTIN SCH MG: 100 CAPSULE ORAL at 12:17

## 2021-01-17 RX ADMIN — ISOSORBIDE MONONITRATE SCH MG: 30 TABLET, FILM COATED, EXTENDED RELEASE ORAL at 12:17

## 2021-01-17 RX ADMIN — GABAPENTIN SCH: 100 CAPSULE ORAL at 12:15

## 2021-01-17 RX ADMIN — SERTRALINE HYDROCHLORIDE SCH MG: 100 TABLET ORAL at 12:16

## 2021-01-17 RX ADMIN — ROPINIROLE SCH MG: 0.5 TABLET ORAL at 20:36

## 2021-01-17 RX ADMIN — FUROSEMIDE SCH MG: 20 TABLET ORAL at 12:16

## 2021-01-17 RX ADMIN — METOPROLOL SUCCINATE SCH MG: 25 TABLET, EXTENDED RELEASE ORAL at 12:16

## 2021-01-17 RX ADMIN — OMEPRAZOLE SCH MG: 20 CAPSULE, DELAYED RELEASE ORAL at 06:16

## 2021-01-17 RX ADMIN — WARFARIN SODIUM SCH MG: 5 TABLET ORAL at 20:36

## 2021-01-17 RX ADMIN — MAGNESIUM OXIDE TAB 400 MG (241.3 MG ELEMENTAL MG) SCH TAB: 400 (241.3 MG) TAB at 12:16

## 2021-01-17 RX ADMIN — Medication SCH APPLIC: at 20:00

## 2021-01-18 RX ADMIN — Medication SCH APPLIC: at 19:52

## 2021-01-18 RX ADMIN — GABAPENTIN SCH MG: 100 CAPSULE ORAL at 08:39

## 2021-01-18 RX ADMIN — ISOSORBIDE MONONITRATE SCH MG: 30 TABLET, FILM COATED, EXTENDED RELEASE ORAL at 08:39

## 2021-01-18 RX ADMIN — FUROSEMIDE SCH MG: 20 TABLET ORAL at 08:36

## 2021-01-18 RX ADMIN — WARFARIN SODIUM SCH MG: 5 TABLET ORAL at 19:53

## 2021-01-18 RX ADMIN — ROPINIROLE SCH MG: 0.5 TABLET ORAL at 08:07

## 2021-01-18 RX ADMIN — METOPROLOL SUCCINATE SCH MG: 25 TABLET, EXTENDED RELEASE ORAL at 08:38

## 2021-01-18 RX ADMIN — GABAPENTIN SCH MG: 100 CAPSULE ORAL at 13:58

## 2021-01-18 RX ADMIN — OMEPRAZOLE SCH MG: 20 CAPSULE, DELAYED RELEASE ORAL at 06:09

## 2021-01-18 RX ADMIN — GABAPENTIN SCH MG: 300 CAPSULE ORAL at 19:52

## 2021-01-18 RX ADMIN — SERTRALINE HYDROCHLORIDE SCH MG: 100 TABLET ORAL at 08:36

## 2021-01-18 RX ADMIN — Medication SCH APPLIC: at 08:43

## 2021-01-18 RX ADMIN — MAGNESIUM OXIDE TAB 400 MG (241.3 MG ELEMENTAL MG) SCH TAB: 400 (241.3 MG) TAB at 08:35

## 2021-01-19 RX ADMIN — SERTRALINE HYDROCHLORIDE SCH MG: 100 TABLET ORAL at 08:40

## 2021-01-19 RX ADMIN — ROPINIROLE SCH MG: 0.5 TABLET ORAL at 20:51

## 2021-01-19 RX ADMIN — OMEPRAZOLE SCH MG: 20 CAPSULE, DELAYED RELEASE ORAL at 08:44

## 2021-01-19 RX ADMIN — Medication SCH APPLIC: at 08:46

## 2021-01-19 RX ADMIN — GABAPENTIN SCH MG: 100 CAPSULE ORAL at 08:39

## 2021-01-19 RX ADMIN — ISOSORBIDE MONONITRATE SCH MG: 30 TABLET, FILM COATED, EXTENDED RELEASE ORAL at 08:43

## 2021-01-19 RX ADMIN — GABAPENTIN SCH MG: 100 CAPSULE ORAL at 13:46

## 2021-01-19 RX ADMIN — WARFARIN SODIUM SCH MG: 5 TABLET ORAL at 20:52

## 2021-01-19 RX ADMIN — GABAPENTIN SCH MG: 300 CAPSULE ORAL at 20:50

## 2021-01-19 RX ADMIN — MAGNESIUM OXIDE TAB 400 MG (241.3 MG ELEMENTAL MG) SCH TAB: 400 (241.3 MG) TAB at 08:39

## 2021-01-19 RX ADMIN — Medication SCH APPLIC: at 21:40

## 2021-01-19 RX ADMIN — FUROSEMIDE SCH MG: 20 TABLET ORAL at 08:39

## 2021-01-19 RX ADMIN — METOPROLOL SUCCINATE SCH MG: 25 TABLET, EXTENDED RELEASE ORAL at 08:43

## 2021-01-20 RX ADMIN — GABAPENTIN SCH MG: 100 CAPSULE ORAL at 12:19

## 2021-01-20 RX ADMIN — Medication SCH APPLIC: at 09:45

## 2021-01-20 RX ADMIN — Medication SCH APPLIC: at 19:33

## 2021-01-20 RX ADMIN — ROPINIROLE SCH MG: 0.5 TABLET ORAL at 19:31

## 2021-01-20 RX ADMIN — ISOSORBIDE MONONITRATE SCH MG: 30 TABLET, FILM COATED, EXTENDED RELEASE ORAL at 07:59

## 2021-01-20 RX ADMIN — WARFARIN SODIUM SCH MG: 5 TABLET ORAL at 19:32

## 2021-01-20 RX ADMIN — GABAPENTIN SCH MG: 300 CAPSULE ORAL at 19:33

## 2021-01-20 RX ADMIN — OMEPRAZOLE SCH MG: 20 CAPSULE, DELAYED RELEASE ORAL at 06:21

## 2021-01-20 RX ADMIN — MAGNESIUM OXIDE TAB 400 MG (241.3 MG ELEMENTAL MG) SCH TAB: 400 (241.3 MG) TAB at 07:58

## 2021-01-20 RX ADMIN — SERTRALINE HYDROCHLORIDE SCH MG: 100 TABLET ORAL at 08:01

## 2021-01-20 RX ADMIN — METOPROLOL SUCCINATE SCH MG: 25 TABLET, EXTENDED RELEASE ORAL at 08:00

## 2021-01-20 RX ADMIN — FUROSEMIDE SCH MG: 20 TABLET ORAL at 08:01

## 2021-01-20 RX ADMIN — GABAPENTIN SCH MG: 100 CAPSULE ORAL at 07:58

## 2021-01-21 RX ADMIN — GABAPENTIN SCH MG: 100 CAPSULE ORAL at 08:08

## 2021-01-21 RX ADMIN — OMEPRAZOLE SCH MG: 20 CAPSULE, DELAYED RELEASE ORAL at 06:57

## 2021-01-21 RX ADMIN — Medication SCH APPLIC: at 08:12

## 2021-01-21 RX ADMIN — METOPROLOL SUCCINATE SCH MG: 25 TABLET, EXTENDED RELEASE ORAL at 08:08

## 2021-01-21 RX ADMIN — ISOSORBIDE MONONITRATE SCH MG: 30 TABLET, FILM COATED, EXTENDED RELEASE ORAL at 08:08

## 2021-01-21 RX ADMIN — ROPINIROLE SCH MG: 0.5 TABLET ORAL at 20:51

## 2021-01-21 RX ADMIN — GABAPENTIN SCH MG: 300 CAPSULE ORAL at 20:51

## 2021-01-21 RX ADMIN — GABAPENTIN SCH MG: 100 CAPSULE ORAL at 13:14

## 2021-01-21 RX ADMIN — FUROSEMIDE SCH MG: 20 TABLET ORAL at 08:08

## 2021-01-21 RX ADMIN — SERTRALINE HYDROCHLORIDE SCH MG: 100 TABLET ORAL at 08:08

## 2021-01-21 RX ADMIN — WARFARIN SODIUM SCH MG: 5 TABLET ORAL at 20:51

## 2021-01-21 RX ADMIN — Medication SCH APPLIC: at 20:52

## 2021-01-21 RX ADMIN — MAGNESIUM OXIDE TAB 400 MG (241.3 MG ELEMENTAL MG) SCH TAB: 400 (241.3 MG) TAB at 08:08

## 2021-01-22 RX ADMIN — GABAPENTIN SCH MG: 100 CAPSULE ORAL at 08:23

## 2021-01-22 RX ADMIN — Medication SCH APPLIC: at 08:24

## 2021-01-22 RX ADMIN — WARFARIN SODIUM SCH MG: 5 TABLET ORAL at 20:20

## 2021-01-22 RX ADMIN — FUROSEMIDE SCH MG: 20 TABLET ORAL at 08:22

## 2021-01-22 RX ADMIN — METOPROLOL SUCCINATE SCH MG: 25 TABLET, EXTENDED RELEASE ORAL at 08:23

## 2021-01-22 RX ADMIN — Medication SCH APPLIC: at 20:21

## 2021-01-22 RX ADMIN — SERTRALINE HYDROCHLORIDE SCH MG: 100 TABLET ORAL at 08:21

## 2021-01-22 RX ADMIN — GABAPENTIN SCH MG: 300 CAPSULE ORAL at 20:19

## 2021-01-22 RX ADMIN — ISOSORBIDE MONONITRATE SCH MG: 30 TABLET, FILM COATED, EXTENDED RELEASE ORAL at 08:22

## 2021-01-22 RX ADMIN — ROPINIROLE SCH MG: 0.5 TABLET ORAL at 20:20

## 2021-01-22 RX ADMIN — GABAPENTIN SCH MG: 100 CAPSULE ORAL at 12:08

## 2021-01-22 RX ADMIN — OMEPRAZOLE SCH MG: 20 CAPSULE, DELAYED RELEASE ORAL at 06:08

## 2021-01-22 RX ADMIN — MAGNESIUM OXIDE TAB 400 MG (241.3 MG ELEMENTAL MG) SCH TAB: 400 (241.3 MG) TAB at 08:20

## 2021-01-23 RX ADMIN — MAGNESIUM OXIDE TAB 400 MG (241.3 MG ELEMENTAL MG) SCH TAB: 400 (241.3 MG) TAB at 07:32

## 2021-01-23 RX ADMIN — Medication SCH APPLIC: at 07:33

## 2021-01-23 RX ADMIN — GABAPENTIN SCH MG: 100 CAPSULE ORAL at 13:08

## 2021-01-23 RX ADMIN — FUROSEMIDE SCH MG: 20 TABLET ORAL at 07:33

## 2021-01-23 RX ADMIN — ROPINIROLE SCH MG: 0.5 TABLET ORAL at 19:38

## 2021-01-23 RX ADMIN — OMEPRAZOLE SCH MG: 20 CAPSULE, DELAYED RELEASE ORAL at 06:23

## 2021-01-23 RX ADMIN — SERTRALINE HYDROCHLORIDE SCH MG: 100 TABLET ORAL at 07:33

## 2021-01-23 RX ADMIN — ISOSORBIDE MONONITRATE SCH MG: 30 TABLET, FILM COATED, EXTENDED RELEASE ORAL at 07:32

## 2021-01-23 RX ADMIN — GABAPENTIN SCH MG: 300 CAPSULE ORAL at 19:38

## 2021-01-23 RX ADMIN — GABAPENTIN SCH MG: 100 CAPSULE ORAL at 07:32

## 2021-01-23 RX ADMIN — WARFARIN SODIUM SCH MG: 5 TABLET ORAL at 19:38

## 2021-01-23 RX ADMIN — METOPROLOL SUCCINATE SCH MG: 25 TABLET, EXTENDED RELEASE ORAL at 07:32

## 2021-01-23 RX ADMIN — Medication SCH APPLIC: at 19:40

## 2021-01-24 RX ADMIN — ROPINIROLE SCH MG: 0.5 TABLET ORAL at 20:08

## 2021-01-24 RX ADMIN — MAGNESIUM OXIDE TAB 400 MG (241.3 MG ELEMENTAL MG) SCH TAB: 400 (241.3 MG) TAB at 08:00

## 2021-01-24 RX ADMIN — Medication SCH APPLIC: at 20:03

## 2021-01-24 RX ADMIN — Medication SCH APPLIC: at 08:01

## 2021-01-24 RX ADMIN — ISOSORBIDE MONONITRATE SCH MG: 30 TABLET, FILM COATED, EXTENDED RELEASE ORAL at 08:00

## 2021-01-24 RX ADMIN — OMEPRAZOLE SCH MG: 20 CAPSULE, DELAYED RELEASE ORAL at 06:31

## 2021-01-24 RX ADMIN — SERTRALINE HYDROCHLORIDE SCH MG: 100 TABLET ORAL at 08:00

## 2021-01-24 RX ADMIN — GABAPENTIN SCH MG: 100 CAPSULE ORAL at 13:10

## 2021-01-24 RX ADMIN — WARFARIN SODIUM SCH MG: 5 TABLET ORAL at 20:09

## 2021-01-24 RX ADMIN — FUROSEMIDE SCH MG: 20 TABLET ORAL at 08:00

## 2021-01-24 RX ADMIN — METOPROLOL SUCCINATE SCH MG: 25 TABLET, EXTENDED RELEASE ORAL at 08:01

## 2021-01-24 RX ADMIN — GABAPENTIN SCH MG: 300 CAPSULE ORAL at 20:10

## 2021-01-24 RX ADMIN — GABAPENTIN SCH MG: 100 CAPSULE ORAL at 08:00

## 2021-01-25 RX ADMIN — GABAPENTIN SCH MG: 100 CAPSULE ORAL at 09:11

## 2021-01-25 RX ADMIN — OMEPRAZOLE SCH MG: 20 CAPSULE, DELAYED RELEASE ORAL at 06:43

## 2021-01-25 RX ADMIN — ROPINIROLE SCH MG: 0.5 TABLET ORAL at 19:35

## 2021-01-25 RX ADMIN — METOPROLOL SUCCINATE SCH MG: 25 TABLET, EXTENDED RELEASE ORAL at 09:10

## 2021-01-25 RX ADMIN — FUROSEMIDE SCH MG: 20 TABLET ORAL at 09:09

## 2021-01-25 RX ADMIN — ISOSORBIDE MONONITRATE SCH MG: 30 TABLET, FILM COATED, EXTENDED RELEASE ORAL at 09:10

## 2021-01-25 RX ADMIN — GABAPENTIN SCH MG: 100 CAPSULE ORAL at 12:06

## 2021-01-25 RX ADMIN — GABAPENTIN SCH MG: 300 CAPSULE ORAL at 19:33

## 2021-01-25 RX ADMIN — SERTRALINE HYDROCHLORIDE SCH MG: 100 TABLET ORAL at 09:09

## 2021-01-25 RX ADMIN — Medication SCH APPLIC: at 09:10

## 2021-01-25 RX ADMIN — WARFARIN SODIUM SCH MG: 5 TABLET ORAL at 19:35

## 2021-01-25 RX ADMIN — MAGNESIUM OXIDE TAB 400 MG (241.3 MG ELEMENTAL MG) SCH TAB: 400 (241.3 MG) TAB at 09:09

## 2021-01-25 RX ADMIN — Medication SCH APPLIC: at 19:36

## 2021-01-26 RX ADMIN — Medication SCH APPLIC: at 07:33

## 2021-01-26 RX ADMIN — FUROSEMIDE SCH MG: 20 TABLET ORAL at 07:32

## 2021-01-26 RX ADMIN — Medication SCH APPLIC: at 19:31

## 2021-01-26 RX ADMIN — METOPROLOL SUCCINATE SCH MG: 25 TABLET, EXTENDED RELEASE ORAL at 07:32

## 2021-01-26 RX ADMIN — GABAPENTIN SCH MG: 300 CAPSULE ORAL at 19:30

## 2021-01-26 RX ADMIN — OMEPRAZOLE SCH MG: 20 CAPSULE, DELAYED RELEASE ORAL at 06:35

## 2021-01-26 RX ADMIN — GABAPENTIN SCH MG: 100 CAPSULE ORAL at 12:12

## 2021-01-26 RX ADMIN — MAGNESIUM OXIDE TAB 400 MG (241.3 MG ELEMENTAL MG) SCH TAB: 400 (241.3 MG) TAB at 07:31

## 2021-01-26 RX ADMIN — SERTRALINE HYDROCHLORIDE SCH MG: 100 TABLET ORAL at 07:31

## 2021-01-26 RX ADMIN — ISOSORBIDE MONONITRATE SCH MG: 30 TABLET, FILM COATED, EXTENDED RELEASE ORAL at 07:32

## 2021-01-26 RX ADMIN — WARFARIN SODIUM SCH MG: 5 TABLET ORAL at 19:30

## 2021-01-26 RX ADMIN — GABAPENTIN SCH MG: 100 CAPSULE ORAL at 07:30

## 2021-01-26 RX ADMIN — ROPINIROLE SCH MG: 0.5 TABLET ORAL at 19:30

## 2021-01-27 RX ADMIN — ROPINIROLE SCH MG: 0.5 TABLET ORAL at 19:46

## 2021-01-27 RX ADMIN — GABAPENTIN SCH MG: 100 CAPSULE ORAL at 12:24

## 2021-01-27 RX ADMIN — GABAPENTIN SCH MG: 300 CAPSULE ORAL at 19:46

## 2021-01-27 RX ADMIN — METOPROLOL SUCCINATE SCH MG: 25 TABLET, EXTENDED RELEASE ORAL at 08:04

## 2021-01-27 RX ADMIN — GABAPENTIN SCH MG: 100 CAPSULE ORAL at 08:07

## 2021-01-27 RX ADMIN — FUROSEMIDE SCH MG: 20 TABLET ORAL at 08:04

## 2021-01-27 RX ADMIN — Medication SCH APPLIC: at 08:06

## 2021-01-27 RX ADMIN — MAGNESIUM OXIDE TAB 400 MG (241.3 MG ELEMENTAL MG) SCH TAB: 400 (241.3 MG) TAB at 08:06

## 2021-01-27 RX ADMIN — Medication SCH APPLIC: at 19:48

## 2021-01-27 RX ADMIN — SERTRALINE HYDROCHLORIDE SCH MG: 100 TABLET ORAL at 08:04

## 2021-01-27 RX ADMIN — WARFARIN SODIUM SCH MG: 5 TABLET ORAL at 19:46

## 2021-01-27 RX ADMIN — OMEPRAZOLE SCH MG: 20 CAPSULE, DELAYED RELEASE ORAL at 06:22

## 2021-01-27 RX ADMIN — ISOSORBIDE MONONITRATE SCH MG: 30 TABLET, FILM COATED, EXTENDED RELEASE ORAL at 08:05

## 2021-01-28 RX ADMIN — Medication SCH APPLIC: at 07:24

## 2021-01-28 RX ADMIN — FUROSEMIDE SCH MG: 20 TABLET ORAL at 07:23

## 2021-01-28 RX ADMIN — GABAPENTIN SCH MG: 100 CAPSULE ORAL at 07:22

## 2021-01-28 RX ADMIN — OMEPRAZOLE SCH MG: 20 CAPSULE, DELAYED RELEASE ORAL at 06:14

## 2021-01-28 RX ADMIN — ISOSORBIDE MONONITRATE SCH MG: 30 TABLET, FILM COATED, EXTENDED RELEASE ORAL at 07:23

## 2021-01-28 RX ADMIN — GABAPENTIN SCH MG: 100 CAPSULE ORAL at 12:37

## 2021-01-28 RX ADMIN — Medication SCH APPLIC: at 20:24

## 2021-01-28 RX ADMIN — SERTRALINE HYDROCHLORIDE SCH MG: 100 TABLET ORAL at 07:23

## 2021-01-28 RX ADMIN — MAGNESIUM OXIDE TAB 400 MG (241.3 MG ELEMENTAL MG) SCH TAB: 400 (241.3 MG) TAB at 07:22

## 2021-01-28 RX ADMIN — GABAPENTIN SCH MG: 300 CAPSULE ORAL at 20:20

## 2021-01-28 RX ADMIN — METOPROLOL SUCCINATE SCH MG: 25 TABLET, EXTENDED RELEASE ORAL at 07:24

## 2021-01-28 RX ADMIN — ROPINIROLE SCH MG: 0.5 TABLET ORAL at 20:21

## 2021-01-28 RX ADMIN — WARFARIN SODIUM SCH MG: 5 TABLET ORAL at 20:22

## 2021-01-29 RX ADMIN — GABAPENTIN SCH MG: 100 CAPSULE ORAL at 12:32

## 2021-01-29 RX ADMIN — OMEPRAZOLE SCH MG: 20 CAPSULE, DELAYED RELEASE ORAL at 06:16

## 2021-01-29 RX ADMIN — MAGNESIUM OXIDE TAB 400 MG (241.3 MG ELEMENTAL MG) SCH TAB: 400 (241.3 MG) TAB at 08:13

## 2021-01-29 RX ADMIN — FUROSEMIDE SCH MG: 20 TABLET ORAL at 08:13

## 2021-01-29 RX ADMIN — METOPROLOL SUCCINATE SCH MG: 25 TABLET, EXTENDED RELEASE ORAL at 08:14

## 2021-01-29 RX ADMIN — Medication SCH APPLIC: at 08:14

## 2021-01-29 RX ADMIN — Medication SCH APPLIC: at 19:54

## 2021-01-29 RX ADMIN — WARFARIN SODIUM SCH MG: 5 TABLET ORAL at 19:52

## 2021-01-29 RX ADMIN — ROPINIROLE SCH MG: 0.5 TABLET ORAL at 19:51

## 2021-01-29 RX ADMIN — ISOSORBIDE MONONITRATE SCH MG: 30 TABLET, FILM COATED, EXTENDED RELEASE ORAL at 08:13

## 2021-01-29 RX ADMIN — SERTRALINE HYDROCHLORIDE SCH MG: 100 TABLET ORAL at 08:14

## 2021-01-29 RX ADMIN — GABAPENTIN SCH MG: 300 CAPSULE ORAL at 19:51

## 2021-01-29 RX ADMIN — GABAPENTIN SCH MG: 100 CAPSULE ORAL at 08:12

## 2021-01-30 RX ADMIN — METOPROLOL SUCCINATE SCH MG: 25 TABLET, EXTENDED RELEASE ORAL at 08:05

## 2021-01-30 RX ADMIN — Medication SCH APPLIC: at 08:06

## 2021-01-30 RX ADMIN — FUROSEMIDE SCH MG: 20 TABLET ORAL at 08:05

## 2021-01-30 RX ADMIN — WARFARIN SODIUM SCH MG: 5 TABLET ORAL at 20:06

## 2021-01-30 RX ADMIN — ISOSORBIDE MONONITRATE SCH MG: 30 TABLET, FILM COATED, EXTENDED RELEASE ORAL at 08:05

## 2021-01-30 RX ADMIN — OMEPRAZOLE SCH MG: 20 CAPSULE, DELAYED RELEASE ORAL at 06:16

## 2021-01-30 RX ADMIN — GABAPENTIN SCH MG: 100 CAPSULE ORAL at 08:05

## 2021-01-30 RX ADMIN — GABAPENTIN SCH MG: 100 CAPSULE ORAL at 13:48

## 2021-01-30 RX ADMIN — ROPINIROLE SCH MG: 0.5 TABLET ORAL at 20:06

## 2021-01-30 RX ADMIN — Medication SCH APPLIC: at 20:10

## 2021-01-30 RX ADMIN — SERTRALINE HYDROCHLORIDE SCH MG: 100 TABLET ORAL at 08:05

## 2021-01-30 RX ADMIN — MAGNESIUM OXIDE TAB 400 MG (241.3 MG ELEMENTAL MG) SCH TAB: 400 (241.3 MG) TAB at 08:06

## 2021-01-30 RX ADMIN — GABAPENTIN SCH MG: 300 CAPSULE ORAL at 20:06

## 2021-01-31 RX ADMIN — Medication SCH APPLIC: at 08:25

## 2021-01-31 RX ADMIN — WARFARIN SODIUM SCH MG: 5 TABLET ORAL at 19:21

## 2021-01-31 RX ADMIN — ROPINIROLE SCH MG: 0.5 TABLET ORAL at 19:21

## 2021-01-31 RX ADMIN — GABAPENTIN SCH MG: 100 CAPSULE ORAL at 12:32

## 2021-01-31 RX ADMIN — ISOSORBIDE MONONITRATE SCH MG: 30 TABLET, FILM COATED, EXTENDED RELEASE ORAL at 08:24

## 2021-01-31 RX ADMIN — Medication SCH APPLIC: at 19:24

## 2021-01-31 RX ADMIN — SERTRALINE HYDROCHLORIDE SCH MG: 100 TABLET ORAL at 08:24

## 2021-01-31 RX ADMIN — GABAPENTIN SCH MG: 100 CAPSULE ORAL at 08:25

## 2021-01-31 RX ADMIN — FUROSEMIDE SCH MG: 20 TABLET ORAL at 08:24

## 2021-01-31 RX ADMIN — OMEPRAZOLE SCH MG: 20 CAPSULE, DELAYED RELEASE ORAL at 06:11

## 2021-01-31 RX ADMIN — GABAPENTIN SCH MG: 300 CAPSULE ORAL at 19:19

## 2021-01-31 RX ADMIN — METOPROLOL SUCCINATE SCH MG: 25 TABLET, EXTENDED RELEASE ORAL at 08:24

## 2021-01-31 RX ADMIN — MAGNESIUM OXIDE TAB 400 MG (241.3 MG ELEMENTAL MG) SCH TAB: 400 (241.3 MG) TAB at 08:26

## 2021-02-01 RX ADMIN — OMEPRAZOLE SCH MG: 20 CAPSULE, DELAYED RELEASE ORAL at 06:13

## 2021-02-01 RX ADMIN — ISOSORBIDE MONONITRATE SCH MG: 30 TABLET, FILM COATED, EXTENDED RELEASE ORAL at 07:25

## 2021-02-01 RX ADMIN — ROPINIROLE SCH MG: 0.5 TABLET ORAL at 20:12

## 2021-02-01 RX ADMIN — WARFARIN SODIUM SCH MG: 5 TABLET ORAL at 20:12

## 2021-02-01 RX ADMIN — MAGNESIUM OXIDE TAB 400 MG (241.3 MG ELEMENTAL MG) SCH TAB: 400 (241.3 MG) TAB at 07:24

## 2021-02-01 RX ADMIN — GABAPENTIN SCH MG: 100 CAPSULE ORAL at 07:26

## 2021-02-01 RX ADMIN — GABAPENTIN SCH MG: 100 CAPSULE ORAL at 12:09

## 2021-02-01 RX ADMIN — FUROSEMIDE SCH MG: 20 TABLET ORAL at 07:24

## 2021-02-01 RX ADMIN — Medication SCH APPLIC: at 07:27

## 2021-02-01 RX ADMIN — GABAPENTIN SCH MG: 300 CAPSULE ORAL at 20:10

## 2021-02-01 RX ADMIN — Medication SCH APPLIC: at 20:14

## 2021-02-01 RX ADMIN — SERTRALINE HYDROCHLORIDE SCH MG: 100 TABLET ORAL at 07:25

## 2021-02-01 RX ADMIN — METOPROLOL SUCCINATE SCH MG: 25 TABLET, EXTENDED RELEASE ORAL at 07:26

## 2021-02-02 RX ADMIN — OMEPRAZOLE SCH MG: 20 CAPSULE, DELAYED RELEASE ORAL at 07:56

## 2021-02-02 RX ADMIN — GABAPENTIN SCH MG: 100 CAPSULE ORAL at 12:05

## 2021-02-02 RX ADMIN — WARFARIN SODIUM SCH MG: 5 TABLET ORAL at 19:37

## 2021-02-02 RX ADMIN — GABAPENTIN SCH MG: 100 CAPSULE ORAL at 07:47

## 2021-02-02 RX ADMIN — SERTRALINE HYDROCHLORIDE SCH MG: 100 TABLET ORAL at 07:47

## 2021-02-02 RX ADMIN — FUROSEMIDE SCH MG: 20 TABLET ORAL at 07:48

## 2021-02-02 RX ADMIN — Medication SCH APPLIC: at 19:38

## 2021-02-02 RX ADMIN — Medication SCH APPLIC: at 07:48

## 2021-02-02 RX ADMIN — MAGNESIUM OXIDE TAB 400 MG (241.3 MG ELEMENTAL MG) SCH TAB: 400 (241.3 MG) TAB at 07:46

## 2021-02-02 RX ADMIN — GABAPENTIN SCH MG: 300 CAPSULE ORAL at 19:37

## 2021-02-02 RX ADMIN — ISOSORBIDE MONONITRATE SCH MG: 30 TABLET, FILM COATED, EXTENDED RELEASE ORAL at 07:47

## 2021-02-02 RX ADMIN — METOPROLOL SUCCINATE SCH MG: 25 TABLET, EXTENDED RELEASE ORAL at 07:46

## 2021-02-02 RX ADMIN — ROPINIROLE SCH MG: 0.5 TABLET ORAL at 19:37

## 2021-02-03 RX ADMIN — GABAPENTIN SCH MG: 100 CAPSULE ORAL at 08:48

## 2021-02-03 RX ADMIN — MAGNESIUM OXIDE TAB 400 MG (241.3 MG ELEMENTAL MG) SCH TAB: 400 (241.3 MG) TAB at 08:48

## 2021-02-03 RX ADMIN — Medication SCH APPLIC: at 08:49

## 2021-02-03 RX ADMIN — SERTRALINE HYDROCHLORIDE SCH MG: 100 TABLET ORAL at 08:48

## 2021-02-03 RX ADMIN — FUROSEMIDE SCH MG: 20 TABLET ORAL at 08:48

## 2021-02-03 RX ADMIN — METOPROLOL SUCCINATE SCH MG: 25 TABLET, EXTENDED RELEASE ORAL at 08:49

## 2021-02-03 RX ADMIN — ISOSORBIDE MONONITRATE SCH MG: 30 TABLET, FILM COATED, EXTENDED RELEASE ORAL at 08:48

## 2021-02-03 RX ADMIN — WARFARIN SODIUM SCH MG: 5 TABLET ORAL at 19:28

## 2021-02-03 RX ADMIN — GABAPENTIN SCH MG: 100 CAPSULE ORAL at 13:25

## 2021-02-03 RX ADMIN — Medication SCH APPLIC: at 19:32

## 2021-02-03 RX ADMIN — OMEPRAZOLE SCH MG: 20 CAPSULE, DELAYED RELEASE ORAL at 06:15

## 2021-02-03 RX ADMIN — ROPINIROLE SCH MG: 0.5 TABLET ORAL at 19:28

## 2021-02-03 RX ADMIN — GABAPENTIN SCH MG: 300 CAPSULE ORAL at 19:27

## 2021-02-04 RX ADMIN — ROPINIROLE SCH MG: 0.5 TABLET ORAL at 19:42

## 2021-02-04 RX ADMIN — SERTRALINE HYDROCHLORIDE SCH MG: 100 TABLET ORAL at 08:53

## 2021-02-04 RX ADMIN — METOPROLOL SUCCINATE SCH MG: 25 TABLET, EXTENDED RELEASE ORAL at 08:53

## 2021-02-04 RX ADMIN — OMEPRAZOLE SCH MG: 20 CAPSULE, DELAYED RELEASE ORAL at 06:32

## 2021-02-04 RX ADMIN — GABAPENTIN SCH MG: 100 CAPSULE ORAL at 08:54

## 2021-02-04 RX ADMIN — MAGNESIUM OXIDE TAB 400 MG (241.3 MG ELEMENTAL MG) SCH TAB: 400 (241.3 MG) TAB at 08:55

## 2021-02-04 RX ADMIN — FUROSEMIDE SCH MG: 20 TABLET ORAL at 08:53

## 2021-02-04 RX ADMIN — Medication SCH APPLIC: at 19:44

## 2021-02-04 RX ADMIN — ISOSORBIDE MONONITRATE SCH MG: 30 TABLET, FILM COATED, EXTENDED RELEASE ORAL at 08:53

## 2021-02-04 RX ADMIN — GABAPENTIN SCH MG: 300 CAPSULE ORAL at 19:42

## 2021-02-04 RX ADMIN — WARFARIN SODIUM SCH MG: 5 TABLET ORAL at 19:42

## 2021-02-04 RX ADMIN — Medication SCH APPLIC: at 08:54

## 2021-02-04 RX ADMIN — GABAPENTIN SCH MG: 100 CAPSULE ORAL at 13:32

## 2021-02-05 RX ADMIN — WARFARIN SODIUM SCH MG: 5 TABLET ORAL at 19:36

## 2021-02-05 RX ADMIN — ISOSORBIDE MONONITRATE SCH MG: 30 TABLET, FILM COATED, EXTENDED RELEASE ORAL at 08:12

## 2021-02-05 RX ADMIN — ROPINIROLE SCH MG: 0.5 TABLET ORAL at 19:36

## 2021-02-05 RX ADMIN — Medication SCH APPLIC: at 19:38

## 2021-02-05 RX ADMIN — MAGNESIUM OXIDE TAB 400 MG (241.3 MG ELEMENTAL MG) SCH TAB: 400 (241.3 MG) TAB at 08:11

## 2021-02-05 RX ADMIN — METOPROLOL SUCCINATE SCH MG: 25 TABLET, EXTENDED RELEASE ORAL at 08:11

## 2021-02-05 RX ADMIN — Medication SCH APPLIC: at 08:13

## 2021-02-05 RX ADMIN — GABAPENTIN SCH MG: 100 CAPSULE ORAL at 08:12

## 2021-02-05 RX ADMIN — OMEPRAZOLE SCH MG: 20 CAPSULE, DELAYED RELEASE ORAL at 06:14

## 2021-02-05 RX ADMIN — SERTRALINE HYDROCHLORIDE SCH MG: 100 TABLET ORAL at 08:12

## 2021-02-05 RX ADMIN — FUROSEMIDE SCH MG: 20 TABLET ORAL at 08:12

## 2021-02-05 RX ADMIN — GABAPENTIN SCH MG: 300 CAPSULE ORAL at 19:36

## 2021-02-05 RX ADMIN — GABAPENTIN SCH MG: 100 CAPSULE ORAL at 12:42

## 2021-02-06 RX ADMIN — Medication SCH APPLIC: at 09:32

## 2021-02-06 RX ADMIN — ROPINIROLE SCH MG: 0.5 TABLET ORAL at 19:49

## 2021-02-06 RX ADMIN — Medication SCH APPLIC: at 19:52

## 2021-02-06 RX ADMIN — GABAPENTIN SCH MG: 100 CAPSULE ORAL at 13:19

## 2021-02-06 RX ADMIN — MAGNESIUM OXIDE TAB 400 MG (241.3 MG ELEMENTAL MG) SCH TAB: 400 (241.3 MG) TAB at 09:28

## 2021-02-06 RX ADMIN — FUROSEMIDE SCH MG: 20 TABLET ORAL at 09:26

## 2021-02-06 RX ADMIN — GABAPENTIN SCH MG: 300 CAPSULE ORAL at 19:49

## 2021-02-06 RX ADMIN — SERTRALINE HYDROCHLORIDE SCH MG: 100 TABLET ORAL at 09:27

## 2021-02-06 RX ADMIN — GABAPENTIN SCH MG: 100 CAPSULE ORAL at 09:26

## 2021-02-06 RX ADMIN — METOPROLOL SUCCINATE SCH MG: 25 TABLET, EXTENDED RELEASE ORAL at 09:27

## 2021-02-06 RX ADMIN — WARFARIN SODIUM SCH MG: 5 TABLET ORAL at 19:49

## 2021-02-06 RX ADMIN — OMEPRAZOLE SCH MG: 20 CAPSULE, DELAYED RELEASE ORAL at 06:20

## 2021-02-06 RX ADMIN — ISOSORBIDE MONONITRATE SCH MG: 30 TABLET, FILM COATED, EXTENDED RELEASE ORAL at 09:28

## 2021-02-07 RX ADMIN — GABAPENTIN SCH MG: 300 CAPSULE ORAL at 20:11

## 2021-02-07 RX ADMIN — Medication SCH APPLIC: at 20:15

## 2021-02-07 RX ADMIN — FUROSEMIDE SCH MG: 20 TABLET ORAL at 08:56

## 2021-02-07 RX ADMIN — MAGNESIUM OXIDE TAB 400 MG (241.3 MG ELEMENTAL MG) SCH TAB: 400 (241.3 MG) TAB at 08:56

## 2021-02-07 RX ADMIN — SERTRALINE HYDROCHLORIDE SCH MG: 100 TABLET ORAL at 08:56

## 2021-02-07 RX ADMIN — METOPROLOL SUCCINATE SCH MG: 25 TABLET, EXTENDED RELEASE ORAL at 08:57

## 2021-02-07 RX ADMIN — ROPINIROLE SCH MG: 0.5 TABLET ORAL at 20:14

## 2021-02-07 RX ADMIN — ISOSORBIDE MONONITRATE SCH MG: 30 TABLET, FILM COATED, EXTENDED RELEASE ORAL at 08:57

## 2021-02-07 RX ADMIN — GABAPENTIN SCH MG: 100 CAPSULE ORAL at 08:58

## 2021-02-07 RX ADMIN — WARFARIN SODIUM SCH MG: 5 TABLET ORAL at 20:14

## 2021-02-07 RX ADMIN — OMEPRAZOLE SCH MG: 20 CAPSULE, DELAYED RELEASE ORAL at 06:21

## 2021-02-07 RX ADMIN — Medication SCH APPLIC: at 08:58

## 2021-02-07 RX ADMIN — GABAPENTIN SCH MG: 100 CAPSULE ORAL at 12:53

## 2021-02-08 RX ADMIN — Medication SCH APPLIC: at 19:53

## 2021-02-08 RX ADMIN — GABAPENTIN SCH MG: 300 CAPSULE ORAL at 19:52

## 2021-02-08 RX ADMIN — FUROSEMIDE SCH MG: 20 TABLET ORAL at 08:34

## 2021-02-08 RX ADMIN — ISOSORBIDE MONONITRATE SCH MG: 30 TABLET, FILM COATED, EXTENDED RELEASE ORAL at 08:34

## 2021-02-08 RX ADMIN — METOPROLOL SUCCINATE SCH MG: 25 TABLET, EXTENDED RELEASE ORAL at 08:34

## 2021-02-08 RX ADMIN — OMEPRAZOLE SCH MG: 20 CAPSULE, DELAYED RELEASE ORAL at 06:36

## 2021-02-08 RX ADMIN — BUSPIRONE HYDROCHLORIDE SCH MG: 5 TABLET ORAL at 19:53

## 2021-02-08 RX ADMIN — GABAPENTIN SCH MG: 100 CAPSULE ORAL at 13:19

## 2021-02-08 RX ADMIN — ROPINIROLE SCH MG: 0.5 TABLET ORAL at 19:52

## 2021-02-08 RX ADMIN — MAGNESIUM OXIDE TAB 400 MG (241.3 MG ELEMENTAL MG) SCH TAB: 400 (241.3 MG) TAB at 08:34

## 2021-02-08 RX ADMIN — BUSPIRONE HYDROCHLORIDE SCH MG: 5 TABLET ORAL at 13:19

## 2021-02-08 RX ADMIN — GABAPENTIN SCH MG: 100 CAPSULE ORAL at 08:33

## 2021-02-08 RX ADMIN — WARFARIN SODIUM SCH MG: 5 TABLET ORAL at 19:53

## 2021-02-08 RX ADMIN — Medication SCH APPLIC: at 08:35

## 2021-02-08 RX ADMIN — SERTRALINE HYDROCHLORIDE SCH MG: 100 TABLET ORAL at 08:34

## 2021-02-08 NOTE — PCM.PN
- General Info


Date of Service: 02/08/21


Admission Dx/Problem (Free Text): 


Failure to Thrive, Bilateral LE weakness





Subjective Update: 


Patient has been doing well.  She has no acute complaints today.  Still has 

intermittent RUE pain; discharged from OT services due to improvement.   





Right lower shin wound still healing; slowly.





Mood is down over the last month; confirmed with nursing staff.  Gert is very 

down about the fact that family never reaches out or comes to see her.


Functional Status: Reports: Pain Controlled





- Review of Systems


General: Reports: Weakness


HEENT: Reports: No Symptoms


Pulmonary: Reports: No Symptoms


Cardiovascular: Reports: Edema


Gastrointestinal: Reports: No Symptoms, Other (intermittent bowel incontinence)


Genitourinary: Reports: No Symptoms


Musculoskeletal: Reports: No Symptoms


Skin: Reports: No Symptoms


Psychiatric: Reports: Depression





- Patient Data


Vitals - Most Recent: 


                                Last Vital Signs











Temp  97.6 F   02/08/21 06:00


 


Pulse  72   02/08/21 08:34


 


Resp  17   12/18/20 08:42


 


BP  140/66   02/08/21 08:34


 


Pulse Ox  95   01/26/21 09:38











Weight - Most Recent: 184 lb 9.6 oz


I&O - Last 24 Hours: 


                                 Intake & Output











 02/08/21 02/08/21 02/08/21





 06:59 14:59 22:59


 


Intake Total  300 


 


Balance  300 











Med Orders - Current: 


                               Current Medications





Acetaminophen (Tylenol Extra Strength)  1,000 mg PO BID PRN


   PRN Reason: Pain/Fever


   Last Admin: 01/24/21 20:00 Dose:  1,000 mg


   Documented by: 


Acetaminophen (Tylenol)  650 mg PO TID@0800,1300,2000 [facility]


   Last Admin: 02/08/21 13:19 Dose:  650 mg


   Documented by: 


Buspirone HCl (Buspar)  5 mg PO TID@0800,1300,2000 [facility]


   Last Admin: 02/08/21 13:19 Dose:  5 mg


   Documented by: 


Calcium Carbonate/Glycine (Tums Extra Strength)  750 mg PO TID PRN


   PRN Reason: Dyspepsia


   Last Admin: 02/01/21 12:21 Dose:  750 mg


   Documented by: 


Docusate Sodium (Colace)  100 mg PO DAILY PRN


   PRN Reason: Constipation


Famotidine (Pepcid)  20 mg PO BEDTIME [facility]


   Last Admin: 02/07/21 20:14 Dose:  20 mg


   Documented by: 


Ferrous Sulfate (Ferrous Sulfate)  325 mg PO Q48H [facility]


   Last Admin: 02/08/21 08:34 Dose:  325 mg


   Documented by: 


Furosemide (Lasix)  20 mg PO DAILY [facility]


   Last Admin: 02/08/21 08:34 Dose:  20 mg


   Documented by: 


Gabapentin (Neurontin)  100 mg PO BID@0800,1300 [facility]


   Last Admin: 02/08/21 13:19 Dose:  100 mg


   Documented by: 


Gabapentin (Neurontin)  300 mg PO BEDTIME [facility]


   Last Admin: 02/07/21 20:11 Dose:  300 mg


   Documented by: 


Isosorbide Mononitrate (Imdur)  30 mg PO DAILY [facility]


   Last Admin: 02/08/21 08:34 Dose:  30 mg


   Documented by: 


Loperamide HCl (Imodium)  2 mg PO Q12H PRN


   PRN Reason: Diarrhea


   Last Admin: 12/16/20 08:38 Dose:  2 mg


   Documented by: 


Melatonin (Melatonin)  6 mg PO BEDTIME [facility]


   Last Admin: 02/07/21 20:14 Dose:  6 mg


   Documented by: 


Metoprolol Succinate (Toprol Xl)  25 mg PO DAILY [facility]


   Last Admin: 02/08/21 08:34 Dose:  25 mg


   Documented by: 


Miconazole (Desenex 2%)  0 gm TOP BID PRN


   PRN Reason: RASH UNDER BILATERAL BREASTS


Mineral Oil/White Petrolatum (Minerin Creme)  0 gm TOP BID [facility]


   Last Admin: 02/08/21 08:35 Dose:  1 applic


   Documented by: 


Multivitamins/Minerals (Thera M Plus)  1 tab PO DAILY [facility]


   Last Admin: 02/08/21 08:34 Dose:  1 tab


   Documented by: 


Nitroglycerin (Nitrostat)  0.4 mg SL Q5M PRN


   PRN Reason: Chest Pain


Omeprazole (Omeprazole)  20 mg PO DAILY@0700 [facility]


   Last Admin: 02/08/21 06:36 Dose:  20 mg


   Documented by: 


Pharmacy Consult (Consult To Pharmacy)  1 each .XX ASDIRECTED [facility]


Polyethylene Glycol (Miralax)  17 gm PO DAILY PRN


   PRN Reason: Constipation


Ropinirole HCl (Requip)  0.5 mg PO BEDTIME [facility]


   Last Admin: 02/07/21 20:14 Dose:  0.5 mg


   Documented by: 


Senna/Docusate Sodium (Senna Plus)  1 tab PO DAILY PRN


   PRN Reason: Constipation


Sertraline HCl (Zoloft)  100 mg PO DAILY [facility]


   Last Admin: 02/08/21 08:34 Dose:  100 mg


   Documented by: 


Vitamin B Complex (Vitamin B Complex)  1 each PO DAILY [facility]


   Last Admin: 02/08/21 08:34 Dose:  1 each


   Documented by: 


Warfarin Sodium (Coumadin)  5 mg PO SuMoTuWeFrSa@2000 [facility]


   Last Admin: 02/07/21 20:14 Dose:  5 mg


   Documented by: 


Warfarin Sodium (Coumadin)  2.5 mg PO Th@2000 [facility]


   Last Admin: 02/04/21 19:42 Dose:  2.5 mg


   Documented by: 





Discontinued Medications





Buspirone HCl (Buspar)  5 mg PO BID [facility]


   Last Admin: 02/08/21 08:34 Dose:  5 mg


   Documented by: 


Ciprofloxacin (Ciprofloxacin Hcl)  500 mg PO Q24H [facility]


   Stop: 10/22/20 20:01


   Last Admin: 10/22/20 19:24 Dose:  500 mg


   Documented by: 


Famotidine (Pepcid)  20 mg PO DAILY [facility]


   Last Admin: 10/09/20 08:14 Dose:  20 mg


   Documented by: 


Furosemide (Lasix)  20 mg PO DAILY@1200 [facility]


   Stop: 12/06/20 12:01


   Last Admin: 12/06/20 12:16 Dose:  20 mg


   Documented by: 


Nitrofurantoin Macrocrystals (Macrobid)  100 mg PO BID [facility]


   Stop: 10/25/20 08:31


   Last Admin: 10/18/20 09:00 Dose:  100 mg


   Documented by: 


Omeprazole (Omeprazole)  20 mg PO DAILY [facility]


   Last Admin: 10/09/20 08:14 Dose:  20 mg


   Documented by: 











- Exam


Quality Assessment: Supplemental Oxygen


General: Alert, Oriented


HEENT: Mucous Membr. Moist/Pink


Neck: Supple


Lungs: Clear to Auscultation, Normal Respiratory Effort


Cardiovascular: Regular Rate, Regular Rhythm


GI/Abdominal Exam: Normal Bowel Sounds, Soft, Non-Tender


Skin: Warm, Dry


Wound/Incisions: Healing Well (open area now measures maybe 3mm at its widest, 

surrounding skin is very thin/fragile but healing)


Psy/Mental Status: Alert, Depressed





Sepsis Event Note





- Evaluation


Sepsis Screening Result: No Definite Risk





- Focused Exam


Vital Signs: 


                                   Vital Signs











  Temp Pulse BP


 


 02/08/21 08:34   72  140/66


 


 02/08/21 06:00  97.6 F  














- Problem List & Annotations


(1) Need for assistance with personal care


SNOMED Code(s): 32432343548132208


   Code(s): Z74.1 - NEED FOR ASSISTANCE WITH PERSONAL CARE   Status: Acute   

Current Visit: No   





(2) Anxiety


SNOMED Code(s): 38770823


   Code(s): F41.9 - ANXIETY DISORDER, UNSPECIFIED   Status: Chronic   Current 

Visit: No   





(3) CKD (chronic kidney disease)


SNOMED Code(s): 538760789


   Code(s): N18.9 - CHRONIC KIDNEY DISEASE, UNSPECIFIED   Status: Chronic   

Current Visit: No   





(4) COPD (chronic obstructive pulmonary disease)


SNOMED Code(s): 75210862


   Code(s): J44.9 - CHRONIC OBSTRUCTIVE PULMONARY DISEASE, UNSPECIFIED   Status:

Chronic   Current Visit: No   





(5) Depression


SNOMED Code(s): 57732110


   Code(s): F32.9 - MAJOR DEPRESSIVE DISORDER, SINGLE EPISODE, UNSPECIFIED   

Status: Chronic   Current Visit: No   





(6) Diastolic HF (heart failure)


SNOMED Code(s): 471787469


   Code(s): I50.30 - UNSPECIFIED DIASTOLIC (CONGESTIVE) HEART FAILURE   Status: 

Chronic   Current Visit: No   





(7) GERD (gastroesophageal reflux disease)


SNOMED Code(s): 144234864


   Code(s): K21.9 - GASTRO-ESOPHAGEAL REFLUX DISEASE WITHOUT ESOPHAGITIS   

Status: Chronic   Current Visit: No   





(8) Hypertension


SNOMED Code(s): 95184406


   Code(s): I10 - ESSENTIAL (PRIMARY) HYPERTENSION   Status: Chronic   Current 

Visit: No   





- Problem List Review


Problem List Initiated/Reviewed/Updated: Yes





- My Orders


Last 24 Hours: 


My Active Orders





02/08/21 13:00


busPIRone [Buspar]   5 mg PO TID@0800,1300,2000 





02/22/21 07:00


INR,PT,PROTHROMBIN TIME [COAG] Q28D 





03/22/21 07:00


INR,PT,PROTHROMBIN TIME [COAG] Q28D 





04/19/21 07:00


INR,PT,PROTHROMBIN TIME [COAG] Q28D 





05/17/21 07:00


INR,PT,PROTHROMBIN TIME [COAG] Q28D 





06/14/21 07:00


INR,PT,PROTHROMBIN TIME [COAG] Q28D 














- Assessment


Assessment:: 


Failure to Thrive


Chronic Leg Weakness


- Patient is long-term swing bed stay for assistance with ADLs due to the above


Plan:


- Continue regular daily care is as previously prescribed


- Continue physical therapy as previously prescribed





Leg wound


Diastolic CHF


- Healing very well, albeit slowly


Plan:


- Continue current wound cares


- Discussed use of compression stockings/ACE, patient refuses


- Discussed possibly referral to wound clinic, patient defers





Depression


- Patients mood more depressed than in the past


Plan:


- Increase Bespar to 5mg TID


- Reassess next month





Breast Cancer, s/p lumpectomy - plan for follow-up with oncology as previously 

planned (repeat breast exam every 6 months, f/u visit with one in Aug 2021 with 

mammo)


HTN - continue metoprolol 25mg daily


GERD - continue omeprazole 20mg daily, prn Pepcid 20mg, prn Tums


Anxiety/Depression - continue Zoloft 100mg daily, BuSpar 5mg TID 


Angina - continue Imdur 30mg daily, prn Nitro


Restless legs - Requip 0.5mg at bedtime


Chronic pain - Gabapentin 100mg am and noon, 300mg at supper


Hx DVT - continue Warfarin, serial INR per pharmacy


Peripheral edema - continue Lasix 20mg daily


Constipation - continue Colace 100mg dialy, prn miralax


Fe-def anemia - continue iron 325mg e/o day


Insomnia - continue melatonin 3mg at bedtime


Chronic respiratory failure - continue supplemental O2


Fungal rash - continue prn miconazole

## 2021-02-09 RX ADMIN — WARFARIN SODIUM SCH MG: 5 TABLET ORAL at 19:51

## 2021-02-09 RX ADMIN — MAGNESIUM OXIDE TAB 400 MG (241.3 MG ELEMENTAL MG) SCH TAB: 400 (241.3 MG) TAB at 07:21

## 2021-02-09 RX ADMIN — METOPROLOL SUCCINATE SCH MG: 25 TABLET, EXTENDED RELEASE ORAL at 07:23

## 2021-02-09 RX ADMIN — BUSPIRONE HYDROCHLORIDE SCH MG: 5 TABLET ORAL at 07:21

## 2021-02-09 RX ADMIN — GABAPENTIN SCH MG: 100 CAPSULE ORAL at 07:22

## 2021-02-09 RX ADMIN — OMEPRAZOLE SCH MG: 20 CAPSULE, DELAYED RELEASE ORAL at 06:21

## 2021-02-09 RX ADMIN — Medication SCH APPLIC: at 07:25

## 2021-02-09 RX ADMIN — Medication SCH APPLIC: at 19:52

## 2021-02-09 RX ADMIN — SERTRALINE HYDROCHLORIDE SCH MG: 100 TABLET ORAL at 07:21

## 2021-02-09 RX ADMIN — ROPINIROLE SCH MG: 0.5 TABLET ORAL at 19:51

## 2021-02-09 RX ADMIN — BUSPIRONE HYDROCHLORIDE SCH MG: 5 TABLET ORAL at 12:16

## 2021-02-09 RX ADMIN — GABAPENTIN SCH MG: 300 CAPSULE ORAL at 19:51

## 2021-02-09 RX ADMIN — BUSPIRONE HYDROCHLORIDE SCH MG: 5 TABLET ORAL at 19:51

## 2021-02-09 RX ADMIN — FUROSEMIDE SCH MG: 20 TABLET ORAL at 07:22

## 2021-02-09 RX ADMIN — ISOSORBIDE MONONITRATE SCH MG: 30 TABLET, FILM COATED, EXTENDED RELEASE ORAL at 07:22

## 2021-02-09 RX ADMIN — GABAPENTIN SCH MG: 100 CAPSULE ORAL at 12:16

## 2021-02-10 RX ADMIN — SERTRALINE HYDROCHLORIDE SCH MG: 100 TABLET ORAL at 07:19

## 2021-02-10 RX ADMIN — METOPROLOL SUCCINATE SCH MG: 25 TABLET, EXTENDED RELEASE ORAL at 07:18

## 2021-02-10 RX ADMIN — GABAPENTIN SCH MG: 300 CAPSULE ORAL at 20:42

## 2021-02-10 RX ADMIN — OMEPRAZOLE SCH MG: 20 CAPSULE, DELAYED RELEASE ORAL at 06:39

## 2021-02-10 RX ADMIN — GABAPENTIN SCH MG: 100 CAPSULE ORAL at 12:20

## 2021-02-10 RX ADMIN — BUSPIRONE HYDROCHLORIDE SCH MG: 5 TABLET ORAL at 07:18

## 2021-02-10 RX ADMIN — ROPINIROLE SCH MG: 0.5 TABLET ORAL at 20:43

## 2021-02-10 RX ADMIN — ISOSORBIDE MONONITRATE SCH MG: 30 TABLET, FILM COATED, EXTENDED RELEASE ORAL at 07:18

## 2021-02-10 RX ADMIN — BUSPIRONE HYDROCHLORIDE SCH MG: 5 TABLET ORAL at 20:42

## 2021-02-10 RX ADMIN — GABAPENTIN SCH MG: 100 CAPSULE ORAL at 07:19

## 2021-02-10 RX ADMIN — MAGNESIUM OXIDE TAB 400 MG (241.3 MG ELEMENTAL MG) SCH TAB: 400 (241.3 MG) TAB at 07:17

## 2021-02-10 RX ADMIN — Medication SCH APPLIC: at 08:08

## 2021-02-10 RX ADMIN — Medication SCH APPLIC: at 20:44

## 2021-02-10 RX ADMIN — WARFARIN SODIUM SCH MG: 5 TABLET ORAL at 20:44

## 2021-02-10 RX ADMIN — BUSPIRONE HYDROCHLORIDE SCH MG: 5 TABLET ORAL at 12:19

## 2021-02-10 RX ADMIN — FUROSEMIDE SCH MG: 20 TABLET ORAL at 07:19

## 2021-02-11 RX ADMIN — WARFARIN SODIUM SCH MG: 5 TABLET ORAL at 20:17

## 2021-02-11 RX ADMIN — OMEPRAZOLE SCH MG: 20 CAPSULE, DELAYED RELEASE ORAL at 06:46

## 2021-02-11 RX ADMIN — Medication SCH APPLIC: at 08:20

## 2021-02-11 RX ADMIN — BUSPIRONE HYDROCHLORIDE SCH MG: 5 TABLET ORAL at 20:17

## 2021-02-11 RX ADMIN — GABAPENTIN SCH MG: 300 CAPSULE ORAL at 20:16

## 2021-02-11 RX ADMIN — BUSPIRONE HYDROCHLORIDE SCH MG: 5 TABLET ORAL at 12:30

## 2021-02-11 RX ADMIN — SERTRALINE HYDROCHLORIDE SCH MG: 100 TABLET ORAL at 08:18

## 2021-02-11 RX ADMIN — FUROSEMIDE SCH MG: 20 TABLET ORAL at 08:18

## 2021-02-11 RX ADMIN — METOPROLOL SUCCINATE SCH MG: 25 TABLET, EXTENDED RELEASE ORAL at 08:19

## 2021-02-11 RX ADMIN — GABAPENTIN SCH MG: 100 CAPSULE ORAL at 12:30

## 2021-02-11 RX ADMIN — Medication SCH APPLIC: at 20:18

## 2021-02-11 RX ADMIN — MAGNESIUM OXIDE TAB 400 MG (241.3 MG ELEMENTAL MG) SCH TAB: 400 (241.3 MG) TAB at 08:20

## 2021-02-11 RX ADMIN — ROPINIROLE SCH MG: 0.5 TABLET ORAL at 20:17

## 2021-02-11 RX ADMIN — ISOSORBIDE MONONITRATE SCH MG: 30 TABLET, FILM COATED, EXTENDED RELEASE ORAL at 08:18

## 2021-02-11 RX ADMIN — GABAPENTIN SCH MG: 100 CAPSULE ORAL at 08:17

## 2021-02-11 RX ADMIN — BUSPIRONE HYDROCHLORIDE SCH MG: 5 TABLET ORAL at 08:18

## 2021-02-12 RX ADMIN — SERTRALINE HYDROCHLORIDE SCH MG: 100 TABLET ORAL at 09:02

## 2021-02-12 RX ADMIN — ROPINIROLE SCH MG: 0.5 TABLET ORAL at 19:24

## 2021-02-12 RX ADMIN — BUSPIRONE HYDROCHLORIDE SCH MG: 5 TABLET ORAL at 14:20

## 2021-02-12 RX ADMIN — GABAPENTIN SCH MG: 100 CAPSULE ORAL at 09:02

## 2021-02-12 RX ADMIN — BUSPIRONE HYDROCHLORIDE SCH MG: 5 TABLET ORAL at 09:35

## 2021-02-12 RX ADMIN — FUROSEMIDE SCH MG: 20 TABLET ORAL at 09:02

## 2021-02-12 RX ADMIN — Medication SCH APPLIC: at 19:25

## 2021-02-12 RX ADMIN — GABAPENTIN SCH MG: 100 CAPSULE ORAL at 13:02

## 2021-02-12 RX ADMIN — GABAPENTIN SCH MG: 300 CAPSULE ORAL at 19:24

## 2021-02-12 RX ADMIN — WARFARIN SODIUM SCH MG: 5 TABLET ORAL at 19:24

## 2021-02-12 RX ADMIN — BUSPIRONE HYDROCHLORIDE SCH MG: 5 TABLET ORAL at 19:24

## 2021-02-12 RX ADMIN — ISOSORBIDE MONONITRATE SCH MG: 30 TABLET, FILM COATED, EXTENDED RELEASE ORAL at 09:05

## 2021-02-12 RX ADMIN — METOPROLOL SUCCINATE SCH MG: 25 TABLET, EXTENDED RELEASE ORAL at 09:06

## 2021-02-12 RX ADMIN — Medication SCH APPLIC: at 09:03

## 2021-02-12 RX ADMIN — OMEPRAZOLE SCH MG: 20 CAPSULE, DELAYED RELEASE ORAL at 06:07

## 2021-02-12 RX ADMIN — MAGNESIUM OXIDE TAB 400 MG (241.3 MG ELEMENTAL MG) SCH TAB: 400 (241.3 MG) TAB at 09:02

## 2021-02-13 RX ADMIN — Medication SCH APPLIC: at 19:25

## 2021-02-13 RX ADMIN — SERTRALINE HYDROCHLORIDE SCH MG: 100 TABLET ORAL at 08:29

## 2021-02-13 RX ADMIN — FUROSEMIDE SCH MG: 20 TABLET ORAL at 08:29

## 2021-02-13 RX ADMIN — WARFARIN SODIUM SCH MG: 5 TABLET ORAL at 19:23

## 2021-02-13 RX ADMIN — BUSPIRONE HYDROCHLORIDE SCH MG: 5 TABLET ORAL at 14:15

## 2021-02-13 RX ADMIN — Medication SCH APPLIC: at 08:33

## 2021-02-13 RX ADMIN — OMEPRAZOLE SCH MG: 20 CAPSULE, DELAYED RELEASE ORAL at 06:38

## 2021-02-13 RX ADMIN — MAGNESIUM OXIDE TAB 400 MG (241.3 MG ELEMENTAL MG) SCH TAB: 400 (241.3 MG) TAB at 08:30

## 2021-02-13 RX ADMIN — ROPINIROLE SCH MG: 0.5 TABLET ORAL at 19:24

## 2021-02-13 RX ADMIN — BUSPIRONE HYDROCHLORIDE SCH MG: 5 TABLET ORAL at 19:23

## 2021-02-13 RX ADMIN — GABAPENTIN SCH MG: 300 CAPSULE ORAL at 19:23

## 2021-02-13 RX ADMIN — METOPROLOL SUCCINATE SCH MG: 25 TABLET, EXTENDED RELEASE ORAL at 08:32

## 2021-02-13 RX ADMIN — BUSPIRONE HYDROCHLORIDE SCH MG: 5 TABLET ORAL at 08:29

## 2021-02-13 RX ADMIN — GABAPENTIN SCH MG: 100 CAPSULE ORAL at 14:15

## 2021-02-13 RX ADMIN — ISOSORBIDE MONONITRATE SCH MG: 30 TABLET, FILM COATED, EXTENDED RELEASE ORAL at 08:33

## 2021-02-13 RX ADMIN — GABAPENTIN SCH MG: 100 CAPSULE ORAL at 08:30

## 2021-02-14 RX ADMIN — WARFARIN SODIUM SCH MG: 5 TABLET ORAL at 19:28

## 2021-02-14 RX ADMIN — GABAPENTIN SCH MG: 100 CAPSULE ORAL at 13:31

## 2021-02-14 RX ADMIN — GABAPENTIN SCH MG: 300 CAPSULE ORAL at 19:28

## 2021-02-14 RX ADMIN — BUSPIRONE HYDROCHLORIDE SCH MG: 5 TABLET ORAL at 13:32

## 2021-02-14 RX ADMIN — ISOSORBIDE MONONITRATE SCH MG: 30 TABLET, FILM COATED, EXTENDED RELEASE ORAL at 08:58

## 2021-02-14 RX ADMIN — BUSPIRONE HYDROCHLORIDE SCH MG: 5 TABLET ORAL at 19:28

## 2021-02-14 RX ADMIN — ROPINIROLE SCH MG: 0.5 TABLET ORAL at 19:28

## 2021-02-14 RX ADMIN — SERTRALINE HYDROCHLORIDE SCH MG: 100 TABLET ORAL at 08:57

## 2021-02-14 RX ADMIN — FUROSEMIDE SCH MG: 20 TABLET ORAL at 08:58

## 2021-02-14 RX ADMIN — Medication SCH APPLIC: at 08:59

## 2021-02-14 RX ADMIN — GABAPENTIN SCH MG: 100 CAPSULE ORAL at 08:57

## 2021-02-14 RX ADMIN — Medication SCH APPLIC: at 19:28

## 2021-02-14 RX ADMIN — MAGNESIUM OXIDE TAB 400 MG (241.3 MG ELEMENTAL MG) SCH TAB: 400 (241.3 MG) TAB at 08:58

## 2021-02-14 RX ADMIN — BUSPIRONE HYDROCHLORIDE SCH MG: 5 TABLET ORAL at 08:57

## 2021-02-14 RX ADMIN — METOPROLOL SUCCINATE SCH MG: 25 TABLET, EXTENDED RELEASE ORAL at 08:57

## 2021-02-14 RX ADMIN — OMEPRAZOLE SCH MG: 20 CAPSULE, DELAYED RELEASE ORAL at 06:58

## 2021-02-15 RX ADMIN — OMEPRAZOLE SCH MG: 20 CAPSULE, DELAYED RELEASE ORAL at 07:54

## 2021-02-15 RX ADMIN — ROPINIROLE SCH MG: 0.5 TABLET ORAL at 20:35

## 2021-02-15 RX ADMIN — MAGNESIUM OXIDE TAB 400 MG (241.3 MG ELEMENTAL MG) SCH TAB: 400 (241.3 MG) TAB at 07:53

## 2021-02-15 RX ADMIN — SERTRALINE HYDROCHLORIDE SCH MG: 100 TABLET ORAL at 07:55

## 2021-02-15 RX ADMIN — METOPROLOL SUCCINATE SCH MG: 25 TABLET, EXTENDED RELEASE ORAL at 07:54

## 2021-02-15 RX ADMIN — GABAPENTIN SCH MG: 100 CAPSULE ORAL at 07:57

## 2021-02-15 RX ADMIN — GABAPENTIN SCH MG: 100 CAPSULE ORAL at 12:38

## 2021-02-15 RX ADMIN — Medication SCH APPLIC: at 20:36

## 2021-02-15 RX ADMIN — FUROSEMIDE SCH MG: 20 TABLET ORAL at 07:55

## 2021-02-15 RX ADMIN — BUSPIRONE HYDROCHLORIDE SCH MG: 5 TABLET ORAL at 07:56

## 2021-02-15 RX ADMIN — ISOSORBIDE MONONITRATE SCH MG: 30 TABLET, FILM COATED, EXTENDED RELEASE ORAL at 07:55

## 2021-02-15 RX ADMIN — BUSPIRONE HYDROCHLORIDE SCH MG: 5 TABLET ORAL at 20:35

## 2021-02-15 RX ADMIN — Medication SCH APPLIC: at 07:57

## 2021-02-15 RX ADMIN — BUSPIRONE HYDROCHLORIDE SCH MG: 5 TABLET ORAL at 12:37

## 2021-02-15 RX ADMIN — GABAPENTIN SCH MG: 300 CAPSULE ORAL at 20:35

## 2021-02-15 RX ADMIN — WARFARIN SODIUM SCH MG: 5 TABLET ORAL at 20:35

## 2021-02-16 RX ADMIN — ISOSORBIDE MONONITRATE SCH MG: 30 TABLET, FILM COATED, EXTENDED RELEASE ORAL at 09:00

## 2021-02-16 RX ADMIN — Medication SCH APPLIC: at 21:23

## 2021-02-16 RX ADMIN — OMEPRAZOLE SCH MG: 20 CAPSULE, DELAYED RELEASE ORAL at 06:14

## 2021-02-16 RX ADMIN — BUSPIRONE HYDROCHLORIDE SCH MG: 5 TABLET ORAL at 19:45

## 2021-02-16 RX ADMIN — GABAPENTIN SCH MG: 100 CAPSULE ORAL at 12:41

## 2021-02-16 RX ADMIN — SERTRALINE HYDROCHLORIDE SCH MG: 100 TABLET ORAL at 09:00

## 2021-02-16 RX ADMIN — BUSPIRONE HYDROCHLORIDE SCH MG: 5 TABLET ORAL at 12:41

## 2021-02-16 RX ADMIN — ROPINIROLE SCH MG: 0.5 TABLET ORAL at 19:46

## 2021-02-16 RX ADMIN — GABAPENTIN SCH MG: 100 CAPSULE ORAL at 08:59

## 2021-02-16 RX ADMIN — FUROSEMIDE SCH MG: 20 TABLET ORAL at 09:02

## 2021-02-16 RX ADMIN — MICONAZOLE NITRATE PRN APPLIC: 2 POWDER TOPICAL at 19:49

## 2021-02-16 RX ADMIN — Medication SCH APPLIC: at 09:02

## 2021-02-16 RX ADMIN — WARFARIN SODIUM SCH MG: 5 TABLET ORAL at 19:46

## 2021-02-16 RX ADMIN — BUSPIRONE HYDROCHLORIDE SCH MG: 5 TABLET ORAL at 09:04

## 2021-02-16 RX ADMIN — MAGNESIUM OXIDE TAB 400 MG (241.3 MG ELEMENTAL MG) SCH TAB: 400 (241.3 MG) TAB at 09:00

## 2021-02-16 RX ADMIN — METOPROLOL SUCCINATE SCH MG: 25 TABLET, EXTENDED RELEASE ORAL at 09:00

## 2021-02-16 RX ADMIN — GABAPENTIN SCH MG: 300 CAPSULE ORAL at 19:46

## 2021-02-17 RX ADMIN — WARFARIN SODIUM SCH MG: 5 TABLET ORAL at 19:30

## 2021-02-17 RX ADMIN — Medication SCH APPLIC: at 19:32

## 2021-02-17 RX ADMIN — OMEPRAZOLE SCH MG: 20 CAPSULE, DELAYED RELEASE ORAL at 06:38

## 2021-02-17 RX ADMIN — METOPROLOL SUCCINATE SCH MG: 25 TABLET, EXTENDED RELEASE ORAL at 08:26

## 2021-02-17 RX ADMIN — ISOSORBIDE MONONITRATE SCH MG: 30 TABLET, FILM COATED, EXTENDED RELEASE ORAL at 08:27

## 2021-02-17 RX ADMIN — BUSPIRONE HYDROCHLORIDE SCH MG: 5 TABLET ORAL at 19:30

## 2021-02-17 RX ADMIN — MAGNESIUM OXIDE TAB 400 MG (241.3 MG ELEMENTAL MG) SCH TAB: 400 (241.3 MG) TAB at 08:28

## 2021-02-17 RX ADMIN — FUROSEMIDE SCH MG: 20 TABLET ORAL at 08:27

## 2021-02-17 RX ADMIN — BUSPIRONE HYDROCHLORIDE SCH MG: 5 TABLET ORAL at 08:26

## 2021-02-17 RX ADMIN — MICONAZOLE NITRATE PRN APPLIC: 2 POWDER TOPICAL at 19:33

## 2021-02-17 RX ADMIN — SERTRALINE HYDROCHLORIDE SCH MG: 100 TABLET ORAL at 08:27

## 2021-02-17 RX ADMIN — GABAPENTIN SCH MG: 100 CAPSULE ORAL at 08:25

## 2021-02-17 RX ADMIN — ROPINIROLE SCH MG: 0.5 TABLET ORAL at 19:30

## 2021-02-17 RX ADMIN — GABAPENTIN SCH MG: 100 CAPSULE ORAL at 12:30

## 2021-02-17 RX ADMIN — Medication SCH APPLIC: at 08:28

## 2021-02-17 RX ADMIN — GABAPENTIN SCH MG: 300 CAPSULE ORAL at 19:30

## 2021-02-17 RX ADMIN — BUSPIRONE HYDROCHLORIDE SCH MG: 5 TABLET ORAL at 12:30

## 2021-02-17 RX ADMIN — MICONAZOLE NITRATE PRN APPLIC: 2 POWDER TOPICAL at 08:25

## 2021-02-18 RX ADMIN — BUSPIRONE HYDROCHLORIDE SCH MG: 5 TABLET ORAL at 13:37

## 2021-02-18 RX ADMIN — ISOSORBIDE MONONITRATE SCH MG: 30 TABLET, FILM COATED, EXTENDED RELEASE ORAL at 07:54

## 2021-02-18 RX ADMIN — MICONAZOLE NITRATE PRN APPLIC: 2 POWDER TOPICAL at 19:30

## 2021-02-18 RX ADMIN — WARFARIN SODIUM SCH MG: 5 TABLET ORAL at 19:28

## 2021-02-18 RX ADMIN — BUSPIRONE HYDROCHLORIDE SCH MG: 5 TABLET ORAL at 19:28

## 2021-02-18 RX ADMIN — Medication SCH APPLIC: at 19:29

## 2021-02-18 RX ADMIN — Medication SCH APPLIC: at 07:55

## 2021-02-18 RX ADMIN — BUSPIRONE HYDROCHLORIDE SCH MG: 5 TABLET ORAL at 07:53

## 2021-02-18 RX ADMIN — MAGNESIUM OXIDE TAB 400 MG (241.3 MG ELEMENTAL MG) SCH TAB: 400 (241.3 MG) TAB at 07:55

## 2021-02-18 RX ADMIN — GABAPENTIN SCH MG: 100 CAPSULE ORAL at 07:55

## 2021-02-18 RX ADMIN — GABAPENTIN SCH MG: 300 CAPSULE ORAL at 19:27

## 2021-02-18 RX ADMIN — METOPROLOL SUCCINATE SCH MG: 25 TABLET, EXTENDED RELEASE ORAL at 07:54

## 2021-02-18 RX ADMIN — FUROSEMIDE SCH MG: 20 TABLET ORAL at 07:53

## 2021-02-18 RX ADMIN — SERTRALINE HYDROCHLORIDE SCH MG: 100 TABLET ORAL at 07:53

## 2021-02-18 RX ADMIN — OMEPRAZOLE SCH MG: 20 CAPSULE, DELAYED RELEASE ORAL at 06:14

## 2021-02-18 RX ADMIN — ROPINIROLE SCH MG: 0.5 TABLET ORAL at 19:28

## 2021-02-18 RX ADMIN — GABAPENTIN SCH MG: 100 CAPSULE ORAL at 13:35

## 2021-02-19 RX ADMIN — ROPINIROLE SCH MG: 0.5 TABLET ORAL at 19:32

## 2021-02-19 RX ADMIN — MICONAZOLE NITRATE PRN APPLIC: 2 POWDER TOPICAL at 08:04

## 2021-02-19 RX ADMIN — BUSPIRONE HYDROCHLORIDE SCH MG: 5 TABLET ORAL at 08:03

## 2021-02-19 RX ADMIN — ISOSORBIDE MONONITRATE SCH MG: 30 TABLET, FILM COATED, EXTENDED RELEASE ORAL at 08:02

## 2021-02-19 RX ADMIN — FUROSEMIDE SCH MG: 20 TABLET ORAL at 08:02

## 2021-02-19 RX ADMIN — METOPROLOL SUCCINATE SCH MG: 25 TABLET, EXTENDED RELEASE ORAL at 08:03

## 2021-02-19 RX ADMIN — WARFARIN SODIUM SCH MG: 5 TABLET ORAL at 19:32

## 2021-02-19 RX ADMIN — GABAPENTIN SCH MG: 100 CAPSULE ORAL at 08:01

## 2021-02-19 RX ADMIN — GABAPENTIN SCH MG: 300 CAPSULE ORAL at 19:32

## 2021-02-19 RX ADMIN — Medication SCH APPLIC: at 19:33

## 2021-02-19 RX ADMIN — Medication SCH APPLIC: at 08:03

## 2021-02-19 RX ADMIN — BUSPIRONE HYDROCHLORIDE SCH MG: 5 TABLET ORAL at 13:07

## 2021-02-19 RX ADMIN — MAGNESIUM OXIDE TAB 400 MG (241.3 MG ELEMENTAL MG) SCH TAB: 400 (241.3 MG) TAB at 08:02

## 2021-02-19 RX ADMIN — SERTRALINE HYDROCHLORIDE SCH MG: 100 TABLET ORAL at 08:02

## 2021-02-19 RX ADMIN — OMEPRAZOLE SCH MG: 20 CAPSULE, DELAYED RELEASE ORAL at 06:30

## 2021-02-19 RX ADMIN — GABAPENTIN SCH MG: 100 CAPSULE ORAL at 13:07

## 2021-02-19 RX ADMIN — BUSPIRONE HYDROCHLORIDE SCH MG: 5 TABLET ORAL at 19:32

## 2021-02-20 RX ADMIN — MAGNESIUM OXIDE TAB 400 MG (241.3 MG ELEMENTAL MG) SCH TAB: 400 (241.3 MG) TAB at 07:42

## 2021-02-20 RX ADMIN — BUSPIRONE HYDROCHLORIDE SCH MG: 5 TABLET ORAL at 12:15

## 2021-02-20 RX ADMIN — BUSPIRONE HYDROCHLORIDE SCH MG: 5 TABLET ORAL at 07:41

## 2021-02-20 RX ADMIN — METOPROLOL SUCCINATE SCH MG: 25 TABLET, EXTENDED RELEASE ORAL at 07:42

## 2021-02-20 RX ADMIN — MICONAZOLE NITRATE PRN APPLIC: 2 POWDER TOPICAL at 07:43

## 2021-02-20 RX ADMIN — OMEPRAZOLE SCH MG: 20 CAPSULE, DELAYED RELEASE ORAL at 06:45

## 2021-02-20 RX ADMIN — Medication SCH APPLIC: at 07:43

## 2021-02-20 RX ADMIN — Medication SCH APPLIC: at 20:05

## 2021-02-20 RX ADMIN — WARFARIN SODIUM SCH MG: 5 TABLET ORAL at 19:59

## 2021-02-20 RX ADMIN — BUSPIRONE HYDROCHLORIDE SCH MG: 5 TABLET ORAL at 19:59

## 2021-02-20 RX ADMIN — SERTRALINE HYDROCHLORIDE SCH MG: 100 TABLET ORAL at 07:41

## 2021-02-20 RX ADMIN — GABAPENTIN SCH MG: 300 CAPSULE ORAL at 20:00

## 2021-02-20 RX ADMIN — ROPINIROLE SCH MG: 0.5 TABLET ORAL at 19:59

## 2021-02-20 RX ADMIN — GABAPENTIN SCH MG: 100 CAPSULE ORAL at 12:15

## 2021-02-20 RX ADMIN — ISOSORBIDE MONONITRATE SCH MG: 30 TABLET, FILM COATED, EXTENDED RELEASE ORAL at 07:42

## 2021-02-20 RX ADMIN — FUROSEMIDE SCH MG: 20 TABLET ORAL at 07:41

## 2021-02-20 RX ADMIN — GABAPENTIN SCH MG: 100 CAPSULE ORAL at 07:41

## 2021-02-21 RX ADMIN — BUSPIRONE HYDROCHLORIDE SCH MG: 5 TABLET ORAL at 12:36

## 2021-02-21 RX ADMIN — WARFARIN SODIUM SCH MG: 5 TABLET ORAL at 19:22

## 2021-02-21 RX ADMIN — GABAPENTIN SCH MG: 100 CAPSULE ORAL at 08:09

## 2021-02-21 RX ADMIN — BUSPIRONE HYDROCHLORIDE SCH MG: 5 TABLET ORAL at 19:22

## 2021-02-21 RX ADMIN — ISOSORBIDE MONONITRATE SCH MG: 30 TABLET, FILM COATED, EXTENDED RELEASE ORAL at 08:08

## 2021-02-21 RX ADMIN — ROPINIROLE SCH MG: 0.5 TABLET ORAL at 19:22

## 2021-02-21 RX ADMIN — MAGNESIUM OXIDE TAB 400 MG (241.3 MG ELEMENTAL MG) SCH TAB: 400 (241.3 MG) TAB at 08:05

## 2021-02-21 RX ADMIN — MICONAZOLE NITRATE PRN APPLIC: 2 POWDER TOPICAL at 08:04

## 2021-02-21 RX ADMIN — OMEPRAZOLE SCH MG: 20 CAPSULE, DELAYED RELEASE ORAL at 06:21

## 2021-02-21 RX ADMIN — GABAPENTIN SCH MG: 100 CAPSULE ORAL at 12:36

## 2021-02-21 RX ADMIN — GABAPENTIN SCH MG: 300 CAPSULE ORAL at 19:22

## 2021-02-21 RX ADMIN — SERTRALINE HYDROCHLORIDE SCH MG: 100 TABLET ORAL at 08:08

## 2021-02-21 RX ADMIN — BUSPIRONE HYDROCHLORIDE SCH MG: 5 TABLET ORAL at 08:07

## 2021-02-21 RX ADMIN — Medication SCH APPLIC: at 08:51

## 2021-02-21 RX ADMIN — METOPROLOL SUCCINATE SCH MG: 25 TABLET, EXTENDED RELEASE ORAL at 08:08

## 2021-02-21 RX ADMIN — Medication SCH APPLIC: at 19:22

## 2021-02-21 RX ADMIN — FUROSEMIDE SCH MG: 20 TABLET ORAL at 08:07

## 2021-02-22 RX ADMIN — MAGNESIUM OXIDE TAB 400 MG (241.3 MG ELEMENTAL MG) SCH TAB: 400 (241.3 MG) TAB at 08:01

## 2021-02-22 RX ADMIN — Medication SCH APPLIC: at 08:01

## 2021-02-22 RX ADMIN — OMEPRAZOLE SCH MG: 20 CAPSULE, DELAYED RELEASE ORAL at 06:41

## 2021-02-22 RX ADMIN — ISOSORBIDE MONONITRATE SCH MG: 30 TABLET, FILM COATED, EXTENDED RELEASE ORAL at 08:00

## 2021-02-22 RX ADMIN — BUSPIRONE HYDROCHLORIDE SCH MG: 5 TABLET ORAL at 13:08

## 2021-02-22 RX ADMIN — BUSPIRONE HYDROCHLORIDE SCH MG: 5 TABLET ORAL at 08:01

## 2021-02-22 RX ADMIN — GABAPENTIN SCH MG: 300 CAPSULE ORAL at 19:23

## 2021-02-22 RX ADMIN — METOPROLOL SUCCINATE SCH MG: 25 TABLET, EXTENDED RELEASE ORAL at 08:01

## 2021-02-22 RX ADMIN — Medication SCH APPLIC: at 19:22

## 2021-02-22 RX ADMIN — GABAPENTIN SCH MG: 100 CAPSULE ORAL at 07:59

## 2021-02-22 RX ADMIN — ROPINIROLE SCH MG: 0.5 TABLET ORAL at 19:20

## 2021-02-22 RX ADMIN — GABAPENTIN SCH MG: 100 CAPSULE ORAL at 13:08

## 2021-02-22 RX ADMIN — FUROSEMIDE SCH MG: 20 TABLET ORAL at 08:00

## 2021-02-22 RX ADMIN — SERTRALINE HYDROCHLORIDE SCH MG: 100 TABLET ORAL at 08:00

## 2021-02-22 RX ADMIN — BUSPIRONE HYDROCHLORIDE SCH MG: 5 TABLET ORAL at 19:20

## 2021-02-23 RX ADMIN — ISOSORBIDE MONONITRATE SCH MG: 30 TABLET, FILM COATED, EXTENDED RELEASE ORAL at 08:20

## 2021-02-23 RX ADMIN — GABAPENTIN SCH MG: 100 CAPSULE ORAL at 13:18

## 2021-02-23 RX ADMIN — ROPINIROLE SCH MG: 0.5 TABLET ORAL at 19:30

## 2021-02-23 RX ADMIN — OMEPRAZOLE SCH MG: 20 CAPSULE, DELAYED RELEASE ORAL at 06:21

## 2021-02-23 RX ADMIN — FUROSEMIDE SCH MG: 20 TABLET ORAL at 08:21

## 2021-02-23 RX ADMIN — METOPROLOL SUCCINATE SCH MG: 25 TABLET, EXTENDED RELEASE ORAL at 08:20

## 2021-02-23 RX ADMIN — SERTRALINE HYDROCHLORIDE SCH MG: 100 TABLET ORAL at 08:21

## 2021-02-23 RX ADMIN — Medication SCH APPLIC: at 08:22

## 2021-02-23 RX ADMIN — GABAPENTIN SCH MG: 300 CAPSULE ORAL at 19:31

## 2021-02-23 RX ADMIN — BUSPIRONE HYDROCHLORIDE SCH MG: 5 TABLET ORAL at 14:00

## 2021-02-23 RX ADMIN — BUSPIRONE HYDROCHLORIDE SCH MG: 5 TABLET ORAL at 19:30

## 2021-02-23 RX ADMIN — MAGNESIUM OXIDE TAB 400 MG (241.3 MG ELEMENTAL MG) SCH TAB: 400 (241.3 MG) TAB at 08:22

## 2021-02-23 RX ADMIN — GABAPENTIN SCH MG: 100 CAPSULE ORAL at 08:20

## 2021-02-23 RX ADMIN — BUSPIRONE HYDROCHLORIDE SCH MG: 5 TABLET ORAL at 08:21

## 2021-02-24 RX ADMIN — OMEPRAZOLE SCH MG: 20 CAPSULE, DELAYED RELEASE ORAL at 06:08

## 2021-02-24 RX ADMIN — Medication SCH APPLIC: at 05:06

## 2021-02-24 RX ADMIN — BUSPIRONE HYDROCHLORIDE SCH MG: 5 TABLET ORAL at 13:30

## 2021-02-24 RX ADMIN — WARFARIN SODIUM SCH MG: 5 TABLET ORAL at 19:22

## 2021-02-24 RX ADMIN — METOPROLOL SUCCINATE SCH MG: 25 TABLET, EXTENDED RELEASE ORAL at 08:24

## 2021-02-24 RX ADMIN — Medication SCH APPLIC: at 08:25

## 2021-02-24 RX ADMIN — MICONAZOLE NITRATE PRN APPLIC: 2 POWDER TOPICAL at 19:23

## 2021-02-24 RX ADMIN — Medication SCH APPLIC: at 19:23

## 2021-02-24 RX ADMIN — GABAPENTIN SCH MG: 100 CAPSULE ORAL at 12:34

## 2021-02-24 RX ADMIN — ISOSORBIDE MONONITRATE SCH MG: 30 TABLET, FILM COATED, EXTENDED RELEASE ORAL at 08:23

## 2021-02-24 RX ADMIN — SERTRALINE HYDROCHLORIDE SCH MG: 100 TABLET ORAL at 08:23

## 2021-02-24 RX ADMIN — GABAPENTIN SCH MG: 100 CAPSULE ORAL at 08:21

## 2021-02-24 RX ADMIN — ROPINIROLE SCH MG: 0.5 TABLET ORAL at 19:22

## 2021-02-24 RX ADMIN — BUSPIRONE HYDROCHLORIDE SCH MG: 5 TABLET ORAL at 08:23

## 2021-02-24 RX ADMIN — BUSPIRONE HYDROCHLORIDE SCH MG: 5 TABLET ORAL at 19:22

## 2021-02-24 RX ADMIN — MAGNESIUM OXIDE TAB 400 MG (241.3 MG ELEMENTAL MG) SCH TAB: 400 (241.3 MG) TAB at 08:25

## 2021-02-24 RX ADMIN — GABAPENTIN SCH MG: 300 CAPSULE ORAL at 19:22

## 2021-02-24 RX ADMIN — FUROSEMIDE SCH MG: 20 TABLET ORAL at 08:23

## 2021-02-25 RX ADMIN — BUSPIRONE HYDROCHLORIDE SCH MG: 5 TABLET ORAL at 13:54

## 2021-02-25 RX ADMIN — FUROSEMIDE SCH MG: 20 TABLET ORAL at 08:20

## 2021-02-25 RX ADMIN — BUSPIRONE HYDROCHLORIDE SCH MG: 5 TABLET ORAL at 08:20

## 2021-02-25 RX ADMIN — Medication SCH APPLIC: at 19:35

## 2021-02-25 RX ADMIN — OMEPRAZOLE SCH MG: 20 CAPSULE, DELAYED RELEASE ORAL at 06:33

## 2021-02-25 RX ADMIN — GABAPENTIN SCH MG: 100 CAPSULE ORAL at 13:54

## 2021-02-25 RX ADMIN — GABAPENTIN SCH MG: 300 CAPSULE ORAL at 19:34

## 2021-02-25 RX ADMIN — Medication SCH APPLIC: at 08:22

## 2021-02-25 RX ADMIN — GABAPENTIN SCH MG: 100 CAPSULE ORAL at 08:20

## 2021-02-25 RX ADMIN — METOPROLOL SUCCINATE SCH MG: 25 TABLET, EXTENDED RELEASE ORAL at 08:20

## 2021-02-25 RX ADMIN — SERTRALINE HYDROCHLORIDE SCH MG: 100 TABLET ORAL at 08:20

## 2021-02-25 RX ADMIN — ISOSORBIDE MONONITRATE SCH MG: 30 TABLET, FILM COATED, EXTENDED RELEASE ORAL at 08:20

## 2021-02-25 RX ADMIN — ROPINIROLE SCH MG: 0.5 TABLET ORAL at 19:33

## 2021-02-25 RX ADMIN — MAGNESIUM OXIDE TAB 400 MG (241.3 MG ELEMENTAL MG) SCH TAB: 400 (241.3 MG) TAB at 08:20

## 2021-02-25 RX ADMIN — WARFARIN SODIUM SCH MG: 5 TABLET ORAL at 19:33

## 2021-02-25 RX ADMIN — BUSPIRONE HYDROCHLORIDE SCH MG: 5 TABLET ORAL at 19:33

## 2021-02-26 RX ADMIN — GABAPENTIN SCH MG: 300 CAPSULE ORAL at 19:37

## 2021-02-26 RX ADMIN — BUSPIRONE HYDROCHLORIDE SCH MG: 5 TABLET ORAL at 08:42

## 2021-02-26 RX ADMIN — Medication SCH APPLIC: at 08:45

## 2021-02-26 RX ADMIN — MAGNESIUM OXIDE TAB 400 MG (241.3 MG ELEMENTAL MG) SCH TAB: 400 (241.3 MG) TAB at 08:41

## 2021-02-26 RX ADMIN — GABAPENTIN SCH MG: 100 CAPSULE ORAL at 15:16

## 2021-02-26 RX ADMIN — Medication SCH APPLIC: at 19:40

## 2021-02-26 RX ADMIN — FUROSEMIDE SCH MG: 20 TABLET ORAL at 08:41

## 2021-02-26 RX ADMIN — SERTRALINE HYDROCHLORIDE SCH MG: 100 TABLET ORAL at 08:42

## 2021-02-26 RX ADMIN — BUSPIRONE HYDROCHLORIDE SCH MG: 5 TABLET ORAL at 15:16

## 2021-02-26 RX ADMIN — WARFARIN SODIUM SCH MG: 5 TABLET ORAL at 19:37

## 2021-02-26 RX ADMIN — ISOSORBIDE MONONITRATE SCH MG: 30 TABLET, FILM COATED, EXTENDED RELEASE ORAL at 08:41

## 2021-02-26 RX ADMIN — ROPINIROLE SCH MG: 0.5 TABLET ORAL at 19:37

## 2021-02-26 RX ADMIN — BUSPIRONE HYDROCHLORIDE SCH MG: 5 TABLET ORAL at 19:37

## 2021-02-26 RX ADMIN — OMEPRAZOLE SCH MG: 20 CAPSULE, DELAYED RELEASE ORAL at 06:19

## 2021-02-26 RX ADMIN — METOPROLOL SUCCINATE SCH MG: 25 TABLET, EXTENDED RELEASE ORAL at 08:41

## 2021-02-26 RX ADMIN — GABAPENTIN SCH MG: 100 CAPSULE ORAL at 08:40

## 2021-02-27 RX ADMIN — SERTRALINE HYDROCHLORIDE SCH MG: 100 TABLET ORAL at 09:43

## 2021-02-27 RX ADMIN — GABAPENTIN SCH MG: 100 CAPSULE ORAL at 09:45

## 2021-02-27 RX ADMIN — METOPROLOL SUCCINATE SCH MG: 25 TABLET, EXTENDED RELEASE ORAL at 09:44

## 2021-02-27 RX ADMIN — ISOSORBIDE MONONITRATE SCH MG: 30 TABLET, FILM COATED, EXTENDED RELEASE ORAL at 09:46

## 2021-02-27 RX ADMIN — Medication SCH APPLIC: at 09:47

## 2021-02-27 RX ADMIN — BUSPIRONE HYDROCHLORIDE SCH MG: 5 TABLET ORAL at 14:30

## 2021-02-27 RX ADMIN — GABAPENTIN SCH MG: 300 CAPSULE ORAL at 19:42

## 2021-02-27 RX ADMIN — MAGNESIUM OXIDE TAB 400 MG (241.3 MG ELEMENTAL MG) SCH TAB: 400 (241.3 MG) TAB at 09:48

## 2021-02-27 RX ADMIN — ROPINIROLE SCH MG: 0.5 TABLET ORAL at 19:42

## 2021-02-27 RX ADMIN — FUROSEMIDE SCH MG: 20 TABLET ORAL at 09:45

## 2021-02-27 RX ADMIN — BUSPIRONE HYDROCHLORIDE SCH MG: 5 TABLET ORAL at 09:43

## 2021-02-27 RX ADMIN — BUSPIRONE HYDROCHLORIDE SCH MG: 5 TABLET ORAL at 19:42

## 2021-02-27 RX ADMIN — Medication SCH APPLIC: at 20:59

## 2021-02-27 RX ADMIN — OMEPRAZOLE SCH MG: 20 CAPSULE, DELAYED RELEASE ORAL at 06:05

## 2021-02-27 RX ADMIN — GABAPENTIN SCH MG: 100 CAPSULE ORAL at 14:30

## 2021-02-27 RX ADMIN — WARFARIN SODIUM SCH MG: 5 TABLET ORAL at 19:42

## 2021-02-28 RX ADMIN — SERTRALINE HYDROCHLORIDE SCH MG: 100 TABLET ORAL at 09:37

## 2021-02-28 RX ADMIN — ISOSORBIDE MONONITRATE SCH MG: 30 TABLET, FILM COATED, EXTENDED RELEASE ORAL at 09:37

## 2021-02-28 RX ADMIN — ROPINIROLE SCH MG: 0.5 TABLET ORAL at 20:19

## 2021-02-28 RX ADMIN — METOPROLOL SUCCINATE SCH MG: 25 TABLET, EXTENDED RELEASE ORAL at 09:38

## 2021-02-28 RX ADMIN — GABAPENTIN SCH MG: 300 CAPSULE ORAL at 20:17

## 2021-02-28 RX ADMIN — MAGNESIUM OXIDE TAB 400 MG (241.3 MG ELEMENTAL MG) SCH TAB: 400 (241.3 MG) TAB at 09:37

## 2021-02-28 RX ADMIN — BUSPIRONE HYDROCHLORIDE SCH MG: 5 TABLET ORAL at 09:39

## 2021-02-28 RX ADMIN — BUSPIRONE HYDROCHLORIDE SCH MG: 5 TABLET ORAL at 20:19

## 2021-02-28 RX ADMIN — BUSPIRONE HYDROCHLORIDE SCH MG: 5 TABLET ORAL at 13:51

## 2021-02-28 RX ADMIN — Medication SCH APPLIC: at 09:41

## 2021-02-28 RX ADMIN — GABAPENTIN SCH MG: 100 CAPSULE ORAL at 09:36

## 2021-02-28 RX ADMIN — GABAPENTIN SCH MG: 100 CAPSULE ORAL at 13:51

## 2021-02-28 RX ADMIN — FUROSEMIDE SCH MG: 20 TABLET ORAL at 09:37

## 2021-02-28 RX ADMIN — OMEPRAZOLE SCH MG: 20 CAPSULE, DELAYED RELEASE ORAL at 06:15

## 2021-02-28 RX ADMIN — WARFARIN SODIUM SCH MG: 5 TABLET ORAL at 20:19

## 2021-02-28 RX ADMIN — Medication SCH APPLIC: at 20:20

## 2021-03-01 RX ADMIN — Medication SCH APPLIC: at 09:10

## 2021-03-01 RX ADMIN — MAGNESIUM OXIDE TAB 400 MG (241.3 MG ELEMENTAL MG) SCH TAB: 400 (241.3 MG) TAB at 09:10

## 2021-03-01 RX ADMIN — BUSPIRONE HYDROCHLORIDE SCH MG: 5 TABLET ORAL at 19:33

## 2021-03-01 RX ADMIN — ROPINIROLE SCH MG: 0.5 TABLET ORAL at 19:33

## 2021-03-01 RX ADMIN — BUSPIRONE HYDROCHLORIDE SCH MG: 5 TABLET ORAL at 12:37

## 2021-03-01 RX ADMIN — SERTRALINE HYDROCHLORIDE SCH MG: 100 TABLET ORAL at 09:09

## 2021-03-01 RX ADMIN — Medication SCH APPLIC: at 19:34

## 2021-03-01 RX ADMIN — GABAPENTIN SCH MG: 100 CAPSULE ORAL at 12:36

## 2021-03-01 RX ADMIN — ISOSORBIDE MONONITRATE SCH MG: 30 TABLET, FILM COATED, EXTENDED RELEASE ORAL at 09:09

## 2021-03-01 RX ADMIN — FUROSEMIDE SCH MG: 20 TABLET ORAL at 09:08

## 2021-03-01 RX ADMIN — BUSPIRONE HYDROCHLORIDE SCH MG: 5 TABLET ORAL at 09:08

## 2021-03-01 RX ADMIN — OMEPRAZOLE SCH MG: 20 CAPSULE, DELAYED RELEASE ORAL at 06:24

## 2021-03-01 RX ADMIN — GABAPENTIN SCH MG: 100 CAPSULE ORAL at 09:09

## 2021-03-01 RX ADMIN — METOPROLOL SUCCINATE SCH MG: 25 TABLET, EXTENDED RELEASE ORAL at 09:09

## 2021-03-01 RX ADMIN — WARFARIN SODIUM SCH MG: 5 TABLET ORAL at 19:33

## 2021-03-01 RX ADMIN — GABAPENTIN SCH MG: 300 CAPSULE ORAL at 19:33

## 2021-03-02 RX ADMIN — BUSPIRONE HYDROCHLORIDE SCH MG: 5 TABLET ORAL at 08:15

## 2021-03-02 RX ADMIN — FUROSEMIDE SCH MG: 20 TABLET ORAL at 08:16

## 2021-03-02 RX ADMIN — GABAPENTIN SCH MG: 100 CAPSULE ORAL at 08:15

## 2021-03-02 RX ADMIN — GABAPENTIN SCH MG: 300 CAPSULE ORAL at 19:41

## 2021-03-02 RX ADMIN — METOPROLOL SUCCINATE SCH MG: 25 TABLET, EXTENDED RELEASE ORAL at 08:16

## 2021-03-02 RX ADMIN — Medication SCH APPLIC: at 08:17

## 2021-03-02 RX ADMIN — WARFARIN SODIUM SCH MG: 5 TABLET ORAL at 19:40

## 2021-03-02 RX ADMIN — OMEPRAZOLE SCH MG: 20 CAPSULE, DELAYED RELEASE ORAL at 06:09

## 2021-03-02 RX ADMIN — Medication SCH APPLIC: at 19:42

## 2021-03-02 RX ADMIN — ROPINIROLE SCH MG: 0.5 TABLET ORAL at 19:40

## 2021-03-02 RX ADMIN — BUSPIRONE HYDROCHLORIDE SCH MG: 5 TABLET ORAL at 14:49

## 2021-03-02 RX ADMIN — ISOSORBIDE MONONITRATE SCH MG: 30 TABLET, FILM COATED, EXTENDED RELEASE ORAL at 08:15

## 2021-03-02 RX ADMIN — BUSPIRONE HYDROCHLORIDE SCH MG: 5 TABLET ORAL at 19:40

## 2021-03-02 RX ADMIN — MAGNESIUM OXIDE TAB 400 MG (241.3 MG ELEMENTAL MG) SCH TAB: 400 (241.3 MG) TAB at 08:16

## 2021-03-02 RX ADMIN — GABAPENTIN SCH MG: 100 CAPSULE ORAL at 14:49

## 2021-03-02 RX ADMIN — SERTRALINE HYDROCHLORIDE SCH MG: 100 TABLET ORAL at 08:16

## 2021-03-03 RX ADMIN — WARFARIN SODIUM SCH MG: 5 TABLET ORAL at 20:16

## 2021-03-03 RX ADMIN — FUROSEMIDE SCH MG: 20 TABLET ORAL at 08:08

## 2021-03-03 RX ADMIN — METOPROLOL SUCCINATE SCH MG: 25 TABLET, EXTENDED RELEASE ORAL at 08:07

## 2021-03-03 RX ADMIN — BUSPIRONE HYDROCHLORIDE SCH MG: 5 TABLET ORAL at 13:22

## 2021-03-03 RX ADMIN — OMEPRAZOLE SCH MG: 20 CAPSULE, DELAYED RELEASE ORAL at 06:21

## 2021-03-03 RX ADMIN — GABAPENTIN SCH MG: 100 CAPSULE ORAL at 13:19

## 2021-03-03 RX ADMIN — Medication SCH APPLIC: at 08:08

## 2021-03-03 RX ADMIN — BUSPIRONE HYDROCHLORIDE SCH MG: 5 TABLET ORAL at 20:15

## 2021-03-03 RX ADMIN — MAGNESIUM OXIDE TAB 400 MG (241.3 MG ELEMENTAL MG) SCH TAB: 400 (241.3 MG) TAB at 08:06

## 2021-03-03 RX ADMIN — ROPINIROLE SCH MG: 0.5 TABLET ORAL at 20:15

## 2021-03-03 RX ADMIN — ISOSORBIDE MONONITRATE SCH MG: 30 TABLET, FILM COATED, EXTENDED RELEASE ORAL at 08:07

## 2021-03-03 RX ADMIN — BUSPIRONE HYDROCHLORIDE SCH MG: 5 TABLET ORAL at 08:06

## 2021-03-03 RX ADMIN — GABAPENTIN SCH MG: 300 CAPSULE ORAL at 20:14

## 2021-03-03 RX ADMIN — GABAPENTIN SCH MG: 100 CAPSULE ORAL at 08:08

## 2021-03-03 RX ADMIN — Medication SCH APPLIC: at 20:19

## 2021-03-03 RX ADMIN — SERTRALINE HYDROCHLORIDE SCH MG: 100 TABLET ORAL at 08:07

## 2021-03-04 RX ADMIN — METOPROLOL SUCCINATE SCH MG: 25 TABLET, EXTENDED RELEASE ORAL at 08:27

## 2021-03-04 RX ADMIN — OMEPRAZOLE SCH MG: 20 CAPSULE, DELAYED RELEASE ORAL at 06:35

## 2021-03-04 RX ADMIN — GABAPENTIN SCH MG: 100 CAPSULE ORAL at 08:27

## 2021-03-04 RX ADMIN — MAGNESIUM OXIDE TAB 400 MG (241.3 MG ELEMENTAL MG) SCH TAB: 400 (241.3 MG) TAB at 08:28

## 2021-03-04 RX ADMIN — WARFARIN SODIUM SCH MG: 5 TABLET ORAL at 20:17

## 2021-03-04 RX ADMIN — BUSPIRONE HYDROCHLORIDE SCH MG: 5 TABLET ORAL at 20:17

## 2021-03-04 RX ADMIN — BUSPIRONE HYDROCHLORIDE SCH MG: 5 TABLET ORAL at 08:28

## 2021-03-04 RX ADMIN — GABAPENTIN SCH MG: 100 CAPSULE ORAL at 13:29

## 2021-03-04 RX ADMIN — ROPINIROLE SCH MG: 0.5 TABLET ORAL at 20:18

## 2021-03-04 RX ADMIN — FUROSEMIDE SCH MG: 20 TABLET ORAL at 08:28

## 2021-03-04 RX ADMIN — Medication SCH APPLIC: at 10:03

## 2021-03-04 RX ADMIN — GABAPENTIN SCH MG: 300 CAPSULE ORAL at 20:16

## 2021-03-04 RX ADMIN — BUSPIRONE HYDROCHLORIDE SCH MG: 5 TABLET ORAL at 13:29

## 2021-03-04 RX ADMIN — ISOSORBIDE MONONITRATE SCH MG: 30 TABLET, FILM COATED, EXTENDED RELEASE ORAL at 08:28

## 2021-03-04 RX ADMIN — Medication SCH APPLIC: at 20:15

## 2021-03-04 RX ADMIN — SERTRALINE HYDROCHLORIDE SCH MG: 100 TABLET ORAL at 08:28

## 2021-03-05 RX ADMIN — BUSPIRONE HYDROCHLORIDE SCH MG: 5 TABLET ORAL at 19:49

## 2021-03-05 RX ADMIN — BUSPIRONE HYDROCHLORIDE SCH MG: 5 TABLET ORAL at 13:38

## 2021-03-05 RX ADMIN — Medication SCH APPLIC: at 08:18

## 2021-03-05 RX ADMIN — BUSPIRONE HYDROCHLORIDE SCH MG: 5 TABLET ORAL at 08:19

## 2021-03-05 RX ADMIN — SERTRALINE HYDROCHLORIDE SCH MG: 100 TABLET ORAL at 08:17

## 2021-03-05 RX ADMIN — GABAPENTIN SCH MG: 100 CAPSULE ORAL at 13:38

## 2021-03-05 RX ADMIN — OMEPRAZOLE SCH MG: 20 CAPSULE, DELAYED RELEASE ORAL at 06:51

## 2021-03-05 RX ADMIN — MAGNESIUM OXIDE TAB 400 MG (241.3 MG ELEMENTAL MG) SCH TAB: 400 (241.3 MG) TAB at 08:16

## 2021-03-05 RX ADMIN — ROPINIROLE SCH MG: 0.5 TABLET ORAL at 19:49

## 2021-03-05 RX ADMIN — FUROSEMIDE SCH MG: 20 TABLET ORAL at 08:17

## 2021-03-05 RX ADMIN — WARFARIN SODIUM SCH MG: 5 TABLET ORAL at 19:50

## 2021-03-05 RX ADMIN — Medication SCH APPLIC: at 19:51

## 2021-03-05 RX ADMIN — METOPROLOL SUCCINATE SCH MG: 25 TABLET, EXTENDED RELEASE ORAL at 08:16

## 2021-03-05 RX ADMIN — ISOSORBIDE MONONITRATE SCH MG: 30 TABLET, FILM COATED, EXTENDED RELEASE ORAL at 08:17

## 2021-03-05 RX ADMIN — GABAPENTIN SCH MG: 300 CAPSULE ORAL at 19:50

## 2021-03-05 RX ADMIN — GABAPENTIN SCH MG: 100 CAPSULE ORAL at 08:18

## 2021-03-06 RX ADMIN — WARFARIN SODIUM SCH MG: 5 TABLET ORAL at 19:42

## 2021-03-06 RX ADMIN — BUSPIRONE HYDROCHLORIDE SCH MG: 5 TABLET ORAL at 19:42

## 2021-03-06 RX ADMIN — BUSPIRONE HYDROCHLORIDE SCH MG: 5 TABLET ORAL at 08:57

## 2021-03-06 RX ADMIN — METOPROLOL SUCCINATE SCH MG: 25 TABLET, EXTENDED RELEASE ORAL at 08:58

## 2021-03-06 RX ADMIN — ISOSORBIDE MONONITRATE SCH MG: 30 TABLET, FILM COATED, EXTENDED RELEASE ORAL at 08:58

## 2021-03-06 RX ADMIN — Medication SCH APPLIC: at 19:43

## 2021-03-06 RX ADMIN — GABAPENTIN SCH MG: 100 CAPSULE ORAL at 12:20

## 2021-03-06 RX ADMIN — SERTRALINE HYDROCHLORIDE SCH MG: 100 TABLET ORAL at 08:57

## 2021-03-06 RX ADMIN — GABAPENTIN SCH MG: 100 CAPSULE ORAL at 08:57

## 2021-03-06 RX ADMIN — OMEPRAZOLE SCH MG: 20 CAPSULE, DELAYED RELEASE ORAL at 08:57

## 2021-03-06 RX ADMIN — FUROSEMIDE SCH MG: 20 TABLET ORAL at 08:58

## 2021-03-06 RX ADMIN — MAGNESIUM OXIDE TAB 400 MG (241.3 MG ELEMENTAL MG) SCH TAB: 400 (241.3 MG) TAB at 08:57

## 2021-03-06 RX ADMIN — GABAPENTIN SCH MG: 300 CAPSULE ORAL at 19:42

## 2021-03-06 RX ADMIN — ROPINIROLE SCH MG: 0.5 TABLET ORAL at 19:42

## 2021-03-06 RX ADMIN — Medication SCH APPLIC: at 08:58

## 2021-03-06 RX ADMIN — BUSPIRONE HYDROCHLORIDE SCH MG: 5 TABLET ORAL at 12:19

## 2021-03-07 RX ADMIN — SERTRALINE HYDROCHLORIDE SCH MG: 100 TABLET ORAL at 08:16

## 2021-03-07 RX ADMIN — Medication SCH APPLIC: at 08:17

## 2021-03-07 RX ADMIN — OMEPRAZOLE SCH MG: 20 CAPSULE, DELAYED RELEASE ORAL at 06:41

## 2021-03-07 RX ADMIN — ROPINIROLE SCH MG: 0.5 TABLET ORAL at 19:55

## 2021-03-07 RX ADMIN — METOPROLOL SUCCINATE SCH MG: 25 TABLET, EXTENDED RELEASE ORAL at 08:15

## 2021-03-07 RX ADMIN — GABAPENTIN SCH MG: 100 CAPSULE ORAL at 08:17

## 2021-03-07 RX ADMIN — BUSPIRONE HYDROCHLORIDE SCH MG: 5 TABLET ORAL at 08:15

## 2021-03-07 RX ADMIN — Medication SCH APPLIC: at 19:56

## 2021-03-07 RX ADMIN — ISOSORBIDE MONONITRATE SCH MG: 30 TABLET, FILM COATED, EXTENDED RELEASE ORAL at 08:16

## 2021-03-07 RX ADMIN — BUSPIRONE HYDROCHLORIDE SCH MG: 5 TABLET ORAL at 19:55

## 2021-03-07 RX ADMIN — MAGNESIUM OXIDE TAB 400 MG (241.3 MG ELEMENTAL MG) SCH TAB: 400 (241.3 MG) TAB at 08:16

## 2021-03-07 RX ADMIN — GABAPENTIN SCH MG: 300 CAPSULE ORAL at 19:55

## 2021-03-07 RX ADMIN — FUROSEMIDE SCH MG: 20 TABLET ORAL at 08:16

## 2021-03-07 RX ADMIN — WARFARIN SODIUM SCH MG: 5 TABLET ORAL at 19:55

## 2021-03-07 RX ADMIN — BUSPIRONE HYDROCHLORIDE SCH MG: 5 TABLET ORAL at 12:17

## 2021-03-07 RX ADMIN — GABAPENTIN SCH MG: 100 CAPSULE ORAL at 12:17

## 2021-03-08 RX ADMIN — OMEPRAZOLE SCH MG: 20 CAPSULE, DELAYED RELEASE ORAL at 06:50

## 2021-03-08 RX ADMIN — GABAPENTIN SCH MG: 100 CAPSULE ORAL at 13:11

## 2021-03-08 RX ADMIN — Medication SCH APPLIC: at 07:58

## 2021-03-08 RX ADMIN — WARFARIN SODIUM SCH MG: 5 TABLET ORAL at 20:39

## 2021-03-08 RX ADMIN — GABAPENTIN SCH MG: 100 CAPSULE ORAL at 07:58

## 2021-03-08 RX ADMIN — Medication SCH APPLIC: at 20:40

## 2021-03-08 RX ADMIN — FUROSEMIDE SCH MG: 20 TABLET ORAL at 07:58

## 2021-03-08 RX ADMIN — GABAPENTIN SCH MG: 300 CAPSULE ORAL at 20:37

## 2021-03-08 RX ADMIN — ROPINIROLE SCH MG: 0.5 TABLET ORAL at 20:38

## 2021-03-08 RX ADMIN — BUSPIRONE HYDROCHLORIDE SCH MG: 5 TABLET ORAL at 20:38

## 2021-03-08 RX ADMIN — BUSPIRONE HYDROCHLORIDE SCH MG: 5 TABLET ORAL at 13:10

## 2021-03-08 RX ADMIN — MAGNESIUM OXIDE TAB 400 MG (241.3 MG ELEMENTAL MG) SCH TAB: 400 (241.3 MG) TAB at 07:56

## 2021-03-08 RX ADMIN — METOPROLOL SUCCINATE SCH MG: 25 TABLET, EXTENDED RELEASE ORAL at 07:57

## 2021-03-08 RX ADMIN — SERTRALINE HYDROCHLORIDE SCH MG: 100 TABLET ORAL at 07:57

## 2021-03-08 RX ADMIN — ISOSORBIDE MONONITRATE SCH MG: 30 TABLET, FILM COATED, EXTENDED RELEASE ORAL at 07:57

## 2021-03-08 RX ADMIN — BUSPIRONE HYDROCHLORIDE SCH MG: 5 TABLET ORAL at 07:57

## 2021-03-09 RX ADMIN — ISOSORBIDE MONONITRATE SCH MG: 30 TABLET, FILM COATED, EXTENDED RELEASE ORAL at 08:14

## 2021-03-09 RX ADMIN — ROPINIROLE SCH MG: 0.5 TABLET ORAL at 20:31

## 2021-03-09 RX ADMIN — FUROSEMIDE SCH MG: 20 TABLET ORAL at 08:12

## 2021-03-09 RX ADMIN — SERTRALINE HYDROCHLORIDE SCH MG: 100 TABLET ORAL at 08:13

## 2021-03-09 RX ADMIN — MAGNESIUM OXIDE TAB 400 MG (241.3 MG ELEMENTAL MG) SCH TAB: 400 (241.3 MG) TAB at 08:14

## 2021-03-09 RX ADMIN — Medication SCH APPLIC: at 20:33

## 2021-03-09 RX ADMIN — GABAPENTIN SCH MG: 300 CAPSULE ORAL at 20:29

## 2021-03-09 RX ADMIN — Medication SCH APPLIC: at 08:14

## 2021-03-09 RX ADMIN — OMEPRAZOLE SCH: 20 CAPSULE, DELAYED RELEASE ORAL at 08:16

## 2021-03-09 RX ADMIN — BUSPIRONE HYDROCHLORIDE SCH MG: 5 TABLET ORAL at 13:18

## 2021-03-09 RX ADMIN — BUSPIRONE HYDROCHLORIDE SCH MG: 5 TABLET ORAL at 08:12

## 2021-03-09 RX ADMIN — METOPROLOL SUCCINATE SCH MG: 25 TABLET, EXTENDED RELEASE ORAL at 08:15

## 2021-03-09 RX ADMIN — GABAPENTIN SCH MG: 100 CAPSULE ORAL at 08:15

## 2021-03-09 RX ADMIN — BUSPIRONE HYDROCHLORIDE SCH MG: 5 TABLET ORAL at 20:31

## 2021-03-09 RX ADMIN — GABAPENTIN SCH MG: 100 CAPSULE ORAL at 13:18

## 2021-03-09 RX ADMIN — WARFARIN SODIUM SCH MG: 5 TABLET ORAL at 20:31

## 2021-03-10 RX ADMIN — ROPINIROLE SCH MG: 0.5 TABLET ORAL at 20:09

## 2021-03-10 RX ADMIN — FUROSEMIDE SCH MG: 20 TABLET ORAL at 09:17

## 2021-03-10 RX ADMIN — METOPROLOL SUCCINATE SCH MG: 25 TABLET, EXTENDED RELEASE ORAL at 09:16

## 2021-03-10 RX ADMIN — GABAPENTIN SCH MG: 100 CAPSULE ORAL at 13:07

## 2021-03-10 RX ADMIN — GABAPENTIN SCH MG: 100 CAPSULE ORAL at 09:15

## 2021-03-10 RX ADMIN — BUSPIRONE HYDROCHLORIDE SCH: 5 TABLET ORAL at 09:19

## 2021-03-10 RX ADMIN — SERTRALINE HYDROCHLORIDE SCH MG: 100 TABLET ORAL at 09:17

## 2021-03-10 RX ADMIN — BUSPIRONE HYDROCHLORIDE SCH MG: 5 TABLET ORAL at 07:45

## 2021-03-10 RX ADMIN — Medication SCH APPLIC: at 09:18

## 2021-03-10 RX ADMIN — GABAPENTIN SCH MG: 300 CAPSULE ORAL at 20:10

## 2021-03-10 RX ADMIN — WARFARIN SODIUM SCH MG: 5 TABLET ORAL at 20:10

## 2021-03-10 RX ADMIN — MAGNESIUM OXIDE TAB 400 MG (241.3 MG ELEMENTAL MG) SCH TAB: 400 (241.3 MG) TAB at 09:14

## 2021-03-10 RX ADMIN — ISOSORBIDE MONONITRATE SCH MG: 30 TABLET, FILM COATED, EXTENDED RELEASE ORAL at 09:17

## 2021-03-10 RX ADMIN — Medication SCH APPLIC: at 20:11

## 2021-03-10 RX ADMIN — OMEPRAZOLE SCH MG: 20 CAPSULE, DELAYED RELEASE ORAL at 06:45

## 2021-03-10 NOTE — PCM.PN
- General Info


Date of Service: 03/10/21


Admission Dx/Problem (Free Text): 


Failure to Thrive, Bilateral LE weakness





Subjective Update: 


Patient has been doing ok.  Only complaint is her lack of energy and 'give a 

darn.'  Nursing staff also noting that patient has been more down and less 

willing to participate in activities and ambulate. She is very down about lack 

of family contact and poor physical functioning.





Right lower shin wound healed/scabbed over finally.





Functional Status: Reports: Ambulating, Other (patient will ambulate minimal 

distance, must be urged to do so)





- Review of Systems


General: Reports: No Symptoms


HEENT: Reports: No Symptoms


Pulmonary: Reports: No Symptoms


Cardiovascular: Reports: No Symptoms


Gastrointestinal: Reports: No Symptoms


Musculoskeletal: Reports: Back Pain, Leg Pain


Skin: Reports: No Symptoms


Neurological: Reports: Weakness


Psychiatric: Reports: Depression





- Patient Data


Vitals - Most Recent: 


                                Last Vital Signs











Temp  97.9 F   03/10/21 05:27


 


Pulse  88   03/09/21 08:15


 


Resp  16   03/06/21 06:00


 


BP  141/78 H  03/09/21 08:15


 


Pulse Ox  98   03/06/21 06:00











Weight - Most Recent: 184 lb 9.6 oz


I&O - Last 24 Hours: 


                                 Intake & Output











 03/09/21 03/10/21 03/10/21





 22:59 06:59 14:59


 


Intake Total 120  


 


Balance 120  











Lab Results Last 24 Hours: 


                         Laboratory Results - last 24 hr











  03/10/21 Range/Units





  06:38 


 


PT  32.2 H  (9.9-12.5)  SEC


 


INR  2.9  (2.0-3.5)  











Med Orders - Current: 


                               Current Medications





Acetaminophen (Tylenol Extra Strength)  1,000 mg PO BID PRN


   PRN Reason: Pain/Fever


   Last Admin: 02/26/21 06:19 Dose:  1,000 mg


   Documented by: 


Acetaminophen (Acetaminophen 325 Mg Tab)  650 mg PO TID@0800,1300,2000 [facility]


   Last Admin: 03/09/21 20:32 Dose:  650 mg


   Documented by: 


Buspirone HCl (Buspar)  5 mg PO TID@0800,1300,2000 [facility]


   Last Admin: 03/09/21 20:31 Dose:  5 mg


   Documented by: 


Calcium Carbonate/Glycine (Tums Extra Strength)  750 mg PO TID PRN


   PRN Reason: Dyspepsia


   Last Admin: 03/07/21 12:18 Dose:  750 mg


   Documented by: 


Docusate Sodium (Colace)  100 mg PO DAILY PRN


   PRN Reason: Constipation


Famotidine (Pepcid)  20 mg PO BEDTIME [facility]


   Last Admin: 03/09/21 20:31 Dose:  20 mg


   Documented by: 


Ferrous Sulfate (Ferrous Sulfate)  325 mg PO Q48H [facility]


   Last Admin: 03/08/21 07:56 Dose:  325 mg


   Documented by: 


Furosemide (Lasix)  20 mg PO DAILY [facility]


   Last Admin: 03/09/21 08:12 Dose:  20 mg


   Documented by: 


Gabapentin (Neurontin)  100 mg PO BID@0800,1300 [facility]


   Last Admin: 03/09/21 13:18 Dose:  100 mg


   Documented by: 


Gabapentin (Neurontin)  300 mg PO BEDTIME [facility]


   Last Admin: 03/09/21 20:29 Dose:  300 mg


   Documented by: 


Isosorbide Mononitrate (Imdur)  30 mg PO DAILY [facility]


   Last Admin: 03/09/21 08:14 Dose:  30 mg


   Documented by: 


Loperamide HCl (Imodium)  2 mg PO Q12H PRN


   PRN Reason: Diarrhea


   Last Admin: 12/16/20 08:38 Dose:  2 mg


   Documented by: 


Melatonin (Melatonin)  6 mg PO BEDTIME [facility]


   Last Admin: 03/09/21 20:32 Dose:  6 mg


   Documented by: 


Metoprolol Succinate (Toprol Xl)  25 mg PO DAILY [facility]


   Last Admin: 03/09/21 08:15 Dose:  25 mg


   Documented by: 


Miconazole (Desenex 2%)  0 gm TOP BID PRN


   PRN Reason: RASH UNDER BILATERAL BREASTS


   Last Admin: 02/24/21 19:23 Dose:  1 applic


   Documented by: 


Mineral Oil/White Petrolatum (Minerin Creme)  0 gm TOP BID [facility]


   Last Admin: 03/09/21 20:33 Dose:  1 applic


   Documented by: 


Multivitamins/Minerals (Multivitamins With Iron/Calcium/Folic Acid/Minerals Tab)

 1 tab PO DAILY [facility]


   Last Admin: 03/09/21 08:14 Dose:  1 tab


   Documented by: 


Nitroglycerin (Nitrostat)  0.4 mg SL Q5M PRN


   PRN Reason: Chest Pain


   Last Admin: 02/19/21 11:11 Dose:  0.4 mg


   Documented by: 


Omeprazole (Omeprazole)  20 mg PO DAILY@0700 [facility]


   Last Admin: 03/10/21 06:45 Dose:  20 mg


   Documented by: 


Pharmacy Consult (Consult To Pharmacy)  1 each .XX ASDIRECTED [facility]


Polyethylene Glycol (Miralax)  17 gm PO DAILY PRN


   PRN Reason: Constipation


Ropinirole HCl (Requip)  0.5 mg PO BEDTIME [facility]


   Last Admin: 03/09/21 20:31 Dose:  0.5 mg


   Documented by: 


Senna/Docusate Sodium (Senna Plus)  1 tab PO DAILY PRN


   PRN Reason: Constipation


Sertraline HCl (Zoloft)  100 mg PO DAILY [facility]


   Last Admin: 03/09/21 08:13 Dose:  100 mg


   Documented by: 


Vitamin B Complex (Vitamin B Complex Tab)  1 each PO DAILY [facility]


   Last Admin: 03/09/21 08:14 Dose:  1 each


   Documented by: 


Warfarin Sodium (Coumadin)  5 mg PO SuTuThSa@2000 [facility]


   Last Admin: 03/09/21 20:31 Dose:  5 mg


   Documented by: 


Warfarin Sodium (Coumadin)  2.5 mg PO MoWeFr@2000 [facility]


   Last Admin: 03/08/21 20:39 Dose:  2.5 mg


   Documented by: 





Discontinued Medications





Buspirone HCl (Buspar)  5 mg PO BID [facility]


   Last Admin: 02/08/21 08:34 Dose:  5 mg


   Documented by: 


Ciprofloxacin (Ciprofloxacin Hcl)  500 mg PO Q24H [facility]


   Stop: 10/22/20 20:01


   Last Admin: 10/22/20 19:24 Dose:  500 mg


   Documented by: 


Famotidine (Pepcid)  20 mg PO DAILY [facility]


   Last Admin: 10/09/20 08:14 Dose:  20 mg


   Documented by: 


Furosemide (Lasix)  20 mg PO DAILY@1200 [facility]


   Stop: 12/06/20 12:01


   Last Admin: 12/06/20 12:16 Dose:  20 mg


   Documented by: 


Nitrofurantoin Macrocrystals (Macrobid)  100 mg PO BID [facility]


   Stop: 10/25/20 08:31


   Last Admin: 10/18/20 09:00 Dose:  100 mg


   Documented by: 


Omeprazole (Omeprazole)  20 mg PO DAILY [facility]


   Last Admin: 10/09/20 08:14 Dose:  20 mg


   Documented by: 


Warfarin Sodium (Coumadin)  5 mg PO AyeshaTuWeFrSa@2000 [facility]


   Last Admin: 02/21/21 19:22 Dose:  5 mg


   Documented by: 


Warfarin Sodium (Coumadin)  2.5 mg PO Th@2000 [facility]


   Last Admin: 02/18/21 19:28 Dose:  2.5 mg


   Documented by: 











- Exam


Quality Assessment: Supplemental Oxygen


General: Alert, Oriented


HEENT: EOMI, Mucous Membr. Moist/Pink


Neck: Supple


Lungs: Clear to Auscultation, Normal Respiratory Effort


Cardiovascular: Regular Rate, Regular Rhythm


GI/Abdominal Exam: Normal Bowel Sounds, Soft, Non-Tender


Extremities: Normal Inspection, Pedal Edema (trace)


Skin: Warm, Dry


Wound/Incisions: Healing Well (right Lower leg wound healed over well, scabbed 

over now with no surrounding erythema)


Neurological: No New Focal Deficit


Psy/Mental Status: Alert, Depressed





- Patient Data


Lab Results Last 24 hrs: 


                         Laboratory Results - last 24 hr











  03/10/21 Range/Units





  06:38 


 


PT  32.2 H  (9.9-12.5)  SEC


 


INR  2.9  (2.0-3.5)  











Result Diagrams: 


                                 12/07/20 06:53





Sepsis Event Note





- Evaluation


Sepsis Screening Result: No Definite Risk





- Focused Exam


Vital Signs: 


                                   Vital Signs











  Temp


 


 03/10/21 05:27  97.9 F














- Problem List & Annotations


(1) Need for assistance with personal care


SNOMED Code(s): 16235641830478311


   Code(s): Z74.1 - NEED FOR ASSISTANCE WITH PERSONAL CARE   Status: Acute   

Current Visit: No   





(2) Depression


SNOMED Code(s): 65228922


   Code(s): F32.9 - MAJOR DEPRESSIVE DISORDER, SINGLE EPISODE, UNSPECIFIED   

Status: Chronic   Current Visit: No   





(3) Anxiety


SNOMED Code(s): 41881342


   Code(s): F41.9 - ANXIETY DISORDER, UNSPECIFIED   Status: Chronic   Current 

Visit: No   





(4) CKD (chronic kidney disease)


SNOMED Code(s): 003504993


   Code(s): N18.9 - CHRONIC KIDNEY DISEASE, UNSPECIFIED   Status: Chronic   

Current Visit: No   





(5) COPD (chronic obstructive pulmonary disease)


SNOMED Code(s): 89143528


   Code(s): J44.9 - CHRONIC OBSTRUCTIVE PULMONARY DISEASE, UNSPECIFIED   Status:

Chronic   Current Visit: No   





(6) Diastolic HF (heart failure)


SNOMED Code(s): 139825302


   Code(s): I50.30 - UNSPECIFIED DIASTOLIC (CONGESTIVE) HEART FAILURE   Status: 

Chronic   Current Visit: No   





(7) GERD (gastroesophageal reflux disease)


SNOMED Code(s): 838215530


   Code(s): K21.9 - GASTRO-ESOPHAGEAL REFLUX DISEASE WITHOUT ESOPHAGITIS   

Status: Chronic   Current Visit: No   





(8) Hypertension


SNOMED Code(s): 57673730


   Code(s): I10 - ESSENTIAL (PRIMARY) HYPERTENSION   Status: Chronic   Current 

Visit: No   





- Problem List Review


Problem List Initiated/Reviewed/Updated: Yes





- My Orders


Last 24 Hours: 


My Active Orders





03/10/21 13:00


busPIRone [Buspar]   7.5 mg PO TID@0800,1300,2000 





03/22/21 07:00


INR,PT,PROTHROMBIN TIME [COAG] Q28D 





04/19/21 07:00


INR,PT,PROTHROMBIN TIME [COAG] Q28D 





05/17/21 07:00


INR,PT,PROTHROMBIN TIME [COAG] Q28D 





06/14/21 07:00


INR,PT,PROTHROMBIN TIME [COAG] Q28D 














- Assessment


Assessment:: 


Failure to Thrive


Chronic Leg Weakness


- Patient is long-term swing bed stay for assistance with ADLs due to the above


Plan:


- Continue regular daily care is as previously prescribed


- Continue physical therapy as previously prescribed


- Encouraged patient to leave room 3 times a day; self-propel in the wheelchair 

(walk-along)





Leg wound


Diastolic CHF


- Healed over now


Plan:


- Staff can continue applying lotion


- Discussed use of compression stockings/ACE, patient refuses


- Discussed possibly referral to wound clinic, patient defers





Depression


- Patients continues to exhibit depressive symptoms


Plan:


- Increase Buspar to 7.5mg daily


- Continue Zoloft at 100mg daily


- Adding vitamin D as well for possible seasonal component to worsening 

depression


- Encouraged increased social interactions on the unit, patient instructed to 

leave room 3x a day even if this entails her 'walking' the unit in a wheelchair


- Reassess next month





Breast Cancer, s/p lumpectomy - plan for follow-up with oncology as previously 

planned (repeat breast exam every 6 months, f/u visit with one in Aug 2021 with 

mammo)


HTN - continue metoprolol 25mg daily


GERD - continue omeprazole 20mg daily, prn Pepcid 20mg, prn Tums


Anxiety/Depression - continue Zoloft 100mg daily, BuSpar 5mg TID 


Angina - continue Imdur 30mg daily, prn Nitro


Restless legs - Requip 0.5mg at bedtime


Chronic pain - Gabapentin 100mg am and noon, 300mg at supper


Hx DVT - continue Warfarin, serial INR per pharmacy


Peripheral edema - continue Lasix 20mg daily


Constipation - continue Colace 100mg dialy, prn miralax


Fe-def anemia - continue iron 325mg e/o day


Insomnia - continue melatonin 3mg at bedtime


Chronic respiratory failure - continue supplemental O2


Fungal rash - continue prn miconazole

## 2021-03-11 RX ADMIN — METOPROLOL SUCCINATE SCH MG: 25 TABLET, EXTENDED RELEASE ORAL at 10:00

## 2021-03-11 RX ADMIN — WARFARIN SODIUM SCH MG: 5 TABLET ORAL at 19:54

## 2021-03-11 RX ADMIN — GABAPENTIN SCH MG: 100 CAPSULE ORAL at 10:00

## 2021-03-11 RX ADMIN — OMEPRAZOLE SCH MG: 20 CAPSULE, DELAYED RELEASE ORAL at 06:06

## 2021-03-11 RX ADMIN — Medication SCH APPLIC: at 10:01

## 2021-03-11 RX ADMIN — GABAPENTIN SCH MG: 100 CAPSULE ORAL at 15:18

## 2021-03-11 RX ADMIN — CHOLECALCIFEROL TAB 10 MCG (400 UNIT) SCH MCG: 10 TAB at 10:06

## 2021-03-11 RX ADMIN — ISOSORBIDE MONONITRATE SCH MG: 30 TABLET, FILM COATED, EXTENDED RELEASE ORAL at 10:01

## 2021-03-11 RX ADMIN — FUROSEMIDE SCH MG: 20 TABLET ORAL at 09:59

## 2021-03-11 RX ADMIN — Medication SCH APPLIC: at 19:56

## 2021-03-11 RX ADMIN — ROPINIROLE SCH MG: 0.5 TABLET ORAL at 19:54

## 2021-03-11 RX ADMIN — SERTRALINE HYDROCHLORIDE SCH MG: 100 TABLET ORAL at 09:58

## 2021-03-11 RX ADMIN — MAGNESIUM OXIDE TAB 400 MG (241.3 MG ELEMENTAL MG) SCH TAB: 400 (241.3 MG) TAB at 09:57

## 2021-03-11 RX ADMIN — GABAPENTIN SCH MG: 300 CAPSULE ORAL at 19:53

## 2021-03-12 RX ADMIN — GABAPENTIN SCH MG: 100 CAPSULE ORAL at 13:47

## 2021-03-12 RX ADMIN — MAGNESIUM OXIDE TAB 400 MG (241.3 MG ELEMENTAL MG) SCH TAB: 400 (241.3 MG) TAB at 08:11

## 2021-03-12 RX ADMIN — FUROSEMIDE SCH MG: 20 TABLET ORAL at 08:09

## 2021-03-12 RX ADMIN — Medication SCH APPLIC: at 19:36

## 2021-03-12 RX ADMIN — Medication SCH APPLIC: at 08:11

## 2021-03-12 RX ADMIN — METOPROLOL SUCCINATE SCH MG: 25 TABLET, EXTENDED RELEASE ORAL at 08:09

## 2021-03-12 RX ADMIN — GABAPENTIN SCH MG: 100 CAPSULE ORAL at 08:10

## 2021-03-12 RX ADMIN — ISOSORBIDE MONONITRATE SCH MG: 30 TABLET, FILM COATED, EXTENDED RELEASE ORAL at 08:10

## 2021-03-12 RX ADMIN — CHOLECALCIFEROL TAB 10 MCG (400 UNIT) SCH MCG: 10 TAB at 08:10

## 2021-03-12 RX ADMIN — GABAPENTIN SCH MG: 300 CAPSULE ORAL at 19:35

## 2021-03-12 RX ADMIN — WARFARIN SODIUM SCH MG: 5 TABLET ORAL at 19:35

## 2021-03-12 RX ADMIN — OMEPRAZOLE SCH MG: 20 CAPSULE, DELAYED RELEASE ORAL at 06:48

## 2021-03-12 RX ADMIN — SERTRALINE HYDROCHLORIDE SCH MG: 100 TABLET ORAL at 08:10

## 2021-03-12 RX ADMIN — ROPINIROLE SCH MG: 0.5 TABLET ORAL at 19:35

## 2021-03-13 RX ADMIN — FUROSEMIDE SCH MG: 20 TABLET ORAL at 08:32

## 2021-03-13 RX ADMIN — GABAPENTIN SCH MG: 300 CAPSULE ORAL at 20:12

## 2021-03-13 RX ADMIN — MAGNESIUM OXIDE TAB 400 MG (241.3 MG ELEMENTAL MG) SCH TAB: 400 (241.3 MG) TAB at 08:32

## 2021-03-13 RX ADMIN — Medication SCH APPLIC: at 20:15

## 2021-03-13 RX ADMIN — GABAPENTIN SCH MG: 100 CAPSULE ORAL at 08:31

## 2021-03-13 RX ADMIN — WARFARIN SODIUM SCH MG: 5 TABLET ORAL at 20:13

## 2021-03-13 RX ADMIN — SERTRALINE HYDROCHLORIDE SCH MG: 100 TABLET ORAL at 08:32

## 2021-03-13 RX ADMIN — CHOLECALCIFEROL TAB 10 MCG (400 UNIT) SCH MCG: 10 TAB at 08:32

## 2021-03-13 RX ADMIN — Medication SCH APPLIC: at 08:33

## 2021-03-13 RX ADMIN — GABAPENTIN SCH MG: 100 CAPSULE ORAL at 13:24

## 2021-03-13 RX ADMIN — METOPROLOL SUCCINATE SCH MG: 25 TABLET, EXTENDED RELEASE ORAL at 08:31

## 2021-03-13 RX ADMIN — ISOSORBIDE MONONITRATE SCH MG: 30 TABLET, FILM COATED, EXTENDED RELEASE ORAL at 08:32

## 2021-03-13 RX ADMIN — OMEPRAZOLE SCH MG: 20 CAPSULE, DELAYED RELEASE ORAL at 06:32

## 2021-03-13 RX ADMIN — ROPINIROLE SCH MG: 0.5 TABLET ORAL at 20:13

## 2021-03-14 RX ADMIN — GABAPENTIN SCH MG: 100 CAPSULE ORAL at 08:24

## 2021-03-14 RX ADMIN — ROPINIROLE SCH MG: 0.5 TABLET ORAL at 19:57

## 2021-03-14 RX ADMIN — CHOLECALCIFEROL TAB 10 MCG (400 UNIT) SCH MCG: 10 TAB at 08:25

## 2021-03-14 RX ADMIN — ISOSORBIDE MONONITRATE SCH MG: 30 TABLET, FILM COATED, EXTENDED RELEASE ORAL at 08:24

## 2021-03-14 RX ADMIN — MAGNESIUM OXIDE TAB 400 MG (241.3 MG ELEMENTAL MG) SCH TAB: 400 (241.3 MG) TAB at 08:25

## 2021-03-14 RX ADMIN — SERTRALINE HYDROCHLORIDE SCH MG: 100 TABLET ORAL at 08:24

## 2021-03-14 RX ADMIN — Medication SCH APPLIC: at 08:23

## 2021-03-14 RX ADMIN — GABAPENTIN SCH MG: 300 CAPSULE ORAL at 19:55

## 2021-03-14 RX ADMIN — FUROSEMIDE SCH MG: 20 TABLET ORAL at 08:24

## 2021-03-14 RX ADMIN — OMEPRAZOLE SCH MG: 20 CAPSULE, DELAYED RELEASE ORAL at 06:20

## 2021-03-14 RX ADMIN — GABAPENTIN SCH MG: 100 CAPSULE ORAL at 13:20

## 2021-03-14 RX ADMIN — Medication SCH APPLIC: at 19:57

## 2021-03-14 RX ADMIN — WARFARIN SODIUM SCH MG: 5 TABLET ORAL at 19:57

## 2021-03-14 RX ADMIN — METOPROLOL SUCCINATE SCH MG: 25 TABLET, EXTENDED RELEASE ORAL at 08:24

## 2021-03-15 RX ADMIN — GABAPENTIN SCH MG: 100 CAPSULE ORAL at 14:03

## 2021-03-15 RX ADMIN — CHOLECALCIFEROL TAB 10 MCG (400 UNIT) SCH MCG: 10 TAB at 08:03

## 2021-03-15 RX ADMIN — ROPINIROLE SCH MG: 0.5 TABLET ORAL at 20:15

## 2021-03-15 RX ADMIN — GABAPENTIN SCH MG: 100 CAPSULE ORAL at 08:03

## 2021-03-15 RX ADMIN — MAGNESIUM OXIDE TAB 400 MG (241.3 MG ELEMENTAL MG) SCH TAB: 400 (241.3 MG) TAB at 08:03

## 2021-03-15 RX ADMIN — Medication SCH APPLIC: at 20:17

## 2021-03-15 RX ADMIN — Medication SCH APPLIC: at 08:03

## 2021-03-15 RX ADMIN — METOPROLOL SUCCINATE SCH MG: 25 TABLET, EXTENDED RELEASE ORAL at 08:01

## 2021-03-15 RX ADMIN — OMEPRAZOLE SCH MG: 20 CAPSULE, DELAYED RELEASE ORAL at 06:05

## 2021-03-15 RX ADMIN — FUROSEMIDE SCH MG: 20 TABLET ORAL at 08:01

## 2021-03-15 RX ADMIN — SERTRALINE HYDROCHLORIDE SCH MG: 100 TABLET ORAL at 08:01

## 2021-03-15 RX ADMIN — WARFARIN SODIUM SCH MG: 5 TABLET ORAL at 20:15

## 2021-03-15 RX ADMIN — ISOSORBIDE MONONITRATE SCH MG: 30 TABLET, FILM COATED, EXTENDED RELEASE ORAL at 08:00

## 2021-03-15 RX ADMIN — GABAPENTIN SCH MG: 300 CAPSULE ORAL at 20:15

## 2021-03-16 RX ADMIN — SERTRALINE HYDROCHLORIDE SCH MG: 100 TABLET ORAL at 07:48

## 2021-03-16 RX ADMIN — GABAPENTIN SCH MG: 100 CAPSULE ORAL at 07:47

## 2021-03-16 RX ADMIN — MAGNESIUM OXIDE TAB 400 MG (241.3 MG ELEMENTAL MG) SCH TAB: 400 (241.3 MG) TAB at 07:47

## 2021-03-16 RX ADMIN — METOPROLOL SUCCINATE SCH: 25 TABLET, EXTENDED RELEASE ORAL at 07:54

## 2021-03-16 RX ADMIN — FUROSEMIDE SCH MG: 20 TABLET ORAL at 07:48

## 2021-03-16 RX ADMIN — Medication SCH APPLIC: at 12:35

## 2021-03-16 RX ADMIN — Medication SCH APPLIC: at 19:48

## 2021-03-16 RX ADMIN — OMEPRAZOLE SCH MG: 20 CAPSULE, DELAYED RELEASE ORAL at 06:00

## 2021-03-16 RX ADMIN — CHOLECALCIFEROL TAB 10 MCG (400 UNIT) SCH MCG: 10 TAB at 07:46

## 2021-03-16 RX ADMIN — GABAPENTIN SCH MG: 300 CAPSULE ORAL at 19:46

## 2021-03-16 RX ADMIN — WARFARIN SODIUM SCH MG: 5 TABLET ORAL at 19:47

## 2021-03-16 RX ADMIN — GABAPENTIN SCH MG: 100 CAPSULE ORAL at 12:36

## 2021-03-16 RX ADMIN — ISOSORBIDE MONONITRATE SCH: 30 TABLET, FILM COATED, EXTENDED RELEASE ORAL at 07:53

## 2021-03-16 RX ADMIN — ROPINIROLE SCH MG: 0.5 TABLET ORAL at 19:47

## 2021-03-17 RX ADMIN — MAGNESIUM OXIDE TAB 400 MG (241.3 MG ELEMENTAL MG) SCH TAB: 400 (241.3 MG) TAB at 08:24

## 2021-03-17 RX ADMIN — GABAPENTIN SCH MG: 100 CAPSULE ORAL at 14:13

## 2021-03-17 RX ADMIN — GABAPENTIN SCH MG: 300 CAPSULE ORAL at 19:39

## 2021-03-17 RX ADMIN — ROPINIROLE SCH MG: 0.5 TABLET ORAL at 19:37

## 2021-03-17 RX ADMIN — SERTRALINE HYDROCHLORIDE SCH MG: 100 TABLET ORAL at 08:26

## 2021-03-17 RX ADMIN — Medication SCH APPLIC: at 08:24

## 2021-03-17 RX ADMIN — FUROSEMIDE SCH MG: 20 TABLET ORAL at 08:29

## 2021-03-17 RX ADMIN — WARFARIN SODIUM SCH MG: 5 TABLET ORAL at 19:37

## 2021-03-17 RX ADMIN — Medication SCH APPLIC: at 19:38

## 2021-03-17 RX ADMIN — GABAPENTIN SCH MG: 100 CAPSULE ORAL at 08:23

## 2021-03-17 RX ADMIN — ISOSORBIDE MONONITRATE SCH MG: 30 TABLET, FILM COATED, EXTENDED RELEASE ORAL at 08:25

## 2021-03-17 RX ADMIN — OMEPRAZOLE SCH MG: 20 CAPSULE, DELAYED RELEASE ORAL at 06:23

## 2021-03-17 RX ADMIN — CHOLECALCIFEROL TAB 10 MCG (400 UNIT) SCH MCG: 10 TAB at 08:24

## 2021-03-17 RX ADMIN — METOPROLOL SUCCINATE SCH MG: 25 TABLET, EXTENDED RELEASE ORAL at 08:25

## 2021-03-18 RX ADMIN — ISOSORBIDE MONONITRATE SCH MG: 30 TABLET, FILM COATED, EXTENDED RELEASE ORAL at 08:50

## 2021-03-18 RX ADMIN — GABAPENTIN SCH MG: 300 CAPSULE ORAL at 19:38

## 2021-03-18 RX ADMIN — CHOLECALCIFEROL TAB 10 MCG (400 UNIT) SCH MCG: 10 TAB at 08:49

## 2021-03-18 RX ADMIN — GABAPENTIN SCH MG: 100 CAPSULE ORAL at 08:53

## 2021-03-18 RX ADMIN — OMEPRAZOLE SCH MG: 20 CAPSULE, DELAYED RELEASE ORAL at 06:19

## 2021-03-18 RX ADMIN — GABAPENTIN SCH MG: 100 CAPSULE ORAL at 13:24

## 2021-03-18 RX ADMIN — Medication SCH APPLIC: at 08:54

## 2021-03-18 RX ADMIN — Medication SCH APPLIC: at 19:38

## 2021-03-18 RX ADMIN — SERTRALINE HYDROCHLORIDE SCH MG: 100 TABLET ORAL at 08:53

## 2021-03-18 RX ADMIN — MAGNESIUM OXIDE TAB 400 MG (241.3 MG ELEMENTAL MG) SCH TAB: 400 (241.3 MG) TAB at 08:49

## 2021-03-18 RX ADMIN — METOPROLOL SUCCINATE SCH MG: 25 TABLET, EXTENDED RELEASE ORAL at 08:50

## 2021-03-18 RX ADMIN — WARFARIN SODIUM SCH MG: 5 TABLET ORAL at 19:38

## 2021-03-18 RX ADMIN — ROPINIROLE SCH MG: 0.5 TABLET ORAL at 19:38

## 2021-03-18 RX ADMIN — FUROSEMIDE SCH MG: 20 TABLET ORAL at 08:52

## 2021-03-19 LAB
ANION GAP SERPL CALC-SCNC: 5.9 MMOL/L (ref 5–15)
CHLORIDE SERPL-SCNC: 109 MMOL/L (ref 98–107)
SODIUM SERPL-SCNC: 150 MMOL/L (ref 136–145)

## 2021-03-19 RX ADMIN — MAGNESIUM OXIDE TAB 400 MG (241.3 MG ELEMENTAL MG) SCH TAB: 400 (241.3 MG) TAB at 09:01

## 2021-03-19 RX ADMIN — SERTRALINE HYDROCHLORIDE SCH MG: 100 TABLET ORAL at 08:56

## 2021-03-19 RX ADMIN — NITROFURANTOIN MONOHYDRATE AND NITROFURANTOIN MACROCRYSTALLINE SCH MG: 75; 25 CAPSULE ORAL at 20:43

## 2021-03-19 RX ADMIN — ROPINIROLE SCH MG: 0.5 TABLET ORAL at 20:42

## 2021-03-19 RX ADMIN — ISOSORBIDE MONONITRATE SCH MG: 30 TABLET, FILM COATED, EXTENDED RELEASE ORAL at 08:57

## 2021-03-19 RX ADMIN — Medication SCH APPLIC: at 20:45

## 2021-03-19 RX ADMIN — Medication SCH APPLIC: at 08:56

## 2021-03-19 RX ADMIN — GABAPENTIN SCH MG: 300 CAPSULE ORAL at 20:40

## 2021-03-19 RX ADMIN — OMEPRAZOLE SCH MG: 20 CAPSULE, DELAYED RELEASE ORAL at 06:17

## 2021-03-19 RX ADMIN — GABAPENTIN SCH MG: 100 CAPSULE ORAL at 13:40

## 2021-03-19 RX ADMIN — METOPROLOL SUCCINATE SCH MG: 25 TABLET, EXTENDED RELEASE ORAL at 08:56

## 2021-03-19 RX ADMIN — GABAPENTIN SCH MG: 100 CAPSULE ORAL at 08:57

## 2021-03-19 RX ADMIN — WARFARIN SODIUM SCH MG: 5 TABLET ORAL at 20:42

## 2021-03-19 RX ADMIN — FUROSEMIDE SCH MG: 20 TABLET ORAL at 08:56

## 2021-03-19 RX ADMIN — CHOLECALCIFEROL TAB 10 MCG (400 UNIT) SCH MCG: 10 TAB at 09:00

## 2021-03-19 NOTE — PCM.SN.2
- Free Text/Narrative


Note: 


Informed by nursing staff this afternoon that Gert was acting more confused.  

Does have history of UTIs.  


UA did demonstrate UTI.  Will treat with Macrobid 100mg BID for 5 days.


Patient also has mild elevation in Na and Cr.  Has history of CHF and I was 

informed by nursing staff that she does not respond well (quick overload) with 

IVF.


Will have nursing push PO fluids for the next 24 hours. 


Repeat BMP in the am.


If worsening/not improving may need to give small bolus (500mL slowly) to help.


Weekend provider updated/notified.

## 2021-03-20 LAB
ANION GAP SERPL CALC-SCNC: 6 MMOL/L (ref 5–15)
CHLORIDE SERPL-SCNC: 108 MMOL/L (ref 98–107)
SODIUM SERPL-SCNC: 149 MMOL/L (ref 136–145)

## 2021-03-20 RX ADMIN — MAGNESIUM OXIDE TAB 400 MG (241.3 MG ELEMENTAL MG) SCH TAB: 400 (241.3 MG) TAB at 09:30

## 2021-03-20 RX ADMIN — GABAPENTIN SCH MG: 100 CAPSULE ORAL at 14:38

## 2021-03-20 RX ADMIN — WARFARIN SODIUM SCH MG: 5 TABLET ORAL at 19:55

## 2021-03-20 RX ADMIN — NITROFURANTOIN MONOHYDRATE AND NITROFURANTOIN MACROCRYSTALLINE SCH MG: 75; 25 CAPSULE ORAL at 09:30

## 2021-03-20 RX ADMIN — METOPROLOL SUCCINATE SCH MG: 25 TABLET, EXTENDED RELEASE ORAL at 09:32

## 2021-03-20 RX ADMIN — Medication SCH APPLIC: at 19:58

## 2021-03-20 RX ADMIN — FUROSEMIDE SCH MG: 20 TABLET ORAL at 09:32

## 2021-03-20 RX ADMIN — NITROFURANTOIN MONOHYDRATE AND NITROFURANTOIN MACROCRYSTALLINE SCH MG: 75; 25 CAPSULE ORAL at 19:56

## 2021-03-20 RX ADMIN — Medication SCH APPLIC: at 09:28

## 2021-03-20 RX ADMIN — CHOLECALCIFEROL TAB 10 MCG (400 UNIT) SCH MCG: 10 TAB at 09:29

## 2021-03-20 RX ADMIN — SERTRALINE HYDROCHLORIDE SCH MG: 100 TABLET ORAL at 09:32

## 2021-03-20 RX ADMIN — OMEPRAZOLE SCH MG: 20 CAPSULE, DELAYED RELEASE ORAL at 06:08

## 2021-03-20 RX ADMIN — ROPINIROLE SCH MG: 0.5 TABLET ORAL at 19:55

## 2021-03-20 RX ADMIN — GABAPENTIN SCH MG: 300 CAPSULE ORAL at 19:54

## 2021-03-20 RX ADMIN — ISOSORBIDE MONONITRATE SCH MG: 30 TABLET, FILM COATED, EXTENDED RELEASE ORAL at 09:37

## 2021-03-20 RX ADMIN — GABAPENTIN SCH MG: 100 CAPSULE ORAL at 09:31

## 2021-03-20 NOTE — PCM.SN.2
- Free Text/Narrative


Note: 


AM labs reviewed showing hypernatremia. Nursing staff states patient has a hard 

time drinking water, "it makes her gag."  She has been getting Gatorade, but 

cautioned nursing staff about the sodium content in Gatorade.  Recommend a 500cc

very blow bolus of D5 to help drive down sodium and rehydrate patient.  Nursing 

staff states they will check with patient as she apparently refused previous 

IVF. Recheck labs tomorrow. Monitor fluid status closely.

## 2021-03-21 LAB
ANION GAP SERPL CALC-SCNC: 4.1 MMOL/L (ref 5–15)
CHLORIDE SERPL-SCNC: 108 MMOL/L (ref 98–107)
SODIUM SERPL-SCNC: 147 MMOL/L (ref 136–145)

## 2021-03-21 RX ADMIN — ROPINIROLE SCH MG: 0.5 TABLET ORAL at 20:14

## 2021-03-21 RX ADMIN — Medication SCH APPLIC: at 08:35

## 2021-03-21 RX ADMIN — OMEPRAZOLE SCH MG: 20 CAPSULE, DELAYED RELEASE ORAL at 06:09

## 2021-03-21 RX ADMIN — METOPROLOL SUCCINATE SCH MG: 25 TABLET, EXTENDED RELEASE ORAL at 08:30

## 2021-03-21 RX ADMIN — SERTRALINE HYDROCHLORIDE SCH MG: 100 TABLET ORAL at 08:32

## 2021-03-21 RX ADMIN — NITROFURANTOIN MONOHYDRATE AND NITROFURANTOIN MACROCRYSTALLINE SCH MG: 75; 25 CAPSULE ORAL at 20:16

## 2021-03-21 RX ADMIN — NITROFURANTOIN MONOHYDRATE AND NITROFURANTOIN MACROCRYSTALLINE SCH MG: 75; 25 CAPSULE ORAL at 08:36

## 2021-03-21 RX ADMIN — ISOSORBIDE MONONITRATE SCH MG: 30 TABLET, FILM COATED, EXTENDED RELEASE ORAL at 08:32

## 2021-03-21 RX ADMIN — GABAPENTIN SCH MG: 100 CAPSULE ORAL at 15:05

## 2021-03-21 RX ADMIN — Medication SCH APPLIC: at 20:17

## 2021-03-21 RX ADMIN — CHOLECALCIFEROL TAB 10 MCG (400 UNIT) SCH MCG: 10 TAB at 08:32

## 2021-03-21 RX ADMIN — WARFARIN SODIUM SCH MG: 5 TABLET ORAL at 20:14

## 2021-03-21 RX ADMIN — FUROSEMIDE SCH MG: 20 TABLET ORAL at 08:30

## 2021-03-21 RX ADMIN — MAGNESIUM OXIDE TAB 400 MG (241.3 MG ELEMENTAL MG) SCH TAB: 400 (241.3 MG) TAB at 08:33

## 2021-03-21 RX ADMIN — GABAPENTIN SCH MG: 300 CAPSULE ORAL at 20:11

## 2021-03-21 RX ADMIN — GABAPENTIN SCH MG: 100 CAPSULE ORAL at 08:29

## 2021-03-21 NOTE — PCM.SN.2
- Free Text/Narrative


Note: 


Labs reviewed. Sodium trending down.  Patient refuses IVF.  Discussed with 

nursing staff to avoid foods/beverages containing high amounts of sodium. Noted 

elevated WBC; could be reactionary versus true infection given known UTI. 

Continue to monitor.

## 2021-03-22 LAB
ANION GAP SERPL CALC-SCNC: 4.6 MMOL/L (ref 5–15)
CHLORIDE SERPL-SCNC: 107 MMOL/L (ref 98–107)
SODIUM SERPL-SCNC: 148 MMOL/L (ref 136–145)

## 2021-03-22 RX ADMIN — Medication SCH APPLIC: at 19:48

## 2021-03-22 RX ADMIN — ISOSORBIDE MONONITRATE SCH MG: 30 TABLET, FILM COATED, EXTENDED RELEASE ORAL at 08:01

## 2021-03-22 RX ADMIN — FUROSEMIDE SCH MG: 20 TABLET ORAL at 08:01

## 2021-03-22 RX ADMIN — MAGNESIUM OXIDE TAB 400 MG (241.3 MG ELEMENTAL MG) SCH TAB: 400 (241.3 MG) TAB at 08:04

## 2021-03-22 RX ADMIN — SERTRALINE HYDROCHLORIDE SCH MG: 100 TABLET ORAL at 08:01

## 2021-03-22 RX ADMIN — NITROFURANTOIN MONOHYDRATE AND NITROFURANTOIN MACROCRYSTALLINE SCH MG: 75; 25 CAPSULE ORAL at 19:45

## 2021-03-22 RX ADMIN — NITROFURANTOIN MONOHYDRATE AND NITROFURANTOIN MACROCRYSTALLINE SCH MG: 75; 25 CAPSULE ORAL at 08:04

## 2021-03-22 RX ADMIN — ROPINIROLE SCH MG: 0.5 TABLET ORAL at 19:46

## 2021-03-22 RX ADMIN — CHOLECALCIFEROL TAB 10 MCG (400 UNIT) SCH MCG: 10 TAB at 08:03

## 2021-03-22 RX ADMIN — GABAPENTIN SCH MG: 100 CAPSULE ORAL at 12:33

## 2021-03-22 RX ADMIN — OMEPRAZOLE SCH MG: 20 CAPSULE, DELAYED RELEASE ORAL at 06:17

## 2021-03-22 RX ADMIN — GABAPENTIN SCH MG: 300 CAPSULE ORAL at 19:46

## 2021-03-22 RX ADMIN — METOPROLOL SUCCINATE SCH MG: 25 TABLET, EXTENDED RELEASE ORAL at 08:02

## 2021-03-22 RX ADMIN — GABAPENTIN SCH MG: 100 CAPSULE ORAL at 08:03

## 2021-03-22 RX ADMIN — Medication SCH CAP: at 06:17

## 2021-03-22 RX ADMIN — Medication SCH APPLIC: at 08:07

## 2021-03-23 RX ADMIN — NITROFURANTOIN MONOHYDRATE AND NITROFURANTOIN MACROCRYSTALLINE SCH MG: 75; 25 CAPSULE ORAL at 19:33

## 2021-03-23 RX ADMIN — ISOSORBIDE MONONITRATE SCH MG: 30 TABLET, FILM COATED, EXTENDED RELEASE ORAL at 08:28

## 2021-03-23 RX ADMIN — Medication SCH CAP: at 06:14

## 2021-03-23 RX ADMIN — Medication SCH APPLIC: at 19:34

## 2021-03-23 RX ADMIN — ROPINIROLE SCH MG: 0.5 TABLET ORAL at 19:33

## 2021-03-23 RX ADMIN — GABAPENTIN SCH MG: 100 CAPSULE ORAL at 13:02

## 2021-03-23 RX ADMIN — SERTRALINE HYDROCHLORIDE SCH MG: 100 TABLET ORAL at 08:28

## 2021-03-23 RX ADMIN — METOPROLOL SUCCINATE SCH MG: 25 TABLET, EXTENDED RELEASE ORAL at 08:27

## 2021-03-23 RX ADMIN — CHOLECALCIFEROL TAB 10 MCG (400 UNIT) SCH MCG: 10 TAB at 08:28

## 2021-03-23 RX ADMIN — GABAPENTIN SCH MG: 300 CAPSULE ORAL at 19:33

## 2021-03-23 RX ADMIN — OMEPRAZOLE SCH MG: 20 CAPSULE, DELAYED RELEASE ORAL at 06:14

## 2021-03-23 RX ADMIN — FUROSEMIDE SCH MG: 20 TABLET ORAL at 08:27

## 2021-03-23 RX ADMIN — GABAPENTIN SCH MG: 100 CAPSULE ORAL at 08:27

## 2021-03-23 RX ADMIN — NITROFURANTOIN MONOHYDRATE AND NITROFURANTOIN MACROCRYSTALLINE SCH MG: 75; 25 CAPSULE ORAL at 08:28

## 2021-03-23 RX ADMIN — MAGNESIUM OXIDE TAB 400 MG (241.3 MG ELEMENTAL MG) SCH TAB: 400 (241.3 MG) TAB at 08:29

## 2021-03-23 RX ADMIN — Medication SCH APPLIC: at 08:29

## 2021-03-24 LAB
ANION GAP SERPL CALC-SCNC: 4.6 MMOL/L (ref 5–15)
CHLORIDE SERPL-SCNC: 108 MMOL/L (ref 98–107)
SODIUM SERPL-SCNC: 148 MMOL/L (ref 136–145)

## 2021-03-24 RX ADMIN — ISOSORBIDE MONONITRATE SCH MG: 30 TABLET, FILM COATED, EXTENDED RELEASE ORAL at 08:47

## 2021-03-24 RX ADMIN — SERTRALINE HYDROCHLORIDE SCH MG: 100 TABLET ORAL at 08:48

## 2021-03-24 RX ADMIN — METOPROLOL SUCCINATE SCH MG: 25 TABLET, EXTENDED RELEASE ORAL at 08:48

## 2021-03-24 RX ADMIN — MAGNESIUM OXIDE TAB 400 MG (241.3 MG ELEMENTAL MG) SCH TAB: 400 (241.3 MG) TAB at 08:47

## 2021-03-24 RX ADMIN — NITROFURANTOIN MONOHYDRATE AND NITROFURANTOIN MACROCRYSTALLINE SCH MG: 75; 25 CAPSULE ORAL at 19:59

## 2021-03-24 RX ADMIN — FUROSEMIDE SCH MG: 20 TABLET ORAL at 08:47

## 2021-03-24 RX ADMIN — GABAPENTIN SCH MG: 100 CAPSULE ORAL at 13:35

## 2021-03-24 RX ADMIN — CHOLECALCIFEROL TAB 10 MCG (400 UNIT) SCH MCG: 10 TAB at 08:49

## 2021-03-24 RX ADMIN — OMEPRAZOLE SCH MG: 20 CAPSULE, DELAYED RELEASE ORAL at 06:32

## 2021-03-24 RX ADMIN — Medication SCH APPLIC: at 20:05

## 2021-03-24 RX ADMIN — GABAPENTIN SCH MG: 300 CAPSULE ORAL at 19:59

## 2021-03-24 RX ADMIN — ROPINIROLE SCH MG: 0.5 TABLET ORAL at 20:01

## 2021-03-24 RX ADMIN — GABAPENTIN SCH MG: 100 CAPSULE ORAL at 08:52

## 2021-03-24 RX ADMIN — Medication SCH CAP: at 06:32

## 2021-03-24 RX ADMIN — Medication SCH APPLIC: at 08:49

## 2021-03-24 RX ADMIN — WARFARIN SODIUM SCH: 5 TABLET ORAL at 20:46

## 2021-03-24 RX ADMIN — NITROFURANTOIN MONOHYDRATE AND NITROFURANTOIN MACROCRYSTALLINE SCH MG: 75; 25 CAPSULE ORAL at 08:47

## 2021-03-25 RX ADMIN — OMEPRAZOLE SCH MG: 20 CAPSULE, DELAYED RELEASE ORAL at 06:52

## 2021-03-25 RX ADMIN — GABAPENTIN SCH MG: 100 CAPSULE ORAL at 09:57

## 2021-03-25 RX ADMIN — Medication SCH CAP: at 06:52

## 2021-03-25 RX ADMIN — GABAPENTIN SCH MG: 100 CAPSULE ORAL at 14:22

## 2021-03-25 RX ADMIN — ROPINIROLE SCH MG: 0.5 TABLET ORAL at 20:33

## 2021-03-25 RX ADMIN — ISOSORBIDE MONONITRATE SCH MG: 30 TABLET, FILM COATED, EXTENDED RELEASE ORAL at 09:56

## 2021-03-25 RX ADMIN — WARFARIN SODIUM SCH MG: 5 TABLET ORAL at 20:32

## 2021-03-25 RX ADMIN — METOPROLOL SUCCINATE SCH MG: 25 TABLET, EXTENDED RELEASE ORAL at 09:56

## 2021-03-25 RX ADMIN — SERTRALINE HYDROCHLORIDE SCH MG: 100 TABLET ORAL at 09:56

## 2021-03-25 RX ADMIN — Medication SCH APPLIC: at 09:58

## 2021-03-25 RX ADMIN — Medication SCH APPLIC: at 20:35

## 2021-03-25 RX ADMIN — GABAPENTIN SCH MG: 300 CAPSULE ORAL at 20:32

## 2021-03-25 RX ADMIN — CHOLECALCIFEROL TAB 10 MCG (400 UNIT) SCH MCG: 10 TAB at 09:57

## 2021-03-25 RX ADMIN — FUROSEMIDE SCH MG: 20 TABLET ORAL at 09:57

## 2021-03-25 RX ADMIN — MAGNESIUM OXIDE TAB 400 MG (241.3 MG ELEMENTAL MG) SCH TAB: 400 (241.3 MG) TAB at 09:58

## 2021-03-26 PROCEDURE — 0HBBXZZ EXCISION OF RIGHT UPPER ARM SKIN, EXTERNAL APPROACH: ICD-10-PCS | Performed by: FAMILY MEDICINE

## 2021-03-26 RX ADMIN — WARFARIN SODIUM SCH MG: 5 TABLET ORAL at 19:33

## 2021-03-26 RX ADMIN — SERTRALINE HYDROCHLORIDE SCH MG: 100 TABLET ORAL at 08:32

## 2021-03-26 RX ADMIN — OMEPRAZOLE SCH MG: 20 CAPSULE, DELAYED RELEASE ORAL at 06:15

## 2021-03-26 RX ADMIN — Medication SCH CAP: at 06:15

## 2021-03-26 RX ADMIN — METOPROLOL SUCCINATE SCH MG: 25 TABLET, EXTENDED RELEASE ORAL at 08:31

## 2021-03-26 RX ADMIN — GABAPENTIN SCH MG: 100 CAPSULE ORAL at 08:30

## 2021-03-26 RX ADMIN — FUROSEMIDE SCH MG: 20 TABLET ORAL at 08:31

## 2021-03-26 RX ADMIN — Medication SCH APPLIC: at 08:32

## 2021-03-26 RX ADMIN — ROPINIROLE SCH MG: 0.5 TABLET ORAL at 19:33

## 2021-03-26 RX ADMIN — GABAPENTIN SCH MG: 100 CAPSULE ORAL at 13:48

## 2021-03-26 RX ADMIN — Medication SCH APPLIC: at 19:35

## 2021-03-26 RX ADMIN — GABAPENTIN SCH MG: 300 CAPSULE ORAL at 19:33

## 2021-03-26 RX ADMIN — CHOLECALCIFEROL TAB 10 MCG (400 UNIT) SCH MCG: 10 TAB at 08:30

## 2021-03-26 RX ADMIN — ISOSORBIDE MONONITRATE SCH MG: 30 TABLET, FILM COATED, EXTENDED RELEASE ORAL at 08:31

## 2021-03-26 RX ADMIN — MAGNESIUM OXIDE TAB 400 MG (241.3 MG ELEMENTAL MG) SCH TAB: 400 (241.3 MG) TAB at 08:30

## 2021-03-26 NOTE — PCM.SN.2
- Free Text/Narrative


Note: 


Provider asked to see the patient for wound assessment.  Patient had a fall with

abrasion to the right elbow last week.  Nursing worried about rate of healing of

this.  Also concern about worsening of right anterior shin wound.  At this time 

patient notes that things are about the same; no real concerns in regards to her

skin today.  Did have a bath this morning which may have improved the look of 

the elbow. Mood still down.  A little nauseas today





Right elbow: 4x2cm abrasion to the elbow with a scab noted.  Small amount 

(3x10mm) piece of skin debrided from the edge of the wound


Left shin: quarter sized flat red lesion noted, chronic.  No surrounding 

erythema/edema


Right shin: 3x3cm scabbed lesion noted to the right anterior distal shin, small 

[place] of erythema.  No warmth or drainage noted.  This has been a chronic 

healing with wax/wane





We will continue to keep all wounds open to the air, apply healing lotion 

(patient's) 2 times a day to each.

## 2021-03-27 RX ADMIN — Medication SCH CAP: at 06:22

## 2021-03-27 RX ADMIN — ROPINIROLE SCH MG: 0.5 TABLET ORAL at 20:05

## 2021-03-27 RX ADMIN — GABAPENTIN SCH MG: 100 CAPSULE ORAL at 07:33

## 2021-03-27 RX ADMIN — ISOSORBIDE MONONITRATE SCH MG: 30 TABLET, FILM COATED, EXTENDED RELEASE ORAL at 07:33

## 2021-03-27 RX ADMIN — MAGNESIUM OXIDE TAB 400 MG (241.3 MG ELEMENTAL MG) SCH TAB: 400 (241.3 MG) TAB at 07:34

## 2021-03-27 RX ADMIN — WARFARIN SODIUM SCH MG: 5 TABLET ORAL at 20:05

## 2021-03-27 RX ADMIN — OMEPRAZOLE SCH MG: 20 CAPSULE, DELAYED RELEASE ORAL at 06:22

## 2021-03-27 RX ADMIN — Medication SCH APPLIC: at 20:06

## 2021-03-27 RX ADMIN — SERTRALINE HYDROCHLORIDE SCH MG: 100 TABLET ORAL at 07:32

## 2021-03-27 RX ADMIN — Medication SCH APPLIC: at 07:34

## 2021-03-27 RX ADMIN — FUROSEMIDE SCH MG: 20 TABLET ORAL at 07:33

## 2021-03-27 RX ADMIN — GABAPENTIN SCH MG: 100 CAPSULE ORAL at 12:04

## 2021-03-27 RX ADMIN — METOPROLOL SUCCINATE SCH MG: 25 TABLET, EXTENDED RELEASE ORAL at 07:32

## 2021-03-27 RX ADMIN — CHOLECALCIFEROL TAB 10 MCG (400 UNIT) SCH MCG: 10 TAB at 07:33

## 2021-03-27 RX ADMIN — GABAPENTIN SCH MG: 300 CAPSULE ORAL at 20:05

## 2021-03-28 RX ADMIN — WARFARIN SODIUM SCH MG: 5 TABLET ORAL at 20:04

## 2021-03-28 RX ADMIN — FUROSEMIDE SCH MG: 20 TABLET ORAL at 08:04

## 2021-03-28 RX ADMIN — CHOLECALCIFEROL TAB 10 MCG (400 UNIT) SCH MCG: 10 TAB at 08:05

## 2021-03-28 RX ADMIN — GABAPENTIN SCH MG: 100 CAPSULE ORAL at 08:05

## 2021-03-28 RX ADMIN — Medication SCH APPLIC: at 08:06

## 2021-03-28 RX ADMIN — GABAPENTIN SCH MG: 300 CAPSULE ORAL at 20:05

## 2021-03-28 RX ADMIN — ISOSORBIDE MONONITRATE SCH MG: 30 TABLET, FILM COATED, EXTENDED RELEASE ORAL at 08:04

## 2021-03-28 RX ADMIN — METOPROLOL SUCCINATE SCH MG: 25 TABLET, EXTENDED RELEASE ORAL at 08:04

## 2021-03-28 RX ADMIN — GABAPENTIN SCH MG: 100 CAPSULE ORAL at 13:27

## 2021-03-28 RX ADMIN — ROPINIROLE SCH MG: 0.5 TABLET ORAL at 20:04

## 2021-03-28 RX ADMIN — SERTRALINE HYDROCHLORIDE SCH MG: 100 TABLET ORAL at 08:05

## 2021-03-28 RX ADMIN — MAGNESIUM OXIDE TAB 400 MG (241.3 MG ELEMENTAL MG) SCH TAB: 400 (241.3 MG) TAB at 08:06

## 2021-03-28 RX ADMIN — OMEPRAZOLE SCH MG: 20 CAPSULE, DELAYED RELEASE ORAL at 06:14

## 2021-03-28 RX ADMIN — Medication SCH APPLIC: at 20:08

## 2021-03-28 RX ADMIN — Medication SCH CAP: at 06:14

## 2021-03-29 RX ADMIN — GABAPENTIN SCH MG: 100 CAPSULE ORAL at 09:43

## 2021-03-29 RX ADMIN — GABAPENTIN SCH MG: 300 CAPSULE ORAL at 19:45

## 2021-03-29 RX ADMIN — ROPINIROLE SCH MG: 0.5 TABLET ORAL at 19:46

## 2021-03-29 RX ADMIN — Medication SCH APPLIC: at 09:43

## 2021-03-29 RX ADMIN — MAGNESIUM OXIDE TAB 400 MG (241.3 MG ELEMENTAL MG) SCH TAB: 400 (241.3 MG) TAB at 09:45

## 2021-03-29 RX ADMIN — Medication SCH APPLIC: at 19:47

## 2021-03-29 RX ADMIN — GABAPENTIN SCH MG: 100 CAPSULE ORAL at 14:40

## 2021-03-29 RX ADMIN — CHOLECALCIFEROL TAB 10 MCG (400 UNIT) SCH MCG: 10 TAB at 09:45

## 2021-03-29 RX ADMIN — OMEPRAZOLE SCH MG: 20 CAPSULE, DELAYED RELEASE ORAL at 06:25

## 2021-03-29 RX ADMIN — WARFARIN SODIUM SCH MG: 5 TABLET ORAL at 19:46

## 2021-03-29 RX ADMIN — ISOSORBIDE MONONITRATE SCH MG: 30 TABLET, FILM COATED, EXTENDED RELEASE ORAL at 09:42

## 2021-03-29 RX ADMIN — METOPROLOL SUCCINATE SCH MG: 25 TABLET, EXTENDED RELEASE ORAL at 09:41

## 2021-03-29 RX ADMIN — Medication SCH CAP: at 06:25

## 2021-03-29 RX ADMIN — SERTRALINE HYDROCHLORIDE SCH MG: 100 TABLET ORAL at 09:41

## 2021-03-30 RX ADMIN — Medication SCH APPLIC: at 09:41

## 2021-03-30 RX ADMIN — GABAPENTIN SCH MG: 300 CAPSULE ORAL at 20:01

## 2021-03-30 RX ADMIN — GABAPENTIN SCH MG: 100 CAPSULE ORAL at 12:16

## 2021-03-30 RX ADMIN — METOPROLOL SUCCINATE SCH MG: 25 TABLET, EXTENDED RELEASE ORAL at 09:18

## 2021-03-30 RX ADMIN — MAGNESIUM OXIDE TAB 400 MG (241.3 MG ELEMENTAL MG) SCH TAB: 400 (241.3 MG) TAB at 09:29

## 2021-03-30 RX ADMIN — OMEPRAZOLE SCH MG: 20 CAPSULE, DELAYED RELEASE ORAL at 06:27

## 2021-03-30 RX ADMIN — Medication SCH CAP: at 06:27

## 2021-03-30 RX ADMIN — ISOSORBIDE MONONITRATE SCH MG: 30 TABLET, FILM COATED, EXTENDED RELEASE ORAL at 09:17

## 2021-03-30 RX ADMIN — Medication SCH APPLIC: at 20:06

## 2021-03-30 RX ADMIN — CHOLECALCIFEROL TAB 10 MCG (400 UNIT) SCH MCG: 10 TAB at 09:28

## 2021-03-30 RX ADMIN — ROPINIROLE SCH MG: 0.5 TABLET ORAL at 20:04

## 2021-03-30 RX ADMIN — GABAPENTIN SCH MG: 100 CAPSULE ORAL at 09:15

## 2021-03-30 RX ADMIN — WARFARIN SODIUM SCH MG: 5 TABLET ORAL at 20:04

## 2021-03-30 RX ADMIN — SERTRALINE HYDROCHLORIDE SCH MG: 100 TABLET ORAL at 09:18

## 2021-03-31 LAB
ANION GAP SERPL CALC-SCNC: 6.8 MMOL/L (ref 5–15)
CHLORIDE SERPL-SCNC: 104 MMOL/L (ref 98–107)
SODIUM SERPL-SCNC: 147 MMOL/L (ref 136–145)

## 2021-03-31 RX ADMIN — WARFARIN SODIUM SCH MG: 5 TABLET ORAL at 20:15

## 2021-03-31 RX ADMIN — GABAPENTIN SCH MG: 100 CAPSULE ORAL at 13:56

## 2021-03-31 RX ADMIN — CHOLECALCIFEROL TAB 10 MCG (400 UNIT) SCH MCG: 10 TAB at 08:21

## 2021-03-31 RX ADMIN — BUSPIRONE HYDROCHLORIDE SCH MG: 15 TABLET ORAL at 20:15

## 2021-03-31 RX ADMIN — OMEPRAZOLE SCH MG: 20 CAPSULE, DELAYED RELEASE ORAL at 06:20

## 2021-03-31 RX ADMIN — METOPROLOL SUCCINATE SCH MG: 25 TABLET, EXTENDED RELEASE ORAL at 06:28

## 2021-03-31 RX ADMIN — Medication SCH APPLIC: at 08:19

## 2021-03-31 RX ADMIN — ROPINIROLE SCH MG: 0.5 TABLET ORAL at 20:15

## 2021-03-31 RX ADMIN — GABAPENTIN SCH MG: 100 CAPSULE ORAL at 08:19

## 2021-03-31 RX ADMIN — SERTRALINE HYDROCHLORIDE SCH MG: 100 TABLET ORAL at 08:18

## 2021-03-31 RX ADMIN — GABAPENTIN SCH MG: 300 CAPSULE ORAL at 20:16

## 2021-03-31 RX ADMIN — MAGNESIUM OXIDE TAB 400 MG (241.3 MG ELEMENTAL MG) SCH TAB: 400 (241.3 MG) TAB at 08:21

## 2021-03-31 RX ADMIN — Medication SCH APPLIC: at 20:19

## 2021-03-31 RX ADMIN — ISOSORBIDE MONONITRATE SCH MG: 30 TABLET, FILM COATED, EXTENDED RELEASE ORAL at 08:18

## 2021-03-31 RX ADMIN — METOPROLOL SUCCINATE SCH: 25 TABLET, EXTENDED RELEASE ORAL at 07:04

## 2021-03-31 RX ADMIN — Medication SCH CAP: at 06:19

## 2021-03-31 NOTE — CR
______________________________________________________________________________   

  

[id number] RAD/RAD Chest PA And Lateral  

EXAM: FRONTAL AND LATERAL CHEST  

   

 INDICATION: CRACKLES.  

   

 COMPARISON: July 28, 2020.  

   

 DISCUSSION: Cardiomegaly with development of mild central vascular congestion.  

 Possible minimal pleural effusions. Surgical clips overlie the right lung base.  

   

 IMPRESSION:    

 1.  Mild congestive heart failure.  

   

 Electronically signed by Jimmy Turk MD on 3/31/2021 11:41 AM  

   

  

Jimmy Turk MD                 

 03/31/21 1144    

  

Thank you for allowing us to participate in the care of your patient.

## 2021-03-31 NOTE — PCM.SN.2
- Free Text/Narrative


Note: 


Provider updated by nursing staff of fall yesterday, did assess injuries 

yesterday.  Updated today on increased weakness and bibasillar crackles.  

Patient's Lasix had already been increased earlier this week; swelling in LE 

improved.  Continues on her 1-2L O2 per NC (baseline) with sats in the upper 

90s.  


On my exam patient is tired in appearance but stable and in NAD.  Bibasillar 

crackles.  LE swelling 1+.  


Will check labs today including INR, CBC, BMP, and CXR.





Labs and imaging reviewed this afternoon.  She does have a mild bump in her 

WBC's but no 'predominance' of any cell line.  INR normal.  BMP stable.  CXR 

demonstrating fluid overload (bibasilar haziness)


Will repeat CBC and get ESR/CRP in the morning.


Continue Lasix as previously ordered.

## 2021-04-01 RX ADMIN — BUSPIRONE HYDROCHLORIDE SCH MG: 15 TABLET ORAL at 13:10

## 2021-04-01 RX ADMIN — SERTRALINE HYDROCHLORIDE SCH MG: 100 TABLET ORAL at 07:42

## 2021-04-01 RX ADMIN — ROPINIROLE SCH MG: 0.5 TABLET ORAL at 20:27

## 2021-04-01 RX ADMIN — OMEPRAZOLE SCH MG: 20 CAPSULE, DELAYED RELEASE ORAL at 06:06

## 2021-04-01 RX ADMIN — MAGNESIUM OXIDE TAB 400 MG (241.3 MG ELEMENTAL MG) SCH TAB: 400 (241.3 MG) TAB at 07:44

## 2021-04-01 RX ADMIN — Medication SCH APPLIC: at 20:29

## 2021-04-01 RX ADMIN — METOPROLOL SUCCINATE SCH MG: 25 TABLET, EXTENDED RELEASE ORAL at 07:41

## 2021-04-01 RX ADMIN — GABAPENTIN SCH MG: 100 CAPSULE ORAL at 07:44

## 2021-04-01 RX ADMIN — CHOLECALCIFEROL TAB 10 MCG (400 UNIT) SCH MCG: 10 TAB at 07:44

## 2021-04-01 RX ADMIN — Medication SCH APPLIC: at 07:46

## 2021-04-01 RX ADMIN — MICONAZOLE NITRATE PRN APPLIC: 2 POWDER TOPICAL at 20:26

## 2021-04-01 RX ADMIN — FUROSEMIDE SCH MG: 20 TABLET ORAL at 13:10

## 2021-04-01 RX ADMIN — GABAPENTIN SCH MG: 100 CAPSULE ORAL at 13:10

## 2021-04-01 RX ADMIN — BUSPIRONE HYDROCHLORIDE SCH MG: 15 TABLET ORAL at 07:42

## 2021-04-01 RX ADMIN — GABAPENTIN SCH MG: 300 CAPSULE ORAL at 20:29

## 2021-04-01 RX ADMIN — Medication SCH CAP: at 06:06

## 2021-04-01 RX ADMIN — FUROSEMIDE SCH MG: 20 TABLET ORAL at 07:43

## 2021-04-01 RX ADMIN — BUSPIRONE HYDROCHLORIDE SCH MG: 15 TABLET ORAL at 20:26

## 2021-04-01 RX ADMIN — ISOSORBIDE MONONITRATE SCH MG: 30 TABLET, FILM COATED, EXTENDED RELEASE ORAL at 07:42

## 2021-04-01 RX ADMIN — WARFARIN SODIUM SCH MG: 5 TABLET ORAL at 20:28

## 2021-04-02 RX ADMIN — OMEPRAZOLE SCH MG: 20 CAPSULE, DELAYED RELEASE ORAL at 06:23

## 2021-04-02 RX ADMIN — WARFARIN SODIUM SCH MG: 5 TABLET ORAL at 20:35

## 2021-04-02 RX ADMIN — BUSPIRONE HYDROCHLORIDE SCH MG: 15 TABLET ORAL at 20:06

## 2021-04-02 RX ADMIN — Medication SCH APPLIC: at 20:06

## 2021-04-02 RX ADMIN — GABAPENTIN SCH MG: 300 CAPSULE ORAL at 20:07

## 2021-04-02 RX ADMIN — CHOLECALCIFEROL TAB 10 MCG (400 UNIT) SCH MCG: 10 TAB at 09:22

## 2021-04-02 RX ADMIN — METOPROLOL SUCCINATE SCH MG: 25 TABLET, EXTENDED RELEASE ORAL at 09:18

## 2021-04-02 RX ADMIN — BUSPIRONE HYDROCHLORIDE SCH MG: 15 TABLET ORAL at 13:20

## 2021-04-02 RX ADMIN — FUROSEMIDE SCH MG: 20 TABLET ORAL at 09:17

## 2021-04-02 RX ADMIN — GABAPENTIN SCH MG: 100 CAPSULE ORAL at 13:19

## 2021-04-02 RX ADMIN — ROPINIROLE SCH MG: 0.5 TABLET ORAL at 20:05

## 2021-04-02 RX ADMIN — Medication SCH APPLIC: at 09:25

## 2021-04-02 RX ADMIN — GABAPENTIN SCH MG: 100 CAPSULE ORAL at 09:19

## 2021-04-02 RX ADMIN — SERTRALINE HYDROCHLORIDE SCH MG: 100 TABLET ORAL at 09:18

## 2021-04-02 RX ADMIN — MAGNESIUM OXIDE TAB 400 MG (241.3 MG ELEMENTAL MG) SCH TAB: 400 (241.3 MG) TAB at 09:22

## 2021-04-02 RX ADMIN — BUSPIRONE HYDROCHLORIDE SCH MG: 15 TABLET ORAL at 09:18

## 2021-04-02 RX ADMIN — ISOSORBIDE MONONITRATE SCH MG: 30 TABLET, FILM COATED, EXTENDED RELEASE ORAL at 09:18

## 2021-04-02 RX ADMIN — FUROSEMIDE SCH MG: 20 TABLET ORAL at 13:20

## 2021-04-02 RX ADMIN — Medication SCH CAP: at 06:23

## 2021-04-03 RX ADMIN — SERTRALINE HYDROCHLORIDE SCH MG: 100 TABLET ORAL at 08:28

## 2021-04-03 RX ADMIN — METOPROLOL SUCCINATE SCH MG: 25 TABLET, EXTENDED RELEASE ORAL at 08:26

## 2021-04-03 RX ADMIN — ROPINIROLE SCH MG: 0.5 TABLET ORAL at 19:14

## 2021-04-03 RX ADMIN — Medication SCH CAP: at 06:56

## 2021-04-03 RX ADMIN — FUROSEMIDE SCH MG: 20 TABLET ORAL at 08:26

## 2021-04-03 RX ADMIN — GABAPENTIN SCH MG: 300 CAPSULE ORAL at 19:13

## 2021-04-03 RX ADMIN — Medication SCH APPLIC: at 19:14

## 2021-04-03 RX ADMIN — GABAPENTIN SCH MG: 100 CAPSULE ORAL at 13:15

## 2021-04-03 RX ADMIN — BUSPIRONE HYDROCHLORIDE SCH MG: 15 TABLET ORAL at 13:15

## 2021-04-03 RX ADMIN — GABAPENTIN SCH MG: 100 CAPSULE ORAL at 08:25

## 2021-04-03 RX ADMIN — BUSPIRONE HYDROCHLORIDE SCH MG: 15 TABLET ORAL at 19:14

## 2021-04-03 RX ADMIN — ISOSORBIDE MONONITRATE SCH MG: 30 TABLET, FILM COATED, EXTENDED RELEASE ORAL at 08:26

## 2021-04-03 RX ADMIN — OMEPRAZOLE SCH MG: 20 CAPSULE, DELAYED RELEASE ORAL at 06:56

## 2021-04-03 RX ADMIN — Medication SCH APPLIC: at 08:27

## 2021-04-03 RX ADMIN — BUSPIRONE HYDROCHLORIDE SCH MG: 15 TABLET ORAL at 08:25

## 2021-04-03 RX ADMIN — CHOLECALCIFEROL TAB 10 MCG (400 UNIT) SCH MCG: 10 TAB at 08:24

## 2021-04-03 RX ADMIN — WARFARIN SODIUM SCH MG: 5 TABLET ORAL at 19:13

## 2021-04-03 RX ADMIN — MAGNESIUM OXIDE TAB 400 MG (241.3 MG ELEMENTAL MG) SCH TAB: 400 (241.3 MG) TAB at 08:24

## 2021-04-04 RX ADMIN — WARFARIN SODIUM SCH MG: 5 TABLET ORAL at 19:13

## 2021-04-04 RX ADMIN — BUSPIRONE HYDROCHLORIDE SCH MG: 15 TABLET ORAL at 19:14

## 2021-04-04 RX ADMIN — SERTRALINE HYDROCHLORIDE SCH MG: 100 TABLET ORAL at 08:47

## 2021-04-04 RX ADMIN — METOPROLOL SUCCINATE SCH MG: 25 TABLET, EXTENDED RELEASE ORAL at 08:47

## 2021-04-04 RX ADMIN — Medication SCH CAP: at 06:14

## 2021-04-04 RX ADMIN — CHOLECALCIFEROL TAB 10 MCG (400 UNIT) SCH MCG: 10 TAB at 08:48

## 2021-04-04 RX ADMIN — Medication SCH APPLIC: at 08:49

## 2021-04-04 RX ADMIN — GABAPENTIN SCH MG: 300 CAPSULE ORAL at 19:15

## 2021-04-04 RX ADMIN — OMEPRAZOLE SCH MG: 20 CAPSULE, DELAYED RELEASE ORAL at 06:14

## 2021-04-04 RX ADMIN — ROPINIROLE SCH MG: 0.5 TABLET ORAL at 19:13

## 2021-04-04 RX ADMIN — MAGNESIUM OXIDE TAB 400 MG (241.3 MG ELEMENTAL MG) SCH TAB: 400 (241.3 MG) TAB at 08:48

## 2021-04-04 RX ADMIN — ISOSORBIDE MONONITRATE SCH MG: 30 TABLET, FILM COATED, EXTENDED RELEASE ORAL at 08:47

## 2021-04-04 RX ADMIN — BUSPIRONE HYDROCHLORIDE SCH MG: 15 TABLET ORAL at 13:20

## 2021-04-04 RX ADMIN — GABAPENTIN SCH MG: 100 CAPSULE ORAL at 08:49

## 2021-04-04 RX ADMIN — BUSPIRONE HYDROCHLORIDE SCH MG: 15 TABLET ORAL at 08:47

## 2021-04-04 RX ADMIN — Medication SCH APPLIC: at 19:14

## 2021-04-04 RX ADMIN — FUROSEMIDE SCH MG: 20 TABLET ORAL at 08:47

## 2021-04-04 RX ADMIN — GABAPENTIN SCH MG: 100 CAPSULE ORAL at 13:20

## 2021-04-05 RX ADMIN — BUSPIRONE HYDROCHLORIDE SCH MG: 15 TABLET ORAL at 13:22

## 2021-04-05 RX ADMIN — Medication SCH APPLIC: at 08:43

## 2021-04-05 RX ADMIN — Medication SCH CAP: at 06:21

## 2021-04-05 RX ADMIN — GABAPENTIN SCH MG: 300 CAPSULE ORAL at 20:06

## 2021-04-05 RX ADMIN — FUROSEMIDE SCH MG: 20 TABLET ORAL at 08:41

## 2021-04-05 RX ADMIN — GABAPENTIN SCH MG: 100 CAPSULE ORAL at 13:21

## 2021-04-05 RX ADMIN — FUROSEMIDE SCH MG: 20 TABLET ORAL at 13:22

## 2021-04-05 RX ADMIN — Medication SCH APPLIC: at 19:59

## 2021-04-05 RX ADMIN — MAGNESIUM OXIDE TAB 400 MG (241.3 MG ELEMENTAL MG) SCH TAB: 400 (241.3 MG) TAB at 08:42

## 2021-04-05 RX ADMIN — WARFARIN SODIUM SCH MG: 5 TABLET ORAL at 20:07

## 2021-04-05 RX ADMIN — OMEPRAZOLE SCH MG: 20 CAPSULE, DELAYED RELEASE ORAL at 06:21

## 2021-04-05 RX ADMIN — ISOSORBIDE MONONITRATE SCH MG: 30 TABLET, FILM COATED, EXTENDED RELEASE ORAL at 08:41

## 2021-04-05 RX ADMIN — GABAPENTIN SCH MG: 100 CAPSULE ORAL at 08:40

## 2021-04-05 RX ADMIN — BUSPIRONE HYDROCHLORIDE SCH MG: 15 TABLET ORAL at 08:40

## 2021-04-05 RX ADMIN — CHOLECALCIFEROL TAB 10 MCG (400 UNIT) SCH MCG: 10 TAB at 08:42

## 2021-04-05 RX ADMIN — SERTRALINE HYDROCHLORIDE SCH MG: 100 TABLET ORAL at 08:40

## 2021-04-05 RX ADMIN — METOPROLOL SUCCINATE SCH MG: 25 TABLET, EXTENDED RELEASE ORAL at 08:40

## 2021-04-05 RX ADMIN — BUSPIRONE HYDROCHLORIDE SCH MG: 15 TABLET ORAL at 20:05

## 2021-04-05 RX ADMIN — ROPINIROLE SCH MG: 0.5 TABLET ORAL at 20:04

## 2021-04-06 RX ADMIN — BUSPIRONE HYDROCHLORIDE SCH MG: 15 TABLET ORAL at 12:41

## 2021-04-06 RX ADMIN — Medication SCH CAP: at 06:01

## 2021-04-06 RX ADMIN — Medication SCH APPLIC: at 19:20

## 2021-04-06 RX ADMIN — BUSPIRONE HYDROCHLORIDE SCH MG: 15 TABLET ORAL at 08:32

## 2021-04-06 RX ADMIN — OMEPRAZOLE SCH MG: 20 CAPSULE, DELAYED RELEASE ORAL at 06:01

## 2021-04-06 RX ADMIN — Medication SCH APPLIC: at 08:36

## 2021-04-06 RX ADMIN — METOPROLOL SUCCINATE SCH MG: 25 TABLET, EXTENDED RELEASE ORAL at 08:30

## 2021-04-06 RX ADMIN — GABAPENTIN SCH MG: 300 CAPSULE ORAL at 19:21

## 2021-04-06 RX ADMIN — FUROSEMIDE SCH MG: 20 TABLET ORAL at 08:32

## 2021-04-06 RX ADMIN — SERTRALINE HYDROCHLORIDE SCH MG: 100 TABLET ORAL at 08:31

## 2021-04-06 RX ADMIN — MAGNESIUM OXIDE TAB 400 MG (241.3 MG ELEMENTAL MG) SCH TAB: 400 (241.3 MG) TAB at 08:32

## 2021-04-06 RX ADMIN — ISOSORBIDE MONONITRATE SCH MG: 30 TABLET, FILM COATED, EXTENDED RELEASE ORAL at 08:31

## 2021-04-06 RX ADMIN — ROPINIROLE SCH MG: 0.5 TABLET ORAL at 19:20

## 2021-04-06 RX ADMIN — CHOLECALCIFEROL TAB 10 MCG (400 UNIT) SCH MCG: 10 TAB at 08:32

## 2021-04-06 RX ADMIN — FUROSEMIDE SCH MG: 20 TABLET ORAL at 12:41

## 2021-04-06 RX ADMIN — BUSPIRONE HYDROCHLORIDE SCH MG: 15 TABLET ORAL at 19:19

## 2021-04-06 RX ADMIN — GABAPENTIN SCH MG: 100 CAPSULE ORAL at 12:41

## 2021-04-06 RX ADMIN — WARFARIN SODIUM SCH MG: 5 TABLET ORAL at 19:19

## 2021-04-06 RX ADMIN — GABAPENTIN SCH MG: 100 CAPSULE ORAL at 08:33

## 2021-04-07 RX ADMIN — WARFARIN SODIUM SCH MG: 5 TABLET ORAL at 19:45

## 2021-04-07 RX ADMIN — Medication SCH CAP: at 06:19

## 2021-04-07 RX ADMIN — CHOLECALCIFEROL TAB 10 MCG (400 UNIT) SCH MCG: 10 TAB at 08:10

## 2021-04-07 RX ADMIN — OMEPRAZOLE SCH MG: 20 CAPSULE, DELAYED RELEASE ORAL at 06:19

## 2021-04-07 RX ADMIN — FUROSEMIDE SCH MG: 20 TABLET ORAL at 08:09

## 2021-04-07 RX ADMIN — GABAPENTIN SCH MG: 100 CAPSULE ORAL at 13:24

## 2021-04-07 RX ADMIN — BUSPIRONE HYDROCHLORIDE SCH MG: 15 TABLET ORAL at 19:45

## 2021-04-07 RX ADMIN — BUSPIRONE HYDROCHLORIDE SCH MG: 15 TABLET ORAL at 13:25

## 2021-04-07 RX ADMIN — GABAPENTIN SCH MG: 100 CAPSULE ORAL at 08:08

## 2021-04-07 RX ADMIN — Medication SCH APPLIC: at 19:46

## 2021-04-07 RX ADMIN — Medication SCH APPLIC: at 08:10

## 2021-04-07 RX ADMIN — MAGNESIUM OXIDE TAB 400 MG (241.3 MG ELEMENTAL MG) SCH TAB: 400 (241.3 MG) TAB at 08:10

## 2021-04-07 RX ADMIN — GABAPENTIN SCH MG: 300 CAPSULE ORAL at 19:46

## 2021-04-07 RX ADMIN — ROPINIROLE SCH MG: 0.5 TABLET ORAL at 19:48

## 2021-04-07 RX ADMIN — BUSPIRONE HYDROCHLORIDE SCH MG: 15 TABLET ORAL at 08:09

## 2021-04-07 RX ADMIN — METOPROLOL SUCCINATE SCH MG: 25 TABLET, EXTENDED RELEASE ORAL at 08:08

## 2021-04-07 RX ADMIN — SERTRALINE HYDROCHLORIDE SCH MG: 100 TABLET ORAL at 08:09

## 2021-04-07 RX ADMIN — FUROSEMIDE SCH MG: 20 TABLET ORAL at 13:25

## 2021-04-07 RX ADMIN — ISOSORBIDE MONONITRATE SCH MG: 30 TABLET, FILM COATED, EXTENDED RELEASE ORAL at 08:08

## 2021-04-07 NOTE — PCM.PN
- General Info


Date of Service: 04/07/21


Admission Dx/Problem (Free Text): 


Failure to Thrive, Bilateral LE weakness





Subjective Update: 


Patient has been doing ok.  We did increase lasix due to swelling and need to 

sleep upright for a handful of days with improvement, upon pulling back on this 

symptoms worsened again so decision made to continue at higher dose.  She states

her wounds are healing well (skin tears from falls).  Right anterior shin is 

open again.  States her mood is a bit better since our medication adjustments.  

Continues to have concerns about weakness in her legs and ability to continue to

ambulate; nursing also noting troubles with this but thinking mostly 

behavioral/motivational.


Functional Status: Reports: Pain Controlled, Tolerating Diet, Ambulating (with 

walker or sitting and self-propelling in wheelchair)





- Review of Systems


General: Reports: No Symptoms


HEENT: Reports: No Symptoms


Pulmonary: Reports: No Symptoms


Cardiovascular: Reports: No Symptoms


Gastrointestinal: Reports: No Symptoms


Genitourinary: Reports: No Symptoms


Musculoskeletal: Reports: Leg Pain


Skin: Reports: Other (skin tears healing, right shin still draining/bandaged)


Neurological: Reports: No Symptoms


Psychiatric: Reports: Depression, Anxiety





- Patient Data


Vitals - Most Recent: 


                                Last Vital Signs











Temp  97.9 F   04/07/21 06:00


 


Pulse  80   04/07/21 08:08


 


Resp  18   04/02/21 06:00


 


BP  118/54 L  04/07/21 08:08


 


Pulse Ox  96   04/02/21 15:00











Weight - Most Recent: 175 lb 9.6 oz


I&O - Last 24 Hours: 


                                 Intake & Output











 04/06/21 04/07/21 04/07/21





 22:59 06:59 14:59


 


Intake Total 180  


 


Balance 180  











Med Orders - Current: 


                               Current Medications





Acetaminophen (Acetaminophen 500 Mg Tab)  1,000 mg PO BID PRN


   PRN Reason: Pain/Fever


   Last Admin: 03/28/21 21:46 Dose:  1,000 mg


   Documented by: 


Acetaminophen (Acetaminophen 325 Mg Tab)  650 mg PO TID@0800,1300,2000 [facility]


   Last Admin: 04/07/21 08:10 Dose:  650 mg


   Documented by: 


Buspirone HCl (Buspirone 15 Mg Tab (Own Supply))  7.5 mg PO TID@0800,1300,2000 

[facility]


   Last Admin: 04/07/21 08:09 Dose:  7.5 mg


   Documented by: 


Calcium Carbonate/Glycine (Calcium Carbonate 750 Mg Tab.Chew)  750 mg PO TID PRN


   PRN Reason: Dyspepsia


   Last Admin: 04/05/21 08:51 Dose:  750 mg


   Documented by: 


Cholecalciferol (Cholecalciferol (Vitamin D3) 10 Mcg Tab)  40 mcg PO DAILY [facility]


   Last Admin: 04/07/21 08:10 Dose:  40 mcg


   Documented by: 


Docusate Sodium (Docusate Sodium 100 Mg Cap)  100 mg PO DAILY PRN


   PRN Reason: Constipation


Famotidine (Famotidine 20 Mg Tab)  20 mg PO BEDTIME [facility]


   Last Admin: 04/06/21 19:17 Dose:  20 mg


   Documented by: 


Ferrous Sulfate (Ferrous Sulfate 325 Mg Tab)  325 mg PO Q48H [facility]


   Last Admin: 04/07/21 08:10 Dose:  325 mg


   Documented by: 


Furosemide (Furosemide 20 Mg Tab (Own Suppy))  20 mg PO BID@0800,1300 [facility]


   Last Admin: 04/07/21 08:09 Dose:  20 mg


   Documented by: 


Gabapentin (Gabapentin 100 Mg Cap (Own Supply))  100 mg PO BID@0800,1300 [facility]


   Last Admin: 04/07/21 08:08 Dose:  100 mg


   Documented by: 


Gabapentin (Gabapentin 300 Mg Cap (Own Supply))  300 mg PO BEDTIME [facility]


   Last Admin: 04/06/21 19:21 Dose:  300 mg


   Documented by: 


Isosorbide Mononitrate (Isosorbide Mononitrate 30 Mg Tab.Er (Own Supply))  30 mg

PO DAILY [facility]


   Last Admin: 04/07/21 08:08 Dose:  30 mg


   Documented by: 


Lactobacillus Rhamnosus (Lactobacillus Rhamnosus Gg (Probiotic) Cap)  1 cap PO 

DAILY@0700 [facility]


   Last Admin: 04/07/21 06:19 Dose:  1 cap


   Documented by: 


Loperamide HCl (Loperamide 2 Mg Cap)  2 mg PO Q12H PRN


   PRN Reason: Diarrhea


   Last Admin: 03/24/21 09:39 Dose:  2 mg


   Documented by: 


Melatonin (Melatonin 3 Mg Tab)  6 mg PO BEDTIME [facility]


   Last Admin: 04/06/21 19:18 Dose:  6 mg


   Documented by: 


Metoprolol Succinate (Metoprolol Succinate 25 Mg Tab.Er (Own Supply))  25 mg PO 

DAILY [facility]


   Last Admin: 04/07/21 08:08 Dose:  25 mg


   Documented by: 


Miconazole (Miconazole 2% Top Powder 45 Gm Container)  0 gm TOP BID PRN


   PRN Reason: RASH UNDER BILATERAL BREASTS


   Last Admin: 04/01/21 20:26 Dose:  1 applic


   Documented by: 


Mineral Oil/White Petrolatum (Mineral Oil/Petrolatum,White Crm 454 Gm Jar)  0 gm

TOP BID [facility]


   Last Admin: 04/07/21 08:10 Dose:  1 applic


   Documented by: 


Multivitamins/Minerals (Multivitamins With Iron/Calcium/Folic Acid/Minerals Tab)

 1 tab PO DAILY [facility]


   Last Admin: 04/07/21 08:10 Dose:  1 tab


   Documented by: 


Nitroglycerin (Nitroglycerin 0.4 Mg Tab.Sl (Own Supply))  0.4 mg SL Q5M PRN


   PRN Reason: Chest Pain


   Last Admin: 02/19/21 11:11 Dose:  0.4 mg


   Documented by: 


Omeprazole (Omeprazole 20 Mg Cap.Cr)  20 mg PO DAILY@0700 [facility]


   Last Admin: 04/07/21 06:19 Dose:  20 mg


   Documented by: 


Ondansetron HCl (Ondansetron 4 Mg Tab.Dis (Own Supply))  4 mg PO Q4H PRN


   PRN Reason: Nausea/Vomiting


   Last Admin: 04/05/21 18:25 Dose:  4 mg


   Documented by: 


Pharmacy Consult (Warfarin Monthly Monitoring (+Prn))  1 each .XX ASDIRECTED [facility]


Polyethylene Glycol (Polyethylene Glycol 3350 Powder 17 Gm Packet)  17 gm PO 

DAILY PRN


   PRN Reason: Constipation


Ropinirole HCl (Ropinirole 0.5 Mg Tab (Own Supply))  0.5 mg PO BEDTIME [facility]


   Last Admin: 04/06/21 19:20 Dose:  0.5 mg


   Documented by: 


Senna/Docusate Sodium (Docusate Sodium/Sennosides 50-8.6 Mg Tab)  1 tab PO DAILY

PRN


   PRN Reason: Constipation


Sertraline HCl (Sertraline 100 Mg Tab (Own Supply))  100 mg PO DAILY [facility]


   Last Admin: 04/07/21 08:09 Dose:  100 mg


   Documented by: 


Vitamin B Complex (Vitamin B Complex Tab)  1 each PO DAILY [facility]


   Last Admin: 04/07/21 08:10 Dose:  1 each


   Documented by: 


Warfarin Sodium (Warfarin 5 Mg Tab (Own Supply))  5 mg PO SuTuThSa@2000 [facility]


   Last Admin: 04/06/21 19:19 Dose:  5 mg


   Documented by: 


Warfarin Sodium (Warfarin 5 Mg Tab (Own Supply))  2.5 mg PO MoWeFr@2000 [facility]


   Last Admin: 04/05/21 20:07 Dose:  2.5 mg


   Documented by: 





Discontinued Medications





Buspirone HCl (Buspirone 5 Mg Tab (Own Supply))  5 mg PO TID@0800,1300,2000 [facility]


   Last Admin: 03/10/21 09:19 Dose:  Not Given


   Documented by: 


Buspirone HCl (Buspirone 5 Mg Tab (Own Supply))  7.5 mg PO TID@0800,1300,2000 

[facility]


   Stop: 03/31/21 13:01


   Last Admin: 03/31/21 13:55 Dose:  7.5 mg


   Documented by: 


Buspirone HCl (Buspirone 5 Mg Tab (Own Supply))  5 mg PO BID [facility]


   Last Admin: 02/08/21 08:34 Dose:  5 mg


   Documented by: 


Ciprofloxacin (Ciprofloxacin 500 Mg Tab)  500 mg PO Q24H [facility]


   Stop: 10/22/20 20:01


   Last Admin: 10/22/20 19:24 Dose:  500 mg


   Documented by: 


Famotidine (Famotidine 20 Mg Tab)  20 mg PO DAILY [facility]


   Last Admin: 10/09/20 08:14 Dose:  20 mg


   Documented by: 


Furosemide (Furosemide 20 Mg Tab (Own Supply))  20 mg PO DAILY@1200 [facility]


   Stop: 12/06/20 12:01


   Last Admin: 12/06/20 12:16 Dose:  20 mg


   Documented by: 


Furosemide (Furosemide 20 Mg Tab)  20 mg PO BIDDIURETIC [facility]


   Stop: 04/02/21 23:59


Furosemide (Furosemide 20 Mg Tab (Own Supply))  20 mg PO DAILY [facility]


   Last Admin: 04/05/21 08:41 Dose:  20 mg


   Documented by: 


Furosemide (Furosemide 20 Mg Tab)  20 mg PO 0800,1400 [facility]


   Stop: 04/02/21 14:01


   Last Admin: 03/31/21 13:55 Dose:  20 mg


   Documented by: 


Furosemide (Furosemide 20 Mg Tab (Own Supply))  20 mg PO BID@0800,1400 [facility]


   Stop: 04/02/21 14:01


   Last Admin: 04/02/21 13:20 Dose:  20 mg


   Documented by: 


Furosemide (Furosemide 20 Mg Tab (Own Supply))  20 mg PO DAILY [facility]


   Last Admin: 03/28/21 08:04 Dose:  20 mg


   Documented by: 


Lactobacillus Rhamnosus (Lactobacillus Rhamnosus Gg (Probiotic) Cap)  1 cap PO 

DAILY [facility]


   Last Admin: 03/21/21 08:32 Dose:  1 cap


   Documented by: 


Nitrofurantoin Macrocrystals (Nitrofurantoin Monohydrate/Macrocrystalline 100 Mg

Cap)  100 mg PO BID [facility]


   Stop: 10/25/20 08:31


   Last Admin: 10/18/20 09:00 Dose:  100 mg


   Documented by: 


Nitrofurantoin Macrocrystals (Nitrofurantoin Monohydrate/Macrocrystalline 100 Mg

Cap)  100 mg PO BID [facility]


   Stop: 03/24/21 20:01


   Last Admin: 03/24/21 19:59 Dose:  100 mg


   Documented by: 


Omeprazole (Omeprazole 20 Mg Cap.Cr)  20 mg PO DAILY [facility]


   Last Admin: 10/09/20 08:14 Dose:  20 mg


   Documented by: 


Ondansetron HCl (Ondansetron 4 Mg Tab.Dis)  4 mg PO Q4H PRN


   PRN Reason: Nausea/Vomiting


Warfarin Sodium (Warfarin 5 Mg Tab (Own Med))  5 mg PO SuMoTuWeFrSa@2000 [facility]


   Last Admin: 02/21/21 19:22 Dose:  5 mg


   Documented by: 


Warfarin Sodium (Warfarin 5 Mg Tab (Own Med))  2.5 mg PO @2000 [facility]


   Last Admin: 02/18/21 19:28 Dose:  2.5 mg


   Documented by: 











- Exam


Quality Assessment: Supplemental Oxygen


General: Alert, Oriented


HEENT: EOMI, Mucous Membr. Moist/Pink


Neck: Supple


Lungs: Clear to Auscultation, Normal Respiratory Effort


Cardiovascular: Regular Rate, Regular Rhythm


GI/Abdominal Exam: Normal Bowel Sounds, Soft, No Distention


Extremities: Pedal Edema (1+ to mid-shin bilaterally, tender to palpation)


Skin: Warm, Dry


Wound/Incisions: Healing Well (skin tears on upper extremities healing well, 

open wound to RLE about 3x3cm, superficial with good granulation tissue at the 

base, no surrounding erythema/edema)


Psy/Mental Status: Alert, Depressed





- Patient Data


Result Diagrams: 


                                 04/01/21 06:30





                                 03/31/21 08:45





Sepsis Event Note





- Evaluation


Sepsis Screening Result: No Definite Risk





- Focused Exam


Vital Signs: 


                                   Vital Signs











  Temp Pulse BP


 


 04/07/21 08:08   80  118/54 L


 


 04/07/21 06:00  97.9 F  














- Problem List & Annotations


(1) Need for assistance with personal care


SNOMED Code(s): 99101640903312396


   Code(s): Z74.1 - NEED FOR ASSISTANCE WITH PERSONAL CARE   Status: Acute   C

urrent Visit: No   





(2) Depression


SNOMED Code(s): 03273594


   Code(s): F32.9 - MAJOR DEPRESSIVE DISORDER, SINGLE EPISODE, UNSPECIFIED   

Status: Chronic   Current Visit: No   





(3) Anxiety


SNOMED Code(s): 18205408


   Code(s): F41.9 - ANXIETY DISORDER, UNSPECIFIED   Status: Chronic   Current 

Visit: No   





(4) CKD (chronic kidney disease)


SNOMED Code(s): 973844208


   Code(s): N18.9 - CHRONIC KIDNEY DISEASE, UNSPECIFIED   Status: Chronic   

Current Visit: No   





(5) COPD (chronic obstructive pulmonary disease)


SNOMED Code(s): 43085554


   Code(s): J44.9 - CHRONIC OBSTRUCTIVE PULMONARY DISEASE, UNSPECIFIED   Status:

Chronic   Current Visit: No   





(6) Diastolic HF (heart failure)


SNOMED Code(s): 275423828


   Code(s): I50.30 - UNSPECIFIED DIASTOLIC (CONGESTIVE) HEART FAILURE   Status: 

Chronic   Current Visit: No   





(7) GERD (gastroesophageal reflux disease)


SNOMED Code(s): 537238697


   Code(s): K21.9 - GASTRO-ESOPHAGEAL REFLUX DISEASE WITHOUT ESOPHAGITIS   

Status: Chronic   Current Visit: No   





(8) Hypertension


SNOMED Code(s): 67614208


   Code(s): I10 - ESSENTIAL (PRIMARY) HYPERTENSION   Status: Chronic   Current 

Visit: No   





- Problem List Review


Problem List Initiated/Reviewed/Updated: Yes





- My Orders


Last 24 Hours: 


My Active Orders





04/19/21 07:00


INR,PT,PROTHROMBIN TIME [COAG] Q28D 





05/17/21 07:00


INR,PT,PROTHROMBIN TIME [COAG] Q28D 





06/14/21 07:00


INR,PT,PROTHROMBIN TIME [COAG] Q28D 














- Assessment


Assessment:: 


Failure to Thrive


Chronic Leg Weakness


- Patient is long-term swing bed stay for assistance with ADLs due to the above


Plan:


- Continue regular daily care is as previously prescribed


- Continue physical therapy as previously prescribed


- Encouraged patient to leave room 3 times a day; self-propel in the wheelchair 

(walk-along)





Leg wound


Diastolic CHF


- Lasix increased to BID 


- Leg wound open at this time


Plan:


- Staff to continue wound cares


- Discussed use of compression stockings/ACE, patient refuses


- Continue Lasix BID


- BMP next week to check renal function/electrolytes





Depression


- Patients continues to exhibit depressive symptoms, reports slightly improved


Plan:


- Continue Buspar 7.5mg TID


- Continue Zoloft at 100mg daily


- Continue Vitamin D


- Encouraged increased social interactions on the unit, patient instructed to 

leave room 3x a day even if this entails her 'walking' the unit in a wheelchair


- Reassess next month





Breast Cancer, s/p lumpectomy - plan for follow-up with oncology as previously 

planned (repeat breast exam every 6 months, f/u visit with one in Aug 2021 with 

mammo)


HTN - continue metoprolol 25mg daily


GERD - continue omeprazole 20mg daily, prn Pepcid 20mg, prn Tums


Anxiety/Depression - continue Zoloft 100mg daily, BuSpar 7.5mg TID, Vitamin D


Angina - continue Imdur 30mg daily, prn Nitro


Restless legs - Requip 0.5mg at bedtime


Chronic pain - Gabapentin 100mg am and noon, 300mg at supper


Hx DVT - continue Warfarin, serial INR per pharmacy


Peripheral edema - continue Lasix 20mg BID


Constipation - continue Colace 100mg dialy, prn miralax


Fe-def anemia - continue iron 325mg e/o day


Insomnia - continue melatonin 3mg at bedtime


Chronic respiratory failure - continue supplemental O2


Fungal rash - continue prn miconazole

## 2021-04-08 RX ADMIN — BUSPIRONE HYDROCHLORIDE SCH MG: 15 TABLET ORAL at 19:54

## 2021-04-08 RX ADMIN — WARFARIN SODIUM SCH MG: 5 TABLET ORAL at 19:55

## 2021-04-08 RX ADMIN — GABAPENTIN SCH MG: 100 CAPSULE ORAL at 07:50

## 2021-04-08 RX ADMIN — FUROSEMIDE SCH MG: 20 TABLET ORAL at 07:46

## 2021-04-08 RX ADMIN — FUROSEMIDE SCH MG: 20 TABLET ORAL at 12:21

## 2021-04-08 RX ADMIN — OMEPRAZOLE SCH MG: 20 CAPSULE, DELAYED RELEASE ORAL at 06:05

## 2021-04-08 RX ADMIN — GABAPENTIN SCH MG: 300 CAPSULE ORAL at 19:57

## 2021-04-08 RX ADMIN — Medication SCH APPLIC: at 20:01

## 2021-04-08 RX ADMIN — Medication SCH APPLIC: at 07:53

## 2021-04-08 RX ADMIN — GABAPENTIN SCH MG: 100 CAPSULE ORAL at 12:21

## 2021-04-08 RX ADMIN — BUSPIRONE HYDROCHLORIDE SCH MG: 15 TABLET ORAL at 07:46

## 2021-04-08 RX ADMIN — SERTRALINE HYDROCHLORIDE SCH MG: 100 TABLET ORAL at 07:50

## 2021-04-08 RX ADMIN — METOPROLOL SUCCINATE SCH MG: 25 TABLET, EXTENDED RELEASE ORAL at 07:51

## 2021-04-08 RX ADMIN — ISOSORBIDE MONONITRATE SCH MG: 30 TABLET, FILM COATED, EXTENDED RELEASE ORAL at 07:50

## 2021-04-08 RX ADMIN — BUSPIRONE HYDROCHLORIDE SCH MG: 15 TABLET ORAL at 12:21

## 2021-04-08 RX ADMIN — Medication SCH CAP: at 06:05

## 2021-04-08 RX ADMIN — MAGNESIUM OXIDE TAB 400 MG (241.3 MG ELEMENTAL MG) SCH TAB: 400 (241.3 MG) TAB at 07:52

## 2021-04-08 RX ADMIN — ROPINIROLE SCH MG: 0.5 TABLET ORAL at 19:55

## 2021-04-08 RX ADMIN — CHOLECALCIFEROL TAB 10 MCG (400 UNIT) SCH MCG: 10 TAB at 07:52

## 2021-04-09 RX ADMIN — GABAPENTIN SCH MG: 300 CAPSULE ORAL at 19:26

## 2021-04-09 RX ADMIN — MAGNESIUM OXIDE TAB 400 MG (241.3 MG ELEMENTAL MG) SCH TAB: 400 (241.3 MG) TAB at 07:18

## 2021-04-09 RX ADMIN — WARFARIN SODIUM SCH MG: 5 TABLET ORAL at 19:21

## 2021-04-09 RX ADMIN — ROPINIROLE SCH MG: 0.5 TABLET ORAL at 19:21

## 2021-04-09 RX ADMIN — BUSPIRONE HYDROCHLORIDE SCH MG: 15 TABLET ORAL at 19:21

## 2021-04-09 RX ADMIN — GABAPENTIN SCH MG: 100 CAPSULE ORAL at 07:20

## 2021-04-09 RX ADMIN — Medication SCH APPLIC: at 19:22

## 2021-04-09 RX ADMIN — BUSPIRONE HYDROCHLORIDE SCH MG: 15 TABLET ORAL at 07:16

## 2021-04-09 RX ADMIN — OMEPRAZOLE SCH MG: 20 CAPSULE, DELAYED RELEASE ORAL at 06:00

## 2021-04-09 RX ADMIN — Medication SCH APPLIC: at 07:20

## 2021-04-09 RX ADMIN — METOPROLOL SUCCINATE SCH MG: 25 TABLET, EXTENDED RELEASE ORAL at 07:17

## 2021-04-09 RX ADMIN — FUROSEMIDE SCH MG: 20 TABLET ORAL at 07:16

## 2021-04-09 RX ADMIN — Medication SCH CAP: at 06:00

## 2021-04-09 RX ADMIN — FUROSEMIDE SCH MG: 20 TABLET ORAL at 11:59

## 2021-04-09 RX ADMIN — CHOLECALCIFEROL TAB 10 MCG (400 UNIT) SCH MCG: 10 TAB at 07:18

## 2021-04-09 RX ADMIN — BUSPIRONE HYDROCHLORIDE SCH MG: 15 TABLET ORAL at 11:59

## 2021-04-09 RX ADMIN — ISOSORBIDE MONONITRATE SCH MG: 30 TABLET, FILM COATED, EXTENDED RELEASE ORAL at 07:17

## 2021-04-09 RX ADMIN — GABAPENTIN SCH MG: 100 CAPSULE ORAL at 12:00

## 2021-04-09 RX ADMIN — SERTRALINE HYDROCHLORIDE SCH MG: 100 TABLET ORAL at 07:17

## 2021-04-10 RX ADMIN — OMEPRAZOLE SCH MG: 20 CAPSULE, DELAYED RELEASE ORAL at 06:20

## 2021-04-10 RX ADMIN — Medication SCH APPLIC: at 08:24

## 2021-04-10 RX ADMIN — FUROSEMIDE SCH MG: 20 TABLET ORAL at 08:27

## 2021-04-10 RX ADMIN — BUSPIRONE HYDROCHLORIDE SCH MG: 15 TABLET ORAL at 08:26

## 2021-04-10 RX ADMIN — FUROSEMIDE SCH MG: 20 TABLET ORAL at 16:03

## 2021-04-10 RX ADMIN — WARFARIN SODIUM SCH MG: 5 TABLET ORAL at 19:34

## 2021-04-10 RX ADMIN — MAGNESIUM OXIDE TAB 400 MG (241.3 MG ELEMENTAL MG) SCH TAB: 400 (241.3 MG) TAB at 08:26

## 2021-04-10 RX ADMIN — BUSPIRONE HYDROCHLORIDE SCH MG: 15 TABLET ORAL at 16:03

## 2021-04-10 RX ADMIN — ROPINIROLE SCH MG: 0.5 TABLET ORAL at 19:35

## 2021-04-10 RX ADMIN — SERTRALINE HYDROCHLORIDE SCH MG: 100 TABLET ORAL at 08:32

## 2021-04-10 RX ADMIN — BUSPIRONE HYDROCHLORIDE SCH MG: 15 TABLET ORAL at 19:36

## 2021-04-10 RX ADMIN — GABAPENTIN SCH MG: 100 CAPSULE ORAL at 16:02

## 2021-04-10 RX ADMIN — Medication SCH APPLIC: at 19:36

## 2021-04-10 RX ADMIN — GABAPENTIN SCH MG: 300 CAPSULE ORAL at 19:34

## 2021-04-10 RX ADMIN — GABAPENTIN SCH MG: 100 CAPSULE ORAL at 08:24

## 2021-04-10 RX ADMIN — Medication SCH CAP: at 06:20

## 2021-04-10 RX ADMIN — METOPROLOL SUCCINATE SCH MG: 25 TABLET, EXTENDED RELEASE ORAL at 08:31

## 2021-04-10 RX ADMIN — ISOSORBIDE MONONITRATE SCH MG: 30 TABLET, FILM COATED, EXTENDED RELEASE ORAL at 08:27

## 2021-04-10 RX ADMIN — CHOLECALCIFEROL TAB 10 MCG (400 UNIT) SCH MCG: 10 TAB at 08:25

## 2021-04-11 RX ADMIN — METOPROLOL SUCCINATE SCH MG: 25 TABLET, EXTENDED RELEASE ORAL at 08:32

## 2021-04-11 RX ADMIN — WARFARIN SODIUM SCH MG: 5 TABLET ORAL at 19:26

## 2021-04-11 RX ADMIN — BUSPIRONE HYDROCHLORIDE SCH MG: 15 TABLET ORAL at 13:03

## 2021-04-11 RX ADMIN — SERTRALINE HYDROCHLORIDE SCH MG: 100 TABLET ORAL at 08:32

## 2021-04-11 RX ADMIN — BUSPIRONE HYDROCHLORIDE SCH MG: 15 TABLET ORAL at 08:31

## 2021-04-11 RX ADMIN — GABAPENTIN SCH MG: 100 CAPSULE ORAL at 08:30

## 2021-04-11 RX ADMIN — GABAPENTIN SCH MG: 300 CAPSULE ORAL at 19:26

## 2021-04-11 RX ADMIN — MAGNESIUM OXIDE TAB 400 MG (241.3 MG ELEMENTAL MG) SCH TAB: 400 (241.3 MG) TAB at 08:33

## 2021-04-11 RX ADMIN — FUROSEMIDE SCH MG: 20 TABLET ORAL at 13:03

## 2021-04-11 RX ADMIN — ISOSORBIDE MONONITRATE SCH MG: 30 TABLET, FILM COATED, EXTENDED RELEASE ORAL at 08:31

## 2021-04-11 RX ADMIN — Medication SCH APPLIC: at 19:27

## 2021-04-11 RX ADMIN — FUROSEMIDE SCH MG: 20 TABLET ORAL at 08:31

## 2021-04-11 RX ADMIN — ROPINIROLE SCH MG: 0.5 TABLET ORAL at 19:26

## 2021-04-11 RX ADMIN — Medication SCH APPLIC: at 08:36

## 2021-04-11 RX ADMIN — Medication SCH CAP: at 06:05

## 2021-04-11 RX ADMIN — GABAPENTIN SCH MG: 100 CAPSULE ORAL at 13:03

## 2021-04-11 RX ADMIN — CHOLECALCIFEROL TAB 10 MCG (400 UNIT) SCH MCG: 10 TAB at 08:33

## 2021-04-11 RX ADMIN — OMEPRAZOLE SCH MG: 20 CAPSULE, DELAYED RELEASE ORAL at 06:05

## 2021-04-11 RX ADMIN — BUSPIRONE HYDROCHLORIDE SCH MG: 15 TABLET ORAL at 19:25

## 2021-04-12 LAB
ANION GAP SERPL CALC-SCNC: 5.9 MMOL/L (ref 5–15)
CHLORIDE SERPL-SCNC: 107 MMOL/L (ref 98–107)
SODIUM SERPL-SCNC: 148 MMOL/L (ref 136–145)

## 2021-04-12 RX ADMIN — GABAPENTIN SCH MG: 100 CAPSULE ORAL at 07:34

## 2021-04-12 RX ADMIN — BUSPIRONE HYDROCHLORIDE SCH MG: 15 TABLET ORAL at 07:35

## 2021-04-12 RX ADMIN — METOPROLOL SUCCINATE SCH MG: 25 TABLET, EXTENDED RELEASE ORAL at 07:36

## 2021-04-12 RX ADMIN — BUSPIRONE HYDROCHLORIDE SCH MG: 15 TABLET ORAL at 12:30

## 2021-04-12 RX ADMIN — Medication SCH APPLIC: at 19:43

## 2021-04-12 RX ADMIN — FUROSEMIDE SCH MG: 20 TABLET ORAL at 12:31

## 2021-04-12 RX ADMIN — SERTRALINE HYDROCHLORIDE SCH MG: 100 TABLET ORAL at 07:36

## 2021-04-12 RX ADMIN — Medication SCH APPLIC: at 07:38

## 2021-04-12 RX ADMIN — GABAPENTIN SCH MG: 100 CAPSULE ORAL at 12:29

## 2021-04-12 RX ADMIN — WARFARIN SODIUM SCH MG: 5 TABLET ORAL at 19:40

## 2021-04-12 RX ADMIN — OMEPRAZOLE SCH MG: 20 CAPSULE, DELAYED RELEASE ORAL at 06:03

## 2021-04-12 RX ADMIN — GABAPENTIN SCH MG: 300 CAPSULE ORAL at 19:40

## 2021-04-12 RX ADMIN — ISOSORBIDE MONONITRATE SCH MG: 30 TABLET, FILM COATED, EXTENDED RELEASE ORAL at 07:35

## 2021-04-12 RX ADMIN — BUSPIRONE HYDROCHLORIDE SCH MG: 15 TABLET ORAL at 19:43

## 2021-04-12 RX ADMIN — FUROSEMIDE SCH MG: 20 TABLET ORAL at 07:35

## 2021-04-12 RX ADMIN — ROPINIROLE SCH MG: 0.5 TABLET ORAL at 19:40

## 2021-04-12 RX ADMIN — CHOLECALCIFEROL TAB 10 MCG (400 UNIT) SCH MCG: 10 TAB at 07:37

## 2021-04-12 RX ADMIN — Medication SCH CAP: at 06:03

## 2021-04-12 RX ADMIN — MAGNESIUM OXIDE TAB 400 MG (241.3 MG ELEMENTAL MG) SCH TAB: 400 (241.3 MG) TAB at 07:37

## 2021-04-13 RX ADMIN — BUSPIRONE HYDROCHLORIDE SCH MG: 15 TABLET ORAL at 13:24

## 2021-04-13 RX ADMIN — Medication SCH APPLIC: at 19:38

## 2021-04-13 RX ADMIN — GABAPENTIN SCH MG: 300 CAPSULE ORAL at 19:39

## 2021-04-13 RX ADMIN — GABAPENTIN SCH MG: 100 CAPSULE ORAL at 13:23

## 2021-04-13 RX ADMIN — MAGNESIUM OXIDE TAB 400 MG (241.3 MG ELEMENTAL MG) SCH TAB: 400 (241.3 MG) TAB at 07:46

## 2021-04-13 RX ADMIN — CHOLECALCIFEROL TAB 10 MCG (400 UNIT) SCH MCG: 10 TAB at 07:45

## 2021-04-13 RX ADMIN — GABAPENTIN SCH MG: 100 CAPSULE ORAL at 07:46

## 2021-04-13 RX ADMIN — FUROSEMIDE SCH MG: 20 TABLET ORAL at 07:46

## 2021-04-13 RX ADMIN — BUSPIRONE HYDROCHLORIDE SCH MG: 15 TABLET ORAL at 19:41

## 2021-04-13 RX ADMIN — ROPINIROLE SCH MG: 0.5 TABLET ORAL at 19:40

## 2021-04-13 RX ADMIN — BUSPIRONE HYDROCHLORIDE SCH MG: 15 TABLET ORAL at 07:46

## 2021-04-13 RX ADMIN — OMEPRAZOLE SCH MG: 20 CAPSULE, DELAYED RELEASE ORAL at 06:02

## 2021-04-13 RX ADMIN — Medication SCH CAP: at 06:02

## 2021-04-13 RX ADMIN — METOPROLOL SUCCINATE SCH MG: 25 TABLET, EXTENDED RELEASE ORAL at 07:47

## 2021-04-13 RX ADMIN — FUROSEMIDE SCH MG: 20 TABLET ORAL at 13:23

## 2021-04-13 RX ADMIN — SERTRALINE HYDROCHLORIDE SCH MG: 100 TABLET ORAL at 07:47

## 2021-04-13 RX ADMIN — Medication SCH APPLIC: at 07:48

## 2021-04-13 RX ADMIN — ISOSORBIDE MONONITRATE SCH MG: 30 TABLET, FILM COATED, EXTENDED RELEASE ORAL at 07:47

## 2021-04-13 RX ADMIN — WARFARIN SODIUM SCH MG: 5 TABLET ORAL at 19:36

## 2021-04-14 RX ADMIN — GABAPENTIN SCH MG: 100 CAPSULE ORAL at 08:28

## 2021-04-14 RX ADMIN — GABAPENTIN SCH MG: 300 CAPSULE ORAL at 19:55

## 2021-04-14 RX ADMIN — CHOLECALCIFEROL TAB 10 MCG (400 UNIT) SCH MCG: 10 TAB at 08:31

## 2021-04-14 RX ADMIN — FUROSEMIDE SCH MG: 20 TABLET ORAL at 12:56

## 2021-04-14 RX ADMIN — BUSPIRONE HYDROCHLORIDE SCH MG: 15 TABLET ORAL at 08:30

## 2021-04-14 RX ADMIN — GABAPENTIN SCH MG: 100 CAPSULE ORAL at 12:57

## 2021-04-14 RX ADMIN — ISOSORBIDE MONONITRATE SCH MG: 30 TABLET, FILM COATED, EXTENDED RELEASE ORAL at 08:30

## 2021-04-14 RX ADMIN — Medication SCH APPLIC: at 19:54

## 2021-04-14 RX ADMIN — Medication SCH APPLIC: at 08:32

## 2021-04-14 RX ADMIN — Medication SCH CAP: at 06:41

## 2021-04-14 RX ADMIN — FUROSEMIDE SCH MG: 20 TABLET ORAL at 08:30

## 2021-04-14 RX ADMIN — BUSPIRONE HYDROCHLORIDE SCH MG: 15 TABLET ORAL at 12:56

## 2021-04-14 RX ADMIN — SERTRALINE HYDROCHLORIDE SCH MG: 100 TABLET ORAL at 08:30

## 2021-04-14 RX ADMIN — OMEPRAZOLE SCH MG: 20 CAPSULE, DELAYED RELEASE ORAL at 06:41

## 2021-04-14 RX ADMIN — METOPROLOL SUCCINATE SCH MG: 25 TABLET, EXTENDED RELEASE ORAL at 08:29

## 2021-04-14 RX ADMIN — ROPINIROLE SCH MG: 0.5 TABLET ORAL at 19:53

## 2021-04-14 RX ADMIN — WARFARIN SODIUM SCH MG: 5 TABLET ORAL at 19:54

## 2021-04-14 RX ADMIN — MAGNESIUM OXIDE TAB 400 MG (241.3 MG ELEMENTAL MG) SCH TAB: 400 (241.3 MG) TAB at 08:31

## 2021-04-14 RX ADMIN — BUSPIRONE HYDROCHLORIDE SCH MG: 15 TABLET ORAL at 19:54

## 2021-04-15 RX ADMIN — OMEPRAZOLE SCH MG: 20 CAPSULE, DELAYED RELEASE ORAL at 06:32

## 2021-04-15 RX ADMIN — GABAPENTIN SCH MG: 100 CAPSULE ORAL at 13:07

## 2021-04-15 RX ADMIN — FUROSEMIDE SCH MG: 20 TABLET ORAL at 13:07

## 2021-04-15 RX ADMIN — ISOSORBIDE MONONITRATE SCH MG: 30 TABLET, FILM COATED, EXTENDED RELEASE ORAL at 08:30

## 2021-04-15 RX ADMIN — Medication SCH APPLIC: at 19:46

## 2021-04-15 RX ADMIN — ROPINIROLE SCH MG: 0.5 TABLET ORAL at 19:46

## 2021-04-15 RX ADMIN — BUSPIRONE HYDROCHLORIDE SCH MG: 15 TABLET ORAL at 19:46

## 2021-04-15 RX ADMIN — BUSPIRONE HYDROCHLORIDE SCH MG: 15 TABLET ORAL at 08:29

## 2021-04-15 RX ADMIN — SERTRALINE HYDROCHLORIDE SCH MG: 100 TABLET ORAL at 08:30

## 2021-04-15 RX ADMIN — Medication SCH CAP: at 06:32

## 2021-04-15 RX ADMIN — GABAPENTIN SCH MG: 100 CAPSULE ORAL at 08:30

## 2021-04-15 RX ADMIN — CHOLECALCIFEROL TAB 10 MCG (400 UNIT) SCH MCG: 10 TAB at 08:32

## 2021-04-15 RX ADMIN — BUSPIRONE HYDROCHLORIDE SCH MG: 15 TABLET ORAL at 13:07

## 2021-04-15 RX ADMIN — GABAPENTIN SCH MG: 300 CAPSULE ORAL at 19:44

## 2021-04-15 RX ADMIN — FUROSEMIDE SCH MG: 20 TABLET ORAL at 08:31

## 2021-04-15 RX ADMIN — Medication SCH APPLIC: at 08:33

## 2021-04-15 RX ADMIN — METOPROLOL SUCCINATE SCH MG: 25 TABLET, EXTENDED RELEASE ORAL at 08:31

## 2021-04-15 RX ADMIN — WARFARIN SODIUM SCH MG: 5 TABLET ORAL at 19:45

## 2021-04-15 RX ADMIN — MAGNESIUM OXIDE TAB 400 MG (241.3 MG ELEMENTAL MG) SCH TAB: 400 (241.3 MG) TAB at 08:33

## 2021-04-16 RX ADMIN — CHOLECALCIFEROL TAB 10 MCG (400 UNIT) SCH MCG: 10 TAB at 07:53

## 2021-04-16 RX ADMIN — GABAPENTIN SCH MG: 100 CAPSULE ORAL at 12:22

## 2021-04-16 RX ADMIN — FUROSEMIDE SCH MG: 20 TABLET ORAL at 07:55

## 2021-04-16 RX ADMIN — ISOSORBIDE MONONITRATE SCH MG: 30 TABLET, FILM COATED, EXTENDED RELEASE ORAL at 07:55

## 2021-04-16 RX ADMIN — GABAPENTIN SCH MG: 300 CAPSULE ORAL at 19:34

## 2021-04-16 RX ADMIN — SERTRALINE HYDROCHLORIDE SCH MG: 100 TABLET ORAL at 07:55

## 2021-04-16 RX ADMIN — BUSPIRONE HYDROCHLORIDE SCH MG: 15 TABLET ORAL at 07:55

## 2021-04-16 RX ADMIN — METOPROLOL SUCCINATE SCH MG: 25 TABLET, EXTENDED RELEASE ORAL at 07:54

## 2021-04-16 RX ADMIN — ROPINIROLE SCH MG: 0.5 TABLET ORAL at 19:34

## 2021-04-16 RX ADMIN — Medication SCH APPLIC: at 19:33

## 2021-04-16 RX ADMIN — GABAPENTIN SCH MG: 100 CAPSULE ORAL at 07:56

## 2021-04-16 RX ADMIN — WARFARIN SODIUM SCH MG: 5 TABLET ORAL at 19:33

## 2021-04-16 RX ADMIN — BUSPIRONE HYDROCHLORIDE SCH MG: 15 TABLET ORAL at 19:32

## 2021-04-16 RX ADMIN — FUROSEMIDE SCH MG: 20 TABLET ORAL at 12:22

## 2021-04-16 RX ADMIN — OMEPRAZOLE SCH MG: 20 CAPSULE, DELAYED RELEASE ORAL at 06:43

## 2021-04-16 RX ADMIN — MAGNESIUM OXIDE TAB 400 MG (241.3 MG ELEMENTAL MG) SCH TAB: 400 (241.3 MG) TAB at 07:54

## 2021-04-16 RX ADMIN — Medication SCH CAP: at 06:43

## 2021-04-16 RX ADMIN — Medication SCH APPLIC: at 07:56

## 2021-04-16 RX ADMIN — BUSPIRONE HYDROCHLORIDE SCH MG: 15 TABLET ORAL at 12:22

## 2021-04-17 RX ADMIN — METOPROLOL SUCCINATE SCH MG: 25 TABLET, EXTENDED RELEASE ORAL at 08:06

## 2021-04-17 RX ADMIN — GABAPENTIN SCH MG: 300 CAPSULE ORAL at 19:16

## 2021-04-17 RX ADMIN — WARFARIN SODIUM SCH MG: 5 TABLET ORAL at 19:15

## 2021-04-17 RX ADMIN — GABAPENTIN SCH MG: 100 CAPSULE ORAL at 12:22

## 2021-04-17 RX ADMIN — Medication SCH APPLIC: at 19:16

## 2021-04-17 RX ADMIN — CHOLECALCIFEROL TAB 10 MCG (400 UNIT) SCH MCG: 10 TAB at 08:07

## 2021-04-17 RX ADMIN — Medication SCH APPLIC: at 08:06

## 2021-04-17 RX ADMIN — FUROSEMIDE SCH MG: 20 TABLET ORAL at 12:23

## 2021-04-17 RX ADMIN — GABAPENTIN SCH MG: 100 CAPSULE ORAL at 08:05

## 2021-04-17 RX ADMIN — ROPINIROLE SCH MG: 0.5 TABLET ORAL at 19:16

## 2021-04-17 RX ADMIN — BUSPIRONE HYDROCHLORIDE SCH MG: 15 TABLET ORAL at 19:14

## 2021-04-17 RX ADMIN — SERTRALINE HYDROCHLORIDE SCH MG: 100 TABLET ORAL at 08:06

## 2021-04-17 RX ADMIN — FUROSEMIDE SCH MG: 20 TABLET ORAL at 08:05

## 2021-04-17 RX ADMIN — OMEPRAZOLE SCH MG: 20 CAPSULE, DELAYED RELEASE ORAL at 06:37

## 2021-04-17 RX ADMIN — BUSPIRONE HYDROCHLORIDE SCH MG: 15 TABLET ORAL at 12:23

## 2021-04-17 RX ADMIN — ISOSORBIDE MONONITRATE SCH MG: 30 TABLET, FILM COATED, EXTENDED RELEASE ORAL at 08:06

## 2021-04-17 RX ADMIN — MAGNESIUM OXIDE TAB 400 MG (241.3 MG ELEMENTAL MG) SCH TAB: 400 (241.3 MG) TAB at 08:07

## 2021-04-17 RX ADMIN — Medication SCH CAP: at 06:37

## 2021-04-17 RX ADMIN — BUSPIRONE HYDROCHLORIDE SCH MG: 15 TABLET ORAL at 08:05

## 2021-04-18 RX ADMIN — GABAPENTIN SCH MG: 300 CAPSULE ORAL at 20:22

## 2021-04-18 RX ADMIN — ISOSORBIDE MONONITRATE SCH MG: 30 TABLET, FILM COATED, EXTENDED RELEASE ORAL at 08:06

## 2021-04-18 RX ADMIN — Medication SCH CAP: at 06:27

## 2021-04-18 RX ADMIN — MAGNESIUM OXIDE TAB 400 MG (241.3 MG ELEMENTAL MG) SCH TAB: 400 (241.3 MG) TAB at 08:06

## 2021-04-18 RX ADMIN — ROPINIROLE SCH MG: 0.5 TABLET ORAL at 20:22

## 2021-04-18 RX ADMIN — BUSPIRONE HYDROCHLORIDE SCH MG: 15 TABLET ORAL at 20:21

## 2021-04-18 RX ADMIN — FUROSEMIDE SCH MG: 20 TABLET ORAL at 08:06

## 2021-04-18 RX ADMIN — SERTRALINE HYDROCHLORIDE SCH MG: 100 TABLET ORAL at 08:07

## 2021-04-18 RX ADMIN — Medication SCH APPLIC: at 20:22

## 2021-04-18 RX ADMIN — OMEPRAZOLE SCH MG: 20 CAPSULE, DELAYED RELEASE ORAL at 06:27

## 2021-04-18 RX ADMIN — METOPROLOL SUCCINATE SCH MG: 25 TABLET, EXTENDED RELEASE ORAL at 08:07

## 2021-04-18 RX ADMIN — BUSPIRONE HYDROCHLORIDE SCH MG: 15 TABLET ORAL at 08:06

## 2021-04-18 RX ADMIN — FUROSEMIDE SCH MG: 20 TABLET ORAL at 13:35

## 2021-04-18 RX ADMIN — CHOLECALCIFEROL TAB 10 MCG (400 UNIT) SCH MCG: 10 TAB at 08:05

## 2021-04-18 RX ADMIN — GABAPENTIN SCH MG: 100 CAPSULE ORAL at 08:05

## 2021-04-18 RX ADMIN — GABAPENTIN SCH MG: 100 CAPSULE ORAL at 13:34

## 2021-04-18 RX ADMIN — BUSPIRONE HYDROCHLORIDE SCH MG: 15 TABLET ORAL at 13:35

## 2021-04-18 RX ADMIN — Medication SCH APPLIC: at 08:05

## 2021-04-18 RX ADMIN — WARFARIN SODIUM SCH MG: 5 TABLET ORAL at 20:21

## 2021-04-19 RX ADMIN — CHOLECALCIFEROL TAB 10 MCG (400 UNIT) SCH MCG: 10 TAB at 09:34

## 2021-04-19 RX ADMIN — BUSPIRONE HYDROCHLORIDE SCH MG: 15 TABLET ORAL at 09:35

## 2021-04-19 RX ADMIN — SERTRALINE HYDROCHLORIDE SCH MG: 100 TABLET ORAL at 09:36

## 2021-04-19 RX ADMIN — Medication SCH APPLIC: at 09:38

## 2021-04-19 RX ADMIN — Medication SCH CAP: at 06:08

## 2021-04-19 RX ADMIN — GABAPENTIN SCH MG: 100 CAPSULE ORAL at 14:10

## 2021-04-19 RX ADMIN — OMEPRAZOLE SCH MG: 20 CAPSULE, DELAYED RELEASE ORAL at 06:08

## 2021-04-19 RX ADMIN — BUSPIRONE HYDROCHLORIDE SCH MG: 15 TABLET ORAL at 21:19

## 2021-04-19 RX ADMIN — MAGNESIUM OXIDE TAB 400 MG (241.3 MG ELEMENTAL MG) SCH TAB: 400 (241.3 MG) TAB at 09:35

## 2021-04-19 RX ADMIN — BUSPIRONE HYDROCHLORIDE SCH MG: 15 TABLET ORAL at 14:10

## 2021-04-19 RX ADMIN — ROPINIROLE SCH MG: 0.5 TABLET ORAL at 21:20

## 2021-04-19 RX ADMIN — FUROSEMIDE SCH MG: 20 TABLET ORAL at 09:35

## 2021-04-19 RX ADMIN — Medication SCH APPLIC: at 21:20

## 2021-04-19 RX ADMIN — ISOSORBIDE MONONITRATE SCH MG: 30 TABLET, FILM COATED, EXTENDED RELEASE ORAL at 09:36

## 2021-04-19 RX ADMIN — GABAPENTIN SCH MG: 100 CAPSULE ORAL at 09:33

## 2021-04-19 RX ADMIN — METOPROLOL SUCCINATE SCH MG: 25 TABLET, EXTENDED RELEASE ORAL at 09:35

## 2021-04-19 RX ADMIN — FUROSEMIDE SCH MG: 20 TABLET ORAL at 14:10

## 2021-04-19 RX ADMIN — GABAPENTIN SCH MG: 300 CAPSULE ORAL at 21:21

## 2021-04-20 RX ADMIN — METOPROLOL SUCCINATE SCH MG: 25 TABLET, EXTENDED RELEASE ORAL at 09:32

## 2021-04-20 RX ADMIN — GABAPENTIN SCH MG: 300 CAPSULE ORAL at 20:17

## 2021-04-20 RX ADMIN — Medication SCH CAP: at 06:04

## 2021-04-20 RX ADMIN — BUSPIRONE HYDROCHLORIDE SCH MG: 15 TABLET ORAL at 09:32

## 2021-04-20 RX ADMIN — Medication SCH APPLIC: at 20:18

## 2021-04-20 RX ADMIN — BUSPIRONE HYDROCHLORIDE SCH MG: 15 TABLET ORAL at 14:41

## 2021-04-20 RX ADMIN — FUROSEMIDE SCH MG: 20 TABLET ORAL at 09:32

## 2021-04-20 RX ADMIN — ISOSORBIDE MONONITRATE SCH MG: 30 TABLET, FILM COATED, EXTENDED RELEASE ORAL at 09:31

## 2021-04-20 RX ADMIN — GABAPENTIN SCH MG: 100 CAPSULE ORAL at 14:41

## 2021-04-20 RX ADMIN — SERTRALINE HYDROCHLORIDE SCH MG: 100 TABLET ORAL at 09:30

## 2021-04-20 RX ADMIN — Medication SCH APPLIC: at 09:35

## 2021-04-20 RX ADMIN — ROPINIROLE SCH MG: 0.5 TABLET ORAL at 20:16

## 2021-04-20 RX ADMIN — CEPHALEXIN SCH MG: 500 CAPSULE ORAL at 20:17

## 2021-04-20 RX ADMIN — OMEPRAZOLE SCH MG: 20 CAPSULE, DELAYED RELEASE ORAL at 06:04

## 2021-04-20 RX ADMIN — CHOLECALCIFEROL TAB 10 MCG (400 UNIT) SCH MCG: 10 TAB at 09:33

## 2021-04-20 RX ADMIN — FUROSEMIDE SCH MG: 20 TABLET ORAL at 14:42

## 2021-04-20 RX ADMIN — CEPHALEXIN SCH MG: 500 CAPSULE ORAL at 09:33

## 2021-04-20 RX ADMIN — MAGNESIUM OXIDE TAB 400 MG (241.3 MG ELEMENTAL MG) SCH TAB: 400 (241.3 MG) TAB at 09:33

## 2021-04-20 RX ADMIN — GABAPENTIN SCH MG: 100 CAPSULE ORAL at 09:34

## 2021-04-20 RX ADMIN — CEPHALEXIN SCH MG: 500 CAPSULE ORAL at 14:41

## 2021-04-20 RX ADMIN — BUSPIRONE HYDROCHLORIDE SCH MG: 15 TABLET ORAL at 20:15

## 2021-04-20 NOTE — PCM.SN.2
- Free Text/Narrative


Note: 





Paged by nursing regarding patient's RLE wound.  Concern for developing 

cellulitis.  Area of erythema marked 2 days ago, expanding past the border now. 

Slightly warm to the touch.  Ulcer worsening.





Upon evaluation the ulcer is deeper than before.  Erythema has expanded below 

border of markings.  Area slightly warm to the touch.





Will treat with Keflex 500mg qid for 7 days.

## 2021-04-21 RX ADMIN — FUROSEMIDE SCH MG: 20 TABLET ORAL at 13:37

## 2021-04-21 RX ADMIN — Medication SCH APPLIC: at 20:43

## 2021-04-21 RX ADMIN — FUROSEMIDE SCH MG: 20 TABLET ORAL at 09:13

## 2021-04-21 RX ADMIN — ROPINIROLE SCH MG: 0.5 TABLET ORAL at 20:43

## 2021-04-21 RX ADMIN — GABAPENTIN SCH MG: 300 CAPSULE ORAL at 20:44

## 2021-04-21 RX ADMIN — ISOSORBIDE MONONITRATE SCH MG: 30 TABLET, FILM COATED, EXTENDED RELEASE ORAL at 09:13

## 2021-04-21 RX ADMIN — CEPHALEXIN SCH MG: 500 CAPSULE ORAL at 20:43

## 2021-04-21 RX ADMIN — VITAMIN D, TAB 1000IU (100/BT) SCH MCG: 25 TAB at 13:37

## 2021-04-21 RX ADMIN — METOPROLOL SUCCINATE SCH MG: 25 TABLET, EXTENDED RELEASE ORAL at 09:13

## 2021-04-21 RX ADMIN — CHOLECALCIFEROL TAB 10 MCG (400 UNIT) SCH: 10 TAB at 13:53

## 2021-04-21 RX ADMIN — SERTRALINE HYDROCHLORIDE SCH MG: 100 TABLET ORAL at 09:14

## 2021-04-21 RX ADMIN — MAGNESIUM OXIDE TAB 400 MG (241.3 MG ELEMENTAL MG) SCH TAB: 400 (241.3 MG) TAB at 09:14

## 2021-04-21 RX ADMIN — CEPHALEXIN SCH MG: 500 CAPSULE ORAL at 06:39

## 2021-04-21 RX ADMIN — Medication SCH CAP: at 06:38

## 2021-04-21 RX ADMIN — GABAPENTIN SCH MG: 100 CAPSULE ORAL at 13:35

## 2021-04-21 RX ADMIN — GABAPENTIN SCH MG: 100 CAPSULE ORAL at 09:05

## 2021-04-21 RX ADMIN — WARFARIN SODIUM SCH MG: 5 TABLET ORAL at 20:43

## 2021-04-21 RX ADMIN — Medication SCH EACH: at 22:05

## 2021-04-21 RX ADMIN — BUSPIRONE HYDROCHLORIDE SCH MG: 15 TABLET ORAL at 09:06

## 2021-04-21 RX ADMIN — Medication SCH APPLIC: at 13:38

## 2021-04-21 RX ADMIN — CEPHALEXIN SCH MG: 500 CAPSULE ORAL at 01:00

## 2021-04-21 RX ADMIN — BUSPIRONE HYDROCHLORIDE SCH MG: 15 TABLET ORAL at 13:36

## 2021-04-21 RX ADMIN — OMEPRAZOLE SCH MG: 20 CAPSULE, DELAYED RELEASE ORAL at 06:38

## 2021-04-21 RX ADMIN — CEPHALEXIN SCH MG: 500 CAPSULE ORAL at 13:48

## 2021-04-21 RX ADMIN — BUSPIRONE HYDROCHLORIDE SCH MG: 15 TABLET ORAL at 20:43

## 2021-04-22 RX ADMIN — Medication SCH EACH: at 10:35

## 2021-04-22 RX ADMIN — BUSPIRONE HYDROCHLORIDE SCH MG: 15 TABLET ORAL at 08:50

## 2021-04-22 RX ADMIN — GABAPENTIN SCH MG: 100 CAPSULE ORAL at 13:22

## 2021-04-22 RX ADMIN — Medication SCH APPLIC: at 08:52

## 2021-04-22 RX ADMIN — METOPROLOL SUCCINATE SCH MG: 25 TABLET, EXTENDED RELEASE ORAL at 08:49

## 2021-04-22 RX ADMIN — MAGNESIUM OXIDE TAB 400 MG (241.3 MG ELEMENTAL MG) SCH TAB: 400 (241.3 MG) TAB at 08:51

## 2021-04-22 RX ADMIN — CEPHALEXIN SCH MG: 500 CAPSULE ORAL at 13:23

## 2021-04-22 RX ADMIN — ROPINIROLE SCH MG: 0.5 TABLET ORAL at 19:31

## 2021-04-22 RX ADMIN — ISOSORBIDE MONONITRATE SCH MG: 30 TABLET, FILM COATED, EXTENDED RELEASE ORAL at 08:49

## 2021-04-22 RX ADMIN — GABAPENTIN SCH MG: 300 CAPSULE ORAL at 19:29

## 2021-04-22 RX ADMIN — SERTRALINE HYDROCHLORIDE SCH MG: 100 TABLET ORAL at 08:50

## 2021-04-22 RX ADMIN — WARFARIN SODIUM SCH: 5 TABLET ORAL at 21:55

## 2021-04-22 RX ADMIN — FUROSEMIDE SCH MG: 20 TABLET ORAL at 13:22

## 2021-04-22 RX ADMIN — OMEPRAZOLE SCH MG: 20 CAPSULE, DELAYED RELEASE ORAL at 06:29

## 2021-04-22 RX ADMIN — VITAMIN D, TAB 1000IU (100/BT) SCH MCG: 25 TAB at 08:51

## 2021-04-22 RX ADMIN — BUSPIRONE HYDROCHLORIDE SCH MG: 15 TABLET ORAL at 13:22

## 2021-04-22 RX ADMIN — Medication SCH EACH: at 21:54

## 2021-04-22 RX ADMIN — Medication SCH APPLIC: at 19:30

## 2021-04-22 RX ADMIN — BUSPIRONE HYDROCHLORIDE SCH MG: 15 TABLET ORAL at 19:33

## 2021-04-22 RX ADMIN — CEPHALEXIN SCH MG: 500 CAPSULE ORAL at 08:50

## 2021-04-22 RX ADMIN — FUROSEMIDE SCH MG: 20 TABLET ORAL at 08:50

## 2021-04-22 RX ADMIN — CEPHALEXIN SCH MG: 500 CAPSULE ORAL at 19:32

## 2021-04-22 RX ADMIN — GABAPENTIN SCH MG: 100 CAPSULE ORAL at 08:51

## 2021-04-23 RX ADMIN — BUSPIRONE HYDROCHLORIDE SCH MG: 15 TABLET ORAL at 12:32

## 2021-04-23 RX ADMIN — WARFARIN SODIUM SCH MG: 5 TABLET ORAL at 19:52

## 2021-04-23 RX ADMIN — ISOSORBIDE MONONITRATE SCH MG: 30 TABLET, FILM COATED, EXTENDED RELEASE ORAL at 08:06

## 2021-04-23 RX ADMIN — CIPROFLOXACIN SCH MG: 500 TABLET, FILM COATED ORAL at 19:52

## 2021-04-23 RX ADMIN — GABAPENTIN SCH MG: 100 CAPSULE ORAL at 08:06

## 2021-04-23 RX ADMIN — GABAPENTIN SCH MG: 300 CAPSULE ORAL at 19:49

## 2021-04-23 RX ADMIN — Medication SCH APPLIC: at 08:08

## 2021-04-23 RX ADMIN — VITAMIN D, TAB 1000IU (100/BT) SCH MCG: 25 TAB at 08:08

## 2021-04-23 RX ADMIN — Medication SCH APPLIC: at 19:53

## 2021-04-23 RX ADMIN — GABAPENTIN SCH MG: 100 CAPSULE ORAL at 12:32

## 2021-04-23 RX ADMIN — CEPHALEXIN SCH MG: 500 CAPSULE ORAL at 08:06

## 2021-04-23 RX ADMIN — METOPROLOL SUCCINATE SCH MG: 25 TABLET, EXTENDED RELEASE ORAL at 08:08

## 2021-04-23 RX ADMIN — ROPINIROLE SCH MG: 0.5 TABLET ORAL at 19:51

## 2021-04-23 RX ADMIN — FUROSEMIDE SCH MG: 20 TABLET ORAL at 08:09

## 2021-04-23 RX ADMIN — Medication SCH EACH: at 21:52

## 2021-04-23 RX ADMIN — BUSPIRONE HYDROCHLORIDE SCH MG: 15 TABLET ORAL at 19:53

## 2021-04-23 RX ADMIN — OMEPRAZOLE SCH MG: 20 CAPSULE, DELAYED RELEASE ORAL at 06:04

## 2021-04-23 RX ADMIN — SERTRALINE HYDROCHLORIDE SCH MG: 100 TABLET ORAL at 08:07

## 2021-04-23 RX ADMIN — CIPROFLOXACIN SCH MG: 500 TABLET, FILM COATED ORAL at 09:55

## 2021-04-23 RX ADMIN — BUSPIRONE HYDROCHLORIDE SCH MG: 15 TABLET ORAL at 08:09

## 2021-04-23 RX ADMIN — Medication SCH EACH: at 10:31

## 2021-04-23 RX ADMIN — MAGNESIUM OXIDE TAB 400 MG (241.3 MG ELEMENTAL MG) SCH TAB: 400 (241.3 MG) TAB at 08:08

## 2021-04-23 RX ADMIN — FUROSEMIDE SCH MG: 20 TABLET ORAL at 12:32

## 2021-04-23 NOTE — PCM.SN.2
- Free Text/Narrative


Note: 


Wound culture has returned with pseudomonas, sensitivities back as well.





Will D/C kelfex and switch to Cipro 750mg BID for 10 days.  Nursing encouraged 

to clean the wound well daily.

## 2021-04-24 RX ADMIN — BUSPIRONE HYDROCHLORIDE SCH MG: 15 TABLET ORAL at 19:52

## 2021-04-24 RX ADMIN — VITAMIN D, TAB 1000IU (100/BT) SCH MCG: 25 TAB at 08:07

## 2021-04-24 RX ADMIN — Medication SCH APPLIC: at 08:10

## 2021-04-24 RX ADMIN — MAGNESIUM OXIDE TAB 400 MG (241.3 MG ELEMENTAL MG) SCH TAB: 400 (241.3 MG) TAB at 08:07

## 2021-04-24 RX ADMIN — FUROSEMIDE SCH MG: 20 TABLET ORAL at 12:10

## 2021-04-24 RX ADMIN — CIPROFLOXACIN SCH MG: 500 TABLET, FILM COATED ORAL at 19:53

## 2021-04-24 RX ADMIN — OMEPRAZOLE SCH MG: 20 CAPSULE, DELAYED RELEASE ORAL at 06:32

## 2021-04-24 RX ADMIN — BUSPIRONE HYDROCHLORIDE SCH MG: 15 TABLET ORAL at 08:05

## 2021-04-24 RX ADMIN — GABAPENTIN SCH MG: 100 CAPSULE ORAL at 08:04

## 2021-04-24 RX ADMIN — GABAPENTIN SCH MG: 300 CAPSULE ORAL at 19:53

## 2021-04-24 RX ADMIN — FUROSEMIDE SCH MG: 20 TABLET ORAL at 08:06

## 2021-04-24 RX ADMIN — Medication SCH APPLIC: at 19:57

## 2021-04-24 RX ADMIN — CIPROFLOXACIN SCH MG: 500 TABLET, FILM COATED ORAL at 06:33

## 2021-04-24 RX ADMIN — Medication SCH EACH: at 21:37

## 2021-04-24 RX ADMIN — ISOSORBIDE MONONITRATE SCH MG: 30 TABLET, FILM COATED, EXTENDED RELEASE ORAL at 08:06

## 2021-04-24 RX ADMIN — BUSPIRONE HYDROCHLORIDE SCH MG: 15 TABLET ORAL at 12:11

## 2021-04-24 RX ADMIN — Medication SCH EACH: at 09:57

## 2021-04-24 RX ADMIN — SERTRALINE HYDROCHLORIDE SCH MG: 100 TABLET ORAL at 08:07

## 2021-04-24 RX ADMIN — METOPROLOL SUCCINATE SCH MG: 25 TABLET, EXTENDED RELEASE ORAL at 08:06

## 2021-04-24 RX ADMIN — WARFARIN SODIUM SCH MG: 5 TABLET ORAL at 19:52

## 2021-04-24 RX ADMIN — GABAPENTIN SCH MG: 100 CAPSULE ORAL at 12:11

## 2021-04-24 RX ADMIN — ROPINIROLE SCH MG: 0.5 TABLET ORAL at 19:52

## 2021-04-25 RX ADMIN — OMEPRAZOLE SCH MG: 20 CAPSULE, DELAYED RELEASE ORAL at 06:13

## 2021-04-25 RX ADMIN — CIPROFLOXACIN SCH MG: 500 TABLET, FILM COATED ORAL at 19:47

## 2021-04-25 RX ADMIN — ROPINIROLE SCH MG: 0.5 TABLET ORAL at 19:46

## 2021-04-25 RX ADMIN — FUROSEMIDE SCH MG: 20 TABLET ORAL at 08:11

## 2021-04-25 RX ADMIN — GABAPENTIN SCH MG: 100 CAPSULE ORAL at 08:08

## 2021-04-25 RX ADMIN — CIPROFLOXACIN SCH MG: 500 TABLET, FILM COATED ORAL at 06:13

## 2021-04-25 RX ADMIN — GABAPENTIN SCH MG: 100 CAPSULE ORAL at 12:04

## 2021-04-25 RX ADMIN — METOPROLOL SUCCINATE SCH MG: 25 TABLET, EXTENDED RELEASE ORAL at 08:11

## 2021-04-25 RX ADMIN — BUSPIRONE HYDROCHLORIDE SCH MG: 15 TABLET ORAL at 08:11

## 2021-04-25 RX ADMIN — VITAMIN D, TAB 1000IU (100/BT) SCH MCG: 25 TAB at 08:12

## 2021-04-25 RX ADMIN — WARFARIN SODIUM SCH MG: 5 TABLET ORAL at 19:46

## 2021-04-25 RX ADMIN — MAGNESIUM OXIDE TAB 400 MG (241.3 MG ELEMENTAL MG) SCH TAB: 400 (241.3 MG) TAB at 08:13

## 2021-04-25 RX ADMIN — BUSPIRONE HYDROCHLORIDE SCH MG: 15 TABLET ORAL at 12:04

## 2021-04-25 RX ADMIN — Medication SCH EACH: at 22:15

## 2021-04-25 RX ADMIN — SERTRALINE HYDROCHLORIDE SCH MG: 100 TABLET ORAL at 08:12

## 2021-04-25 RX ADMIN — Medication SCH APPLIC: at 08:14

## 2021-04-25 RX ADMIN — GABAPENTIN SCH MG: 300 CAPSULE ORAL at 19:47

## 2021-04-25 RX ADMIN — ISOSORBIDE MONONITRATE SCH MG: 30 TABLET, FILM COATED, EXTENDED RELEASE ORAL at 08:12

## 2021-04-25 RX ADMIN — Medication SCH APPLIC: at 19:48

## 2021-04-25 RX ADMIN — FUROSEMIDE SCH MG: 20 TABLET ORAL at 12:05

## 2021-04-25 RX ADMIN — BUSPIRONE HYDROCHLORIDE SCH MG: 15 TABLET ORAL at 19:52

## 2021-04-25 RX ADMIN — Medication SCH EACH: at 10:39

## 2021-04-26 RX ADMIN — METOPROLOL SUCCINATE SCH MG: 25 TABLET, EXTENDED RELEASE ORAL at 07:53

## 2021-04-26 RX ADMIN — SERTRALINE HYDROCHLORIDE SCH MG: 100 TABLET ORAL at 07:54

## 2021-04-26 RX ADMIN — GABAPENTIN SCH MG: 300 CAPSULE ORAL at 19:32

## 2021-04-26 RX ADMIN — CIPROFLOXACIN SCH MG: 500 TABLET, FILM COATED ORAL at 19:32

## 2021-04-26 RX ADMIN — VITAMIN D, TAB 1000IU (100/BT) SCH MCG: 25 TAB at 07:55

## 2021-04-26 RX ADMIN — WARFARIN SODIUM SCH MG: 5 TABLET ORAL at 19:32

## 2021-04-26 RX ADMIN — FUROSEMIDE SCH MG: 20 TABLET ORAL at 07:53

## 2021-04-26 RX ADMIN — GABAPENTIN SCH MG: 100 CAPSULE ORAL at 12:23

## 2021-04-26 RX ADMIN — ROPINIROLE SCH MG: 0.5 TABLET ORAL at 19:32

## 2021-04-26 RX ADMIN — BUSPIRONE HYDROCHLORIDE SCH MG: 15 TABLET ORAL at 07:52

## 2021-04-26 RX ADMIN — GABAPENTIN SCH MG: 100 CAPSULE ORAL at 07:52

## 2021-04-26 RX ADMIN — ISOSORBIDE MONONITRATE SCH MG: 30 TABLET, FILM COATED, EXTENDED RELEASE ORAL at 07:54

## 2021-04-26 RX ADMIN — Medication SCH APPLIC: at 07:55

## 2021-04-26 RX ADMIN — CIPROFLOXACIN SCH MG: 500 TABLET, FILM COATED ORAL at 06:16

## 2021-04-26 RX ADMIN — Medication SCH EACH: at 21:11

## 2021-04-26 RX ADMIN — OMEPRAZOLE SCH MG: 20 CAPSULE, DELAYED RELEASE ORAL at 06:16

## 2021-04-26 RX ADMIN — Medication SCH APPLIC: at 19:34

## 2021-04-26 RX ADMIN — Medication SCH EACH: at 09:25

## 2021-04-26 RX ADMIN — BUSPIRONE HYDROCHLORIDE SCH MG: 15 TABLET ORAL at 19:32

## 2021-04-26 RX ADMIN — MAGNESIUM OXIDE TAB 400 MG (241.3 MG ELEMENTAL MG) SCH TAB: 400 (241.3 MG) TAB at 07:55

## 2021-04-26 RX ADMIN — BUSPIRONE HYDROCHLORIDE SCH MG: 15 TABLET ORAL at 12:23

## 2021-04-26 RX ADMIN — FUROSEMIDE SCH MG: 20 TABLET ORAL at 12:23

## 2021-04-27 RX ADMIN — BUSPIRONE HYDROCHLORIDE SCH MG: 15 TABLET ORAL at 12:41

## 2021-04-27 RX ADMIN — MAGNESIUM OXIDE TAB 400 MG (241.3 MG ELEMENTAL MG) SCH TAB: 400 (241.3 MG) TAB at 08:24

## 2021-04-27 RX ADMIN — FUROSEMIDE SCH MG: 20 TABLET ORAL at 12:41

## 2021-04-27 RX ADMIN — GABAPENTIN SCH MG: 100 CAPSULE ORAL at 08:26

## 2021-04-27 RX ADMIN — SERTRALINE HYDROCHLORIDE SCH MG: 100 TABLET ORAL at 08:26

## 2021-04-27 RX ADMIN — Medication SCH APPLIC: at 20:03

## 2021-04-27 RX ADMIN — VITAMIN D, TAB 1000IU (100/BT) SCH MCG: 25 TAB at 08:24

## 2021-04-27 RX ADMIN — BUSPIRONE HYDROCHLORIDE SCH MG: 15 TABLET ORAL at 20:01

## 2021-04-27 RX ADMIN — Medication SCH EACH: at 10:44

## 2021-04-27 RX ADMIN — CIPROFLOXACIN SCH MG: 500 TABLET, FILM COATED ORAL at 06:34

## 2021-04-27 RX ADMIN — ISOSORBIDE MONONITRATE SCH MG: 30 TABLET, FILM COATED, EXTENDED RELEASE ORAL at 08:25

## 2021-04-27 RX ADMIN — Medication SCH APPLIC: at 08:28

## 2021-04-27 RX ADMIN — GABAPENTIN SCH MG: 300 CAPSULE ORAL at 20:00

## 2021-04-27 RX ADMIN — METOPROLOL SUCCINATE SCH MG: 25 TABLET, EXTENDED RELEASE ORAL at 08:25

## 2021-04-27 RX ADMIN — BUSPIRONE HYDROCHLORIDE SCH MG: 15 TABLET ORAL at 08:23

## 2021-04-27 RX ADMIN — GABAPENTIN SCH MG: 100 CAPSULE ORAL at 12:40

## 2021-04-27 RX ADMIN — OMEPRAZOLE SCH MG: 20 CAPSULE, DELAYED RELEASE ORAL at 06:34

## 2021-04-27 RX ADMIN — CIPROFLOXACIN SCH MG: 500 TABLET, FILM COATED ORAL at 20:04

## 2021-04-27 RX ADMIN — FUROSEMIDE SCH MG: 20 TABLET ORAL at 08:23

## 2021-04-27 RX ADMIN — WARFARIN SODIUM SCH MG: 5 TABLET ORAL at 20:01

## 2021-04-27 RX ADMIN — Medication SCH EACH: at 22:03

## 2021-04-27 RX ADMIN — ROPINIROLE SCH MG: 0.5 TABLET ORAL at 20:00

## 2021-04-28 RX ADMIN — BUSPIRONE HYDROCHLORIDE SCH MG: 15 TABLET ORAL at 13:54

## 2021-04-28 RX ADMIN — Medication SCH EACH: at 11:05

## 2021-04-28 RX ADMIN — MAGNESIUM OXIDE TAB 400 MG (241.3 MG ELEMENTAL MG) SCH TAB: 400 (241.3 MG) TAB at 08:03

## 2021-04-28 RX ADMIN — METOPROLOL SUCCINATE SCH MG: 25 TABLET, EXTENDED RELEASE ORAL at 08:04

## 2021-04-28 RX ADMIN — Medication SCH EACH: at 21:30

## 2021-04-28 RX ADMIN — GABAPENTIN SCH MG: 100 CAPSULE ORAL at 08:05

## 2021-04-28 RX ADMIN — FUROSEMIDE SCH MG: 20 TABLET ORAL at 08:06

## 2021-04-28 RX ADMIN — VITAMIN D, TAB 1000IU (100/BT) SCH MCG: 25 TAB at 08:03

## 2021-04-28 RX ADMIN — Medication SCH APPLIC: at 19:29

## 2021-04-28 RX ADMIN — CIPROFLOXACIN SCH MG: 500 TABLET, FILM COATED ORAL at 19:27

## 2021-04-28 RX ADMIN — ROPINIROLE SCH MG: 0.5 TABLET ORAL at 19:26

## 2021-04-28 RX ADMIN — BUSPIRONE HYDROCHLORIDE SCH MG: 15 TABLET ORAL at 08:06

## 2021-04-28 RX ADMIN — CIPROFLOXACIN SCH MG: 500 TABLET, FILM COATED ORAL at 06:10

## 2021-04-28 RX ADMIN — GABAPENTIN SCH MG: 300 CAPSULE ORAL at 19:26

## 2021-04-28 RX ADMIN — WARFARIN SODIUM SCH MG: 5 TABLET ORAL at 19:26

## 2021-04-28 RX ADMIN — GABAPENTIN SCH MG: 100 CAPSULE ORAL at 13:14

## 2021-04-28 RX ADMIN — Medication SCH APPLIC: at 08:05

## 2021-04-28 RX ADMIN — OMEPRAZOLE SCH MG: 20 CAPSULE, DELAYED RELEASE ORAL at 06:10

## 2021-04-28 RX ADMIN — FUROSEMIDE SCH MG: 20 TABLET ORAL at 13:14

## 2021-04-28 RX ADMIN — ISOSORBIDE MONONITRATE SCH MG: 30 TABLET, FILM COATED, EXTENDED RELEASE ORAL at 08:04

## 2021-04-28 RX ADMIN — SERTRALINE HYDROCHLORIDE SCH MG: 100 TABLET ORAL at 08:05

## 2021-04-28 RX ADMIN — BUSPIRONE HYDROCHLORIDE SCH MG: 15 TABLET ORAL at 19:27

## 2021-04-29 RX ADMIN — OMEPRAZOLE SCH MG: 20 CAPSULE, DELAYED RELEASE ORAL at 06:40

## 2021-04-29 RX ADMIN — CIPROFLOXACIN SCH MG: 500 TABLET, FILM COATED ORAL at 06:40

## 2021-04-29 RX ADMIN — FUROSEMIDE SCH MG: 20 TABLET ORAL at 08:14

## 2021-04-29 RX ADMIN — ISOSORBIDE MONONITRATE SCH MG: 30 TABLET, FILM COATED, EXTENDED RELEASE ORAL at 08:14

## 2021-04-29 RX ADMIN — MAGNESIUM OXIDE TAB 400 MG (241.3 MG ELEMENTAL MG) SCH TAB: 400 (241.3 MG) TAB at 08:13

## 2021-04-29 RX ADMIN — Medication SCH APPLIC: at 19:33

## 2021-04-29 RX ADMIN — GABAPENTIN SCH MG: 300 CAPSULE ORAL at 19:30

## 2021-04-29 RX ADMIN — Medication SCH EACH: at 11:11

## 2021-04-29 RX ADMIN — SERTRALINE HYDROCHLORIDE SCH MG: 100 TABLET ORAL at 08:14

## 2021-04-29 RX ADMIN — ROPINIROLE SCH MG: 0.5 TABLET ORAL at 19:32

## 2021-04-29 RX ADMIN — FUROSEMIDE SCH MG: 20 TABLET ORAL at 13:00

## 2021-04-29 RX ADMIN — WARFARIN SODIUM SCH MG: 5 TABLET ORAL at 19:30

## 2021-04-29 RX ADMIN — GABAPENTIN SCH MG: 100 CAPSULE ORAL at 08:15

## 2021-04-29 RX ADMIN — METOPROLOL SUCCINATE SCH MG: 25 TABLET, EXTENDED RELEASE ORAL at 08:14

## 2021-04-29 RX ADMIN — BUSPIRONE HYDROCHLORIDE SCH MG: 15 TABLET ORAL at 08:14

## 2021-04-29 RX ADMIN — VITAMIN D, TAB 1000IU (100/BT) SCH MCG: 25 TAB at 08:13

## 2021-04-29 RX ADMIN — Medication SCH EACH: at 21:25

## 2021-04-29 RX ADMIN — Medication SCH APPLIC: at 08:17

## 2021-04-29 RX ADMIN — CIPROFLOXACIN SCH MG: 500 TABLET, FILM COATED ORAL at 19:30

## 2021-04-29 RX ADMIN — BUSPIRONE HYDROCHLORIDE SCH MG: 15 TABLET ORAL at 13:00

## 2021-04-29 RX ADMIN — GABAPENTIN SCH MG: 100 CAPSULE ORAL at 13:00

## 2021-04-29 RX ADMIN — BUSPIRONE HYDROCHLORIDE SCH MG: 15 TABLET ORAL at 19:31

## 2021-04-30 RX ADMIN — ROPINIROLE SCH MG: 0.5 TABLET ORAL at 19:42

## 2021-04-30 RX ADMIN — CIPROFLOXACIN SCH MG: 500 TABLET, FILM COATED ORAL at 06:08

## 2021-04-30 RX ADMIN — FUROSEMIDE SCH MG: 20 TABLET ORAL at 12:30

## 2021-04-30 RX ADMIN — Medication SCH APPLIC: at 07:50

## 2021-04-30 RX ADMIN — VITAMIN D, TAB 1000IU (100/BT) SCH MCG: 25 TAB at 07:53

## 2021-04-30 RX ADMIN — BUSPIRONE HYDROCHLORIDE SCH MG: 15 TABLET ORAL at 19:41

## 2021-04-30 RX ADMIN — OMEPRAZOLE SCH MG: 20 CAPSULE, DELAYED RELEASE ORAL at 06:08

## 2021-04-30 RX ADMIN — CIPROFLOXACIN SCH MG: 500 TABLET, FILM COATED ORAL at 19:41

## 2021-04-30 RX ADMIN — ISOSORBIDE MONONITRATE SCH MG: 30 TABLET, FILM COATED, EXTENDED RELEASE ORAL at 07:52

## 2021-04-30 RX ADMIN — FUROSEMIDE SCH MG: 20 TABLET ORAL at 07:51

## 2021-04-30 RX ADMIN — BUSPIRONE HYDROCHLORIDE SCH MG: 15 TABLET ORAL at 07:56

## 2021-04-30 RX ADMIN — SERTRALINE HYDROCHLORIDE SCH MG: 100 TABLET ORAL at 07:50

## 2021-04-30 RX ADMIN — GABAPENTIN SCH MG: 300 CAPSULE ORAL at 19:42

## 2021-04-30 RX ADMIN — GABAPENTIN SCH MG: 100 CAPSULE ORAL at 07:54

## 2021-04-30 RX ADMIN — Medication SCH APPLIC: at 19:42

## 2021-04-30 RX ADMIN — WARFARIN SODIUM SCH MG: 5 TABLET ORAL at 19:41

## 2021-04-30 RX ADMIN — Medication SCH EACH: at 21:27

## 2021-04-30 RX ADMIN — Medication SCH EACH: at 09:49

## 2021-04-30 RX ADMIN — MAGNESIUM OXIDE TAB 400 MG (241.3 MG ELEMENTAL MG) SCH TAB: 400 (241.3 MG) TAB at 07:52

## 2021-04-30 RX ADMIN — BUSPIRONE HYDROCHLORIDE SCH MG: 15 TABLET ORAL at 12:29

## 2021-04-30 RX ADMIN — METOPROLOL SUCCINATE SCH MG: 25 TABLET, EXTENDED RELEASE ORAL at 07:51

## 2021-04-30 RX ADMIN — GABAPENTIN SCH MG: 100 CAPSULE ORAL at 12:29

## 2021-05-01 RX ADMIN — CIPROFLOXACIN SCH MG: 500 TABLET, FILM COATED ORAL at 06:05

## 2021-05-01 RX ADMIN — SERTRALINE HYDROCHLORIDE SCH MG: 100 TABLET ORAL at 07:37

## 2021-05-01 RX ADMIN — ISOSORBIDE MONONITRATE SCH MG: 30 TABLET, FILM COATED, EXTENDED RELEASE ORAL at 07:39

## 2021-05-01 RX ADMIN — CIPROFLOXACIN SCH MG: 500 TABLET, FILM COATED ORAL at 20:21

## 2021-05-01 RX ADMIN — BUSPIRONE HYDROCHLORIDE SCH MG: 15 TABLET ORAL at 20:20

## 2021-05-01 RX ADMIN — WARFARIN SODIUM SCH MG: 5 TABLET ORAL at 20:20

## 2021-05-01 RX ADMIN — GABAPENTIN SCH MG: 300 CAPSULE ORAL at 20:19

## 2021-05-01 RX ADMIN — GABAPENTIN SCH MG: 100 CAPSULE ORAL at 07:36

## 2021-05-01 RX ADMIN — MAGNESIUM OXIDE TAB 400 MG (241.3 MG ELEMENTAL MG) SCH TAB: 400 (241.3 MG) TAB at 07:38

## 2021-05-01 RX ADMIN — VITAMIN D, TAB 1000IU (100/BT) SCH MCG: 25 TAB at 07:38

## 2021-05-01 RX ADMIN — BUSPIRONE HYDROCHLORIDE SCH MG: 15 TABLET ORAL at 12:12

## 2021-05-01 RX ADMIN — GABAPENTIN SCH MG: 100 CAPSULE ORAL at 12:12

## 2021-05-01 RX ADMIN — FUROSEMIDE SCH MG: 20 TABLET ORAL at 07:37

## 2021-05-01 RX ADMIN — ROPINIROLE SCH MG: 0.5 TABLET ORAL at 20:20

## 2021-05-01 RX ADMIN — METOPROLOL SUCCINATE SCH MG: 25 TABLET, EXTENDED RELEASE ORAL at 07:37

## 2021-05-01 RX ADMIN — OMEPRAZOLE SCH MG: 20 CAPSULE, DELAYED RELEASE ORAL at 06:06

## 2021-05-01 RX ADMIN — Medication SCH APPLIC: at 20:21

## 2021-05-01 RX ADMIN — Medication SCH EACH: at 22:05

## 2021-05-01 RX ADMIN — FUROSEMIDE SCH MG: 20 TABLET ORAL at 12:12

## 2021-05-01 RX ADMIN — Medication SCH APPLIC: at 07:38

## 2021-05-01 RX ADMIN — BUSPIRONE HYDROCHLORIDE SCH MG: 15 TABLET ORAL at 07:36

## 2021-05-01 RX ADMIN — Medication SCH EACH: at 09:52

## 2021-05-02 RX ADMIN — SERTRALINE HYDROCHLORIDE SCH MG: 100 TABLET ORAL at 08:14

## 2021-05-02 RX ADMIN — Medication SCH EACH: at 10:44

## 2021-05-02 RX ADMIN — CIPROFLOXACIN SCH MG: 500 TABLET, FILM COATED ORAL at 19:44

## 2021-05-02 RX ADMIN — MAGNESIUM OXIDE TAB 400 MG (241.3 MG ELEMENTAL MG) SCH TAB: 400 (241.3 MG) TAB at 08:13

## 2021-05-02 RX ADMIN — VITAMIN D, TAB 1000IU (100/BT) SCH MCG: 25 TAB at 08:13

## 2021-05-02 RX ADMIN — GABAPENTIN SCH MG: 100 CAPSULE ORAL at 12:25

## 2021-05-02 RX ADMIN — FUROSEMIDE SCH MG: 20 TABLET ORAL at 12:26

## 2021-05-02 RX ADMIN — BUSPIRONE HYDROCHLORIDE SCH MG: 15 TABLET ORAL at 19:43

## 2021-05-02 RX ADMIN — WARFARIN SODIUM SCH MG: 5 TABLET ORAL at 19:43

## 2021-05-02 RX ADMIN — OMEPRAZOLE SCH MG: 20 CAPSULE, DELAYED RELEASE ORAL at 06:03

## 2021-05-02 RX ADMIN — ISOSORBIDE MONONITRATE SCH MG: 30 TABLET, FILM COATED, EXTENDED RELEASE ORAL at 08:14

## 2021-05-02 RX ADMIN — GABAPENTIN SCH MG: 100 CAPSULE ORAL at 08:08

## 2021-05-02 RX ADMIN — Medication SCH APPLIC: at 19:48

## 2021-05-02 RX ADMIN — GABAPENTIN SCH MG: 300 CAPSULE ORAL at 19:41

## 2021-05-02 RX ADMIN — ROPINIROLE SCH MG: 0.5 TABLET ORAL at 19:44

## 2021-05-02 RX ADMIN — Medication SCH EACH: at 22:10

## 2021-05-02 RX ADMIN — CIPROFLOXACIN SCH MG: 500 TABLET, FILM COATED ORAL at 06:04

## 2021-05-02 RX ADMIN — METOPROLOL SUCCINATE SCH MG: 25 TABLET, EXTENDED RELEASE ORAL at 08:14

## 2021-05-02 RX ADMIN — BUSPIRONE HYDROCHLORIDE SCH MG: 15 TABLET ORAL at 12:26

## 2021-05-02 RX ADMIN — Medication SCH APPLIC: at 08:15

## 2021-05-02 RX ADMIN — BUSPIRONE HYDROCHLORIDE SCH MG: 15 TABLET ORAL at 08:13

## 2021-05-02 RX ADMIN — FUROSEMIDE SCH MG: 20 TABLET ORAL at 08:13

## 2021-05-03 RX ADMIN — SERTRALINE HYDROCHLORIDE SCH MG: 100 TABLET ORAL at 07:51

## 2021-05-03 RX ADMIN — WARFARIN SODIUM SCH MG: 5 TABLET ORAL at 19:25

## 2021-05-03 RX ADMIN — GABAPENTIN SCH MG: 300 CAPSULE ORAL at 19:30

## 2021-05-03 RX ADMIN — BUSPIRONE HYDROCHLORIDE SCH MG: 15 TABLET ORAL at 13:02

## 2021-05-03 RX ADMIN — MAGNESIUM OXIDE TAB 400 MG (241.3 MG ELEMENTAL MG) SCH TAB: 400 (241.3 MG) TAB at 07:54

## 2021-05-03 RX ADMIN — BUSPIRONE HYDROCHLORIDE SCH MG: 15 TABLET ORAL at 07:51

## 2021-05-03 RX ADMIN — METOPROLOL SUCCINATE SCH MG: 25 TABLET, EXTENDED RELEASE ORAL at 07:52

## 2021-05-03 RX ADMIN — Medication SCH APPLIC: at 07:56

## 2021-05-03 RX ADMIN — OMEPRAZOLE SCH MG: 20 CAPSULE, DELAYED RELEASE ORAL at 06:17

## 2021-05-03 RX ADMIN — GABAPENTIN SCH MG: 100 CAPSULE ORAL at 13:03

## 2021-05-03 RX ADMIN — GABAPENTIN SCH MG: 100 CAPSULE ORAL at 07:52

## 2021-05-03 RX ADMIN — Medication SCH EACH: at 22:00

## 2021-05-03 RX ADMIN — ISOSORBIDE MONONITRATE SCH MG: 30 TABLET, FILM COATED, EXTENDED RELEASE ORAL at 07:51

## 2021-05-03 RX ADMIN — VITAMIN D, TAB 1000IU (100/BT) SCH MCG: 25 TAB at 07:54

## 2021-05-03 RX ADMIN — FUROSEMIDE SCH MG: 20 TABLET ORAL at 07:51

## 2021-05-03 RX ADMIN — BUSPIRONE HYDROCHLORIDE SCH MG: 15 TABLET ORAL at 19:24

## 2021-05-03 RX ADMIN — Medication SCH EACH: at 10:40

## 2021-05-03 RX ADMIN — FUROSEMIDE SCH MG: 20 TABLET ORAL at 13:03

## 2021-05-03 RX ADMIN — ROPINIROLE SCH MG: 0.5 TABLET ORAL at 19:25

## 2021-05-03 RX ADMIN — Medication SCH APPLIC: at 19:26

## 2021-05-04 RX ADMIN — BUSPIRONE HYDROCHLORIDE SCH MG: 15 TABLET ORAL at 13:26

## 2021-05-04 RX ADMIN — MAGNESIUM OXIDE TAB 400 MG (241.3 MG ELEMENTAL MG) SCH TAB: 400 (241.3 MG) TAB at 08:13

## 2021-05-04 RX ADMIN — GABAPENTIN SCH MG: 100 CAPSULE ORAL at 13:26

## 2021-05-04 RX ADMIN — Medication SCH APPLIC: at 19:34

## 2021-05-04 RX ADMIN — METOPROLOL SUCCINATE SCH MG: 25 TABLET, EXTENDED RELEASE ORAL at 08:12

## 2021-05-04 RX ADMIN — WARFARIN SODIUM SCH MG: 5 TABLET ORAL at 19:35

## 2021-05-04 RX ADMIN — GABAPENTIN SCH MG: 100 CAPSULE ORAL at 08:06

## 2021-05-04 RX ADMIN — SERTRALINE HYDROCHLORIDE SCH MG: 100 TABLET ORAL at 08:12

## 2021-05-04 RX ADMIN — ROPINIROLE SCH MG: 0.5 TABLET ORAL at 19:35

## 2021-05-04 RX ADMIN — OMEPRAZOLE SCH MG: 20 CAPSULE, DELAYED RELEASE ORAL at 06:49

## 2021-05-04 RX ADMIN — VITAMIN D, TAB 1000IU (100/BT) SCH MCG: 25 TAB at 08:13

## 2021-05-04 RX ADMIN — BUSPIRONE HYDROCHLORIDE SCH MG: 15 TABLET ORAL at 08:07

## 2021-05-04 RX ADMIN — BUSPIRONE HYDROCHLORIDE SCH MG: 15 TABLET ORAL at 19:35

## 2021-05-04 RX ADMIN — ISOSORBIDE MONONITRATE SCH MG: 30 TABLET, FILM COATED, EXTENDED RELEASE ORAL at 08:07

## 2021-05-04 RX ADMIN — Medication SCH APPLIC: at 08:14

## 2021-05-04 RX ADMIN — Medication SCH EACH: at 13:25

## 2021-05-04 RX ADMIN — GABAPENTIN SCH MG: 300 CAPSULE ORAL at 19:34

## 2021-05-04 RX ADMIN — FUROSEMIDE SCH MG: 20 TABLET ORAL at 13:26

## 2021-05-04 RX ADMIN — FUROSEMIDE SCH MG: 20 TABLET ORAL at 08:13

## 2021-05-05 RX ADMIN — METOPROLOL SUCCINATE SCH MG: 25 TABLET, EXTENDED RELEASE ORAL at 08:15

## 2021-05-05 RX ADMIN — Medication SCH APPLIC: at 08:17

## 2021-05-05 RX ADMIN — Medication SCH EACH: at 11:38

## 2021-05-05 RX ADMIN — FUROSEMIDE SCH MG: 20 TABLET ORAL at 13:08

## 2021-05-05 RX ADMIN — ISOSORBIDE MONONITRATE SCH MG: 30 TABLET, FILM COATED, EXTENDED RELEASE ORAL at 08:15

## 2021-05-05 RX ADMIN — GABAPENTIN SCH MG: 100 CAPSULE ORAL at 13:08

## 2021-05-05 RX ADMIN — BUSPIRONE HYDROCHLORIDE SCH MG: 15 TABLET ORAL at 19:13

## 2021-05-05 RX ADMIN — WARFARIN SODIUM SCH MG: 5 TABLET ORAL at 19:11

## 2021-05-05 RX ADMIN — BUSPIRONE HYDROCHLORIDE SCH MG: 15 TABLET ORAL at 08:15

## 2021-05-05 RX ADMIN — Medication SCH EACH: at 21:32

## 2021-05-05 RX ADMIN — GABAPENTIN SCH MG: 100 CAPSULE ORAL at 08:14

## 2021-05-05 RX ADMIN — OMEPRAZOLE SCH MG: 20 CAPSULE, DELAYED RELEASE ORAL at 06:05

## 2021-05-05 RX ADMIN — GABAPENTIN SCH MG: 300 CAPSULE ORAL at 19:10

## 2021-05-05 RX ADMIN — SERTRALINE HYDROCHLORIDE SCH MG: 100 TABLET ORAL at 08:16

## 2021-05-05 RX ADMIN — Medication SCH APPLIC: at 19:13

## 2021-05-05 RX ADMIN — BUSPIRONE HYDROCHLORIDE SCH MG: 15 TABLET ORAL at 13:08

## 2021-05-05 RX ADMIN — ROPINIROLE SCH MG: 0.5 TABLET ORAL at 19:11

## 2021-05-05 RX ADMIN — FUROSEMIDE SCH MG: 20 TABLET ORAL at 08:15

## 2021-05-05 RX ADMIN — MAGNESIUM OXIDE TAB 400 MG (241.3 MG ELEMENTAL MG) SCH TAB: 400 (241.3 MG) TAB at 08:16

## 2021-05-05 RX ADMIN — VITAMIN D, TAB 1000IU (100/BT) SCH MCG: 25 TAB at 08:16

## 2021-05-05 RX ADMIN — Medication SCH EACH: at 02:40

## 2021-05-06 RX ADMIN — ISOSORBIDE MONONITRATE SCH MG: 30 TABLET, FILM COATED, EXTENDED RELEASE ORAL at 07:51

## 2021-05-06 RX ADMIN — GABAPENTIN SCH MG: 300 CAPSULE ORAL at 20:40

## 2021-05-06 RX ADMIN — Medication SCH APPLIC: at 07:54

## 2021-05-06 RX ADMIN — OMEPRAZOLE SCH MG: 20 CAPSULE, DELAYED RELEASE ORAL at 06:07

## 2021-05-06 RX ADMIN — METOPROLOL SUCCINATE SCH MG: 25 TABLET, EXTENDED RELEASE ORAL at 07:51

## 2021-05-06 RX ADMIN — BUSPIRONE HYDROCHLORIDE SCH MG: 15 TABLET ORAL at 20:39

## 2021-05-06 RX ADMIN — ROPINIROLE SCH MG: 0.5 TABLET ORAL at 20:39

## 2021-05-06 RX ADMIN — FUROSEMIDE SCH MG: 20 TABLET ORAL at 12:18

## 2021-05-06 RX ADMIN — GABAPENTIN SCH MG: 100 CAPSULE ORAL at 12:15

## 2021-05-06 RX ADMIN — Medication SCH APPLIC: at 20:42

## 2021-05-06 RX ADMIN — BUSPIRONE HYDROCHLORIDE SCH MG: 15 TABLET ORAL at 12:15

## 2021-05-06 RX ADMIN — VITAMIN D, TAB 1000IU (100/BT) SCH MCG: 25 TAB at 07:52

## 2021-05-06 RX ADMIN — Medication SCH EACH: at 22:39

## 2021-05-06 RX ADMIN — GABAPENTIN SCH MG: 100 CAPSULE ORAL at 07:50

## 2021-05-06 RX ADMIN — FUROSEMIDE SCH MG: 20 TABLET ORAL at 07:50

## 2021-05-06 RX ADMIN — MAGNESIUM OXIDE TAB 400 MG (241.3 MG ELEMENTAL MG) SCH TAB: 400 (241.3 MG) TAB at 07:52

## 2021-05-06 RX ADMIN — BUSPIRONE HYDROCHLORIDE SCH MG: 15 TABLET ORAL at 07:50

## 2021-05-06 RX ADMIN — WARFARIN SODIUM SCH MG: 5 TABLET ORAL at 20:39

## 2021-05-06 RX ADMIN — Medication SCH EACH: at 10:51

## 2021-05-06 RX ADMIN — SERTRALINE HYDROCHLORIDE SCH MG: 100 TABLET ORAL at 07:51

## 2021-05-07 RX ADMIN — ISOSORBIDE MONONITRATE SCH MG: 30 TABLET, FILM COATED, EXTENDED RELEASE ORAL at 07:47

## 2021-05-07 RX ADMIN — BUSPIRONE HYDROCHLORIDE SCH MG: 15 TABLET ORAL at 12:10

## 2021-05-07 RX ADMIN — BUSPIRONE HYDROCHLORIDE SCH MG: 15 TABLET ORAL at 19:47

## 2021-05-07 RX ADMIN — Medication SCH EACH: at 20:59

## 2021-05-07 RX ADMIN — Medication SCH EACH: at 09:08

## 2021-05-07 RX ADMIN — SERTRALINE HYDROCHLORIDE SCH MG: 100 TABLET ORAL at 07:47

## 2021-05-07 RX ADMIN — WARFARIN SODIUM SCH MG: 5 TABLET ORAL at 19:47

## 2021-05-07 RX ADMIN — GABAPENTIN SCH MG: 100 CAPSULE ORAL at 07:46

## 2021-05-07 RX ADMIN — METOPROLOL SUCCINATE SCH MG: 25 TABLET, EXTENDED RELEASE ORAL at 07:48

## 2021-05-07 RX ADMIN — Medication SCH APPLIC: at 07:49

## 2021-05-07 RX ADMIN — FUROSEMIDE SCH MG: 20 TABLET ORAL at 07:47

## 2021-05-07 RX ADMIN — ROPINIROLE SCH MG: 0.5 TABLET ORAL at 19:47

## 2021-05-07 RX ADMIN — VITAMIN D, TAB 1000IU (100/BT) SCH MCG: 25 TAB at 07:48

## 2021-05-07 RX ADMIN — OMEPRAZOLE SCH MG: 20 CAPSULE, DELAYED RELEASE ORAL at 06:30

## 2021-05-07 RX ADMIN — GABAPENTIN SCH MG: 300 CAPSULE ORAL at 19:46

## 2021-05-07 RX ADMIN — BUSPIRONE HYDROCHLORIDE SCH MG: 15 TABLET ORAL at 07:47

## 2021-05-07 RX ADMIN — GABAPENTIN SCH MG: 100 CAPSULE ORAL at 12:10

## 2021-05-07 RX ADMIN — Medication SCH APPLIC: at 19:49

## 2021-05-07 RX ADMIN — FUROSEMIDE SCH MG: 20 TABLET ORAL at 12:10

## 2021-05-07 RX ADMIN — MAGNESIUM OXIDE TAB 400 MG (241.3 MG ELEMENTAL MG) SCH TAB: 400 (241.3 MG) TAB at 07:48

## 2021-05-08 RX ADMIN — Medication SCH APPLIC: at 08:05

## 2021-05-08 RX ADMIN — ISOSORBIDE MONONITRATE SCH MG: 30 TABLET, FILM COATED, EXTENDED RELEASE ORAL at 08:03

## 2021-05-08 RX ADMIN — SERTRALINE HYDROCHLORIDE SCH MG: 100 TABLET ORAL at 08:03

## 2021-05-08 RX ADMIN — VITAMIN D, TAB 1000IU (100/BT) SCH MCG: 25 TAB at 08:04

## 2021-05-08 RX ADMIN — BUSPIRONE HYDROCHLORIDE SCH MG: 15 TABLET ORAL at 12:06

## 2021-05-08 RX ADMIN — METOPROLOL SUCCINATE SCH MG: 25 TABLET, EXTENDED RELEASE ORAL at 08:04

## 2021-05-08 RX ADMIN — BUSPIRONE HYDROCHLORIDE SCH MG: 15 TABLET ORAL at 22:17

## 2021-05-08 RX ADMIN — MAGNESIUM OXIDE TAB 400 MG (241.3 MG ELEMENTAL MG) SCH TAB: 400 (241.3 MG) TAB at 08:04

## 2021-05-08 RX ADMIN — Medication SCH EACH: at 23:33

## 2021-05-08 RX ADMIN — Medication SCH APPLIC: at 19:45

## 2021-05-08 RX ADMIN — FUROSEMIDE SCH MG: 20 TABLET ORAL at 08:05

## 2021-05-08 RX ADMIN — GABAPENTIN SCH MG: 300 CAPSULE ORAL at 19:45

## 2021-05-08 RX ADMIN — Medication SCH EACH: at 10:21

## 2021-05-08 RX ADMIN — WARFARIN SODIUM SCH MG: 5 TABLET ORAL at 19:45

## 2021-05-08 RX ADMIN — GABAPENTIN SCH MG: 100 CAPSULE ORAL at 12:06

## 2021-05-08 RX ADMIN — FUROSEMIDE SCH MG: 20 TABLET ORAL at 12:06

## 2021-05-08 RX ADMIN — BUSPIRONE HYDROCHLORIDE SCH MG: 15 TABLET ORAL at 08:05

## 2021-05-08 RX ADMIN — OMEPRAZOLE SCH MG: 20 CAPSULE, DELAYED RELEASE ORAL at 06:19

## 2021-05-08 RX ADMIN — GABAPENTIN SCH MG: 100 CAPSULE ORAL at 08:03

## 2021-05-08 RX ADMIN — ROPINIROLE SCH MG: 0.5 TABLET ORAL at 19:44

## 2021-05-09 RX ADMIN — METOPROLOL SUCCINATE SCH MG: 25 TABLET, EXTENDED RELEASE ORAL at 08:29

## 2021-05-09 RX ADMIN — MAGNESIUM OXIDE TAB 400 MG (241.3 MG ELEMENTAL MG) SCH TAB: 400 (241.3 MG) TAB at 08:31

## 2021-05-09 RX ADMIN — Medication SCH APPLIC: at 08:32

## 2021-05-09 RX ADMIN — GABAPENTIN SCH MG: 300 CAPSULE ORAL at 19:45

## 2021-05-09 RX ADMIN — FUROSEMIDE SCH MG: 20 TABLET ORAL at 12:14

## 2021-05-09 RX ADMIN — OMEPRAZOLE SCH MG: 20 CAPSULE, DELAYED RELEASE ORAL at 06:50

## 2021-05-09 RX ADMIN — Medication SCH APPLIC: at 19:47

## 2021-05-09 RX ADMIN — VITAMIN D, TAB 1000IU (100/BT) SCH MCG: 25 TAB at 08:30

## 2021-05-09 RX ADMIN — GABAPENTIN SCH MG: 100 CAPSULE ORAL at 08:28

## 2021-05-09 RX ADMIN — GABAPENTIN SCH MG: 100 CAPSULE ORAL at 12:12

## 2021-05-09 RX ADMIN — BUSPIRONE HYDROCHLORIDE SCH MG: 15 TABLET ORAL at 19:46

## 2021-05-09 RX ADMIN — ROPINIROLE SCH MG: 0.5 TABLET ORAL at 19:46

## 2021-05-09 RX ADMIN — ISOSORBIDE MONONITRATE SCH MG: 30 TABLET, FILM COATED, EXTENDED RELEASE ORAL at 08:29

## 2021-05-09 RX ADMIN — SERTRALINE HYDROCHLORIDE SCH MG: 100 TABLET ORAL at 08:29

## 2021-05-09 RX ADMIN — Medication SCH EACH: at 19:47

## 2021-05-09 RX ADMIN — Medication SCH EACH: at 09:51

## 2021-05-09 RX ADMIN — FUROSEMIDE SCH MG: 20 TABLET ORAL at 08:28

## 2021-05-09 RX ADMIN — BUSPIRONE HYDROCHLORIDE SCH MG: 15 TABLET ORAL at 08:28

## 2021-05-09 RX ADMIN — BUSPIRONE HYDROCHLORIDE SCH MG: 15 TABLET ORAL at 12:13

## 2021-05-09 RX ADMIN — WARFARIN SODIUM SCH MG: 5 TABLET ORAL at 19:45

## 2021-05-10 RX ADMIN — BUSPIRONE HYDROCHLORIDE SCH MG: 15 TABLET ORAL at 07:27

## 2021-05-10 RX ADMIN — Medication SCH APPLIC: at 20:33

## 2021-05-10 RX ADMIN — ISOSORBIDE MONONITRATE SCH MG: 30 TABLET, FILM COATED, EXTENDED RELEASE ORAL at 07:27

## 2021-05-10 RX ADMIN — VITAMIN D, TAB 1000IU (100/BT) SCH MCG: 25 TAB at 07:27

## 2021-05-10 RX ADMIN — BUSPIRONE HYDROCHLORIDE SCH MG: 15 TABLET ORAL at 20:35

## 2021-05-10 RX ADMIN — Medication SCH EACH: at 20:34

## 2021-05-10 RX ADMIN — GABAPENTIN SCH MG: 100 CAPSULE ORAL at 12:09

## 2021-05-10 RX ADMIN — Medication SCH APPLIC: at 07:29

## 2021-05-10 RX ADMIN — SERTRALINE HYDROCHLORIDE SCH MG: 100 TABLET ORAL at 07:27

## 2021-05-10 RX ADMIN — FUROSEMIDE SCH MG: 20 TABLET ORAL at 12:10

## 2021-05-10 RX ADMIN — OMEPRAZOLE SCH MG: 20 CAPSULE, DELAYED RELEASE ORAL at 06:23

## 2021-05-10 RX ADMIN — BUSPIRONE HYDROCHLORIDE SCH MG: 15 TABLET ORAL at 12:10

## 2021-05-10 RX ADMIN — MAGNESIUM OXIDE TAB 400 MG (241.3 MG ELEMENTAL MG) SCH TAB: 400 (241.3 MG) TAB at 07:27

## 2021-05-10 RX ADMIN — Medication SCH EACH: at 07:29

## 2021-05-10 RX ADMIN — METOPROLOL SUCCINATE SCH MG: 25 TABLET, EXTENDED RELEASE ORAL at 07:28

## 2021-05-10 RX ADMIN — ROPINIROLE SCH MG: 0.5 TABLET ORAL at 20:33

## 2021-05-10 RX ADMIN — FUROSEMIDE SCH MG: 20 TABLET ORAL at 07:27

## 2021-05-10 RX ADMIN — GABAPENTIN SCH MG: 300 CAPSULE ORAL at 20:33

## 2021-05-10 RX ADMIN — WARFARIN SODIUM SCH MG: 5 TABLET ORAL at 20:33

## 2021-05-10 RX ADMIN — GABAPENTIN SCH MG: 100 CAPSULE ORAL at 07:26

## 2021-05-11 RX ADMIN — MAGNESIUM OXIDE TAB 400 MG (241.3 MG ELEMENTAL MG) SCH TAB: 400 (241.3 MG) TAB at 07:49

## 2021-05-11 RX ADMIN — BUSPIRONE HYDROCHLORIDE SCH MG: 15 TABLET ORAL at 07:47

## 2021-05-11 RX ADMIN — SERTRALINE HYDROCHLORIDE SCH MG: 100 TABLET ORAL at 07:46

## 2021-05-11 RX ADMIN — ISOSORBIDE MONONITRATE SCH MG: 30 TABLET, FILM COATED, EXTENDED RELEASE ORAL at 07:47

## 2021-05-11 RX ADMIN — Medication SCH EACH: at 07:50

## 2021-05-11 RX ADMIN — Medication SCH EACH: at 20:54

## 2021-05-11 RX ADMIN — BUSPIRONE HYDROCHLORIDE SCH MG: 15 TABLET ORAL at 20:54

## 2021-05-11 RX ADMIN — GABAPENTIN SCH MG: 100 CAPSULE ORAL at 12:13

## 2021-05-11 RX ADMIN — OMEPRAZOLE SCH MG: 20 CAPSULE, DELAYED RELEASE ORAL at 06:28

## 2021-05-11 RX ADMIN — GABAPENTIN SCH MG: 300 CAPSULE ORAL at 20:55

## 2021-05-11 RX ADMIN — METOPROLOL SUCCINATE SCH MG: 25 TABLET, EXTENDED RELEASE ORAL at 07:46

## 2021-05-11 RX ADMIN — WARFARIN SODIUM SCH MG: 5 TABLET ORAL at 20:55

## 2021-05-11 RX ADMIN — BUSPIRONE HYDROCHLORIDE SCH MG: 15 TABLET ORAL at 12:13

## 2021-05-11 RX ADMIN — ROPINIROLE SCH MG: 0.5 TABLET ORAL at 20:54

## 2021-05-11 RX ADMIN — GABAPENTIN SCH MG: 100 CAPSULE ORAL at 07:46

## 2021-05-11 RX ADMIN — Medication SCH APPLIC: at 20:56

## 2021-05-11 RX ADMIN — FUROSEMIDE SCH MG: 20 TABLET ORAL at 07:47

## 2021-05-11 RX ADMIN — FUROSEMIDE SCH MG: 20 TABLET ORAL at 12:13

## 2021-05-11 RX ADMIN — Medication SCH APPLIC: at 07:48

## 2021-05-11 RX ADMIN — VITAMIN D, TAB 1000IU (100/BT) SCH MCG: 25 TAB at 07:49

## 2021-05-12 RX ADMIN — BUSPIRONE HYDROCHLORIDE SCH MG: 15 TABLET ORAL at 19:40

## 2021-05-12 RX ADMIN — SERTRALINE HYDROCHLORIDE SCH MG: 100 TABLET ORAL at 09:31

## 2021-05-12 RX ADMIN — Medication SCH APPLIC: at 19:41

## 2021-05-12 RX ADMIN — ROPINIROLE SCH MG: 0.5 TABLET ORAL at 19:41

## 2021-05-12 RX ADMIN — BUSPIRONE HYDROCHLORIDE SCH MG: 15 TABLET ORAL at 13:55

## 2021-05-12 RX ADMIN — FUROSEMIDE SCH MG: 20 TABLET ORAL at 09:32

## 2021-05-12 RX ADMIN — WARFARIN SODIUM SCH MG: 5 TABLET ORAL at 19:41

## 2021-05-12 RX ADMIN — GABAPENTIN SCH MG: 300 CAPSULE ORAL at 19:37

## 2021-05-12 RX ADMIN — OMEPRAZOLE SCH MG: 20 CAPSULE, DELAYED RELEASE ORAL at 06:14

## 2021-05-12 RX ADMIN — Medication SCH EACH: at 19:41

## 2021-05-12 RX ADMIN — FUROSEMIDE SCH MG: 20 TABLET ORAL at 13:12

## 2021-05-12 RX ADMIN — GABAPENTIN SCH MG: 100 CAPSULE ORAL at 13:12

## 2021-05-12 RX ADMIN — METOPROLOL SUCCINATE SCH MG: 25 TABLET, EXTENDED RELEASE ORAL at 09:30

## 2021-05-12 RX ADMIN — ISOSORBIDE MONONITRATE SCH MG: 30 TABLET, FILM COATED, EXTENDED RELEASE ORAL at 09:30

## 2021-05-12 RX ADMIN — GABAPENTIN SCH MG: 100 CAPSULE ORAL at 09:29

## 2021-05-12 RX ADMIN — Medication SCH EACH: at 09:33

## 2021-05-12 RX ADMIN — MAGNESIUM OXIDE TAB 400 MG (241.3 MG ELEMENTAL MG) SCH TAB: 400 (241.3 MG) TAB at 09:30

## 2021-05-12 RX ADMIN — BUSPIRONE HYDROCHLORIDE SCH MG: 15 TABLET ORAL at 09:30

## 2021-05-12 RX ADMIN — Medication SCH APPLIC: at 09:53

## 2021-05-12 RX ADMIN — VITAMIN D, TAB 1000IU (100/BT) SCH MCG: 25 TAB at 09:30

## 2021-05-13 RX ADMIN — BUSPIRONE HYDROCHLORIDE SCH MG: 15 TABLET ORAL at 08:20

## 2021-05-13 RX ADMIN — FUROSEMIDE SCH MG: 20 TABLET ORAL at 14:20

## 2021-05-13 RX ADMIN — BUSPIRONE HYDROCHLORIDE SCH MG: 15 TABLET ORAL at 20:09

## 2021-05-13 RX ADMIN — VITAMIN D, TAB 1000IU (100/BT) SCH MCG: 25 TAB at 08:17

## 2021-05-13 RX ADMIN — METOPROLOL SUCCINATE SCH MG: 25 TABLET, EXTENDED RELEASE ORAL at 08:21

## 2021-05-13 RX ADMIN — ISOSORBIDE MONONITRATE SCH MG: 30 TABLET, FILM COATED, EXTENDED RELEASE ORAL at 08:22

## 2021-05-13 RX ADMIN — BUSPIRONE HYDROCHLORIDE SCH MG: 15 TABLET ORAL at 14:20

## 2021-05-13 RX ADMIN — FUROSEMIDE SCH MG: 20 TABLET ORAL at 08:21

## 2021-05-13 RX ADMIN — GABAPENTIN SCH MG: 100 CAPSULE ORAL at 08:19

## 2021-05-13 RX ADMIN — WARFARIN SODIUM SCH MG: 5 TABLET ORAL at 19:51

## 2021-05-13 RX ADMIN — SERTRALINE HYDROCHLORIDE SCH MG: 100 TABLET ORAL at 08:23

## 2021-05-13 RX ADMIN — Medication SCH APPLIC: at 08:23

## 2021-05-13 RX ADMIN — ROPINIROLE SCH MG: 0.5 TABLET ORAL at 19:52

## 2021-05-13 RX ADMIN — GABAPENTIN SCH MG: 300 CAPSULE ORAL at 19:51

## 2021-05-13 RX ADMIN — MAGNESIUM OXIDE TAB 400 MG (241.3 MG ELEMENTAL MG) SCH TAB: 400 (241.3 MG) TAB at 08:17

## 2021-05-13 RX ADMIN — GABAPENTIN SCH MG: 100 CAPSULE ORAL at 14:21

## 2021-05-13 RX ADMIN — Medication SCH APPLIC: at 19:53

## 2021-05-13 RX ADMIN — Medication SCH EACH: at 08:23

## 2021-05-13 RX ADMIN — Medication SCH EACH: at 19:52

## 2021-05-13 RX ADMIN — OMEPRAZOLE SCH MG: 20 CAPSULE, DELAYED RELEASE ORAL at 06:06

## 2021-05-14 RX ADMIN — WARFARIN SODIUM SCH MG: 5 TABLET ORAL at 19:42

## 2021-05-14 RX ADMIN — Medication SCH EACH: at 08:10

## 2021-05-14 RX ADMIN — ISOSORBIDE MONONITRATE SCH MG: 30 TABLET, FILM COATED, EXTENDED RELEASE ORAL at 08:08

## 2021-05-14 RX ADMIN — BUSPIRONE HYDROCHLORIDE SCH MG: 15 TABLET ORAL at 19:42

## 2021-05-14 RX ADMIN — GABAPENTIN SCH MG: 100 CAPSULE ORAL at 08:06

## 2021-05-14 RX ADMIN — GABAPENTIN SCH MG: 100 CAPSULE ORAL at 13:35

## 2021-05-14 RX ADMIN — FUROSEMIDE SCH MG: 20 TABLET ORAL at 08:07

## 2021-05-14 RX ADMIN — METOPROLOL SUCCINATE SCH MG: 25 TABLET, EXTENDED RELEASE ORAL at 08:07

## 2021-05-14 RX ADMIN — SERTRALINE HYDROCHLORIDE SCH MG: 100 TABLET ORAL at 08:07

## 2021-05-14 RX ADMIN — VITAMIN D, TAB 1000IU (100/BT) SCH MCG: 25 TAB at 08:05

## 2021-05-14 RX ADMIN — MAGNESIUM OXIDE TAB 400 MG (241.3 MG ELEMENTAL MG) SCH TAB: 400 (241.3 MG) TAB at 08:05

## 2021-05-14 RX ADMIN — BUSPIRONE HYDROCHLORIDE SCH MG: 15 TABLET ORAL at 13:36

## 2021-05-14 RX ADMIN — Medication SCH APPLIC: at 19:44

## 2021-05-14 RX ADMIN — GABAPENTIN SCH MG: 300 CAPSULE ORAL at 19:42

## 2021-05-14 RX ADMIN — OMEPRAZOLE SCH MG: 20 CAPSULE, DELAYED RELEASE ORAL at 06:08

## 2021-05-14 RX ADMIN — BUSPIRONE HYDROCHLORIDE SCH MG: 15 TABLET ORAL at 08:07

## 2021-05-14 RX ADMIN — ROPINIROLE SCH MG: 0.5 TABLET ORAL at 19:43

## 2021-05-14 RX ADMIN — Medication SCH APPLIC: at 08:10

## 2021-05-14 RX ADMIN — Medication SCH EACH: at 19:44

## 2021-05-14 RX ADMIN — FUROSEMIDE SCH MG: 20 TABLET ORAL at 13:36

## 2021-05-15 RX ADMIN — GABAPENTIN SCH MG: 100 CAPSULE ORAL at 08:06

## 2021-05-15 RX ADMIN — Medication SCH EACH: at 08:09

## 2021-05-15 RX ADMIN — OMEPRAZOLE SCH MG: 20 CAPSULE, DELAYED RELEASE ORAL at 06:10

## 2021-05-15 RX ADMIN — FUROSEMIDE SCH MG: 20 TABLET ORAL at 12:16

## 2021-05-15 RX ADMIN — ROPINIROLE SCH MG: 0.5 TABLET ORAL at 19:51

## 2021-05-15 RX ADMIN — WARFARIN SODIUM SCH MG: 5 TABLET ORAL at 19:50

## 2021-05-15 RX ADMIN — BUSPIRONE HYDROCHLORIDE SCH MG: 15 TABLET ORAL at 12:16

## 2021-05-15 RX ADMIN — BUSPIRONE HYDROCHLORIDE SCH MG: 15 TABLET ORAL at 08:05

## 2021-05-15 RX ADMIN — Medication SCH APPLIC: at 19:53

## 2021-05-15 RX ADMIN — Medication SCH APPLIC: at 08:01

## 2021-05-15 RX ADMIN — MAGNESIUM OXIDE TAB 400 MG (241.3 MG ELEMENTAL MG) SCH TAB: 400 (241.3 MG) TAB at 07:59

## 2021-05-15 RX ADMIN — METOPROLOL SUCCINATE SCH MG: 25 TABLET, EXTENDED RELEASE ORAL at 08:05

## 2021-05-15 RX ADMIN — BUSPIRONE HYDROCHLORIDE SCH MG: 15 TABLET ORAL at 19:51

## 2021-05-15 RX ADMIN — VITAMIN D, TAB 1000IU (100/BT) SCH MCG: 25 TAB at 07:59

## 2021-05-15 RX ADMIN — SERTRALINE HYDROCHLORIDE SCH MG: 100 TABLET ORAL at 08:07

## 2021-05-15 RX ADMIN — GABAPENTIN SCH MG: 100 CAPSULE ORAL at 12:18

## 2021-05-15 RX ADMIN — Medication SCH EACH: at 19:56

## 2021-05-15 RX ADMIN — FUROSEMIDE SCH MG: 20 TABLET ORAL at 08:05

## 2021-05-15 RX ADMIN — GABAPENTIN SCH MG: 300 CAPSULE ORAL at 19:51

## 2021-05-15 RX ADMIN — ISOSORBIDE MONONITRATE SCH MG: 30 TABLET, FILM COATED, EXTENDED RELEASE ORAL at 08:02

## 2021-05-16 RX ADMIN — Medication SCH APPLIC: at 20:08

## 2021-05-16 RX ADMIN — GABAPENTIN SCH MG: 100 CAPSULE ORAL at 08:10

## 2021-05-16 RX ADMIN — GABAPENTIN SCH MG: 100 CAPSULE ORAL at 13:51

## 2021-05-16 RX ADMIN — SERTRALINE HYDROCHLORIDE SCH MG: 100 TABLET ORAL at 08:15

## 2021-05-16 RX ADMIN — METOPROLOL SUCCINATE SCH MG: 25 TABLET, EXTENDED RELEASE ORAL at 08:13

## 2021-05-16 RX ADMIN — VITAMIN D, TAB 1000IU (100/BT) SCH MCG: 25 TAB at 08:07

## 2021-05-16 RX ADMIN — WARFARIN SODIUM SCH MG: 5 TABLET ORAL at 20:05

## 2021-05-16 RX ADMIN — ROPINIROLE SCH MG: 0.5 TABLET ORAL at 20:06

## 2021-05-16 RX ADMIN — BUSPIRONE HYDROCHLORIDE SCH MG: 15 TABLET ORAL at 20:05

## 2021-05-16 RX ADMIN — MAGNESIUM OXIDE TAB 400 MG (241.3 MG ELEMENTAL MG) SCH TAB: 400 (241.3 MG) TAB at 08:07

## 2021-05-16 RX ADMIN — Medication SCH EACH: at 08:17

## 2021-05-16 RX ADMIN — GABAPENTIN SCH MG: 300 CAPSULE ORAL at 20:06

## 2021-05-16 RX ADMIN — FUROSEMIDE SCH MG: 20 TABLET ORAL at 13:50

## 2021-05-16 RX ADMIN — Medication SCH APPLIC: at 08:16

## 2021-05-16 RX ADMIN — FUROSEMIDE SCH MG: 20 TABLET ORAL at 08:10

## 2021-05-16 RX ADMIN — Medication SCH EACH: at 20:08

## 2021-05-16 RX ADMIN — OMEPRAZOLE SCH MG: 20 CAPSULE, DELAYED RELEASE ORAL at 06:26

## 2021-05-16 RX ADMIN — BUSPIRONE HYDROCHLORIDE SCH MG: 15 TABLET ORAL at 13:50

## 2021-05-16 RX ADMIN — BUSPIRONE HYDROCHLORIDE SCH MG: 15 TABLET ORAL at 08:14

## 2021-05-16 RX ADMIN — ISOSORBIDE MONONITRATE SCH MG: 30 TABLET, FILM COATED, EXTENDED RELEASE ORAL at 08:15

## 2021-05-17 RX ADMIN — VITAMIN D, TAB 1000IU (100/BT) SCH MCG: 25 TAB at 08:15

## 2021-05-17 RX ADMIN — SERTRALINE HYDROCHLORIDE SCH MG: 100 TABLET ORAL at 08:15

## 2021-05-17 RX ADMIN — ROPINIROLE SCH MG: 0.5 TABLET ORAL at 20:08

## 2021-05-17 RX ADMIN — GABAPENTIN SCH MG: 300 CAPSULE ORAL at 20:09

## 2021-05-17 RX ADMIN — Medication SCH APPLIC: at 20:03

## 2021-05-17 RX ADMIN — Medication SCH EACH: at 20:12

## 2021-05-17 RX ADMIN — Medication SCH APPLIC: at 08:16

## 2021-05-17 RX ADMIN — FUROSEMIDE SCH MG: 20 TABLET ORAL at 12:12

## 2021-05-17 RX ADMIN — MAGNESIUM OXIDE TAB 400 MG (241.3 MG ELEMENTAL MG) SCH TAB: 400 (241.3 MG) TAB at 08:15

## 2021-05-17 RX ADMIN — FUROSEMIDE SCH MG: 20 TABLET ORAL at 08:15

## 2021-05-17 RX ADMIN — BUSPIRONE HYDROCHLORIDE SCH MG: 15 TABLET ORAL at 12:13

## 2021-05-17 RX ADMIN — GABAPENTIN SCH MG: 100 CAPSULE ORAL at 12:12

## 2021-05-17 RX ADMIN — Medication SCH EACH: at 08:16

## 2021-05-17 RX ADMIN — METOPROLOL SUCCINATE SCH MG: 25 TABLET, EXTENDED RELEASE ORAL at 08:15

## 2021-05-17 RX ADMIN — OMEPRAZOLE SCH MG: 20 CAPSULE, DELAYED RELEASE ORAL at 06:03

## 2021-05-17 RX ADMIN — GABAPENTIN SCH MG: 100 CAPSULE ORAL at 08:13

## 2021-05-17 RX ADMIN — WARFARIN SODIUM SCH MG: 5 TABLET ORAL at 20:08

## 2021-05-17 RX ADMIN — ISOSORBIDE MONONITRATE SCH MG: 30 TABLET, FILM COATED, EXTENDED RELEASE ORAL at 08:14

## 2021-05-17 RX ADMIN — BUSPIRONE HYDROCHLORIDE SCH MG: 15 TABLET ORAL at 08:15

## 2021-05-17 RX ADMIN — BUSPIRONE HYDROCHLORIDE SCH MG: 15 TABLET ORAL at 20:08

## 2021-05-18 RX ADMIN — Medication SCH APPLIC: at 19:49

## 2021-05-18 RX ADMIN — FUROSEMIDE SCH MG: 20 TABLET ORAL at 07:55

## 2021-05-18 RX ADMIN — ISOSORBIDE MONONITRATE SCH MG: 30 TABLET, FILM COATED, EXTENDED RELEASE ORAL at 07:55

## 2021-05-18 RX ADMIN — BUSPIRONE HYDROCHLORIDE SCH MG: 15 TABLET ORAL at 12:24

## 2021-05-18 RX ADMIN — METOPROLOL SUCCINATE SCH MG: 25 TABLET, EXTENDED RELEASE ORAL at 07:55

## 2021-05-18 RX ADMIN — GABAPENTIN SCH MG: 100 CAPSULE ORAL at 12:24

## 2021-05-18 RX ADMIN — GABAPENTIN SCH MG: 100 CAPSULE ORAL at 07:56

## 2021-05-18 RX ADMIN — MAGNESIUM OXIDE TAB 400 MG (241.3 MG ELEMENTAL MG) SCH TAB: 400 (241.3 MG) TAB at 07:56

## 2021-05-18 RX ADMIN — Medication SCH APPLIC: at 07:56

## 2021-05-18 RX ADMIN — Medication SCH EACH: at 19:48

## 2021-05-18 RX ADMIN — Medication SCH EACH: at 07:57

## 2021-05-18 RX ADMIN — OMEPRAZOLE SCH MG: 20 CAPSULE, DELAYED RELEASE ORAL at 06:00

## 2021-05-18 RX ADMIN — GABAPENTIN SCH MG: 300 CAPSULE ORAL at 19:50

## 2021-05-18 RX ADMIN — ROPINIROLE SCH MG: 0.5 TABLET ORAL at 19:50

## 2021-05-18 RX ADMIN — BUSPIRONE HYDROCHLORIDE SCH MG: 15 TABLET ORAL at 19:49

## 2021-05-18 RX ADMIN — BUSPIRONE HYDROCHLORIDE SCH MG: 15 TABLET ORAL at 07:56

## 2021-05-18 RX ADMIN — SERTRALINE HYDROCHLORIDE SCH MG: 100 TABLET ORAL at 07:54

## 2021-05-18 RX ADMIN — WARFARIN SODIUM SCH MG: 5 TABLET ORAL at 19:49

## 2021-05-18 RX ADMIN — VITAMIN D, TAB 1000IU (100/BT) SCH MCG: 25 TAB at 07:56

## 2021-05-18 RX ADMIN — FUROSEMIDE SCH MG: 20 TABLET ORAL at 12:24

## 2021-05-19 RX ADMIN — FUROSEMIDE SCH MG: 20 TABLET ORAL at 07:30

## 2021-05-19 RX ADMIN — FUROSEMIDE SCH MG: 20 TABLET ORAL at 12:30

## 2021-05-19 RX ADMIN — Medication SCH EACH: at 07:33

## 2021-05-19 RX ADMIN — METOPROLOL SUCCINATE SCH MG: 25 TABLET, EXTENDED RELEASE ORAL at 07:30

## 2021-05-19 RX ADMIN — Medication SCH APPLIC: at 19:29

## 2021-05-19 RX ADMIN — SERTRALINE HYDROCHLORIDE SCH MG: 100 TABLET ORAL at 07:30

## 2021-05-19 RX ADMIN — GABAPENTIN SCH MG: 100 CAPSULE ORAL at 12:30

## 2021-05-19 RX ADMIN — BUSPIRONE HYDROCHLORIDE SCH MG: 15 TABLET ORAL at 07:31

## 2021-05-19 RX ADMIN — VITAMIN D, TAB 1000IU (100/BT) SCH MCG: 25 TAB at 07:29

## 2021-05-19 RX ADMIN — GABAPENTIN SCH MG: 100 CAPSULE ORAL at 07:31

## 2021-05-19 RX ADMIN — WARFARIN SODIUM SCH MG: 5 TABLET ORAL at 19:28

## 2021-05-19 RX ADMIN — MAGNESIUM OXIDE TAB 400 MG (241.3 MG ELEMENTAL MG) SCH TAB: 400 (241.3 MG) TAB at 07:29

## 2021-05-19 RX ADMIN — OMEPRAZOLE SCH MG: 20 CAPSULE, DELAYED RELEASE ORAL at 06:07

## 2021-05-19 RX ADMIN — GABAPENTIN SCH MG: 300 CAPSULE ORAL at 19:29

## 2021-05-19 RX ADMIN — ROPINIROLE SCH MG: 0.5 TABLET ORAL at 19:27

## 2021-05-19 RX ADMIN — Medication SCH EACH: at 19:27

## 2021-05-19 RX ADMIN — BUSPIRONE HYDROCHLORIDE SCH MG: 15 TABLET ORAL at 12:30

## 2021-05-19 RX ADMIN — ISOSORBIDE MONONITRATE SCH MG: 30 TABLET, FILM COATED, EXTENDED RELEASE ORAL at 07:30

## 2021-05-19 RX ADMIN — Medication SCH APPLIC: at 07:32

## 2021-05-19 RX ADMIN — BUSPIRONE HYDROCHLORIDE SCH MG: 15 TABLET ORAL at 19:27

## 2021-05-20 RX ADMIN — ROPINIROLE SCH MG: 0.5 TABLET ORAL at 20:43

## 2021-05-20 RX ADMIN — BUSPIRONE HYDROCHLORIDE SCH MG: 15 TABLET ORAL at 09:01

## 2021-05-20 RX ADMIN — VITAMIN D, TAB 1000IU (100/BT) SCH MCG: 25 TAB at 08:58

## 2021-05-20 RX ADMIN — WARFARIN SODIUM SCH MG: 5 TABLET ORAL at 20:43

## 2021-05-20 RX ADMIN — BUSPIRONE HYDROCHLORIDE SCH MG: 15 TABLET ORAL at 20:45

## 2021-05-20 RX ADMIN — GABAPENTIN SCH MG: 100 CAPSULE ORAL at 08:55

## 2021-05-20 RX ADMIN — Medication SCH APPLIC: at 09:01

## 2021-05-20 RX ADMIN — GABAPENTIN SCH MG: 300 CAPSULE ORAL at 20:44

## 2021-05-20 RX ADMIN — FUROSEMIDE SCH MG: 20 TABLET ORAL at 14:12

## 2021-05-20 RX ADMIN — MAGNESIUM OXIDE TAB 400 MG (241.3 MG ELEMENTAL MG) SCH TAB: 400 (241.3 MG) TAB at 08:58

## 2021-05-20 RX ADMIN — GABAPENTIN SCH MG: 100 CAPSULE ORAL at 14:11

## 2021-05-20 RX ADMIN — ISOSORBIDE MONONITRATE SCH MG: 30 TABLET, FILM COATED, EXTENDED RELEASE ORAL at 08:55

## 2021-05-20 RX ADMIN — OMEPRAZOLE SCH MG: 20 CAPSULE, DELAYED RELEASE ORAL at 06:08

## 2021-05-20 RX ADMIN — Medication SCH APPLIC: at 20:44

## 2021-05-20 RX ADMIN — FUROSEMIDE SCH MG: 20 TABLET ORAL at 08:56

## 2021-05-20 RX ADMIN — METOPROLOL SUCCINATE SCH MG: 25 TABLET, EXTENDED RELEASE ORAL at 08:56

## 2021-05-20 RX ADMIN — BUSPIRONE HYDROCHLORIDE SCH MG: 15 TABLET ORAL at 14:11

## 2021-05-20 RX ADMIN — Medication SCH EACH: at 06:09

## 2021-05-20 RX ADMIN — SERTRALINE HYDROCHLORIDE SCH MG: 100 TABLET ORAL at 08:57

## 2021-05-20 NOTE — PCM.SN.2
- Free Text/Narrative


Note: 


Asked by nursing to come and reevaluate leg wound.  We did treat for pseudomonas

at the end of April.  now worsening again.  Discussed that it is probably time 

to have her see the wound clinic in [place] for further 

evaluation/recommendations.  She is willing to do so.  Referral placed through 

[facility].





Nursing has also noted increased confusion on and off lately.  Will grab a UA.

## 2021-05-21 RX ADMIN — GABAPENTIN SCH MG: 300 CAPSULE ORAL at 20:08

## 2021-05-21 RX ADMIN — VITAMIN D, TAB 1000IU (100/BT) SCH MCG: 25 TAB at 09:07

## 2021-05-21 RX ADMIN — OMEPRAZOLE SCH MG: 20 CAPSULE, DELAYED RELEASE ORAL at 06:23

## 2021-05-21 RX ADMIN — Medication SCH APPLIC: at 20:09

## 2021-05-21 RX ADMIN — FUROSEMIDE SCH MG: 20 TABLET ORAL at 09:10

## 2021-05-21 RX ADMIN — Medication SCH EACH: at 06:24

## 2021-05-21 RX ADMIN — SERTRALINE HYDROCHLORIDE SCH MG: 100 TABLET ORAL at 09:09

## 2021-05-21 RX ADMIN — FUROSEMIDE SCH MG: 20 TABLET ORAL at 14:22

## 2021-05-21 RX ADMIN — GABAPENTIN SCH MG: 100 CAPSULE ORAL at 14:23

## 2021-05-21 RX ADMIN — ISOSORBIDE MONONITRATE SCH MG: 30 TABLET, FILM COATED, EXTENDED RELEASE ORAL at 09:08

## 2021-05-21 RX ADMIN — BUSPIRONE HYDROCHLORIDE SCH MG: 15 TABLET ORAL at 14:23

## 2021-05-21 RX ADMIN — BUSPIRONE HYDROCHLORIDE SCH MG: 15 TABLET ORAL at 09:10

## 2021-05-21 RX ADMIN — ROPINIROLE SCH MG: 0.5 TABLET ORAL at 20:10

## 2021-05-21 RX ADMIN — BUSPIRONE HYDROCHLORIDE SCH MG: 15 TABLET ORAL at 20:08

## 2021-05-21 RX ADMIN — WARFARIN SODIUM SCH MG: 5 TABLET ORAL at 20:08

## 2021-05-21 RX ADMIN — METOPROLOL SUCCINATE SCH MG: 25 TABLET, EXTENDED RELEASE ORAL at 09:10

## 2021-05-21 RX ADMIN — Medication SCH APPLIC: at 09:11

## 2021-05-21 RX ADMIN — MAGNESIUM OXIDE TAB 400 MG (241.3 MG ELEMENTAL MG) SCH TAB: 400 (241.3 MG) TAB at 09:07

## 2021-05-21 RX ADMIN — GABAPENTIN SCH MG: 100 CAPSULE ORAL at 09:12

## 2021-05-22 RX ADMIN — VITAMIN D, TAB 1000IU (100/BT) SCH MCG: 25 TAB at 09:09

## 2021-05-22 RX ADMIN — WARFARIN SODIUM SCH MG: 5 TABLET ORAL at 20:16

## 2021-05-22 RX ADMIN — FUROSEMIDE SCH MG: 20 TABLET ORAL at 09:08

## 2021-05-22 RX ADMIN — Medication SCH APPLIC: at 09:10

## 2021-05-22 RX ADMIN — OMEPRAZOLE SCH MG: 20 CAPSULE, DELAYED RELEASE ORAL at 05:59

## 2021-05-22 RX ADMIN — Medication SCH APPLIC: at 20:17

## 2021-05-22 RX ADMIN — ROPINIROLE SCH MG: 0.5 TABLET ORAL at 20:16

## 2021-05-22 RX ADMIN — BUSPIRONE HYDROCHLORIDE SCH MG: 15 TABLET ORAL at 14:11

## 2021-05-22 RX ADMIN — ISOSORBIDE MONONITRATE SCH MG: 30 TABLET, FILM COATED, EXTENDED RELEASE ORAL at 09:08

## 2021-05-22 RX ADMIN — GABAPENTIN SCH MG: 300 CAPSULE ORAL at 20:16

## 2021-05-22 RX ADMIN — BUSPIRONE HYDROCHLORIDE SCH MG: 15 TABLET ORAL at 20:16

## 2021-05-22 RX ADMIN — BUSPIRONE HYDROCHLORIDE SCH MG: 15 TABLET ORAL at 09:08

## 2021-05-22 RX ADMIN — MAGNESIUM OXIDE TAB 400 MG (241.3 MG ELEMENTAL MG) SCH TAB: 400 (241.3 MG) TAB at 09:10

## 2021-05-22 RX ADMIN — FUROSEMIDE SCH MG: 20 TABLET ORAL at 14:10

## 2021-05-22 RX ADMIN — Medication SCH: at 10:27

## 2021-05-22 RX ADMIN — METOPROLOL SUCCINATE SCH MG: 25 TABLET, EXTENDED RELEASE ORAL at 09:03

## 2021-05-22 RX ADMIN — GABAPENTIN SCH MG: 100 CAPSULE ORAL at 14:11

## 2021-05-22 RX ADMIN — SERTRALINE HYDROCHLORIDE SCH MG: 100 TABLET ORAL at 09:07

## 2021-05-22 RX ADMIN — GABAPENTIN SCH MG: 100 CAPSULE ORAL at 09:10

## 2021-05-23 RX ADMIN — VITAMIN D, TAB 1000IU (100/BT) SCH MCG: 25 TAB at 08:06

## 2021-05-23 RX ADMIN — BUSPIRONE HYDROCHLORIDE SCH MG: 15 TABLET ORAL at 08:07

## 2021-05-23 RX ADMIN — Medication SCH APPLIC: at 20:20

## 2021-05-23 RX ADMIN — FUROSEMIDE SCH MG: 20 TABLET ORAL at 08:07

## 2021-05-23 RX ADMIN — OMEPRAZOLE SCH MG: 20 CAPSULE, DELAYED RELEASE ORAL at 06:21

## 2021-05-23 RX ADMIN — BUSPIRONE HYDROCHLORIDE SCH MG: 15 TABLET ORAL at 20:18

## 2021-05-23 RX ADMIN — Medication SCH EACH: at 10:48

## 2021-05-23 RX ADMIN — GABAPENTIN SCH MG: 100 CAPSULE ORAL at 13:49

## 2021-05-23 RX ADMIN — METOPROLOL SUCCINATE SCH MG: 25 TABLET, EXTENDED RELEASE ORAL at 08:07

## 2021-05-23 RX ADMIN — ISOSORBIDE MONONITRATE SCH MG: 30 TABLET, FILM COATED, EXTENDED RELEASE ORAL at 08:07

## 2021-05-23 RX ADMIN — Medication SCH APPLIC: at 08:08

## 2021-05-23 RX ADMIN — BUSPIRONE HYDROCHLORIDE SCH MG: 15 TABLET ORAL at 13:47

## 2021-05-23 RX ADMIN — WARFARIN SODIUM SCH MG: 5 TABLET ORAL at 20:19

## 2021-05-23 RX ADMIN — GABAPENTIN SCH MG: 300 CAPSULE ORAL at 20:18

## 2021-05-23 RX ADMIN — SERTRALINE HYDROCHLORIDE SCH MG: 100 TABLET ORAL at 08:07

## 2021-05-23 RX ADMIN — GABAPENTIN SCH MG: 100 CAPSULE ORAL at 08:08

## 2021-05-23 RX ADMIN — MAGNESIUM OXIDE TAB 400 MG (241.3 MG ELEMENTAL MG) SCH TAB: 400 (241.3 MG) TAB at 08:06

## 2021-05-23 RX ADMIN — ROPINIROLE SCH MG: 0.5 TABLET ORAL at 20:19

## 2021-05-23 RX ADMIN — FUROSEMIDE SCH MG: 20 TABLET ORAL at 13:48

## 2021-05-24 RX ADMIN — Medication SCH APPLIC: at 09:00

## 2021-05-24 RX ADMIN — FUROSEMIDE SCH MG: 20 TABLET ORAL at 13:17

## 2021-05-24 RX ADMIN — GABAPENTIN SCH MG: 100 CAPSULE ORAL at 09:40

## 2021-05-24 RX ADMIN — FUROSEMIDE SCH MG: 20 TABLET ORAL at 08:59

## 2021-05-24 RX ADMIN — ISOSORBIDE MONONITRATE SCH MG: 30 TABLET, FILM COATED, EXTENDED RELEASE ORAL at 09:39

## 2021-05-24 RX ADMIN — GABAPENTIN SCH MG: 300 CAPSULE ORAL at 20:13

## 2021-05-24 RX ADMIN — VITAMIN D, TAB 1000IU (100/BT) SCH MCG: 25 TAB at 08:59

## 2021-05-24 RX ADMIN — BUSPIRONE HYDROCHLORIDE SCH MG: 15 TABLET ORAL at 20:12

## 2021-05-24 RX ADMIN — ROPINIROLE SCH MG: 0.5 TABLET ORAL at 20:12

## 2021-05-24 RX ADMIN — GABAPENTIN SCH MG: 100 CAPSULE ORAL at 13:17

## 2021-05-24 RX ADMIN — SERTRALINE HYDROCHLORIDE SCH MG: 100 TABLET ORAL at 08:59

## 2021-05-24 RX ADMIN — Medication SCH APPLIC: at 20:14

## 2021-05-24 RX ADMIN — BUSPIRONE HYDROCHLORIDE SCH MG: 15 TABLET ORAL at 13:17

## 2021-05-24 RX ADMIN — METOPROLOL SUCCINATE SCH MG: 25 TABLET, EXTENDED RELEASE ORAL at 08:59

## 2021-05-24 RX ADMIN — WARFARIN SODIUM SCH MG: 5 TABLET ORAL at 20:12

## 2021-05-24 RX ADMIN — OMEPRAZOLE SCH MG: 20 CAPSULE, DELAYED RELEASE ORAL at 06:21

## 2021-05-24 RX ADMIN — BUSPIRONE HYDROCHLORIDE SCH MG: 15 TABLET ORAL at 08:59

## 2021-05-24 RX ADMIN — Medication SCH EACH: at 09:01

## 2021-05-24 RX ADMIN — MAGNESIUM OXIDE TAB 400 MG (241.3 MG ELEMENTAL MG) SCH TAB: 400 (241.3 MG) TAB at 08:59

## 2021-05-24 RX ADMIN — ISOSORBIDE MONONITRATE SCH MG: 30 TABLET, FILM COATED, EXTENDED RELEASE ORAL at 08:59

## 2021-05-25 RX ADMIN — BUSPIRONE HYDROCHLORIDE SCH MG: 15 TABLET ORAL at 12:38

## 2021-05-25 RX ADMIN — BUSPIRONE HYDROCHLORIDE SCH MG: 15 TABLET ORAL at 20:05

## 2021-05-25 RX ADMIN — MAGNESIUM OXIDE TAB 400 MG (241.3 MG ELEMENTAL MG) SCH TAB: 400 (241.3 MG) TAB at 09:10

## 2021-05-25 RX ADMIN — Medication SCH EACH: at 06:12

## 2021-05-25 RX ADMIN — WARFARIN SODIUM SCH MG: 5 TABLET ORAL at 20:06

## 2021-05-25 RX ADMIN — FUROSEMIDE SCH MG: 20 TABLET ORAL at 12:38

## 2021-05-25 RX ADMIN — ISOSORBIDE MONONITRATE SCH MG: 30 TABLET, FILM COATED, EXTENDED RELEASE ORAL at 09:06

## 2021-05-25 RX ADMIN — METOPROLOL SUCCINATE SCH MG: 25 TABLET, EXTENDED RELEASE ORAL at 09:08

## 2021-05-25 RX ADMIN — GABAPENTIN SCH MG: 100 CAPSULE ORAL at 12:37

## 2021-05-25 RX ADMIN — Medication SCH APPLIC: at 08:56

## 2021-05-25 RX ADMIN — VITAMIN D, TAB 1000IU (100/BT) SCH MCG: 25 TAB at 09:12

## 2021-05-25 RX ADMIN — FUROSEMIDE SCH MG: 20 TABLET ORAL at 08:53

## 2021-05-25 RX ADMIN — Medication SCH APPLIC: at 20:06

## 2021-05-25 RX ADMIN — SERTRALINE HYDROCHLORIDE SCH MG: 100 TABLET ORAL at 08:54

## 2021-05-25 RX ADMIN — GABAPENTIN SCH MG: 300 CAPSULE ORAL at 20:04

## 2021-05-25 RX ADMIN — GABAPENTIN SCH MG: 100 CAPSULE ORAL at 08:52

## 2021-05-25 RX ADMIN — OMEPRAZOLE SCH MG: 20 CAPSULE, DELAYED RELEASE ORAL at 06:12

## 2021-05-25 RX ADMIN — BUSPIRONE HYDROCHLORIDE SCH MG: 15 TABLET ORAL at 08:52

## 2021-05-25 RX ADMIN — ROPINIROLE SCH MG: 0.5 TABLET ORAL at 20:05

## 2021-05-26 RX ADMIN — MAGNESIUM OXIDE TAB 400 MG (241.3 MG ELEMENTAL MG) SCH TAB: 400 (241.3 MG) TAB at 08:03

## 2021-05-26 RX ADMIN — FUROSEMIDE SCH MG: 20 TABLET ORAL at 08:02

## 2021-05-26 RX ADMIN — Medication SCH APPLIC: at 20:29

## 2021-05-26 RX ADMIN — ROPINIROLE SCH MG: 0.5 TABLET ORAL at 20:27

## 2021-05-26 RX ADMIN — OMEPRAZOLE SCH: 20 CAPSULE, DELAYED RELEASE ORAL at 06:37

## 2021-05-26 RX ADMIN — BUSPIRONE HYDROCHLORIDE SCH MG: 15 TABLET ORAL at 08:01

## 2021-05-26 RX ADMIN — OMEPRAZOLE SCH MG: 20 CAPSULE, DELAYED RELEASE ORAL at 06:00

## 2021-05-26 RX ADMIN — WARFARIN SODIUM SCH MG: 5 TABLET ORAL at 20:27

## 2021-05-26 RX ADMIN — ISOSORBIDE MONONITRATE SCH MG: 30 TABLET, FILM COATED, EXTENDED RELEASE ORAL at 08:02

## 2021-05-26 RX ADMIN — FUROSEMIDE SCH MG: 20 TABLET ORAL at 12:45

## 2021-05-26 RX ADMIN — BUSPIRONE HYDROCHLORIDE SCH MG: 15 TABLET ORAL at 12:47

## 2021-05-26 RX ADMIN — GABAPENTIN SCH MG: 100 CAPSULE ORAL at 12:45

## 2021-05-26 RX ADMIN — Medication SCH APPLIC: at 08:04

## 2021-05-26 RX ADMIN — GABAPENTIN SCH MG: 300 CAPSULE ORAL at 20:26

## 2021-05-26 RX ADMIN — METOPROLOL SUCCINATE SCH MG: 25 TABLET, EXTENDED RELEASE ORAL at 08:01

## 2021-05-26 RX ADMIN — Medication SCH: at 06:38

## 2021-05-26 RX ADMIN — VITAMIN D, TAB 1000IU (100/BT) SCH MCG: 25 TAB at 08:03

## 2021-05-26 RX ADMIN — Medication SCH EACH: at 06:00

## 2021-05-26 RX ADMIN — SERTRALINE HYDROCHLORIDE SCH MG: 100 TABLET ORAL at 08:01

## 2021-05-26 RX ADMIN — GABAPENTIN SCH MG: 100 CAPSULE ORAL at 08:01

## 2021-05-26 RX ADMIN — BUSPIRONE HYDROCHLORIDE SCH MG: 15 TABLET ORAL at 20:27

## 2021-05-27 RX ADMIN — FUROSEMIDE SCH MG: 20 TABLET ORAL at 12:50

## 2021-05-27 RX ADMIN — Medication SCH EACH: at 07:04

## 2021-05-27 RX ADMIN — BUSPIRONE HYDROCHLORIDE SCH MG: 15 TABLET ORAL at 09:03

## 2021-05-27 RX ADMIN — BUSPIRONE HYDROCHLORIDE SCH MG: 15 TABLET ORAL at 12:50

## 2021-05-27 RX ADMIN — BUSPIRONE HYDROCHLORIDE SCH MG: 15 TABLET ORAL at 19:51

## 2021-05-27 RX ADMIN — ISOSORBIDE MONONITRATE SCH MG: 30 TABLET, FILM COATED, EXTENDED RELEASE ORAL at 09:03

## 2021-05-27 RX ADMIN — ROPINIROLE SCH MG: 0.5 TABLET ORAL at 19:51

## 2021-05-27 RX ADMIN — METOPROLOL SUCCINATE SCH MG: 25 TABLET, EXTENDED RELEASE ORAL at 09:04

## 2021-05-27 RX ADMIN — FUROSEMIDE SCH MG: 20 TABLET ORAL at 09:05

## 2021-05-27 RX ADMIN — GABAPENTIN SCH MG: 300 CAPSULE ORAL at 19:52

## 2021-05-27 RX ADMIN — GABAPENTIN SCH MG: 100 CAPSULE ORAL at 12:51

## 2021-05-27 RX ADMIN — Medication SCH APPLIC: at 19:56

## 2021-05-27 RX ADMIN — Medication SCH APPLIC: at 09:06

## 2021-05-27 RX ADMIN — WARFARIN SODIUM SCH MG: 5 TABLET ORAL at 19:51

## 2021-05-27 RX ADMIN — MAGNESIUM OXIDE TAB 400 MG (241.3 MG ELEMENTAL MG) SCH TAB: 400 (241.3 MG) TAB at 09:06

## 2021-05-27 RX ADMIN — SERTRALINE HYDROCHLORIDE SCH MG: 100 TABLET ORAL at 09:05

## 2021-05-27 RX ADMIN — GABAPENTIN SCH MG: 100 CAPSULE ORAL at 09:02

## 2021-05-27 RX ADMIN — VITAMIN D, TAB 1000IU (100/BT) SCH MCG: 25 TAB at 09:06

## 2021-05-27 RX ADMIN — OMEPRAZOLE SCH MG: 20 CAPSULE, DELAYED RELEASE ORAL at 07:03

## 2021-05-28 RX ADMIN — SERTRALINE HYDROCHLORIDE SCH MG: 100 TABLET ORAL at 08:55

## 2021-05-28 RX ADMIN — BUSPIRONE HYDROCHLORIDE SCH MG: 15 TABLET ORAL at 08:54

## 2021-05-28 RX ADMIN — Medication SCH APPLIC: at 19:52

## 2021-05-28 RX ADMIN — ISOSORBIDE MONONITRATE SCH MG: 30 TABLET, FILM COATED, EXTENDED RELEASE ORAL at 08:56

## 2021-05-28 RX ADMIN — METOPROLOL SUCCINATE SCH MG: 25 TABLET, EXTENDED RELEASE ORAL at 08:56

## 2021-05-28 RX ADMIN — MAGNESIUM OXIDE TAB 400 MG (241.3 MG ELEMENTAL MG) SCH TAB: 400 (241.3 MG) TAB at 08:55

## 2021-05-28 RX ADMIN — WARFARIN SODIUM SCH MG: 5 TABLET ORAL at 19:51

## 2021-05-28 RX ADMIN — FUROSEMIDE SCH MG: 20 TABLET ORAL at 08:54

## 2021-05-28 RX ADMIN — GABAPENTIN SCH MG: 100 CAPSULE ORAL at 12:31

## 2021-05-28 RX ADMIN — VITAMIN D, TAB 1000IU (100/BT) SCH MCG: 25 TAB at 08:55

## 2021-05-28 RX ADMIN — BUSPIRONE HYDROCHLORIDE SCH MG: 15 TABLET ORAL at 19:53

## 2021-05-28 RX ADMIN — FUROSEMIDE SCH MG: 20 TABLET ORAL at 12:30

## 2021-05-28 RX ADMIN — GABAPENTIN SCH MG: 100 CAPSULE ORAL at 08:54

## 2021-05-28 RX ADMIN — ROPINIROLE SCH MG: 0.5 TABLET ORAL at 19:51

## 2021-05-28 RX ADMIN — OMEPRAZOLE SCH MG: 20 CAPSULE, DELAYED RELEASE ORAL at 06:18

## 2021-05-28 RX ADMIN — GABAPENTIN SCH MG: 300 CAPSULE ORAL at 19:53

## 2021-05-28 RX ADMIN — Medication SCH APPLIC: at 08:57

## 2021-05-28 RX ADMIN — Medication SCH EACH: at 06:18

## 2021-05-28 RX ADMIN — BUSPIRONE HYDROCHLORIDE SCH MG: 15 TABLET ORAL at 12:30

## 2021-05-29 RX ADMIN — GABAPENTIN SCH MG: 300 CAPSULE ORAL at 20:16

## 2021-05-29 RX ADMIN — Medication SCH APPLIC: at 20:17

## 2021-05-29 RX ADMIN — Medication SCH APPLIC: at 08:40

## 2021-05-29 RX ADMIN — FUROSEMIDE SCH MG: 20 TABLET ORAL at 12:56

## 2021-05-29 RX ADMIN — FUROSEMIDE SCH MG: 20 TABLET ORAL at 08:39

## 2021-05-29 RX ADMIN — ISOSORBIDE MONONITRATE SCH MG: 30 TABLET, FILM COATED, EXTENDED RELEASE ORAL at 08:39

## 2021-05-29 RX ADMIN — ROPINIROLE SCH MG: 0.5 TABLET ORAL at 20:16

## 2021-05-29 RX ADMIN — GABAPENTIN SCH MG: 100 CAPSULE ORAL at 12:56

## 2021-05-29 RX ADMIN — METOPROLOL SUCCINATE SCH MG: 25 TABLET, EXTENDED RELEASE ORAL at 08:39

## 2021-05-29 RX ADMIN — Medication SCH EACH: at 06:15

## 2021-05-29 RX ADMIN — OMEPRAZOLE SCH MG: 20 CAPSULE, DELAYED RELEASE ORAL at 06:15

## 2021-05-29 RX ADMIN — GABAPENTIN SCH MG: 100 CAPSULE ORAL at 08:39

## 2021-05-29 RX ADMIN — VITAMIN D, TAB 1000IU (100/BT) SCH MCG: 25 TAB at 08:39

## 2021-05-29 RX ADMIN — BUSPIRONE HYDROCHLORIDE SCH MG: 15 TABLET ORAL at 20:16

## 2021-05-29 RX ADMIN — BUSPIRONE HYDROCHLORIDE SCH MG: 15 TABLET ORAL at 12:56

## 2021-05-29 RX ADMIN — BUSPIRONE HYDROCHLORIDE SCH MG: 15 TABLET ORAL at 08:40

## 2021-05-29 RX ADMIN — SERTRALINE HYDROCHLORIDE SCH MG: 100 TABLET ORAL at 08:39

## 2021-05-29 RX ADMIN — MAGNESIUM OXIDE TAB 400 MG (241.3 MG ELEMENTAL MG) SCH TAB: 400 (241.3 MG) TAB at 08:39

## 2021-05-29 RX ADMIN — WARFARIN SODIUM SCH MG: 5 TABLET ORAL at 20:18

## 2021-05-30 RX ADMIN — Medication SCH APPLIC: at 20:12

## 2021-05-30 RX ADMIN — GABAPENTIN SCH MG: 100 CAPSULE ORAL at 12:47

## 2021-05-30 RX ADMIN — FUROSEMIDE SCH MG: 20 TABLET ORAL at 08:24

## 2021-05-30 RX ADMIN — BUSPIRONE HYDROCHLORIDE SCH MG: 15 TABLET ORAL at 12:47

## 2021-05-30 RX ADMIN — OMEPRAZOLE SCH MG: 20 CAPSULE, DELAYED RELEASE ORAL at 06:05

## 2021-05-30 RX ADMIN — ISOSORBIDE MONONITRATE SCH MG: 30 TABLET, FILM COATED, EXTENDED RELEASE ORAL at 08:24

## 2021-05-30 RX ADMIN — GABAPENTIN SCH MG: 300 CAPSULE ORAL at 20:09

## 2021-05-30 RX ADMIN — GABAPENTIN SCH MG: 100 CAPSULE ORAL at 08:23

## 2021-05-30 RX ADMIN — MAGNESIUM OXIDE TAB 400 MG (241.3 MG ELEMENTAL MG) SCH TAB: 400 (241.3 MG) TAB at 08:23

## 2021-05-30 RX ADMIN — METOPROLOL SUCCINATE SCH MG: 25 TABLET, EXTENDED RELEASE ORAL at 08:24

## 2021-05-30 RX ADMIN — WARFARIN SODIUM SCH MG: 5 TABLET ORAL at 20:10

## 2021-05-30 RX ADMIN — Medication SCH EACH: at 06:05

## 2021-05-30 RX ADMIN — SERTRALINE HYDROCHLORIDE SCH MG: 100 TABLET ORAL at 08:24

## 2021-05-30 RX ADMIN — Medication SCH APPLIC: at 08:24

## 2021-05-30 RX ADMIN — ROPINIROLE SCH MG: 0.5 TABLET ORAL at 20:10

## 2021-05-30 RX ADMIN — FUROSEMIDE SCH MG: 20 TABLET ORAL at 12:47

## 2021-05-30 RX ADMIN — BUSPIRONE HYDROCHLORIDE SCH MG: 15 TABLET ORAL at 08:24

## 2021-05-30 RX ADMIN — VITAMIN D, TAB 1000IU (100/BT) SCH MCG: 25 TAB at 08:23

## 2021-05-30 RX ADMIN — BUSPIRONE HYDROCHLORIDE SCH MG: 15 TABLET ORAL at 20:10

## 2021-05-31 RX ADMIN — BUSPIRONE HYDROCHLORIDE SCH MG: 15 TABLET ORAL at 12:41

## 2021-05-31 RX ADMIN — SERTRALINE HYDROCHLORIDE SCH MG: 100 TABLET ORAL at 08:41

## 2021-05-31 RX ADMIN — METOPROLOL SUCCINATE SCH MG: 25 TABLET, EXTENDED RELEASE ORAL at 08:41

## 2021-05-31 RX ADMIN — BUSPIRONE HYDROCHLORIDE SCH MG: 15 TABLET ORAL at 19:58

## 2021-05-31 RX ADMIN — FUROSEMIDE SCH MG: 20 TABLET ORAL at 12:41

## 2021-05-31 RX ADMIN — Medication SCH: at 10:48

## 2021-05-31 RX ADMIN — MAGNESIUM OXIDE TAB 400 MG (241.3 MG ELEMENTAL MG) SCH TAB: 400 (241.3 MG) TAB at 08:42

## 2021-05-31 RX ADMIN — GABAPENTIN SCH MG: 100 CAPSULE ORAL at 08:41

## 2021-05-31 RX ADMIN — OMEPRAZOLE SCH MG: 20 CAPSULE, DELAYED RELEASE ORAL at 06:02

## 2021-05-31 RX ADMIN — FUROSEMIDE SCH MG: 20 TABLET ORAL at 08:41

## 2021-05-31 RX ADMIN — GABAPENTIN SCH MG: 300 CAPSULE ORAL at 19:55

## 2021-05-31 RX ADMIN — ISOSORBIDE MONONITRATE SCH MG: 30 TABLET, FILM COATED, EXTENDED RELEASE ORAL at 08:41

## 2021-05-31 RX ADMIN — GABAPENTIN SCH MG: 100 CAPSULE ORAL at 12:42

## 2021-05-31 RX ADMIN — Medication SCH: at 06:02

## 2021-05-31 RX ADMIN — ROPINIROLE SCH MG: 0.5 TABLET ORAL at 19:57

## 2021-05-31 RX ADMIN — WARFARIN SODIUM SCH MG: 5 TABLET ORAL at 19:59

## 2021-05-31 RX ADMIN — BUSPIRONE HYDROCHLORIDE SCH MG: 15 TABLET ORAL at 08:41

## 2021-05-31 RX ADMIN — VITAMIN D, TAB 1000IU (100/BT) SCH MCG: 25 TAB at 08:42

## 2021-05-31 RX ADMIN — Medication SCH APPLIC: at 19:57

## 2021-06-01 RX ADMIN — BUSPIRONE HYDROCHLORIDE SCH MG: 15 TABLET ORAL at 20:12

## 2021-06-01 RX ADMIN — FUROSEMIDE SCH MG: 20 TABLET ORAL at 08:20

## 2021-06-01 RX ADMIN — GABAPENTIN SCH MG: 100 CAPSULE ORAL at 08:21

## 2021-06-01 RX ADMIN — MAGNESIUM OXIDE TAB 400 MG (241.3 MG ELEMENTAL MG) SCH TAB: 400 (241.3 MG) TAB at 08:19

## 2021-06-01 RX ADMIN — FUROSEMIDE SCH MG: 20 TABLET ORAL at 12:24

## 2021-06-01 RX ADMIN — WARFARIN SODIUM SCH MG: 5 TABLET ORAL at 20:12

## 2021-06-01 RX ADMIN — SERTRALINE HYDROCHLORIDE SCH MG: 100 TABLET ORAL at 08:20

## 2021-06-01 RX ADMIN — BUSPIRONE HYDROCHLORIDE SCH MG: 15 TABLET ORAL at 08:23

## 2021-06-01 RX ADMIN — ISOSORBIDE MONONITRATE SCH MG: 30 TABLET, FILM COATED, EXTENDED RELEASE ORAL at 08:20

## 2021-06-01 RX ADMIN — METOPROLOL SUCCINATE SCH MG: 25 TABLET, EXTENDED RELEASE ORAL at 08:20

## 2021-06-01 RX ADMIN — GABAPENTIN SCH MG: 300 CAPSULE ORAL at 20:12

## 2021-06-01 RX ADMIN — VITAMIN D, TAB 1000IU (100/BT) SCH MCG: 25 TAB at 08:19

## 2021-06-01 RX ADMIN — Medication SCH: at 06:05

## 2021-06-01 RX ADMIN — Medication SCH APPLIC: at 08:23

## 2021-06-01 RX ADMIN — Medication SCH APPLIC: at 20:13

## 2021-06-01 RX ADMIN — ROPINIROLE SCH MG: 0.5 TABLET ORAL at 20:12

## 2021-06-01 RX ADMIN — GABAPENTIN SCH MG: 100 CAPSULE ORAL at 12:23

## 2021-06-01 RX ADMIN — OMEPRAZOLE SCH MG: 20 CAPSULE, DELAYED RELEASE ORAL at 06:05

## 2021-06-01 RX ADMIN — BUSPIRONE HYDROCHLORIDE SCH MG: 15 TABLET ORAL at 12:23

## 2021-06-02 RX ADMIN — GABAPENTIN SCH MG: 100 CAPSULE ORAL at 13:20

## 2021-06-02 RX ADMIN — MAGNESIUM OXIDE TAB 400 MG (241.3 MG ELEMENTAL MG) SCH TAB: 400 (241.3 MG) TAB at 08:08

## 2021-06-02 RX ADMIN — FUROSEMIDE SCH MG: 20 TABLET ORAL at 13:22

## 2021-06-02 RX ADMIN — GABAPENTIN SCH MG: 100 CAPSULE ORAL at 08:11

## 2021-06-02 RX ADMIN — FUROSEMIDE SCH MG: 20 TABLET ORAL at 08:10

## 2021-06-02 RX ADMIN — METOPROLOL SUCCINATE SCH MG: 25 TABLET, EXTENDED RELEASE ORAL at 08:10

## 2021-06-02 RX ADMIN — BUSPIRONE HYDROCHLORIDE SCH MG: 15 TABLET ORAL at 19:25

## 2021-06-02 RX ADMIN — Medication SCH: at 06:01

## 2021-06-02 RX ADMIN — Medication SCH APPLIC: at 19:27

## 2021-06-02 RX ADMIN — ROPINIROLE SCH MG: 0.5 TABLET ORAL at 19:25

## 2021-06-02 RX ADMIN — WARFARIN SODIUM SCH MG: 5 TABLET ORAL at 19:25

## 2021-06-02 RX ADMIN — Medication SCH APPLIC: at 08:09

## 2021-06-02 RX ADMIN — VITAMIN D, TAB 1000IU (100/BT) SCH MCG: 25 TAB at 08:08

## 2021-06-02 RX ADMIN — GABAPENTIN SCH MG: 300 CAPSULE ORAL at 19:26

## 2021-06-02 RX ADMIN — BUSPIRONE HYDROCHLORIDE SCH MG: 15 TABLET ORAL at 13:22

## 2021-06-02 RX ADMIN — BUSPIRONE HYDROCHLORIDE SCH MG: 15 TABLET ORAL at 08:11

## 2021-06-02 RX ADMIN — SERTRALINE HYDROCHLORIDE SCH MG: 100 TABLET ORAL at 08:10

## 2021-06-02 RX ADMIN — ISOSORBIDE MONONITRATE SCH MG: 30 TABLET, FILM COATED, EXTENDED RELEASE ORAL at 08:10

## 2021-06-02 RX ADMIN — OMEPRAZOLE SCH MG: 20 CAPSULE, DELAYED RELEASE ORAL at 06:01

## 2021-06-03 RX ADMIN — FUROSEMIDE SCH MG: 20 TABLET ORAL at 08:42

## 2021-06-03 RX ADMIN — VITAMIN D, TAB 1000IU (100/BT) SCH MCG: 25 TAB at 08:39

## 2021-06-03 RX ADMIN — SERTRALINE HYDROCHLORIDE SCH MG: 100 TABLET ORAL at 08:41

## 2021-06-03 RX ADMIN — BUSPIRONE HYDROCHLORIDE SCH MG: 15 TABLET ORAL at 20:11

## 2021-06-03 RX ADMIN — GABAPENTIN SCH MG: 300 CAPSULE ORAL at 20:09

## 2021-06-03 RX ADMIN — METOPROLOL SUCCINATE SCH MG: 25 TABLET, EXTENDED RELEASE ORAL at 08:42

## 2021-06-03 RX ADMIN — ISOSORBIDE MONONITRATE SCH MG: 30 TABLET, FILM COATED, EXTENDED RELEASE ORAL at 08:41

## 2021-06-03 RX ADMIN — OMEPRAZOLE SCH MG: 20 CAPSULE, DELAYED RELEASE ORAL at 06:15

## 2021-06-03 RX ADMIN — Medication SCH: at 06:15

## 2021-06-03 RX ADMIN — FUROSEMIDE SCH MG: 20 TABLET ORAL at 12:07

## 2021-06-03 RX ADMIN — Medication SCH APPLIC: at 20:12

## 2021-06-03 RX ADMIN — WARFARIN SODIUM SCH MG: 5 TABLET ORAL at 20:11

## 2021-06-03 RX ADMIN — GABAPENTIN SCH MG: 100 CAPSULE ORAL at 08:40

## 2021-06-03 RX ADMIN — BUSPIRONE HYDROCHLORIDE SCH MG: 15 TABLET ORAL at 08:41

## 2021-06-03 RX ADMIN — MAGNESIUM OXIDE TAB 400 MG (241.3 MG ELEMENTAL MG) SCH TAB: 400 (241.3 MG) TAB at 08:39

## 2021-06-03 RX ADMIN — GABAPENTIN SCH MG: 100 CAPSULE ORAL at 12:07

## 2021-06-03 RX ADMIN — ROPINIROLE SCH MG: 0.5 TABLET ORAL at 20:11

## 2021-06-03 RX ADMIN — Medication SCH APPLIC: at 08:42

## 2021-06-03 RX ADMIN — BUSPIRONE HYDROCHLORIDE SCH MG: 15 TABLET ORAL at 12:07

## 2021-06-04 RX ADMIN — MAGNESIUM OXIDE TAB 400 MG (241.3 MG ELEMENTAL MG) SCH TAB: 400 (241.3 MG) TAB at 07:20

## 2021-06-04 RX ADMIN — Medication SCH: at 07:20

## 2021-06-04 RX ADMIN — WARFARIN SODIUM SCH MG: 5 TABLET ORAL at 20:11

## 2021-06-04 RX ADMIN — GABAPENTIN SCH MG: 300 CAPSULE ORAL at 20:11

## 2021-06-04 RX ADMIN — BUSPIRONE HYDROCHLORIDE SCH MG: 15 TABLET ORAL at 20:11

## 2021-06-04 RX ADMIN — FUROSEMIDE SCH MG: 20 TABLET ORAL at 07:22

## 2021-06-04 RX ADMIN — OMEPRAZOLE SCH MG: 20 CAPSULE, DELAYED RELEASE ORAL at 06:12

## 2021-06-04 RX ADMIN — GABAPENTIN SCH MG: 100 CAPSULE ORAL at 07:21

## 2021-06-04 RX ADMIN — BUSPIRONE HYDROCHLORIDE SCH MG: 15 TABLET ORAL at 07:21

## 2021-06-04 RX ADMIN — GABAPENTIN SCH MG: 100 CAPSULE ORAL at 12:29

## 2021-06-04 RX ADMIN — ISOSORBIDE MONONITRATE SCH MG: 30 TABLET, FILM COATED, EXTENDED RELEASE ORAL at 07:22

## 2021-06-04 RX ADMIN — Medication SCH APPLIC: at 20:15

## 2021-06-04 RX ADMIN — ROPINIROLE SCH MG: 0.5 TABLET ORAL at 20:11

## 2021-06-04 RX ADMIN — FUROSEMIDE SCH MG: 20 TABLET ORAL at 12:29

## 2021-06-04 RX ADMIN — Medication SCH APPLIC: at 07:23

## 2021-06-04 RX ADMIN — BUSPIRONE HYDROCHLORIDE SCH MG: 15 TABLET ORAL at 12:30

## 2021-06-04 RX ADMIN — VITAMIN D, TAB 1000IU (100/BT) SCH MCG: 25 TAB at 07:20

## 2021-06-04 RX ADMIN — METOPROLOL SUCCINATE SCH MG: 25 TABLET, EXTENDED RELEASE ORAL at 07:22

## 2021-06-04 RX ADMIN — SERTRALINE HYDROCHLORIDE SCH MG: 100 TABLET ORAL at 07:22

## 2021-06-05 RX ADMIN — SERTRALINE HYDROCHLORIDE SCH MG: 100 TABLET ORAL at 07:28

## 2021-06-05 RX ADMIN — BUSPIRONE HYDROCHLORIDE SCH MG: 15 TABLET ORAL at 12:17

## 2021-06-05 RX ADMIN — OMEPRAZOLE SCH MG: 20 CAPSULE, DELAYED RELEASE ORAL at 06:28

## 2021-06-05 RX ADMIN — Medication SCH EACH: at 06:28

## 2021-06-05 RX ADMIN — GABAPENTIN SCH MG: 100 CAPSULE ORAL at 07:27

## 2021-06-05 RX ADMIN — BUSPIRONE HYDROCHLORIDE SCH MG: 15 TABLET ORAL at 20:21

## 2021-06-05 RX ADMIN — GABAPENTIN SCH MG: 300 CAPSULE ORAL at 20:18

## 2021-06-05 RX ADMIN — ISOSORBIDE MONONITRATE SCH MG: 30 TABLET, FILM COATED, EXTENDED RELEASE ORAL at 07:28

## 2021-06-05 RX ADMIN — VITAMIN D, TAB 1000IU (100/BT) SCH MCG: 25 TAB at 07:27

## 2021-06-05 RX ADMIN — FUROSEMIDE SCH MG: 20 TABLET ORAL at 07:29

## 2021-06-05 RX ADMIN — WARFARIN SODIUM SCH MG: 5 TABLET ORAL at 20:21

## 2021-06-05 RX ADMIN — GABAPENTIN SCH MG: 100 CAPSULE ORAL at 12:16

## 2021-06-05 RX ADMIN — MAGNESIUM OXIDE TAB 400 MG (241.3 MG ELEMENTAL MG) SCH TAB: 400 (241.3 MG) TAB at 07:28

## 2021-06-05 RX ADMIN — BUSPIRONE HYDROCHLORIDE SCH MG: 15 TABLET ORAL at 07:29

## 2021-06-05 RX ADMIN — FUROSEMIDE SCH MG: 20 TABLET ORAL at 12:18

## 2021-06-05 RX ADMIN — Medication SCH APPLIC: at 07:28

## 2021-06-05 RX ADMIN — Medication SCH APPLIC: at 20:22

## 2021-06-05 RX ADMIN — ROPINIROLE SCH MG: 0.5 TABLET ORAL at 20:20

## 2021-06-05 RX ADMIN — METOPROLOL SUCCINATE SCH MG: 25 TABLET, EXTENDED RELEASE ORAL at 07:29

## 2021-06-06 RX ADMIN — ROPINIROLE SCH MG: 0.5 TABLET ORAL at 19:59

## 2021-06-06 RX ADMIN — GABAPENTIN SCH MG: 300 CAPSULE ORAL at 19:59

## 2021-06-06 RX ADMIN — FUROSEMIDE SCH MG: 20 TABLET ORAL at 07:53

## 2021-06-06 RX ADMIN — BUSPIRONE HYDROCHLORIDE SCH MG: 15 TABLET ORAL at 13:28

## 2021-06-06 RX ADMIN — GABAPENTIN SCH MG: 100 CAPSULE ORAL at 07:50

## 2021-06-06 RX ADMIN — SERTRALINE HYDROCHLORIDE SCH MG: 100 TABLET ORAL at 07:54

## 2021-06-06 RX ADMIN — Medication SCH APPLIC: at 07:55

## 2021-06-06 RX ADMIN — WARFARIN SODIUM SCH MG: 5 TABLET ORAL at 19:59

## 2021-06-06 RX ADMIN — BUSPIRONE HYDROCHLORIDE SCH MG: 15 TABLET ORAL at 07:55

## 2021-06-06 RX ADMIN — GABAPENTIN SCH MG: 100 CAPSULE ORAL at 13:27

## 2021-06-06 RX ADMIN — MAGNESIUM OXIDE TAB 400 MG (241.3 MG ELEMENTAL MG) SCH TAB: 400 (241.3 MG) TAB at 07:57

## 2021-06-06 RX ADMIN — Medication SCH APPLIC: at 20:00

## 2021-06-06 RX ADMIN — ISOSORBIDE MONONITRATE SCH MG: 30 TABLET, FILM COATED, EXTENDED RELEASE ORAL at 07:54

## 2021-06-06 RX ADMIN — Medication SCH EACH: at 06:13

## 2021-06-06 RX ADMIN — VITAMIN D, TAB 1000IU (100/BT) SCH MCG: 25 TAB at 07:52

## 2021-06-06 RX ADMIN — FUROSEMIDE SCH MG: 20 TABLET ORAL at 13:28

## 2021-06-06 RX ADMIN — METOPROLOL SUCCINATE SCH MG: 25 TABLET, EXTENDED RELEASE ORAL at 07:54

## 2021-06-06 RX ADMIN — BUSPIRONE HYDROCHLORIDE SCH MG: 15 TABLET ORAL at 19:58

## 2021-06-06 RX ADMIN — OMEPRAZOLE SCH MG: 20 CAPSULE, DELAYED RELEASE ORAL at 06:12

## 2021-06-07 VITALS — HEART RATE: 73 BPM | DIASTOLIC BLOOD PRESSURE: 75 MMHG | SYSTOLIC BLOOD PRESSURE: 127 MMHG

## 2021-06-07 RX ADMIN — BUSPIRONE HYDROCHLORIDE SCH MG: 15 TABLET ORAL at 12:05

## 2021-06-07 RX ADMIN — Medication SCH APPLIC: at 07:30

## 2021-06-07 RX ADMIN — GABAPENTIN SCH MG: 100 CAPSULE ORAL at 12:05

## 2021-06-07 RX ADMIN — GABAPENTIN SCH MG: 100 CAPSULE ORAL at 07:28

## 2021-06-07 RX ADMIN — VITAMIN D, TAB 1000IU (100/BT) SCH MCG: 25 TAB at 07:30

## 2021-06-07 RX ADMIN — BUSPIRONE HYDROCHLORIDE SCH MG: 15 TABLET ORAL at 07:29

## 2021-06-07 RX ADMIN — METOPROLOL SUCCINATE SCH MG: 25 TABLET, EXTENDED RELEASE ORAL at 07:29

## 2021-06-07 RX ADMIN — ISOSORBIDE MONONITRATE SCH MG: 30 TABLET, FILM COATED, EXTENDED RELEASE ORAL at 07:29

## 2021-06-07 RX ADMIN — FUROSEMIDE SCH MG: 20 TABLET ORAL at 12:06

## 2021-06-07 RX ADMIN — Medication SCH EACH: at 06:22

## 2021-06-07 RX ADMIN — SERTRALINE HYDROCHLORIDE SCH MG: 100 TABLET ORAL at 07:29

## 2021-06-07 RX ADMIN — FUROSEMIDE SCH MG: 20 TABLET ORAL at 07:29

## 2021-06-07 RX ADMIN — OMEPRAZOLE SCH MG: 20 CAPSULE, DELAYED RELEASE ORAL at 06:22

## 2021-06-07 RX ADMIN — MAGNESIUM OXIDE TAB 400 MG (241.3 MG ELEMENTAL MG) SCH TAB: 400 (241.3 MG) TAB at 07:30

## 2021-06-26 ENCOUNTER — HOSPITAL ENCOUNTER (INPATIENT)
Dept: HOSPITAL 77 - KA.ED | Age: 86
LOS: 2 days | Discharge: NURSING, REHAB OR LONG-TERM CARE | DRG: 280 | End: 2021-06-28
Attending: FAMILY MEDICINE | Admitting: NURSE PRACTITIONER
Payer: MEDICARE

## 2021-06-26 DIAGNOSIS — I95.9: ICD-10-CM

## 2021-06-26 DIAGNOSIS — Z79.899: ICD-10-CM

## 2021-06-26 DIAGNOSIS — N18.30: ICD-10-CM

## 2021-06-26 DIAGNOSIS — D72.820: ICD-10-CM

## 2021-06-26 DIAGNOSIS — G89.29: ICD-10-CM

## 2021-06-26 DIAGNOSIS — R79.1: ICD-10-CM

## 2021-06-26 DIAGNOSIS — I21.4: ICD-10-CM

## 2021-06-26 DIAGNOSIS — F41.9: ICD-10-CM

## 2021-06-26 DIAGNOSIS — Z66: ICD-10-CM

## 2021-06-26 DIAGNOSIS — J96.21: ICD-10-CM

## 2021-06-26 DIAGNOSIS — Z79.01: ICD-10-CM

## 2021-06-26 DIAGNOSIS — E87.6: ICD-10-CM

## 2021-06-26 DIAGNOSIS — F32.9: ICD-10-CM

## 2021-06-26 DIAGNOSIS — G62.9: ICD-10-CM

## 2021-06-26 DIAGNOSIS — M54.9: ICD-10-CM

## 2021-06-26 DIAGNOSIS — I13.0: Primary | ICD-10-CM

## 2021-06-26 DIAGNOSIS — Z99.81: ICD-10-CM

## 2021-06-26 DIAGNOSIS — E83.42: ICD-10-CM

## 2021-06-26 DIAGNOSIS — Z85.3: ICD-10-CM

## 2021-06-26 DIAGNOSIS — E04.2: ICD-10-CM

## 2021-06-26 DIAGNOSIS — F41.1: ICD-10-CM

## 2021-06-26 DIAGNOSIS — I48.91: ICD-10-CM

## 2021-06-26 DIAGNOSIS — I50.33: ICD-10-CM

## 2021-06-26 DIAGNOSIS — M10.9: ICD-10-CM

## 2021-06-26 DIAGNOSIS — G31.84: ICD-10-CM

## 2021-06-26 DIAGNOSIS — I89.0: ICD-10-CM

## 2021-06-26 DIAGNOSIS — E66.9: ICD-10-CM

## 2021-06-26 DIAGNOSIS — E87.2: ICD-10-CM

## 2021-06-26 DIAGNOSIS — M19.90: ICD-10-CM

## 2021-06-26 DIAGNOSIS — D50.9: ICD-10-CM

## 2021-06-26 DIAGNOSIS — G25.81: ICD-10-CM

## 2021-06-26 DIAGNOSIS — Z85.828: ICD-10-CM

## 2021-06-26 DIAGNOSIS — E88.09: ICD-10-CM

## 2021-06-26 DIAGNOSIS — Z86.718: ICD-10-CM

## 2021-06-26 DIAGNOSIS — Z20.822: ICD-10-CM

## 2021-06-26 DIAGNOSIS — H54.7: ICD-10-CM

## 2021-06-26 DIAGNOSIS — G47.00: ICD-10-CM

## 2021-06-26 DIAGNOSIS — I50.9: ICD-10-CM

## 2021-06-26 DIAGNOSIS — R09.02: ICD-10-CM

## 2021-06-26 DIAGNOSIS — I25.10: ICD-10-CM

## 2021-06-26 DIAGNOSIS — Z51.5: ICD-10-CM

## 2021-06-26 DIAGNOSIS — Z85.43: ICD-10-CM

## 2021-06-26 DIAGNOSIS — Z85.820: ICD-10-CM

## 2021-06-26 DIAGNOSIS — M81.0: ICD-10-CM

## 2021-06-26 DIAGNOSIS — Z90.49: ICD-10-CM

## 2021-06-26 DIAGNOSIS — E87.0: ICD-10-CM

## 2021-06-26 DIAGNOSIS — J44.9: ICD-10-CM

## 2021-06-26 DIAGNOSIS — K21.9: ICD-10-CM

## 2021-06-26 DIAGNOSIS — Z98.49: ICD-10-CM

## 2021-06-26 DIAGNOSIS — D75.89: ICD-10-CM

## 2021-06-26 DIAGNOSIS — S81.801A: ICD-10-CM

## 2021-06-26 DIAGNOSIS — Z88.8: ICD-10-CM

## 2021-06-26 LAB
ANION GAP SERPL CALC-SCNC: 12.3 MMOL/L (ref 5–15)
BASE EXCESS BLDA CALC-SCNC: 16 MMOL/L (ref -2–3)
CHLORIDE SERPL-SCNC: 105 MMOL/L (ref 98–107)
HCO3 BLDA-SCNC: 42.9 MMOL/L (ref 22–26)
INHALED O2 MECHANISM DOSE: (no result)
PCO2 BLDA: 91 MMHG (ref 35–45)
PO2 BLDA: 54 MMHG (ref 80–105)
SAO2 % BLDA: 81 % (ref 95–98)
SODIUM SERPL-SCNC: 152 MMOL/L (ref 136–145)

## 2021-06-26 PROCEDURE — U0002 COVID-19 LAB TEST NON-CDC: HCPCS

## 2021-06-26 NOTE — EDM.PDOC
ED HPI GENERAL MEDICAL PROBLEM





- General


Chief Complaint: Respiratory Problem


Stated Complaint: LOW SATS/CP


Time Seen by Provider: 06/26/21 17:31


Source of Information: Reports: Patient, EMS, EMS Notes Reviewed, Nursing Home 

Records


History Limitations: Reports: Respiratory Distress





- History of Present Illness


INITIAL COMMENTS - FREE TEXT/NARRATIVE: 





Presents emergency room with EMS from the nursing home in [facility] for respiratory 

distress.  Approximately 1630 this afternoon patient was sitting up in recliner 

was noted to have respiratory distress, gurgly cough, oxygen saturations 78% on 

2 L.  EMS were called and patient brought in with O2 sats 83% on 15 L 

nonrebreather.  Tachycardic 140s irregular heart rate respiratory rate 24-30. 

Patient's recently new to the nursing home earlier this month in June.  History 

of DVT in her femoral vein on Coumadin.  History of heart failure, chronic renal

disease, hypertension, heart failure, and COPD.  On arrival patient denies chest

pain however she feels like it is hard to breathe.  Patient's CODE STATUS is DNR

DO NOT INTUBATE.





- Related Data


                                    Allergies











Allergy/AdvReac Type Severity Reaction Status Date / Time


 


baclofen AdvReac Intermediate Confusion Verified 06/26/21 17:52











Home Meds: 


                                    Home Meds





Sertraline [Zoloft] 100 mg PO BEDTIME 05/19/15 [History]


Acetaminophen [Tylenol] 650 mg PO TID@0800,1300,2000  tablet 06/07/21 [Rx]


Cholecalciferol (Vitamin D3) [Vitamin D3] 50 mcg PO DAILY  tablet 06/07/21 [Rx]


Ferrous Sulfate 325 mg PO Q48H  tablet 06/07/21 [Rx]


Furosemide [Lasix] 20 mg PO BID@0800,1300  tablet 06/07/21 [Rx]


Gabapentin [Neurontin] 300 mg PO BEDTIME  cap 06/07/21 [Rx]


Isosorbide Mononitrate [Imdur] 30 mg PO DAILY  tab.er 06/07/21 [Rx]


Loperamide [Imodium] 2 mg PO Q12H PRN  cap 06/07/21 [Rx]


Melatonin 6 mg PO BEDTIME  tablet 06/07/21 [Rx]


Metoprolol Succinate [Toprol XL] 25 mg PO DAILY  tab.er 06/07/21 [Rx]


Omeprazole 20 mg PO DAILY@0700  cap.cr 06/07/21 [Rx]


Ondansetron [Zofran ODT] 4 mg PO Q4H PRN  tab.dis 06/07/21 [Rx]


Phytonadione [Vitamin K] 100 mcg PO DAILY  tablet 06/07/21 [Rx]


Probiotic Gummies 2 each PO DAILY@0700 06/07/21 [Rx]


Vitamin B Complex 1 each PO DAILY  tablet 06/07/21 [Rx]


rOPINIRole [Requip] 0.5 mg PO BEDTIME  tablet 06/07/21 [Rx]


Acetaminophen [Tylenol Arthritis] 650 mg PO DAILY PRN 06/26/21 [History]


Calcium Carbonate [Tums] 500 mg PO QID PRN 06/26/21 [History]


Docusate Sodium/Sennosides [Senna Plus] 1 tab PO DAILY PRN 06/26/21 [History]


Famotidine [Pepcid] 20 mg PO BEDTIME 06/26/21 [History]


Gabapentin [Neurontin] 200 mg PO BID@0800,1300 06/26/21 [History]


LORazepam [Ativan] 0.25 mg PO Q8H PRN 06/26/21 [History]


Lidocaine 5% 1 applic TOP Q48H 06/26/21 [History]


Miconazole [Desenex 2%] 1 applic TOP BID PRN 06/26/21 [History]


Non-Formulary Medication [NF Drug] 1 oz PO DAILY@13 06/26/21 [History]


Nystatin [Nyamyc] 1 applic TOP BID 06/26/21 [History]


Warfarin [Coumadin] 2.5 mg PO SUTUWETHSA@1800 06/26/21 [History]


Warfarin [Coumadin] 5 mg PO MOFR@18 06/26/21 [History]


busPIRone [Buspar] 10 mg PO TID@08,13,20 06/26/21 [History]











Past Medical History


HEENT History: Reports: Cataract, Impaired Vision


Other HEENT History: bilat posterior vitreous detachment


Cardiovascular History: Reports: Blood Clots/VTE/DVT, Heart Failure, 

Hypertension


Other Cardiovascular History: 3+ pitting edema to lower legs and feet


Respiratory History: Reports: COPD, Other (See Below)


Other Respiratory History: Home O2 wears 1L/nc during day and 1L/NC NOC


Gastrointestinal History: Reports: GERD


Genitourinary History: Reports: Renal Disease, Other (See Below)


Other Genitourinary History: CKD STAGE III


OB/GYN History: Reports: Pregnancy


Musculoskeletal History: Reports: Arthritis, Back Pain, Chronic, Gout, 

Osteoporosis


Neurological History: Reports: Neuropathy, Peripheral, Other (See Below)


Other Neuro History: Memory loss, cognitive defecit


Psychiatric History: Reports: Anxiety, Depression


Endocrine/Metabolic History: Reports: Obesity/BMI 30+, Osteoporosis, Other (See 

Below)


Other Endocrine/Metabolic History: Non toxic multinodular goiter


Hematologic History: Reports: Anemia, Anticoagulation Therapy, Iron Deficiency


Other Hematologic History: DVT


Oncologic (Cancer) History: Reports: Breast, Malignant Melanoma, Ovarian, 

Squamous Cell Carcinoma


Dermatologic History: Reports: Cellulitis, Other (See Below)


Other Dermatologic History: Dermatochalasia





- Infectious Disease History


Infectious Disease History: Reports: Chicken Pox


Other Infectious Disease History: E coli in 2012





- Past Surgical History


HEENT Surgical History: Reports: Cataract Surgery


Cardiovascular Surgical History: Reports: None


Respiratory Surgical History: Reports: None


GI Surgical History: Reports: Appendectomy, Cholecystectomy, Colostomy


Female  Surgical History: Reports: Breast Biopsy, Breast Reconstruction


Musculoskeletal Surgical History: Reports: None


Other Musculoskeletal Surgeries/Procedures:: PLATE TO LEFT WRIST


Oncologic Surgical History: Reports: Biopsy of Breast, Lumpectomy


Dermatological Surgical History: Reports: Skin Biopsy





Social & Family History





- Family History


Family Medical History: No Pertinent Family History


Oncologic: Reports: Lung





- Caffeine Use


Caffeine Use: Reports: None





ED ROS GENERAL





- Review of Systems


Review Of Systems: Unable To Obtain


Reason Not Obtained: Due to respiratory stress


Respiratory: Reports: Shortness of Breath, Cough


Cardiovascular: Denies: Chest Pain





ED EXAM, GENERAL





- Physical Exam


Exam: See Below


Exam Limited By: Respiratory Distress


General Appearance: Alert, Moderate Distress


Eye Exam: Bilateral Eye: EOMI, PERRL


Nose: Normal Inspection


Throat/Mouth: Normal Inspection, Normal Oropharynx


Head: Atraumatic, Normocephalic


Neck: Normal Inspection, Supple


Respiratory/Chest: Decreased Breath Sounds, Crackles, Accessory Muscle Use.  No:

 Wheezing


Cardiovascular: Normal Peripheral Pulses, Tachycardia, Irregularly Irregular


Peripheral Pulses: 2+: Radial (L), Radial (R), Posterior Tibial (L), Posterior 

Tibial (R), Dorsalis Pedis (L), Dorsalis Pedis (R)


GI/Abdominal: Soft, Non-Tender


Extremities: Other (+2 moderate edema lower extremities.).  No: Increased 

Warmth, Mottled, Pallor, Redness


Neurological: Alert, Oriented, Other (Dated to person and date of birth, not to 

situation and place.)


Skin Exam: Warm, Dry, Intact, Other (2 ulcerations to the right anterior lower 

shin covered with a dressing no signs of drainage or erythema spreading the site

 that would be concerning for cellulitis.).  No: Diaphoretic, Erythema, Mottled,

 Pallor


  ** #1 Interpretation


EKG Date: 06/26/21


Time: 17:54


Rhythm: A-Fib


Rate (Beats/Min): 141


Comparison: NA - No Prior EKG (Artifact noted)





  ** #2 Interpretation


EKG Date: 06/26/21


Time: 18:38


Rhythm: A-Fib


Rate (Beats/Min): 130


Axis: RAD-Right Axis Deviation





Course





- Orders/Labs/Meds


Orders: 


                               Active Orders 24 hr











 Category Date Time Status


 


 Admission Status [Patient Status] [ADT] Routine ADT  06/26/21 18:54 Active


 


 Cardiac Monitoring [RC] .AS DIRECTED Care  06/26/21 17:45 Active


 


 EKG Documentation Completion [RC] ASDIRECTED Care  06/26/21 17:45 Active


 


 EKG Documentation Completion [RC] ASDIRECTED Care  06/26/21 18:34 Active


 


 CULTURE BLOOD [BC] Stat Lab  06/26/21 18:15 Received


 


 CULTURE BLOOD [BC] Stat Lab  06/26/21 18:15 Received


 


 Sodium Chloride 0.9% [Normal Saline] 1,000 ml Med  06/26/21 18:00 Active





 IV ASDIRECTED   


 


 Sodium Chloride 0.9% [Saline Flush] Med  06/26/21 17:45 Active





 10 ml FLUSH Q8HR PRN   


 


 Blood Culture x2 Reflex Set [OM.PC] Stat Oth  06/26/21 18:02 Ordered


 


 Saline Lock Insert [OM.PC] Routine Oth  06/26/21 17:45 Ordered


 


 EKG 12 Lead [EK] Stat Ther  06/26/21 17:44 Ordered


 


 EKG 12 Lead [EK] Stat Ther  06/26/21 18:33 Ordered








                                Medication Orders





Sodium Chloride (Normal Saline)  1,000 mls @ 20 mls/hr IV ASDIRECTED AIRAM


Sodium Chloride (Sodium Chloride 0.9% 10 Ml Syringe)  10 ml FLUSH Q8HR PRN


   PRN Reason: keep vein open








Labs: 


                                Laboratory Tests











  06/26/21 06/26/21 06/26/21 Range/Units





  17:45 17:50 18:15 


 


WBC  35.02 H*    (5.00-10.00)  10^3/uL


 


RBC  4.06    (3.80-5.50)  10^6/uL


 


Hgb  12.2    (12.0-16.0)  g/dL


 


Hct  41.2    (37.0-47.0)  %


 


MCV  101.5 H D    (82.0-92.0)  fL


 


MCH  30.0    (27.0-31.0)  pg


 


MCHC  29.6 L    (32.0-36.0)  g/dL


 


RDW  16.9 H    (11.5-14.5)  %


 


Plt Count  391    (150-400)  10^3/uL


 


MPV  10.5 H    (7.4-10.4)  fL


 


Immature Gran % (Auto)  0.3    (0.0-5.0)  %


 


Neut % (Auto)  32.8 L    (50.0-70.0)  %


 


Lymph % (Auto)  63.0 H    (20.0-40.0)  %


 


Mono % (Auto)  3.5    (2.0-8.0)  %


 


Eos % (Auto)  0.2 L    (1.0-3.0)  %


 


Baso % (Auto)  0.2    (0.0-1.0)  %


 


Neut # (Auto)  11.48 H    (2.50-7.00)  10^3/uL


 


Lymph # (Auto)  22.06 H    (1.00-4.00)  10^3/uL


 


Mono # (Auto)  1.24 H    (0.10-0.80)  10^3/uL


 


Eos # (Auto)  0.08 L    (0.10-0.30)  10^3/uL


 


Baso # (Auto)  0.07    (0.00-0.10)  10^3/uL


 


Immature Gran # (Auto)  0.09    (0.00-0.50)  10^3/uL


 


INR    4.1 H*  (0.9-1.1)  


 


Sodium   152 H   (136-145)  mmol/L


 


Potassium   3.2 L   (3.5-5.1)  mmol/L


 


Chloride   105   ()  mmol/L


 


Carbon Dioxide   37.9 H   (21.0-32.0)  mmol/L


 


Anion Gap   12.3   (5-15)  mmol/L


 


BUN   27 H   (7-18)  mg/dL


 


Creatinine   1.62 H   (0.51-1.17)  mg/dL


 


Est Cr Clr Drug Dosing   TNP   


 


Estimated GFR (MDRD)   30   mL/min


 


Glucose   153 H   ()  mg/dL


 


Lactic Acid     (0.4-2.0)  mmol/L


 


Calcium   8.9   (8.7-10.3)  mg/dL


 


Total Bilirubin   0.5   (0.2-1.0)  mg/dL


 


AST   35   (15-37)  U/L


 


ALT   27   (14-63)  U/L


 


Alkaline Phosphatase   124 H   ()  U/L


 


Troponin I High Sens   119.400 H*   (0-51.000)  pg/mL


 


C-Reactive Protein   5.7 H   (0.0-0.9)  mg/dL


 


B-Natriuretic Peptide   1000 H   (0-100)  pg/mL


 


Total Protein   6.6   (6.4-8.2)  g/dL


 


Albumin   2.99 L   (3.40-5.00)  g/dL


 


SARS CoV-2 RNA Rapid KATIE     (NEGATIVE)  














  06/26/21 06/26/21 Range/Units





  18:15 18:24 


 


WBC    (5.00-10.00)  10^3/uL


 


RBC    (3.80-5.50)  10^6/uL


 


Hgb    (12.0-16.0)  g/dL


 


Hct    (37.0-47.0)  %


 


MCV    (82.0-92.0)  fL


 


MCH    (27.0-31.0)  pg


 


MCHC    (32.0-36.0)  g/dL


 


RDW    (11.5-14.5)  %


 


Plt Count    (150-400)  10^3/uL


 


MPV    (7.4-10.4)  fL


 


Immature Gran % (Auto)    (0.0-5.0)  %


 


Neut % (Auto)    (50.0-70.0)  %


 


Lymph % (Auto)    (20.0-40.0)  %


 


Mono % (Auto)    (2.0-8.0)  %


 


Eos % (Auto)    (1.0-3.0)  %


 


Baso % (Auto)    (0.0-1.0)  %


 


Neut # (Auto)    (2.50-7.00)  10^3/uL


 


Lymph # (Auto)    (1.00-4.00)  10^3/uL


 


Mono # (Auto)    (0.10-0.80)  10^3/uL


 


Eos # (Auto)    (0.10-0.30)  10^3/uL


 


Baso # (Auto)    (0.00-0.10)  10^3/uL


 


Immature Gran # (Auto)    (0.00-0.50)  10^3/uL


 


INR    (0.9-1.1)  


 


Sodium    (136-145)  mmol/L


 


Potassium    (3.5-5.1)  mmol/L


 


Chloride    ()  mmol/L


 


Carbon Dioxide    (21.0-32.0)  mmol/L


 


Anion Gap    (5-15)  mmol/L


 


BUN    (7-18)  mg/dL


 


Creatinine    (0.51-1.17)  mg/dL


 


Est Cr Clr Drug Dosing    


 


Estimated GFR (MDRD)    mL/min


 


Glucose    ()  mg/dL


 


Lactic Acid  1.6   (0.4-2.0)  mmol/L


 


Calcium    (8.7-10.3)  mg/dL


 


Total Bilirubin    (0.2-1.0)  mg/dL


 


AST    (15-37)  U/L


 


ALT    (14-63)  U/L


 


Alkaline Phosphatase    ()  U/L


 


Troponin I High Sens    (0-51.000)  pg/mL


 


C-Reactive Protein    (0.0-0.9)  mg/dL


 


B-Natriuretic Peptide    (0-100)  pg/mL


 


Total Protein    (6.4-8.2)  g/dL


 


Albumin    (3.40-5.00)  g/dL


 


SARS CoV-2 RNA Rapid KATIE   Negative  (NEGATIVE)  











Meds: 


Medications











Generic Name Dose Route Start Last Admin





  Trade Name Freq  PRN Reason Stop Dose Admin


 


Sodium Chloride  1,000 mls @ 20 mls/hr  06/26/21 18:00 





  Normal Saline  IV  





  ASDIRECTED AIRAM  


 


Sodium Chloride  10 ml  06/26/21 17:45 





  Sodium Chloride 0.9% 10 Ml Syringe  FLUSH  





  Q8HR PRN  





  keep vein open  














Discontinued Medications














Generic Name Dose Route Start Last Admin





  Trade Name Freq  PRN Reason Stop Dose Admin


 


Sodium Chloride  Confirm  06/26/21 17:52 





  Normal Saline  Administered  06/26/21 17:53 





  Dose  





  1,000 mls @ as directed  





  .ROUTE  





  .K-MED ONE  














- Re-Assessments/Exams


Free Text/Narrative Re-Assessment/Exam: 





06/26/21 18:01


Chest x-ray reveals borderline edema and pleural effusions bilateral lung bases.

 White count noted to be elevated 35,000. Patient is afebrile oxygen saturation 

86% on 15 L nonrebreather. Heart rates 140s blood pressure 133 over 80. The 

patient is alert and orientated however remains with her eyes closed laying in 

the bed in moderate respiratory distress. pt position in bed to improved 

ventilation. continuous monitoring. no signs of infection at her lower left leg 

ulcer, afebrile, blood cultures ordered. 


06/26/21 18:03





06/26/21 18:27


Patient respiratory distress has been greatly improved her oxygen saturations 

91% I put her down to 10 L on the nonrebreather and she is maintaining at that. 

 History of COPD likely always in the lower 90s.  Heart rate still mainly tachy

cardic in the 130s blood pressure still 125/61 patient comfortable in bed denies

 any complaints at this time.  The patient is resting with a head of bed 45 

degrees.  Occasion she will have a nonproductive gurgly cough with not unable to

 produce any sputum.  labs pending..  I did get a hold of the granddaughter who 

is on her chart she is a new town she is going to update the family as well.





Troponin elevated EKG is negative for S.  Lactic acid normal.  Creatinine and 

sodium also elevated.  INR is elevated for she is on Coumadin.  We did not give 

her any medicines throughout the ER visit she has IV fluids at [facility].  Potassium 

slightly low at 3.2 WBC 35,000 and elevated MCV as well.


06/26/21 18:56


[facility] reviewed the case patient 

going to be admitted to the floor [place] admitting on-call physician accepted 

care.  Admission diagnosis for hypoxia new onset of atrial fibrillation.   son 

named Juan Manuel lives in [place] was also notified of patient status. patient is 

oxygen saturation stable 90% on 10 L nonrebreather going to titrate down to 6 L 

nasal cannula and see how she does.  Patient is very comfortable in bed vital 

signs improving still tachycardic at the A. fib.  She is continuous monitoring 

throughout the entire visit of the ER.


06/26/21 18:59








Departure





- Departure


Time of Disposition: 18:54


Disposition: Admitted As Inpatient 66


Condition: Serious


Clinical Impression: 


 Hypoxia





Atrial fibrillation


Qualifiers:


 Atrial fibrillation type: unspecified Qualified Code(s): I48.91 - Unspecified 

atrial fibrillation





Forms:  ED Department Discharge





- My Orders


Last 24 Hours: 


My Active Orders





06/26/21 17:44


EKG 12 Lead [EK] Stat 





06/26/21 17:45


Cardiac Monitoring [RC] .AS DIRECTED 


EKG Documentation Completion [RC] ASDIRECTED 


Sodium Chloride 0.9% [Saline Flush]   10 ml FLUSH Q8HR PRN 


Saline Lock Insert [OM.PC] Routine 





06/26/21 18:00


Sodium Chloride 0.9% [Normal Saline] 1,000 ml IV ASDIRECTED 





06/26/21 18:02


Blood Culture x2 Reflex Set [OM.PC] Stat 





06/26/21 18:15


CULTURE BLOOD [BC] Stat 


CULTURE BLOOD [BC] Stat 





06/26/21 18:33


EKG 12 Lead [EK] Stat 





06/26/21 18:34


EKG Documentation Completion [RC] ASDIRECTED 





06/26/21 18:54


Admission Status [Patient Status] [ADT] Routine 














- Assessment/Plan


Last 24 Hours: 


My Active Orders





06/26/21 17:44


EKG 12 Lead [EK] Stat 





06/26/21 17:45


Cardiac Monitoring [RC] .AS DIRECTED 


EKG Documentation Completion [RC] ASDIRECTED 


Sodium Chloride 0.9% [Saline Flush]   10 ml FLUSH Q8HR PRN 


Saline Lock Insert [OM.PC] Routine 





06/26/21 18:00


Sodium Chloride 0.9% [Normal Saline] 1,000 ml IV ASDIRECTED 





06/26/21 18:02


Blood Culture x2 Reflex Set [OM.PC] Stat 





06/26/21 18:15


CULTURE BLOOD [BC] Stat 


CULTURE BLOOD [BC] Stat 





06/26/21 18:33


EKG 12 Lead [EK] Stat 





06/26/21 18:34


EKG Documentation Completion [RC] ASDIRECTED 





06/26/21 18:54


Admission Status [Patient Status] [ADT] Routine

## 2021-06-26 NOTE — CR
______________________________________________________________________________   

  

[id number] RAD/RAD Chest PA or AP 1V  

EXAM:  

   

 SINGLE VIEW CHEST.  

   

 INDICATION:  

   

 HYPOXIA  

   

 COMPARISON:  

   

 CORRELATION IS MADE WITH JUNE 26, 2021  

   

 FINDINGS:  

   

 There is borderline edema  

   

 There is minimal pleural reaction at both lung bases  

   

 The cardiac silhouette is stable  

   

 IMPRESSION:  

   

 BORDERLINE EDEMA  

   

 Electronically signed by Mihai Frazier MD on 6/26/2021 5:51 PM  

   

  

Mihai Frazier MD                 

 06/26/21 [id number]    

  

Thank you for allowing us to participate in the care of your patient.

## 2021-06-26 NOTE — PCM.HP.2
H&P History of Present Illness





- General


Date of Service: 06/26/21


Admit Problem/Dx: 


                           Admission Diagnosis/Problem





Admission Diagnosis/Problem      Hypoxia








Source of Information: Patient, Nursing Home Records, RN





- Related Data


Allergies/Adverse Reactions: 


                                    Allergies











Allergy/AdvReac Type Severity Reaction Status Date / Time


 


baclofen AdvReac Intermediate Confusion Verified 06/26/21 17:52











Home Medications: 


                                    Home Meds





Sertraline [Zoloft] 100 mg PO BEDTIME 05/19/15 [History]


Acetaminophen [Tylenol] 650 mg PO TID@0800,1300,2000  tablet 06/07/21 [Rx]


Cholecalciferol (Vitamin D3) [Vitamin D3] 50 mcg PO DAILY  tablet 06/07/21 [Rx]


Ferrous Sulfate 325 mg PO Q48H  tablet 06/07/21 [Rx]


Furosemide [Lasix] 20 mg PO BID@0800,1300  tablet 06/07/21 [Rx]


Gabapentin [Neurontin] 300 mg PO BEDTIME  cap 06/07/21 [Rx]


Isosorbide Mononitrate [Imdur] 30 mg PO DAILY  tab.er 06/07/21 [Rx]


Loperamide [Imodium] 2 mg PO Q12H PRN  cap 06/07/21 [Rx]


Melatonin 6 mg PO BEDTIME  tablet 06/07/21 [Rx]


Metoprolol Succinate [Toprol XL] 25 mg PO DAILY  tab.er 06/07/21 [Rx]


Omeprazole 20 mg PO DAILY@0700  cap.cr 06/07/21 [Rx]


Ondansetron [Zofran ODT] 4 mg PO Q4H PRN  tab.dis 06/07/21 [Rx]


Phytonadione [Vitamin K] 100 mcg PO DAILY  tablet 06/07/21 [Rx]


Probiotic Gummies 2 each PO DAILY@0700 06/07/21 [Rx]


Vitamin B Complex 1 each PO DAILY  tablet 06/07/21 [Rx]


rOPINIRole [Requip] 0.5 mg PO BEDTIME  tablet 06/07/21 [Rx]


Acetaminophen [Tylenol Arthritis] 650 mg PO DAILY PRN 06/26/21 [History]


Calcium Carbonate [Tums] 500 mg PO QID PRN 06/26/21 [History]


Docusate Sodium/Sennosides [Senna Plus] 1 tab PO DAILY PRN 06/26/21 [History]


Famotidine [Pepcid] 20 mg PO BEDTIME 06/26/21 [History]


Gabapentin [Neurontin] 200 mg PO BID@0800,1300 06/26/21 [History]


LORazepam [Ativan] 0.25 mg PO Q8H PRN 06/26/21 [History]


Lidocaine 5% 1 applic TOP Q48H 06/26/21 [History]


Miconazole [Desenex 2%] 1 applic TOP BID PRN 06/26/21 [History]


Non-Formulary Medication [NF Drug] 1 oz PO DAILY@13 06/26/21 [History]


Nystatin [Nyamyc] 1 applic TOP BID 06/26/21 [History]


Warfarin [Coumadin] 2.5 mg PO SUTUWETHSA@1800 06/26/21 [History]


Warfarin [Coumadin] 5 mg PO MOFR@18 06/26/21 [History]


busPIRone [Buspar] 10 mg PO TID@08,13,20 06/26/21 [History]











Past Medical History


HEENT History: Reports: Cataract, Impaired Vision


Other HEENT History: bilat posterior vitreous detachment


Cardiovascular History: Reports: Blood Clots/VTE/DVT, Heart Failure, 

Hypertension


Other Cardiovascular History: 3+ pitting edema to lower legs and feet


Respiratory History: Reports: COPD, Other (See Below)


Other Respiratory History: Home O2 wears 1L/nc during day and 1L/NC NOC


Gastrointestinal History: Reports: GERD


Genitourinary History: Reports: Renal Disease, Other (See Below)


Other Genitourinary History: CKD STAGE III


OB/GYN History: Reports: Pregnancy


Musculoskeletal History: Reports: Arthritis, Back Pain, Chronic, Gout, 

Osteoporosis


Neurological History: Reports: Neuropathy, Peripheral, Other (See Below)


Other Neuro History: Memory loss, cognitive defecit


Psychiatric History: Reports: Anxiety, Depression


Endocrine/Metabolic History: Reports: Obesity/BMI 30+, Osteoporosis, Other (See 

Below)


Other Endocrine/Metabolic History: Non toxic multinodular goiter


Hematologic History: Reports: Anemia, Anticoagulation Therapy, Iron Deficiency


Other Hematologic History: DVT


Oncologic (Cancer) History: Reports: Breast, Malignant Melanoma, Ovarian, 

Squamous Cell Carcinoma


Dermatologic History: Reports: Cellulitis, Other (See Below)


Other Dermatologic History: Dermatochalasia





- Infectious Disease History


Infectious Disease History: Reports: Chicken Pox


Other Infectious Disease History: E coli in 2012





- Past Surgical History


HEENT Surgical History: Reports: Cataract Surgery


Cardiovascular Surgical History: Reports: None


Respiratory Surgical History: Reports: None


GI Surgical History: Reports: Appendectomy, Cholecystectomy, Colostomy


Female  Surgical History: Reports: Breast Biopsy, Breast Reconstruction


Musculoskeletal Surgical History: Reports: None


Other Musculoskeletal Surgeries/Procedures:: PLATE TO LEFT WRIST


Oncologic Surgical History: Reports: Biopsy of Breast, Lumpectomy


Dermatological Surgical History: Reports: Skin Biopsy





Social & Family History





- Family History


Family Medical History: No Pertinent Family History


Oncologic: Reports: Lung





- Caffeine Use


Caffeine Use: Reports: None





H&P Review of Systems





- Review of Systems:


Review Of Systems: See Below


HEENT: Reports: No Symptoms


Pulmonary: Reports: Shortness of Breath, Cough


Cardiovascular: Reports: Chest Pain, Palpitations, Edema


Gastrointestinal: Reports: No Symptoms


Genitourinary: Reports: No Symptoms


Musculoskeletal: Reports: No Symptoms


Skin: Reports: Wound (Sores to R lower leg)


Psychiatric: Reports: No Symptoms


Neurological: Reports: No Symptoms


Hematologic/Lymphatic: Reports: Easy Bleeding, Easy Bruising (on coumadin)


Immunologic: Reports: No Symptoms





Exam





- Exam


Exam: See Below





- Vital Signs


Vital Signs: 


                                Last Vital Signs











Temp  96.7 F L  06/26/21 17:35


 


Pulse  138 H  06/26/21 17:35


 


Resp  18   06/26/21 17:35


 


BP  136/76   06/26/21 17:35


 


Pulse Ox  82 L  06/26/21 17:35











Weight: 163 lb





- Exam


Quality Assessment: Supplemental Oxygen


General: Alert, Cooperative, Mild Distress (cognitive impairment)


HEENT: Conjunctiva Clear, Mucosa Moist & Pink


Neck: Supple, Trachea Midline


Lungs: Decreased Breath Sounds, Crackles (fine to bases)


Cardiovascular: Irregular Rhythm, Tachycardia


GI/Abdominal Exam: Normal Bowel Sounds, Soft, Non-Tender, No Distention


 (Female) Exam: Deferred


Rectal (Female) Exam: Deferred


Back Exam: Normal Inspection


Extremities: Pedal Edema (+1 bilaterally)


Peripheral Pulses: 2+: Dorsalis Pedis (L), Dorsalis Pedis (R)


Skin: Warm, Dry, Wound (Open wounds to right anterior lower extremity, followed 

by [facility] wound last seen 6/22/21)


Neuro Extensive - Mental Status: Alert, Normal Mood/Affect, Disorientation to 

Place, Disorientation to Time


Psychiatric: Alert





- Patient Data


Lab Results Last 24 hrs: 


                         Laboratory Results - last 24 hr











  06/26/21 06/26/21 06/26/21 Range/Units





  17:45 17:50 18:15 


 


WBC  35.02 H*    (5.00-10.00)  10^3/uL


 


RBC  4.06    (3.80-5.50)  10^6/uL


 


Hgb  12.2    (12.0-16.0)  g/dL


 


Hct  41.2    (37.0-47.0)  %


 


MCV  101.5 H D    (82.0-92.0)  fL


 


MCH  30.0    (27.0-31.0)  pg


 


MCHC  29.6 L    (32.0-36.0)  g/dL


 


RDW  16.9 H    (11.5-14.5)  %


 


Plt Count  391    (150-400)  10^3/uL


 


MPV  10.5 H    (7.4-10.4)  fL


 


Immature Gran % (Auto)  0.3    (0.0-5.0)  %


 


Neut % (Auto)  32.8 L    (50.0-70.0)  %


 


Lymph % (Auto)  63.0 H    (20.0-40.0)  %


 


Mono % (Auto)  3.5    (2.0-8.0)  %


 


Eos % (Auto)  0.2 L    (1.0-3.0)  %


 


Baso % (Auto)  0.2    (0.0-1.0)  %


 


Neut # (Auto)  11.48 H    (2.50-7.00)  10^3/uL


 


Lymph # (Auto)  22.06 H    (1.00-4.00)  10^3/uL


 


Mono # (Auto)  1.24 H    (0.10-0.80)  10^3/uL


 


Eos # (Auto)  0.08 L    (0.10-0.30)  10^3/uL


 


Baso # (Auto)  0.07    (0.00-0.10)  10^3/uL


 


Immature Gran # (Auto)  0.09    (0.00-0.50)  10^3/uL


 


INR    4.1 H*  (0.9-1.1)  


 


Sodium   152 H   (136-145)  mmol/L


 


Potassium   3.2 L   (3.5-5.1)  mmol/L


 


Chloride   105   ()  mmol/L


 


Carbon Dioxide   37.9 H   (21.0-32.0)  mmol/L


 


Anion Gap   12.3   (5-15)  mmol/L


 


BUN   27 H   (7-18)  mg/dL


 


Creatinine   1.62 H   (0.51-1.17)  mg/dL


 


Est Cr Clr Drug Dosing   TNP   


 


Estimated GFR (MDRD)   30   mL/min


 


Glucose   153 H   ()  mg/dL


 


Lactic Acid     (0.4-2.0)  mmol/L


 


Calcium   8.9   (8.7-10.3)  mg/dL


 


Total Bilirubin   0.5   (0.2-1.0)  mg/dL


 


AST   35   (15-37)  U/L


 


ALT   27   (14-63)  U/L


 


Alkaline Phosphatase   124 H   ()  U/L


 


Troponin I High Sens   119.400 H*   (0-51.000)  pg/mL


 


C-Reactive Protein   5.7 H   (0.0-0.9)  mg/dL


 


B-Natriuretic Peptide   1000 H   (0-100)  pg/mL


 


Total Protein   6.6   (6.4-8.2)  g/dL


 


Albumin   2.99 L   (3.40-5.00)  g/dL


 


SARS CoV-2 RNA Rapid KATIE     (NEGATIVE)  














  06/26/21 06/26/21 Range/Units





  18:15 18:24 


 


WBC    (5.00-10.00)  10^3/uL


 


RBC    (3.80-5.50)  10^6/uL


 


Hgb    (12.0-16.0)  g/dL


 


Hct    (37.0-47.0)  %


 


MCV    (82.0-92.0)  fL


 


MCH    (27.0-31.0)  pg


 


MCHC    (32.0-36.0)  g/dL


 


RDW    (11.5-14.5)  %


 


Plt Count    (150-400)  10^3/uL


 


MPV    (7.4-10.4)  fL


 


Immature Gran % (Auto)    (0.0-5.0)  %


 


Neut % (Auto)    (50.0-70.0)  %


 


Lymph % (Auto)    (20.0-40.0)  %


 


Mono % (Auto)    (2.0-8.0)  %


 


Eos % (Auto)    (1.0-3.0)  %


 


Baso % (Auto)    (0.0-1.0)  %


 


Neut # (Auto)    (2.50-7.00)  10^3/uL


 


Lymph # (Auto)    (1.00-4.00)  10^3/uL


 


Mono # (Auto)    (0.10-0.80)  10^3/uL


 


Eos # (Auto)    (0.10-0.30)  10^3/uL


 


Baso # (Auto)    (0.00-0.10)  10^3/uL


 


Immature Gran # (Auto)    (0.00-0.50)  10^3/uL


 


INR    (0.9-1.1)  


 


Sodium    (136-145)  mmol/L


 


Potassium    (3.5-5.1)  mmol/L


 


Chloride    ()  mmol/L


 


Carbon Dioxide    (21.0-32.0)  mmol/L


 


Anion Gap    (5-15)  mmol/L


 


BUN    (7-18)  mg/dL


 


Creatinine    (0.51-1.17)  mg/dL


 


Est Cr Clr Drug Dosing    


 


Estimated GFR (MDRD)    mL/min


 


Glucose    ()  mg/dL


 


Lactic Acid  1.6   (0.4-2.0)  mmol/L


 


Calcium    (8.7-10.3)  mg/dL


 


Total Bilirubin    (0.2-1.0)  mg/dL


 


AST    (15-37)  U/L


 


ALT    (14-63)  U/L


 


Alkaline Phosphatase    ()  U/L


 


Troponin I High Sens    (0-51.000)  pg/mL


 


C-Reactive Protein    (0.0-0.9)  mg/dL


 


B-Natriuretic Peptide    (0-100)  pg/mL


 


Total Protein    (6.4-8.2)  g/dL


 


Albumin    (3.40-5.00)  g/dL


 


SARS CoV-2 RNA Rapid KATIE   Negative  (NEGATIVE)  











Result Diagrams: 


                                 06/27/21 07:25





                                 06/27/21 07:25





Sepsis Event Note





- Evaluation


Sepsis Screening Result: Possible Sepsis Risk





- Focused Exam


Vital Signs: 


                                   Vital Signs











  Temp Pulse Resp BP Pulse Ox


 


 06/26/21 17:35  96.7 F L  138 H  18  136/76  82 L











Problem List Initiated/Reviewed/Updated: Yes


Orders Last 24hrs: 


                               Active Orders 24 hr











 Category Date Time Status


 


 Admission Status [Patient Status] [ADT] Routine ADT  06/26/21 18:54 Active


 


 Cardiac Monitoring [RC] 03,07,11,15,19,23 Care  06/26/21 17:45 Active


 


 Cardiac Monitoring [RC] CONTINUOUS Care  06/26/21 19:51 Active


 


 Height and Weight [RC] DAILY Care  06/26/21 19:50 Active


 


 Intake and Output [RC] QSHIFT Care  06/26/21 19:51 Active


 


 Oxygen Therapy [RC] PRN Care  06/26/21 19:50 Active


 


 Up With Assistance [RC] ASDIRECTED Care  06/26/21 19:50 Active


 


 VTE/DVT Education [RC] PER UNIT ROUTINE Care  06/26/21 19:50 Active


 


 Vital Signs [RC] Q4H Care  06/26/21 19:50 Active


 


 Heart Healthy Diet [DIET] Diet  06/27/21 Breakfast Active


 


 BASIC METABOLIC PANEL,BMP [CHEM] AM Lab  06/27/21 05:11 Ordered


 


 BLOOD GAS ARTERIAL [BG] Stat Lab  06/26/21 19:50 Ordered


 


 CBC WITH AUTO DIFF [HEME] AM Lab  06/27/21 05:11 Ordered


 


 CULTURE BLOOD [BC] Stat Lab  06/26/21 18:15 Received


 


 CULTURE BLOOD [BC] Stat Lab  06/26/21 18:15 Received


 


 POTASSIUM,K [CHEM] Timed Lab  06/26/21 23:00 Ordered


 


 TROPONIN I HIGH SENSITIVITY [CHEM] Timed Lab  06/26/21 23:00 Ordered


 


 Potassium Chloride [KCL in Water 20 MEQ/100 ML] 20 meq Med  06/26/21 19:12 

Active





 Premix Bag 1 bag   





 IV ONETIME   


 


 Potassium Chloride [KCL in Water 20 MEQ/100 ML] 20 meq Med  06/26/21 19:50 

Ordered





 Premix Bag 1 bag   





 IV ONETIME   


 


 Sodium Chloride 0.9% [Normal Saline] 1,000 ml Med  06/26/21 18:00 Active





 IV ASDIRECTED   


 


 Sodium Chloride 0.9% [Saline Flush] Med  06/26/21 17:45 Active





 10 ml FLUSH Q8HR PRN   


 


 Blood Culture x2 Reflex Set [OM.PC] Stat Oth  06/26/21 18:02 Ordered


 


 Saline Lock Insert [OM.PC] Routine Oth  06/26/21 17:45 Ordered


 


 Resuscitation Status Routine Resus Stat  06/26/21 19:50 Ordered


 


 EKG 12 Lead [EK] Stat Ther  06/26/21 17:44 Ordered


 


 EKG 12 Lead [EK] Stat Ther  06/26/21 18:33 Ordered








                                Medication Orders





Sodium Chloride (Normal Saline)  1,000 mls @ 20 mls/hr IV ASDIRECTED AIRAM


Potassium Chloride 20 meq/ (Premix)  100 mls @ 50 mls/hr IV ONETIME ONE


   Stop: 06/26/21 21:11


   Last Admin: 06/26/21 19:50  Dose: 50 mls/hr


   Documented by: KIKO


Potassium Chloride 20 meq/ (Premix)  100 mls @ 50 mls/hr IV ONETIME ONE


   Stop: 06/26/21 21:49


Sodium Chloride (Sodium Chloride 0.9% 10 Ml Syringe)  10 ml FLUSH Q8HR PRN


   PRN Reason: keep vein open








Assessment/Plan Comment:: 


HPI summary: Valerie is an 87y F resident of [facility] in [place], [place] who complained of chest pain and shortness of breath around 1630 this 

afternoon.  O2 sat 78% on 2L at that time and she was noted to have some 

respiratory distress by nursing staff.  [facility] contacted Dr Ivey who 

provided order to have patient transported by EMS to ER for evaluation and 

treatment.  O2 83% on 15L non-rebreather while in transport via EMS.  





ED course:  Patient in moderate respiratory distress upon arrival to ER.  CXR 

indicated borderline edema and minimal pleural reaction to the bilateral bases 

per radiology report.  EKG indicated afib RVR with rates 140s initially. 

Afebrile, BP stable 133/80.  Patient alert to self, has cognitive deficit at 

baseline.  WBC 35.04 with lymphocytosis.  LA normal 1.6.  Na 152, K 3.2, BUN 27,

 Crt 1.62.  CRP elevated at 5.7.  Troponin elevated at 119.400, patient denied 

chest pain in ER.  BNP 1000 with known history of HFpEF.  Chronic 

anticoagulation on coumadin, INR 4.1.  Blood cultures x2 pending.  EKG repeated;

 atrial fib, rates 130s.  Respiratory status improved while in ER, patient 

reported feeling better than when she arrived.  Patient to be admitted to 

inpatient status for treatment of multiple cardiopulmonary concerns.  





Hospital course: 


6/26/21:  Patient given lasix 40mg IV x 1 dose.  Orders placed for KCl riders of

 40mEq to replace K due to hypokalemia and lasix administration.  Patient on 

high flow nasal cannula at 8-10L on admit to floor.  ABG's obtained for 

baseline, pH 7.2, PCO2 91, trial of BIPAP conducted with pressure of 10/5, 

however patient did not tolerate the BIPAP and refused to continue this due to 

discomfort.  NC HFC reapplied.  Patient denies chest pain.  HR irregularly 

irregular, rates 110s.  Repeat troponin, BMP at midnight and will add Mg and 

Phos due to hypokalemia.  Repeat CBC, BMP, INR in am.    





Hospitalization problems and plan:


# Acute on chronic hypoxic respiratory failure


   - ABG:  pH 7.2, CO2 91. 


   - Will trial BIPAP and repeat ABG's  


   - supplemental O2 to maintain O2 sat of > 88% 





# CHF exacerbation - last echo 2/12/13.  BNP 1000.  CXR indicated borderline 

edema, small bilateral pleural effusions 


   - Lasix 40mg IV x1 now


   - will hold home lasix for now and determine fluid status in am. 





# Atrial fibrillation, acute onset:  - Suspect related to CHF exacerbation, 

rates improved from prior afib RVR with rates in the 140s in ER, trending 110s 

to 120s


   - continuous cardiac monitoring 


   - Will consider additional beta blocker if needed for rate control after 

diuresis with lasix 





# elevated troponin


   - Initial trop in .400, will repeat troponin in 6 hours (0000) 





# Hypokalemia


   - K 3.2 prior to IV lasix.


   - 40 MEq KCl riders 


   - Repeat BMP at 0000. 


   - Will check Mg and Phos 





# Supratherapeutic INR


   - INR 4.1 - will hold coumadin and request pharmacy to manage coumadin dosing


   - Will hold home vitamin K for now and request pharmacy to dose along with 

coumadin


   - Recheck INR in am. 





# Leukocytosis - WBC 35.04


   - Primarily affecting lymphocytes, low suspicion of infectious process; 

afebrile, normal lactic acid.  Question possible CLL.  


   - Blood cultures pending x2


   - Procalcitonin pending





# hypoalbuminemia


   - albumin 2.99





# Palliative care status in the setting of critical illness:  Discussed plan of 

care with both patient's son, Juan Manuel and granddaughter, Saumya who both verbalized

 agreement with the DNR/DNI status.  Juan Manuel states that if her status does not 

improve he would prefer to "let her go."  





Chronic, stable conditions:


# CAD - continue imdur 30mg SR PO daily, metoprolol succinate 25mg XL PO daily 


# Hypertension - stable.  


# Chronic respiratory failure - on supplemental oxygen at 1-3L at baseline 


# Chronic DVT - Holding coumadin and vitamin K; pharmacy to dose.  


# CKD Stage III


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 

lymphedema - Patient is followed by [facility] wound care clinic for this and was last

 seen on 6/22/21.  No indication of cellulitis at that time.  Patient was 

recently hospitalized at [place] for cellulitis of the affected area and 

has been on multiple courses of antibiotics in the recent past.  


# GERD - continue omeprazole 20mg PO AM, pepcid 20mg HS


# Restless legs syndrome - continue requip 0.5mg PO HS 


# Peripheral neuropathy - continue gabapentin 200mg PO BID and 300mg at HS 


# Malignant neoplasm of right breast, estrogen receptor positive


# Nontoxic multinodular goiter


# Anxiety - continue buspar 10mg PO TID 


# Depression - continue zoloft 100mg PO daily 


# Osteoporosis 


# Insomnia - continue melatonin 6mg HS 


# Cognitive impairment


# Impaired fasting glucose


# Hx of melanoma


# Hx of neoplasm of ovary





Hospitalization details:


# FEN: IV fluids at TKO, 40 MEq KCl rider, heart healthy


# PPX: Chronic anticoagulation on coumadin, supratherapeutic INR 4.1, home 

omeprazole


# Code status: DNR/DNI 


# Emergency contact: Juan Manuel (son) [phone] or granddaughter Lizzette 

[phone]; both updated on status per myself:  Per son Juan Manuel, no other family 

members are to be given updates. 


# Disposition: Patient admitted to inpatient status, anticipate > 2 midnights 

for management of critical illness

## 2021-06-27 LAB
ANION GAP SERPL CALC-SCNC: 12.9 MMOL/L (ref 5–15)
ANION GAP SERPL CALC-SCNC: 13.3 MMOL/L (ref 5–15)
CHLORIDE SERPL-SCNC: 106 MMOL/L (ref 98–107)
CHLORIDE SERPL-SCNC: 106 MMOL/L (ref 98–107)
SODIUM SERPL-SCNC: 149 MMOL/L (ref 136–145)
SODIUM SERPL-SCNC: 150 MMOL/L (ref 136–145)

## 2021-06-27 NOTE — PCM.SN.2
- Free Text/Narrative


Note: 


Hypotension reported by nursing over the course of the afternoon, blood 

pressures were holding after initial bolus given late this morning.  Repeated 

small boluses of 250ml have been given without improvement in blood pressure.  

Heart tones weak with minimal chest rise with respirations, patient rather gray 

in color.  Patient's son, Juan Manuel updated as to status and he is agreeable to 

proceed with comfort measures. Orders placed for morphine, ativan and 

glycopyolate for comfort for dyspnea, restlessness and secretions, respectively.

 Will update [facility] as to patient's status.

## 2021-06-27 NOTE — PCM.SN.2
- Free Text/Narrative


Note: 


Repeat troponin decreased to 90.7.  Repeat BMP Na 150, K increased to 4.0 with 

IV supplementation, CO 35.1 (improved), BUN 30, Creatinine 1.72, Mg 1.5 - will 

plan to start magnesium oxide 500mg PO daily in am. Phos elevated at 6.7 - 

suspect related to stage 3 CKD and respiratory acidosis given ABG earlier 

tonight with pH of 7.2

## 2021-06-27 NOTE — PCM.PN
- General Info


Date of Service: 06/27/21


Functional Status: Denies: New Symptoms





- Review of Systems


General: Reports: No Symptoms.  Denies: Fever, Fatigue, Chills


HEENT: Denies: Headaches, Sore Throat


Pulmonary: Reports: Cough.  Denies: Shortness of Breath


Cardiovascular: Reports: Edema (lower legs).  Denies: Chest Pain


Gastrointestinal: Reports: Decreased Appetite.  Denies: Abdominal Pain, 

Constipation, Diarrhea, Nausea


Genitourinary: Reports: Incontinence.  Denies: Dysuria, Burning


Musculoskeletal: Reports: Leg Pain (left leg)


Skin: Reports: Other (chronic wounds to R lower leg)


Neurological: Denies: Headache


Psychiatric: Reports: No Symptoms





- Patient Data


Vitals - Most Recent: 


                                Last Vital Signs











Temp  97.7 F   06/27/21 06:41


 


Pulse  108 H  06/27/21 08:40


 


Resp  20   06/27/21 06:41


 


BP  123/56 L  06/27/21 08:41


 


Pulse Ox  92 L  06/27/21 06:41











Weight - Most Recent: 163 lb 3.2 oz


I&O - Last 24 Hours: 


                                 Intake & Output











 06/26/21 06/27/21 06/27/21





 22:59 06:59 14:59


 


Intake Total 0 430 


 


Balance 0 430 











Lab Results Last 24 Hours: 


                         Laboratory Results - last 24 hr











  06/26/21 06/26/21 06/26/21 Range/Units





  00:20 17:45 17:50 


 


WBC   35.02 H*   (5.00-10.00)  10^3/uL


 


RBC   4.06   (3.80-5.50)  10^6/uL


 


Hgb   12.2   (12.0-16.0)  g/dL


 


Hct   41.2   (37.0-47.0)  %


 


MCV   101.5 H D   (82.0-92.0)  fL


 


MCH   30.0   (27.0-31.0)  pg


 


MCHC   29.6 L   (32.0-36.0)  g/dL


 


RDW   16.9 H   (11.5-14.5)  %


 


Plt Count   391   (150-400)  10^3/uL


 


MPV   10.5 H   (7.4-10.4)  fL


 


Immature Gran % (Auto)   0.3   (0.0-5.0)  %


 


Neut % (Auto)   32.8 L   (50.0-70.0)  %


 


Lymph % (Auto)   63.0 H   (20.0-40.0)  %


 


Mono % (Auto)   3.5   (2.0-8.0)  %


 


Eos % (Auto)   0.2 L   (1.0-3.0)  %


 


Baso % (Auto)   0.2   (0.0-1.0)  %


 


Neut # (Auto)   11.48 H   (2.50-7.00)  10^3/uL


 


Lymph # (Auto)   22.06 H   (1.00-4.00)  10^3/uL


 


Mono # (Auto)   1.24 H   (0.10-0.80)  10^3/uL


 


Eos # (Auto)   0.08 L   (0.10-0.30)  10^3/uL


 


Baso # (Auto)   0.07   (0.00-0.10)  10^3/uL


 


Immature Gran # (Auto)   0.09   (0.00-0.50)  10^3/uL


 


PT     (9.2-11.2)  SEC


 


INR     (0.9-1.1)  


 


ABG pH     (7.35-7.45)  


 


ABG pCO2     (35-45)  mmHG


 


ABG pO2     ()  mmHG


 


ABG HCO3     (22-26)  mmol/L


 


ABG Total CO2     (23-27)  mmol/L


 


ABG O2 Saturation     (95-98)  %


 


ABG Base Excess     (-2-3)  mmol/L


 


O2 Delivery Device     


 


Sodium    152 H  (136-145)  mmol/L


 


Potassium    3.2 L  (3.5-5.1)  mmol/L


 


Chloride    105  ()  mmol/L


 


Carbon Dioxide    37.9 H  (21.0-32.0)  mmol/L


 


Anion Gap    12.3  (5-15)  mmol/L


 


BUN    27 H  (7-18)  mg/dL


 


Creatinine    1.62 H  (0.51-1.17)  mg/dL


 


Est Cr Clr Drug Dosing    TNP  


 


Estimated GFR (MDRD)    30  mL/min


 


Glucose    153 H  ()  mg/dL


 


Lactic Acid     (0.4-2.0)  mmol/L


 


Calcium    8.9  (8.7-10.3)  mg/dL


 


Phosphorus     (2.6-4.7)  mg/dL


 


Magnesium     (1.8-2.4)  mg/dL


 


Total Bilirubin    0.5  (0.2-1.0)  mg/dL


 


AST    35  (15-37)  U/L


 


ALT    27  (14-63)  U/L


 


Alkaline Phosphatase    124 H  ()  U/L


 


Troponin I High Sens  Cancelled   119.400 H*  


 


C-Reactive Protein    5.7 H  (0.0-0.9)  mg/dL


 


B-Natriuretic Peptide    1000 H  (0-100)  pg/mL


 


Total Protein    6.6  (6.4-8.2)  g/dL


 


Albumin    2.99 L  (3.40-5.00)  g/dL


 


SARS CoV-2 RNA Rapid KATIE     (NEGATIVE)  














  06/26/21 06/26/21 06/26/21 Range/Units





  18:15 18:15 18:24 


 


WBC     (5.00-10.00)  10^3/uL


 


RBC     (3.80-5.50)  10^6/uL


 


Hgb     (12.0-16.0)  g/dL


 


Hct     (37.0-47.0)  %


 


MCV     (82.0-92.0)  fL


 


MCH     (27.0-31.0)  pg


 


MCHC     (32.0-36.0)  g/dL


 


RDW     (11.5-14.5)  %


 


Plt Count     (150-400)  10^3/uL


 


MPV     (7.4-10.4)  fL


 


Immature Gran % (Auto)     (0.0-5.0)  %


 


Neut % (Auto)     (50.0-70.0)  %


 


Lymph % (Auto)     (20.0-40.0)  %


 


Mono % (Auto)     (2.0-8.0)  %


 


Eos % (Auto)     (1.0-3.0)  %


 


Baso % (Auto)     (0.0-1.0)  %


 


Neut # (Auto)     (2.50-7.00)  10^3/uL


 


Lymph # (Auto)     (1.00-4.00)  10^3/uL


 


Mono # (Auto)     (0.10-0.80)  10^3/uL


 


Eos # (Auto)     (0.10-0.30)  10^3/uL


 


Baso # (Auto)     (0.00-0.10)  10^3/uL


 


Immature Gran # (Auto)     (0.00-0.50)  10^3/uL


 


PT     (9.2-11.2)  SEC


 


INR  4.1 H*    (0.9-1.1)  


 


ABG pH     (7.35-7.45)  


 


ABG pCO2     (35-45)  mmHG


 


ABG pO2     ()  mmHG


 


ABG HCO3     (22-26)  mmol/L


 


ABG Total CO2     (23-27)  mmol/L


 


ABG O2 Saturation     (95-98)  %


 


ABG Base Excess     (-2-3)  mmol/L


 


O2 Delivery Device     


 


Sodium     (136-145)  mmol/L


 


Potassium     (3.5-5.1)  mmol/L


 


Chloride     ()  mmol/L


 


Carbon Dioxide     (21.0-32.0)  mmol/L


 


Anion Gap     (5-15)  mmol/L


 


BUN     (7-18)  mg/dL


 


Creatinine     (0.51-1.17)  mg/dL


 


Est Cr Clr Drug Dosing     


 


Estimated GFR (MDRD)     mL/min


 


Glucose     ()  mg/dL


 


Lactic Acid   1.6   (0.4-2.0)  mmol/L


 


Calcium     (8.7-10.3)  mg/dL


 


Phosphorus     (2.6-4.7)  mg/dL


 


Magnesium     (1.8-2.4)  mg/dL


 


Total Bilirubin     (0.2-1.0)  mg/dL


 


AST     (15-37)  U/L


 


ALT     (14-63)  U/L


 


Alkaline Phosphatase     ()  U/L


 


Troponin I High Sens     


 


C-Reactive Protein     (0.0-0.9)  mg/dL


 


B-Natriuretic Peptide     (0-100)  pg/mL


 


Total Protein     (6.4-8.2)  g/dL


 


Albumin     (3.40-5.00)  g/dL


 


SARS CoV-2 RNA Rapid KATIE    Negative  (NEGATIVE)  














  06/26/21 06/27/21 06/27/21 Range/Units





  20:00 00:20 00:20 


 


WBC     (5.00-10.00)  10^3/uL


 


RBC     (3.80-5.50)  10^6/uL


 


Hgb     (12.0-16.0)  g/dL


 


Hct     (37.0-47.0)  %


 


MCV     (82.0-92.0)  fL


 


MCH     (27.0-31.0)  pg


 


MCHC     (32.0-36.0)  g/dL


 


RDW     (11.5-14.5)  %


 


Plt Count     (150-400)  10^3/uL


 


MPV     (7.4-10.4)  fL


 


Immature Gran % (Auto)     (0.0-5.0)  %


 


Neut % (Auto)     (50.0-70.0)  %


 


Lymph % (Auto)     (20.0-40.0)  %


 


Mono % (Auto)     (2.0-8.0)  %


 


Eos % (Auto)     (1.0-3.0)  %


 


Baso % (Auto)     (0.0-1.0)  %


 


Neut # (Auto)     (2.50-7.00)  10^3/uL


 


Lymph # (Auto)     (1.00-4.00)  10^3/uL


 


Mono # (Auto)     (0.10-0.80)  10^3/uL


 


Eos # (Auto)     (0.10-0.30)  10^3/uL


 


Baso # (Auto)     (0.00-0.10)  10^3/uL


 


Immature Gran # (Auto)     (0.00-0.50)  10^3/uL


 


PT     (9.2-11.2)  SEC


 


INR     (0.9-1.1)  


 


ABG pH  7.20 L*    (7.35-7.45)  


 


ABG pCO2  91 H*    (35-45)  mmHG


 


ABG pO2  54 L    ()  mmHG


 


ABG HCO3  42.9 H    (22-26)  mmol/L


 


ABG Total CO2  43 H    (23-27)  mmol/L


 


ABG O2 Saturation  81 L    (95-98)  %


 


ABG Base Excess  16 H    (-2-3)  mmol/L


 


O2 Delivery Device  Hi flow nasal cannu    


 


Sodium   150 H   (136-145)  mmol/L


 


Potassium   4.0   (3.5-5.1)  mmol/L


 


Chloride   106   ()  mmol/L


 


Carbon Dioxide   35.1 H   (21.0-32.0)  mmol/L


 


Anion Gap   12.9   (5-15)  mmol/L


 


BUN   30 H   (7-18)  mg/dL


 


Creatinine   1.72 H   (0.51-1.17)  mg/dL


 


Est Cr Clr Drug Dosing   16.55   


 


Estimated GFR (MDRD)   28   mL/min


 


Glucose   134   ()  mg/dL


 


Lactic Acid     (0.4-2.0)  mmol/L


 


Calcium   8.2 L   (8.7-10.3)  mg/dL


 


Phosphorus   6.7 H   (2.6-4.7)  mg/dL


 


Magnesium   1.5 L   (1.8-2.4)  mg/dL


 


Total Bilirubin     (0.2-1.0)  mg/dL


 


AST     (15-37)  U/L


 


ALT     (14-63)  U/L


 


Alkaline Phosphatase     ()  U/L


 


Troponin I High Sens    90.700 H*  


 


C-Reactive Protein     (0.0-0.9)  mg/dL


 


B-Natriuretic Peptide     (0-100)  pg/mL


 


Total Protein     (6.4-8.2)  g/dL


 


Albumin     (3.40-5.00)  g/dL


 


SARS CoV-2 RNA Rapid KATIE     (NEGATIVE)  














  06/27/21 06/27/21 Range/Units





  07:25 07:25 


 


WBC  41.20 H*   (5.00-10.00)  10^3/uL


 


RBC  3.88   (3.80-5.50)  10^6/uL


 


Hgb  11.6 L   (12.0-16.0)  g/dL


 


Hct  40.0   (37.0-47.0)  %


 


MCV  103.1 H   (82.0-92.0)  fL


 


MCH  29.9   (27.0-31.0)  pg


 


MCHC  29.0 L   (32.0-36.0)  g/dL


 


RDW  16.8 H   (11.5-14.5)  %


 


Plt Count  331   (150-400)  10^3/uL


 


MPV  10.2   (7.4-10.4)  fL


 


Immature Gran % (Auto)  0.2   (0.0-5.0)  %


 


Neut % (Auto)  48.8 L   (50.0-70.0)  %


 


Lymph % (Auto)  46.9 H   (20.0-40.0)  %


 


Mono % (Auto)  3.9   (2.0-8.0)  %


 


Eos % (Auto)  0.0 L   (1.0-3.0)  %


 


Baso % (Auto)  0.2   (0.0-1.0)  %


 


Neut # (Auto)  20.13 H   (2.50-7.00)  10^3/uL


 


Lymph # (Auto)  19.31 H   (1.00-4.00)  10^3/uL


 


Mono # (Auto)  1.60 H   (0.10-0.80)  10^3/uL


 


Eos # (Auto)  0.00 L   (0.10-0.30)  10^3/uL


 


Baso # (Auto)  0.07   (0.00-0.10)  10^3/uL


 


Immature Gran # (Auto)  0.09   (0.00-0.50)  10^3/uL


 


PT   43.5 H  (9.2-11.2)  SEC


 


INR   4.5 H*  (0.9-1.1)  


 


ABG pH    (7.35-7.45)  


 


ABG pCO2    (35-45)  mmHG


 


ABG pO2    ()  mmHG


 


ABG HCO3    (22-26)  mmol/L


 


ABG Total CO2    (23-27)  mmol/L


 


ABG O2 Saturation    (95-98)  %


 


ABG Base Excess    (-2-3)  mmol/L


 


O2 Delivery Device    


 


Sodium    (136-145)  mmol/L


 


Potassium    (3.5-5.1)  mmol/L


 


Chloride    ()  mmol/L


 


Carbon Dioxide    (21.0-32.0)  mmol/L


 


Anion Gap    (5-15)  mmol/L


 


BUN    (7-18)  mg/dL


 


Creatinine    (0.51-1.17)  mg/dL


 


Est Cr Clr Drug Dosing    


 


Estimated GFR (MDRD)    mL/min


 


Glucose    ()  mg/dL


 


Lactic Acid    (0.4-2.0)  mmol/L


 


Calcium    (8.7-10.3)  mg/dL


 


Phosphorus    (2.6-4.7)  mg/dL


 


Magnesium    (1.8-2.4)  mg/dL


 


Total Bilirubin    (0.2-1.0)  mg/dL


 


AST    (15-37)  U/L


 


ALT    (14-63)  U/L


 


Alkaline Phosphatase    ()  U/L


 


Troponin I High Sens    


 


C-Reactive Protein    (0.0-0.9)  mg/dL


 


B-Natriuretic Peptide    (0-100)  pg/mL


 


Total Protein    (6.4-8.2)  g/dL


 


Albumin    (3.40-5.00)  g/dL


 


SARS CoV-2 RNA Rapid KATIE    (NEGATIVE)  











Med Orders - Current: 


                               Current Medications





Acetaminophen (Acetaminophen 325 Mg Tab)  650 mg PO TID@0800,1300,2000 [facility]


   Last Admin: 06/27/21 08:41 Dose:  650 mg


   Documented by: 


Acetaminophen (Acetaminophen 650 Mg Tab.Er)  650 mg PO DAILY PRN


   PRN Reason: Pain


Buspirone HCl (Buspirone 10 Mg Tab)  10 mg PO TID@08,13,20 [facility]


   Last Admin: 06/27/21 08:41 Dose:  10 mg


   Documented by: 


Calcium Carbonate/Glycine (Calcium Carbonate 500 Mg Tab.Chew)  500 mg PO QID PRN


   PRN Reason: ACID REFLUX


Famotidine (Famotidine 20 Mg Tab)  20 mg PO BEDTIME [facility]


   Last Admin: 06/26/21 21:17 Dose:  20 mg


   Documented by: 


Ferrous Sulfate (Ferrous Sulfate 325 Mg Tab)  325 mg PO Q48H [facility]


Gabapentin (Gabapentin 100 Mg Cap)  200 mg PO BID@0800,1300 [facility]


   Last Admin: 06/27/21 08:41 Dose:  200 mg


   Documented by: 


Gabapentin (Gabapentin 300 Mg Cap)  300 mg PO BEDTIME [facility]


   Last Admin: 06/26/21 21:17 Dose:  300 mg


   Documented by: 


Sodium Chloride (Normal Saline)  1,000 mls @ 20 mls/hr IV ASDIRECTED [facility]


   Last Admin: 06/26/21 20:30 Dose:  20 mls/hr


   Documented by: 


Isosorbide Mononitrate (Isosorbide Mononitrate 30 Mg Tab.Er)  30 mg PO DAILY [facility]


   Last Admin: 06/27/21 08:41 Dose:  30 mg


   Documented by: 


Lorazepam (Lorazepam 0.5 Mg Tab)  0.25 mg PO Q8H PRN


   PRN Reason: Anxiety


   Last Admin: 06/26/21 21:18 Dose:  0.25 mg


   Documented by: 


Magnesium Oxide (Magnesium Oxide 500 Mg Tab)  500 mg PO DAILY [facility]


   Last Admin: 06/27/21 08:42 Dose:  500 mg


   Documented by: 


Melatonin (Melatonin 3 Mg Tab)  6 mg PO BEDTIME [facility]


   Last Admin: 06/26/21 21:17 Dose:  6 mg


   Documented by: 


Metoprolol Succinate (Metoprolol Succinate 25 Mg Tab.Er)  25 mg PO DAILY [facility]


   Last Admin: 06/27/21 08:40 Dose:  25 mg


   Documented by: 


Omeprazole (Omeprazole 20 Mg Cap.Cr)  20 mg PO ACBREAKFAST [facility]


   Last Admin: 06/27/21 07:48 Dose:  20 mg


   Documented by: 


Ropinirole HCl (Ropinirole 0.25 Mg Tab)  0.5 mg PO BEDTIME [facility]


   Last Admin: 06/26/21 21:18 Dose:  0.5 mg


   Documented by: 


Senna/Docusate Sodium (Docusate Sodium/Sennosides 50-8.6 Mg Tab)  1 tab PO DAILY

PRN


   PRN Reason: Constipation


Sertraline HCl (Sertraline 50 Mg Tab)  100 mg PO BEDTIME [facility]


   Last Admin: 06/26/21 21:17 Dose:  100 mg


   Documented by: 


Sodium Chloride (Sodium Chloride 0.9% 10 Ml Syringe)  10 ml FLUSH Q8HR PRN


   PRN Reason: keep vein open





Discontinued Medications





Furosemide (Furosemide 40 Mg/4 Ml Vial)  40 mg IVPUSH NOW ONE


   Stop: 06/26/21 19:12


   Last Admin: 06/26/21 19:19 Dose:  40 mg


   Documented by: 


Furosemide (Furosemide 20 Mg Tab)  20 mg PO BID@0800,1300 [facility]


Sodium Chloride (Normal Saline) Confirm Administered Dose 1,000 mls @ as 

directed .ROUTE .K-MED ONE


   Stop: 06/26/21 17:53


   Last Admin: 06/26/21 19:54 Dose:  Not Given


   Documented by: 


Potassium Chloride 20 meq/ (Premix)  100 mls @ 50 mls/hr IV ONETIME ONE


   Stop: 06/26/21 21:11


   Last Admin: 06/26/21 19:50 Dose:  50 mls/hr


   Documented by: 


Potassium Chloride 20 meq/ (Premix)  100 mls @ 50 mls/hr IV ONETIME ONE


   Stop: 06/26/21 23:14


   Last Admin: 06/26/21 22:06 Dose:  50 mls/hr


   Documented by: 











- Exam


Quality Assessment: Supplemental Oxygen (decreased to baseline of 1L NC)


General: Alert (to self), Cooperative, No Acute Distress


HEENT: Pupils Equal, Mucous Membr. Moist/Pink


Neck: Supple, Trachea Midline


Lungs: Decreased Breath Sounds.  No: Crackles, Wheezing


Cardiovascular: Irregular Rhythm (irregularly, irregular), Tachycardia (rates 

controlled 100s to 110s)


GI/Abdominal Exam: Normal Bowel Sounds, Soft, Non-Tender, No Distention


 (Female) Exam: Deferred


Back Exam: Normal Inspection


Extremities: Non-Tender, Pedal Edema (Non-pitting)


Peripheral Pulses: 2+: Dorsalis Pedis (L), Dorsalis Pedis (R)


Skin: Warm, Dry, Other (hematoma of left hand from blood draw).  No: Rash


Wound/Incisions: Dressing Dry and Intact


Neurological: No New Focal Deficit, Normal Speech


Psy/Mental Status: Alert, Normal Mood





- Patient Data


Lab Results Last 24 hrs: 


                         Laboratory Results - last 24 hr











  06/26/21 06/26/21 06/26/21 Range/Units





  00:20 17:45 17:50 


 


WBC   35.02 H*   (5.00-10.00)  10^3/uL


 


RBC   4.06   (3.80-5.50)  10^6/uL


 


Hgb   12.2   (12.0-16.0)  g/dL


 


Hct   41.2   (37.0-47.0)  %


 


MCV   101.5 H D   (82.0-92.0)  fL


 


MCH   30.0   (27.0-31.0)  pg


 


MCHC   29.6 L   (32.0-36.0)  g/dL


 


RDW   16.9 H   (11.5-14.5)  %


 


Plt Count   391   (150-400)  10^3/uL


 


MPV   10.5 H   (7.4-10.4)  fL


 


Immature Gran % (Auto)   0.3   (0.0-5.0)  %


 


Neut % (Auto)   32.8 L   (50.0-70.0)  %


 


Lymph % (Auto)   63.0 H   (20.0-40.0)  %


 


Mono % (Auto)   3.5   (2.0-8.0)  %


 


Eos % (Auto)   0.2 L   (1.0-3.0)  %


 


Baso % (Auto)   0.2   (0.0-1.0)  %


 


Neut # (Auto)   11.48 H   (2.50-7.00)  10^3/uL


 


Lymph # (Auto)   22.06 H   (1.00-4.00)  10^3/uL


 


Mono # (Auto)   1.24 H   (0.10-0.80)  10^3/uL


 


Eos # (Auto)   0.08 L   (0.10-0.30)  10^3/uL


 


Baso # (Auto)   0.07   (0.00-0.10)  10^3/uL


 


Immature Gran # (Auto)   0.09   (0.00-0.50)  10^3/uL


 


PT     (9.2-11.2)  SEC


 


INR     (0.9-1.1)  


 


ABG pH     (7.35-7.45)  


 


ABG pCO2     (35-45)  mmHG


 


ABG pO2     ()  mmHG


 


ABG HCO3     (22-26)  mmol/L


 


ABG Total CO2     (23-27)  mmol/L


 


ABG O2 Saturation     (95-98)  %


 


ABG Base Excess     (-2-3)  mmol/L


 


O2 Delivery Device     


 


Sodium    152 H  (136-145)  mmol/L


 


Potassium    3.2 L  (3.5-5.1)  mmol/L


 


Chloride    105  ()  mmol/L


 


Carbon Dioxide    37.9 H  (21.0-32.0)  mmol/L


 


Anion Gap    12.3  (5-15)  mmol/L


 


BUN    27 H  (7-18)  mg/dL


 


Creatinine    1.62 H  (0.51-1.17)  mg/dL


 


Est Cr Clr Drug Dosing    TNP  


 


Estimated GFR (MDRD)    30  mL/min


 


Glucose    153 H  ()  mg/dL


 


Lactic Acid     (0.4-2.0)  mmol/L


 


Calcium    8.9  (8.7-10.3)  mg/dL


 


Phosphorus     (2.6-4.7)  mg/dL


 


Magnesium     (1.8-2.4)  mg/dL


 


Total Bilirubin    0.5  (0.2-1.0)  mg/dL


 


AST    35  (15-37)  U/L


 


ALT    27  (14-63)  U/L


 


Alkaline Phosphatase    124 H  ()  U/L


 


Troponin I High Sens  Cancelled   119.400 H*  


 


C-Reactive Protein    5.7 H  (0.0-0.9)  mg/dL


 


B-Natriuretic Peptide    1000 H  (0-100)  pg/mL


 


Total Protein    6.6  (6.4-8.2)  g/dL


 


Albumin    2.99 L  (3.40-5.00)  g/dL


 


SARS CoV-2 RNA Rapid KATIE     (NEGATIVE)  














  06/26/21 06/26/21 06/26/21 Range/Units





  18:15 18:15 18:24 


 


WBC     (5.00-10.00)  10^3/uL


 


RBC     (3.80-5.50)  10^6/uL


 


Hgb     (12.0-16.0)  g/dL


 


Hct     (37.0-47.0)  %


 


MCV     (82.0-92.0)  fL


 


MCH     (27.0-31.0)  pg


 


MCHC     (32.0-36.0)  g/dL


 


RDW     (11.5-14.5)  %


 


Plt Count     (150-400)  10^3/uL


 


MPV     (7.4-10.4)  fL


 


Immature Gran % (Auto)     (0.0-5.0)  %


 


Neut % (Auto)     (50.0-70.0)  %


 


Lymph % (Auto)     (20.0-40.0)  %


 


Mono % (Auto)     (2.0-8.0)  %


 


Eos % (Auto)     (1.0-3.0)  %


 


Baso % (Auto)     (0.0-1.0)  %


 


Neut # (Auto)     (2.50-7.00)  10^3/uL


 


Lymph # (Auto)     (1.00-4.00)  10^3/uL


 


Mono # (Auto)     (0.10-0.80)  10^3/uL


 


Eos # (Auto)     (0.10-0.30)  10^3/uL


 


Baso # (Auto)     (0.00-0.10)  10^3/uL


 


Immature Gran # (Auto)     (0.00-0.50)  10^3/uL


 


PT     (9.2-11.2)  SEC


 


INR  4.1 H*    (0.9-1.1)  


 


ABG pH     (7.35-7.45)  


 


ABG pCO2     (35-45)  mmHG


 


ABG pO2     ()  mmHG


 


ABG HCO3     (22-26)  mmol/L


 


ABG Total CO2     (23-27)  mmol/L


 


ABG O2 Saturation     (95-98)  %


 


ABG Base Excess     (-2-3)  mmol/L


 


O2 Delivery Device     


 


Sodium     (136-145)  mmol/L


 


Potassium     (3.5-5.1)  mmol/L


 


Chloride     ()  mmol/L


 


Carbon Dioxide     (21.0-32.0)  mmol/L


 


Anion Gap     (5-15)  mmol/L


 


BUN     (7-18)  mg/dL


 


Creatinine     (0.51-1.17)  mg/dL


 


Est Cr Clr Drug Dosing     


 


Estimated GFR (MDRD)     mL/min


 


Glucose     ()  mg/dL


 


Lactic Acid   1.6   (0.4-2.0)  mmol/L


 


Calcium     (8.7-10.3)  mg/dL


 


Phosphorus     (2.6-4.7)  mg/dL


 


Magnesium     (1.8-2.4)  mg/dL


 


Total Bilirubin     (0.2-1.0)  mg/dL


 


AST     (15-37)  U/L


 


ALT     (14-63)  U/L


 


Alkaline Phosphatase     ()  U/L


 


Troponin I High Sens     


 


C-Reactive Protein     (0.0-0.9)  mg/dL


 


B-Natriuretic Peptide     (0-100)  pg/mL


 


Total Protein     (6.4-8.2)  g/dL


 


Albumin     (3.40-5.00)  g/dL


 


SARS CoV-2 RNA Rapid KATIE    Negative  (NEGATIVE)  














  06/26/21 06/27/21 06/27/21 Range/Units





  20:00 00:20 00:20 


 


WBC     (5.00-10.00)  10^3/uL


 


RBC     (3.80-5.50)  10^6/uL


 


Hgb     (12.0-16.0)  g/dL


 


Hct     (37.0-47.0)  %


 


MCV     (82.0-92.0)  fL


 


MCH     (27.0-31.0)  pg


 


MCHC     (32.0-36.0)  g/dL


 


RDW     (11.5-14.5)  %


 


Plt Count     (150-400)  10^3/uL


 


MPV     (7.4-10.4)  fL


 


Immature Gran % (Auto)     (0.0-5.0)  %


 


Neut % (Auto)     (50.0-70.0)  %


 


Lymph % (Auto)     (20.0-40.0)  %


 


Mono % (Auto)     (2.0-8.0)  %


 


Eos % (Auto)     (1.0-3.0)  %


 


Baso % (Auto)     (0.0-1.0)  %


 


Neut # (Auto)     (2.50-7.00)  10^3/uL


 


Lymph # (Auto)     (1.00-4.00)  10^3/uL


 


Mono # (Auto)     (0.10-0.80)  10^3/uL


 


Eos # (Auto)     (0.10-0.30)  10^3/uL


 


Baso # (Auto)     (0.00-0.10)  10^3/uL


 


Immature Gran # (Auto)     (0.00-0.50)  10^3/uL


 


PT     (9.2-11.2)  SEC


 


INR     (0.9-1.1)  


 


ABG pH  7.20 L*    (7.35-7.45)  


 


ABG pCO2  91 H*    (35-45)  mmHG


 


ABG pO2  54 L    ()  mmHG


 


ABG HCO3  42.9 H    (22-26)  mmol/L


 


ABG Total CO2  43 H    (23-27)  mmol/L


 


ABG O2 Saturation  81 L    (95-98)  %


 


ABG Base Excess  16 H    (-2-3)  mmol/L


 


O2 Delivery Device  Hi flow nasal cannu    


 


Sodium   150 H   (136-145)  mmol/L


 


Potassium   4.0   (3.5-5.1)  mmol/L


 


Chloride   106   ()  mmol/L


 


Carbon Dioxide   35.1 H   (21.0-32.0)  mmol/L


 


Anion Gap   12.9   (5-15)  mmol/L


 


BUN   30 H   (7-18)  mg/dL


 


Creatinine   1.72 H   (0.51-1.17)  mg/dL


 


Est Cr Clr Drug Dosing   16.55   


 


Estimated GFR (MDRD)   28   mL/min


 


Glucose   134   ()  mg/dL


 


Lactic Acid     (0.4-2.0)  mmol/L


 


Calcium   8.2 L   (8.7-10.3)  mg/dL


 


Phosphorus   6.7 H   (2.6-4.7)  mg/dL


 


Magnesium   1.5 L   (1.8-2.4)  mg/dL


 


Total Bilirubin     (0.2-1.0)  mg/dL


 


AST     (15-37)  U/L


 


ALT     (14-63)  U/L


 


Alkaline Phosphatase     ()  U/L


 


Troponin I High Sens    90.700 H*  


 


C-Reactive Protein     (0.0-0.9)  mg/dL


 


B-Natriuretic Peptide     (0-100)  pg/mL


 


Total Protein     (6.4-8.2)  g/dL


 


Albumin     (3.40-5.00)  g/dL


 


SARS CoV-2 RNA Rapid KATIE     (NEGATIVE)  














  06/27/21 06/27/21 Range/Units





  07:25 07:25 


 


WBC  41.20 H*   (5.00-10.00)  10^3/uL


 


RBC  3.88   (3.80-5.50)  10^6/uL


 


Hgb  11.6 L   (12.0-16.0)  g/dL


 


Hct  40.0   (37.0-47.0)  %


 


MCV  103.1 H   (82.0-92.0)  fL


 


MCH  29.9   (27.0-31.0)  pg


 


MCHC  29.0 L   (32.0-36.0)  g/dL


 


RDW  16.8 H   (11.5-14.5)  %


 


Plt Count  331   (150-400)  10^3/uL


 


MPV  10.2   (7.4-10.4)  fL


 


Immature Gran % (Auto)  0.2   (0.0-5.0)  %


 


Neut % (Auto)  48.8 L   (50.0-70.0)  %


 


Lymph % (Auto)  46.9 H   (20.0-40.0)  %


 


Mono % (Auto)  3.9   (2.0-8.0)  %


 


Eos % (Auto)  0.0 L   (1.0-3.0)  %


 


Baso % (Auto)  0.2   (0.0-1.0)  %


 


Neut # (Auto)  20.13 H   (2.50-7.00)  10^3/uL


 


Lymph # (Auto)  19.31 H   (1.00-4.00)  10^3/uL


 


Mono # (Auto)  1.60 H   (0.10-0.80)  10^3/uL


 


Eos # (Auto)  0.00 L   (0.10-0.30)  10^3/uL


 


Baso # (Auto)  0.07   (0.00-0.10)  10^3/uL


 


Immature Gran # (Auto)  0.09   (0.00-0.50)  10^3/uL


 


PT   43.5 H  (9.2-11.2)  SEC


 


INR   4.5 H*  (0.9-1.1)  


 


ABG pH    (7.35-7.45)  


 


ABG pCO2    (35-45)  mmHG


 


ABG pO2    ()  mmHG


 


ABG HCO3    (22-26)  mmol/L


 


ABG Total CO2    (23-27)  mmol/L


 


ABG O2 Saturation    (95-98)  %


 


ABG Base Excess    (-2-3)  mmol/L


 


O2 Delivery Device    


 


Sodium    (136-145)  mmol/L


 


Potassium    (3.5-5.1)  mmol/L


 


Chloride    ()  mmol/L


 


Carbon Dioxide    (21.0-32.0)  mmol/L


 


Anion Gap    (5-15)  mmol/L


 


BUN    (7-18)  mg/dL


 


Creatinine    (0.51-1.17)  mg/dL


 


Est Cr Clr Drug Dosing    


 


Estimated GFR (MDRD)    mL/min


 


Glucose    ()  mg/dL


 


Lactic Acid    (0.4-2.0)  mmol/L


 


Calcium    (8.7-10.3)  mg/dL


 


Phosphorus    (2.6-4.7)  mg/dL


 


Magnesium    (1.8-2.4)  mg/dL


 


Total Bilirubin    (0.2-1.0)  mg/dL


 


AST    (15-37)  U/L


 


ALT    (14-63)  U/L


 


Alkaline Phosphatase    ()  U/L


 


Troponin I High Sens    


 


C-Reactive Protein    (0.0-0.9)  mg/dL


 


B-Natriuretic Peptide    (0-100)  pg/mL


 


Total Protein    (6.4-8.2)  g/dL


 


Albumin    (3.40-5.00)  g/dL


 


SARS CoV-2 RNA Rapid KATIE    (NEGATIVE)  











Result Diagrams: 


                                 06/27/21 07:25





                                 06/27/21 07:25





Sepsis Event Note





- Evaluation


Sepsis Screening Result: Severe Sepsis Risk





- Focused Exam


Vital Signs: 


                                   Vital Signs











  Temp Pulse Pulse Resp BP BP Pulse Ox


 


 06/27/21 08:41      123/56 L  


 


 06/27/21 08:40   108 H    123/56 L  


 


 06/27/21 06:41  97.7 F   108 H  20   108/62  92 L


 


 06/27/21 02:45  96.4 F L   109 H  20   117/56 L  92 L


 


 06/26/21 23:00  98 F   106 H  16   105/46 L  92 L














- Problem List Review


Problem List Initiated/Reviewed/Updated: Yes





- My Orders


Last 24 Hours: 


My Active Orders





06/26/21 19:50


Height and Weight [RC] 07 


Oxygen Therapy [RC] PRN 


Up With Assistance [RC] ASDIRECTED 


VTE/DVT Education [RC] PER UNIT ROUTINE 


Vital Signs [RC] 03,07,11,15,19,23 


Resuscitation Status Routine 





06/26/21 19:51


Cardiac Monitoring [RC] 03,07,11,15,19,23 


Intake and Output [RC] 1400,2200,0600 





06/26/21 20:36


Acetaminophen [Tylenol Arthritis Pain]   650 mg PO DAILY PRN 


Calcium Carbonate [Tums]   500 mg PO QID PRN 


Docusate Sodium/Sennosides [Senna Plus]   1 tab PO DAILY PRN 


LORazepam [Ativan]   0.25 mg PO Q8H PRN 





06/26/21 21:00


Famotidine [Pepcid]   20 mg PO BEDTIME 


Gabapentin [Neurontin]   300 mg PO BEDTIME 


Melatonin   6 mg PO BEDTIME 


Sertraline [Zoloft]   100 mg PO BEDTIME 


rOPINIRole [Requip]   0.5 mg PO BEDTIME 





06/27/21 07:25


BASIC METABOLIC PANEL,BMP [CHEM] AM 


PROCALCITONIN [REF] Routine 


TROPONIN I HIGH SENSITIVITY [CHEM] Routine 





06/27/21 07:30


Omeprazole   20 mg PO ACBREAKFAST 





06/27/21 Breakfast


Heart Healthy Diet [DIET] 


Acetaminophen [TylenoL]   650 mg PO TID@0800,1300,2000 


Gabapentin [Neurontin]   200 mg PO BID@0800,1300 


busPIRone [Buspar]   10 mg PO TID@08,13,20 





06/27/21 08:54


LACTIC ACID [CHEM] Routine 





06/27/21 09:00


Isosorbide Mononitrate [Imdur]   30 mg PO DAILY 


Magnesium Oxide   500 mg PO DAILY 


Metoprolol Succinate [Toprol XL]   25 mg PO DAILY 





06/28/21 09:00


Ferrous Sulfate   325 mg PO Q48H 














- Plan


Plan:: 


HPI summary: Valerie is an 87y F resident of [facility] in [place], [place] who complained of chest pain and shortness of breath around 1630 this 

afternoon.  O2 sat 78% on 2L at that time and she was noted to have some 

respiratory distress by nursing staff.  [facility] contacted Dr Ivey who 

provided order to have patient transported by EMS to ER for evaluation and 

treatment.  O2 83% on 15L non-rebreather while in transport via EMS.  





ED course:  Patient in moderate respiratory distress upon arrival to ER.  CXR 

indicated borderline edema and minimal pleural reaction to the bilateral bases 

per radiology report.  EKG indicated afib RVR with rates 140s initially. 

Afebrile, BP stable 133/80.  Patient alert to self, has cognitive deficit at 

baseline.  WBC 35.04 with lymphocytosis.  LA normal 1.6.  Na 152, K 3.2, BUN 27,

Crt 1.62.  CRP elevated at 5.7.  Troponin elevated at 119.400, patient denied 

chest pain in ER.  BNP 1000 with known history of HFpEF.  Chronic 

anticoagulation on coumadin, INR 4.1.  Blood cultures x2 pending.  EKG repeated;

atrial fib, rates 130s.  Respiratory status improved while in ER, patient 

reported feeling better than when she arrived.  Patient to be admitted to 

inpatient status for treatment of multiple cardiopulmonary concerns.  





Hospital course: 


6/26/21:  Patient given lasix 40mg IV x 1 dose.  Orders placed for KCl riders of

40mEq to replace K due to hypokalemia and lasix administration.  Patient on high

flow nasal cannula at 8-10L on admit to floor.  ABG's obtained for baseline, pH 

7.2, PCO2 91, trial of BIPAP conducted with pressure of 10/5, however patient 

did not tolerate the BIPAP and refused to continue this due to discomfort.  NC 

HFC reapplied.  Patient denies chest pain.  HR irregularly irregular, rates 

110s.  Repeat troponin, BMP at midnight and will add Mg and Phos due to 

hypokalemia.  Repeat CBC, BMP, INR in am. 





6/27/21:  Patient reports feeling better this morning, states "I am starting to 

come around."  Denies chest pain, shortness of breath.  HR continues to be 

irregularly irregular, rates controlled 100s to 110s.  WBC increased to 41.20 

this morning, neutrophils 48.8%, lymphocytes 46.9%, Hgb 11.6, Plt 331.  Na 149, 

K stable at 4.0, BUN increased to 34 and creatinine 1.88. Small bolus given due 

to suspected mild overdiuresis. Encourage protein supplementation. Troponin 

decreased further to 87.000.  Repeated lactic acid this am 1.2.  Blood cultures 

and procalcitonin pending.  Will recheck CRP.  Repeat labs of CBC, CMP, INR, Mg 

and Phos ordered for Monday am. 





Hospitalization problems and plan:


# Acute on chronic hypoxic respiratory failure; improving.  ABG obtained:  pH 

7.2, CO2 91. Patient unable to tolerate trial of BIPAP.  Oxygenation improved 

following IV lasix administration. O2 flow rate decreased gradually throughout 

the night and currently at baseline O2 of 1L NC


   - Monitor respiratory status 


   - Maintain O2 sat > 88% given chronic respiratory failure





# CHF exacerbation - last echo 2/12/13; EF 60%.  BNP 1000 in ER.  CXR indicated 

borderline edema, small bilateral pleural effusions.  Lasix 40mg IV x1 given 

upon admit for suspected fluid volume overload given shortness of breath and 

peripheral edema.  


   - Will hold home lasix for now as BUN, creatinine increased slightly and 

suspect mild dehydration due to diuresis, restart once appropriate based on 

clinical course


   - Due to suspect mild overdiuresis, will give 250mg IV fluid bolus now and 

then continue TKO


   - Consider repeating Echo on outpatient basis 





# Atrial fibrillation, acute onset: rates further improved to low 100s to 110s. 

Suspect this is related to CHF exacerbation  


   - continuous cardiac monitoring  


   - One time dose of additional metoprolol succinate X 25mg


   - Increase metoprolol XL 50mg daily starting tomorrow for additional rate 

control


   - TSH pending





# elevated troponin: (suspect related to CHF exacerbation, acute onset atrial 

fib); continues to deny chest pain


   - Initial trop in .400, repeat at midnight decreased to 90.7, trop 

87.000 this morning.  





# Hypokalemia -  K 3.2 prior to IV lasix at admit.   40 mEq KCl riders given x 

1.  Repeated BMP at midnight; repeat K 4.0.  Stable this morning, 4.0.


   - Repeat BMP Monday am 


   - Consider adding PO supplement to be taken with lasix at [facility] 





# Hypomagnesemia (Mg 1.5)


   - Will start magnesium oxide 500mg PO daily 


   - Recheck Mg Monday am





# Supratherapeutic INR - 4.5 this morning


   - Continue holding coumadin, vitamin K - pharmacy to dose


   - Will hold home vitamin K for now and request pharmacy to dose along with 

coumadin


   - Repeat INR Monday am





# Leukocytosis - WBC 35.04 initially in ER, increased to 41.02 this morning; No 

acute elevation of neutrophils to suggest acute bacterial infection.  Patient 

afebrile, normotensive, normal lactic acid in ER, repeated this am 1.2.  

Question possible CLL.   


   - Blood cultures pending x2


   - Procalcitonin pending


   - Peripheral smear pending





# Macrocytosis - .1


   - B12 pending


   - Folate pending





# hypoalbuminemia - albumin 2.99


   - Protein supplementation of boost or ensure


   - Repeat CMP Monday am 





# Palliative care status in the setting of critical illness:  Discussed plan of 

care with both patient's son, Juan Manuel and granddaughter, Saumya who both verbalized

agreement with the DNR/DNI status.  Juan Manuel states that if her status does not 

improve he would prefer to "let her go."  





Chronic, stable conditions:


# CAD - continue imdur 30mg SR PO daily, metoprolol succinate 25mg XL PO daily 


# Hypertension - stable.  


# Chronic respiratory failure - on supplemental oxygen at 1-3L at baseline 


# Chronic DVT - Holding coumadin and vitamin K; pharmacy to dose.  


# CKD Stage III


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 

lymphedema - Patient is followed by [facility] wound care clinic for this and was last

seen on 6/22/21.  No indication of cellulitis at that time.  Patient was 

recently hospitalized at [facility] for cellulitis of the affected area and 

has been on multiple courses of antibiotics in the recent past.  


# GERD - continue omeprazole 20mg PO AM, pepcid 20mg HS


# Restless legs syndrome - continue requip 0.5mg PO HS 


# Peripheral neuropathy - continue gabapentin 200mg PO BID and 300mg at HS 


# Malignant neoplasm of right breast, estrogen receptor positive


# Nontoxic multinodular goiter


# Anxiety - continue buspar 10mg PO TID 


# Depression - continue zoloft 100mg PO daily 


# Osteoporosis 


# Insomnia - continue melatonin 6mg HS 


# Cognitive impairment


# Impaired fasting glucose


# Hx of melanoma


# Hx of neoplasm of ovary





Hospitalization details:


# FEN: NS bolus of 250ml for suspected mild overdiuresis, then TKO, K stable 

following IV supplementation, mag oxide 500mg PO daily, heart healthy with 

protein supplement


# PPX: Chronic anticoagulation on coumadin, supratherapeutic INR 4.5, home 

omeprazole


# Code status: DNR/DNI 


# Emergency contact: Juan Manuel (son) [phone] or granddaughter Lzizette 

[phone] both updated after admit per myself:  Per son Juan Manuel, no other family

members are to be given updates. 


# Disposition: Patient continues to meet criteria for inpatient status for 

further cardiac monitoring due to acute onset a-fib; repeat labs.

## 2021-06-28 VITALS — DIASTOLIC BLOOD PRESSURE: 66 MMHG | HEART RATE: 103 BPM | SYSTOLIC BLOOD PRESSURE: 123 MMHG

## 2021-06-28 NOTE — [id number]
**Discharge Summary





- Hospital Course


Free Text/Narrative:: 


Date of admission:


6/26/21





Date of discharge: 


6/28/21





Admission diagnoses:


# Acute on chronic hypoxic respiratory failure


# Acute exacerbation of heart failure with preserved ejection fraction


# Atrial fibrillation, new onset


# Non-STEMI


# Hypernatremia


# Hypokalemia 


# Hypomagnesemia


# Supratherapeutic INR 


# Leukocytosis/lymphocytosis


# Macrocytosis


# Hypoalbuminemia


# Palliative care status





Discharge diagnoses:


# Acute on chronic hypoxic respiratory failure


# Acute exacerbation of heart failure with preserved ejection fraction


# Atrial fibrillation, new onset


# Non-STEMI


# Hypernatremia


# Hypokalemia, resolved


# Hypomagnesemia


# Supratherapeutic INR 


# Leukocytosis/lymphocytosis


# Macrocytosis


# Hypoalbuminemia


# Hospice care status





Chronic conditions: 


# Coronary artery disease 


# Hypertension


# Chronic DVT 


# GERD


# CKD, stage III


# Nontoxic multinodular goiter


# Restless legs syndrome


# Peripheral neuropathy


# Osteoporosis 


# Generalized anxiety disorder


# Depression


# Insomnia


# Cognitive impairment


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


# Lymphedema 


# History of malignant neoplasm of right breast, estrogen receptor positive


# History of neoplasm of ovary


# History of melanoma





Consultations:


None





Procedures:


None





Hospital course:


Ms. Chaudhari is an 87yoF resident of [facility] in [place], [place], with 

a history most notable for COPD and HFpEF who complained of chest pain and 

shortness of breath around 1630 on the afternoon of 6/26/21. O2 sat was noted to

be 78% on her baseline 2lpm and she was noted to have respiratory distress.  She

was transported via EMS to the [facility] ED. En route, she was requiring 15lmp 

via non-rebreather with O2 sat remaining in the mid-80s.  





In the ED, she was noted to be in moderate respiratory distress. VS notable for 

tachycardia and mild tachypnea, but afebrile and /80. Patient was alert to

self, but not otherwise conversant consistent with baseline cognitive deficits. 

EKG revealed atrial fibrillation (new for her) with RVR. Labs notable for WBC 35

with significant lymphocytic predominance, Na 152, K 3.2, Cr 1.62, BNP 1000, 

troponin 119.4, LA 1.6, and INR 4.1. CXR revealed edema. Status and care 

planning discussed with guardian/son Juan Manuel who confirmed DNR/DNI status and 

agreed for local admission to see how she would do with generally conservative 

management. 





Upon admission, ABG obtained (7.2/91/54-43) and she was trialed on BiPAP, but 

didn't tolerate so it was discontinued. She was given furosemide 40mg IV with 

subsequent excellent improvement in respiratory status with ability to decrease 

from 10lpm to her baseline 2lpm. Potassium was replaced. Pulse improved, but she

remained in atrial fibrillation. In the setting of lack of infectious clinical 

picture or identified etiology, significant leukocytosis of lymphocyte 

predominance was thought to be of possible malignant origin, as it was also 

noted on prior labs; regardless, blood cultures and procalcitonin obtained. 

Subsequent labs revealed generally stable, but elevated WBC with significant ly

mphocytosis, improved Na and K, mild further increase in Cr, downtrending of 

troponin, and ongoing normal LA. On the afternoon of 6/27/21, she began to 

develop hypotension which was initially treated with 250cc NS boluses with 

initial improvement, but later with persistent hypotension. Notification was 

received that one of the two blood cultures was positive for gram negative 

organism with the other culture without growth. Goals of care were discussed 

with the son and granddaughter on multiple occasions, and desire was to continue

to focus on comfort and discontinue non-comfort measures. 





On the night prior to discharge, patient appeared very comfortable and for a 

brief period of time was lucid and mildly conversant. On the morning of 

discharge, patient appeared tired, but was in no significant distress and VS 

overall notable for mild tachycardia, but stabilized blood pressure. Son was 

again updated with status and he desired for patient to be discharged back to 

SNF where she can be in familiar surroundings and desired referral to hospice, 

for which he chose referral to [facility]. 





Discharge and follow-up recommendations:


- Discharge back to [facility] 


- Referral to [facility]


- Medication changes at discharge: Discontinue all prior medications. Morphine, 

lorazepam, and glycopyrrolate ordered for comfort.


- Follow-up on facility rounds





- Discharge Data


Discharge Date: 06/28/21


Discharge Disposition: DC/Tfer to SNF 03


Condition: Poor





- Referral to Home Health


Primary Care Physician: 


Elizabeth Becerra MD








- Patient Instructions


Diet: Regular Diet as Tolerated


Activity: As Tolerated





- Discharge Plan


*PRESCRIPTION DRUG MONITORING PROGRAM REVIEWED*: Not Applicable


*COPY OF PRESCRIPTION DRUG MONITORING REPORT IN PATIENT CHARLA: Not Applicable


Prescriptions/Med Rec: 


Glycopyrrolate in Water/Pf [Glycopyrrolate 0.2 mg/ml Syrng] 0.2 mg IV Q4HR PRN 

#5 syringe


 PRN Reason: secretions


Home Medications: 


                                    Home Meds





Glycopyrrolate in Water/Pf [Glycopyrrolate 0.2 mg/ml Syrng] 0.2 mg IV Q4HR PRN 

#5 syringe 06/28/21 [Rx]


LORazepam [Ativan] 1 mg IVPUSH Q4H PRN  vial 06/28/21 [Rx]


Morphine 1 mg IVPUSH Q1H PRN  syringe 06/28/21 [Rx]


Sodium Chloride 0.9% [Saline Flush] 10 ml FLUSH Q8HR PRN  syringe 06/28/21 [Rx]








Oxygen Therapy Mode: Nasal Cannula


Oxygen Flow Rate (L/min): 2 (adjust for comfort)





- Discharge Summary/Plan Comment


DC Time >30 min.: Yes





- General Info


Date of Service: 06/28/21


Admission Dx/Problem (Free Text: 


Able to answer some yes/no questions verbally or with nod/shake of head, 

including denying pain. Unable to otherwise engage in conversation.





- Patient Data


Vitals - Most Recent: 


                                Last Vital Signs











Temp  35.5 C L  06/27/21 15:00


 


Pulse  103 H  06/28/21 09:49


 


Resp  20   06/27/21 15:00


 


BP  123/66   06/28/21 09:49


 


Pulse Ox  91 L  06/28/21 09:49











Weight - Most Recent: 74.026 kg


I&O - Last 24 hours: 


                                 Intake & Output











 06/27/21 06/28/21 06/28/21





 22:59 06:59 14:59


 


Intake Total 50 50 154


 


Balance 50 50 154











Lab Results - Last 24 hrs: 


                         Laboratory Results - last 24 hr











  06/27/21 Range/Units





  07:25 


 


C-Reactive Protein  12.3 H  (0.0-0.9)  mg/dL


 


TSH, Ultra Sensitive  0.328 L  (0.340-4.820)  uIU/mL











SIENNA Results - Last 24 hrs: 


                                  Microbiology











 06/26/21 18:15 Aerobic Blood Culture - Preliminary





 Blood - Venous - Lab Draw    NO GROWTH AFTER 1 DAY





 Anaerobic Blood Culture - Preliminary





    NO GROWTH AFTER 1 DAY


 


 06/26/21 18:15 Aerobic Blood Culture - Preliminary





 Blood - Venous Anaerobic Blood Culture - Preliminary





    NO GROWTH AFTER 1 DAY











Med Orders - Current: 


                               Current Medications





Glycopyrrolate (Glycopyrrolate 0.2 Mg/Ml 5 Ml Syringe)  0.5 mg IVPUSH Q2H PRN


   PRN Reason: secretions


Lorazepam (Lorazepam 2 Mg/Ml Sdv)  1 mg IVPUSH Q4H PRN


   PRN Reason: restlessness


Morphine Sulfate (Morphine 2 Mg/Ml Syringe)  1 mg IVPUSH Q1H PRN


   PRN Reason: Dyspnea


   Last Admin: 06/28/21 10:19 Dose:  1 mg


   Documented by: 


Sodium Chloride (Sodium Chloride 0.9% 10 Ml Syringe)  10 ml FLUSH Q8HR PRN


   PRN Reason: keep vein open





Discontinued Medications





Acetaminophen (Acetaminophen 325 Mg Tab)  650 mg PO TID@0800,1300,2000 [facility]


   Last Admin: 06/27/21 13:55 Dose:  650 mg


   Documented by: 


Acetaminophen (Acetaminophen 650 Mg Tab.Er)  650 mg PO DAILY PRN


   PRN Reason: Pain


Buspirone HCl (Buspirone 10 Mg Tab)  10 mg PO TID@08,13,20 [facility]


   Last Admin: 06/27/21 13:55 Dose:  10 mg


   Documented by: 


Calcium Carbonate/Glycine (Calcium Carbonate 500 Mg Tab.Chew)  500 mg PO QID PRN


   PRN Reason: ACID REFLUX


Famotidine (Famotidine 20 Mg Tab)  20 mg PO BEDTIME [facility]


   Last Admin: 06/26/21 21:17 Dose:  20 mg


   Documented by: 


Ferrous Sulfate (Ferrous Sulfate 325 Mg Tab)  325 mg PO Q48H [facility]


Furosemide (Furosemide 40 Mg/4 Ml Vial)  40 mg IVPUSH NOW ONE


   Stop: 06/26/21 19:12


   Last Admin: 06/26/21 19:19 Dose:  40 mg


   Documented by: 


Furosemide (Furosemide 20 Mg Tab)  20 mg PO BID@0800,1300 [facility]


Gabapentin (Gabapentin 100 Mg Cap)  200 mg PO BID@0800,1300 [facility]


   Last Admin: 06/27/21 13:55 Dose:  200 mg


   Documented by: 


Gabapentin (Gabapentin 300 Mg Cap)  300 mg PO BEDTIME [facility]


   Last Admin: 06/26/21 21:17 Dose:  300 mg


   Documented by: 


Sodium Chloride (Normal Saline)  1,000 mls @ 50 mls/hr IV ASDIRECTED [facility]


   Last Admin: 06/26/21 20:30 Dose:  20 mls/hr


   Documented by: 


Sodium Chloride (Normal Saline) Confirm Administered Dose 1,000 mls @ as 

directed .ROUTE .STK-MED ONE


   Stop: 06/26/21 17:53


   Last Admin: 06/26/21 19:54 Dose:  Not Given


   Documented by: 


Potassium Chloride 20 meq/ (Premix)  100 mls @ 50 mls/hr IV ONETIME ONE


   Stop: 06/26/21 21:11


   Last Admin: 06/26/21 19:50 Dose:  50 mls/hr


   Documented by: 


Potassium Chloride 20 meq/ (Premix)  100 mls @ 50 mls/hr IV ONETIME ONE


   Stop: 06/26/21 23:14


   Last Admin: 06/26/21 22:06 Dose:  50 mls/hr


   Documented by: 


Sodium Chloride (Normal Saline)  250 mls @ 999 mls/hr IV ASDIRECTED [facility]


Sodium Chloride (Normal Saline)  250 mls @ 999 mls/hr IV .BOLUS ONE


   Stop: 06/27/21 16:00


   Last Admin: 06/27/21 15:45 Dose:  999 mls/hr


   Documented by: 


Sodium Chloride (Normal Saline)  250 mls @ 999 mls/hr IV .BOLUS ONE


   Stop: 06/27/21 16:40


   Last Admin: 06/27/21 16:25 Dose:  999 mls/hr


   Documented by: 


Isosorbide Mononitrate (Isosorbide Mononitrate 30 Mg Tab.Er)  30 mg PO DAILY [facility]


   Last Admin: 06/27/21 08:41 Dose:  30 mg


   Documented by: 


Lorazepam (Lorazepam 0.5 Mg Tab)  0.25 mg PO Q8H PRN


   PRN Reason: Anxiety


   Last Admin: 06/26/21 21:18 Dose:  0.25 mg


   Documented by: 


Magnesium Oxide (Magnesium Oxide 500 Mg Tab)  500 mg PO DAILY [facility]


   Last Admin: 06/27/21 08:42 Dose:  500 mg


   Documented by: 


Melatonin (Melatonin 3 Mg Tab)  6 mg PO BEDTIME [facility]


   Last Admin: 06/26/21 21:17 Dose:  6 mg


   Documented by: 


Metoprolol Succinate (Metoprolol Succinate 25 Mg Tab.Er)  25 mg PO DAILY [facility]


   Last Admin: 06/27/21 08:40 Dose:  25 mg


   Documented by: 


Metoprolol Succinate (Metoprolol Succinate 25 Mg Tab.Er)  25 mg PO ONETIME ONE


   Stop: 06/27/21 11:10


   Last Admin: 06/27/21 11:41 Dose:  25 mg


   Documented by: 


Metoprolol Succinate (Metoprolol Succinate 50 Mg Tab.Er)  50 mg PO DAILY [facility]


Omeprazole (Omeprazole 20 Mg Cap.Cr)  20 mg PO ACBREAKFAST [facility]


   Last Admin: 06/27/21 07:48 Dose:  20 mg


   Documented by: 


Ropinirole HCl (Ropinirole 0.25 Mg Tab)  0.5 mg PO BEDTIME [facility]


   Last Admin: 06/26/21 21:18 Dose:  0.5 mg


   Documented by: 


Senna/Docusate Sodium (Docusate Sodium/Sennosides 50-8.6 Mg Tab)  1 tab PO DAILY

 PRN


   PRN Reason: Constipation


Sertraline HCl (Sertraline 50 Mg Tab)  100 mg PO BEDTIME [facility]


   Last Admin: 06/26/21 21:17 Dose:  100 mg


   Documented by: 











- Exam


Physical Findings Comments:: 


GENERAL: Elderly white female lying in hospital bed with mild tachypnea.


HEENT: Normocephalic, atraumatic.  Eyes closed.  Nasal cannula in place  Mucous 

membranes dry .


NECK: Supple, no masses.


CV: Irregularly irregular, tachycardic, +S3.  1+ radial and pedal pulses.


PULMONARY: Tachypnea, diminished breath sounds in bases, scattered crackles, no 

wheezes or rhonchi.


ABDOMEN: Positive bowel sounds, soft, nontender, nondistended.


EXTREMITIES: Cool hands and feet, trace lower extremity edema of the ankles, no 

cyanosis.


MUSCULOSKELETAL: Moves all extremities.


NEUROLOGICAL: No obvious deficits.


DERMATOLOGIC: Scattered ecchymoses, dressing in place on R lateral leg.


PSYCHIATRIC: Minimally interactive.

## 2021-06-28 NOTE — PCM.SN.2
- Free Text/Narrative


Note: 


Discharge planning process completed with patient accepted for return to skilled

nursing facility. Due to her profound weakness at end-of-life and being bed 

confined with inability to sit in a wheelchair, she is unable to transport via 

private vehicle and so requires transport via EMS in a nonurgent manner. Medical

necessity certification statement form completed.

## 2024-10-31 NOTE — PN
04/28/2018 PATIENT NAME:  GRETCHEN MIR

 

CHIEF COMPLAINT:  No shortness of breath, feels better, she does have edema in

her left lower extremities.

 

HISTORY:  This 84-year-old very pleasant female.  She was initially seen by

Carmela Denise, nurse practitioner [facility].  Her son had brought

her in to the clinic to be evaluated and she is followed closely by home health

nurse.  She had been recently taken out of the nursing home by the family.

However, the son brought her in for acute on chronic edema with a subsequent

ultrasound showing DVT, left common femoral vein and left superficial femoral

vein as well as a deep vein in her deep femoral proximal and her left greater

saphenous vein.  All incompletely compressible.  Also nonocclusive thrombus in

her right common femoral vein.  She was admitted for [facility] for anticoagulation and

hemodynamic monitoring, needing bridging therapy.

 

PHYSICAL EXAMINATION:  The patient is a do not resuscitate.

VITAL SIGNS:  She is 176 pounds, BMI 30.  Blood pressure 118/68, heart rate 76,

temperature 98.5, respiratory rate 22, O2 saturation on 3 L is 98%.

GENERAL:  The patient is alert and oriented.

LUNGS:  Clear to auscultation.

CV:  Regular rate and rhythm.  No murmur.

GI:  Slightly distended.  Decreased bowel tones.  Significant constipation with

stool in vault, hard stools.

EXTREMITIES:  Lower extremities bilateral edema 3+ nonpitting left, 2+

nonpitting on her right.  Bilateral anterior shin stasis dermatitis.

 

LABS:  White count normal 5.8, hemoglobin 10.6, hematocrit 32.4.  Initial INR

1.0 and 1.1 today respectively.  Sodium 148, BUN 20, creatinine 1.26, glucose

100, calcium 7.6, total protein 5.2, albumin 2.37.

 

IMPRESSION AND PLAN:

1. Deep vein thrombosis, left common femoral vein, femoral vein proximally,

    proximal and left greater saphenous vein.  Continue with Coumadin, bridging

    with Lovenox.  Monitor for any hemodynamic instability.  Continue with Ace

    wraps.  Yovani stockings do not fit at this time.  Monitor INR closely.

2. Constipation, acute.  Possible opioid induced, acute on chronic.  Give her

    dose of Movantik today, soapsuds enema, docusate sodium, whole prunes.

3. Cellulitis, left lower extremity, mild prudent to continue with Keflex p.o.

4. Congestive heart failure, diastolic, chronic.  Appears stable.  Continue

    with metoprolol.  20 mg of Lasix daily.  We will monitor her electrolytes.

    Monitor blood pressure.

5. Depression, stable on Zoloft.

6. Restless legs syndrome, continue with Requip.

 

OVERALL PLAN:  Continue with anticoagulation with bridging efforts at the

patient plans on being discharged with Coumadin.  She will need proper

therapeutic monitoring on her INRs.  Ace wraps today and soapsuds enema today.

 

DD:  04/28/2018 10:48:02

DT:  04/28/2018 13:26:23

Job #:  [id number] DIAGNOSIS: left foot pain / [employer] / referall by Gavi Stacy,    APPOINTMENT DATE: 11/5/24   NOTES STATUS DETAILS   OFFICE NOTE from referring provider Internal 9/19/24 - Gavi Stacy, ROSHAN CNP - Internal Med    MEDICATION LIST Internal